# Patient Record
Sex: MALE | Race: WHITE | NOT HISPANIC OR LATINO | Employment: OTHER | URBAN - NONMETROPOLITAN AREA
[De-identification: names, ages, dates, MRNs, and addresses within clinical notes are randomized per-mention and may not be internally consistent; named-entity substitution may affect disease eponyms.]

---

## 2017-02-14 ENCOUNTER — HOSPITAL ENCOUNTER (OUTPATIENT)
Facility: HOSPITAL | Age: 72
Setting detail: SURGERY ADMIT
End: 2017-02-14
Attending: ORTHOPAEDIC SURGERY | Admitting: ORTHOPAEDIC SURGERY

## 2017-02-28 ENCOUNTER — TRANSCRIBE ORDERS (OUTPATIENT)
Dept: ADMINISTRATIVE | Facility: HOSPITAL | Age: 72
End: 2017-02-28

## 2017-02-28 DIAGNOSIS — I25.10 ATHEROSCLEROSIS OF NATIVE CORONARY ARTERY OF NATIVE HEART WITHOUT ANGINA PECTORIS: Primary | ICD-10-CM

## 2017-02-28 DIAGNOSIS — Z01.818 PREOPERATIVE CLEARANCE: ICD-10-CM

## 2017-03-06 ENCOUNTER — HOSPITAL ENCOUNTER (OUTPATIENT)
Dept: GENERAL RADIOLOGY | Facility: HOSPITAL | Age: 72
Discharge: HOME | End: 2017-03-06
Admitting: ORTHOPAEDIC SURGERY

## 2017-03-06 ENCOUNTER — APPOINTMENT (OUTPATIENT)
Dept: PREADMISSION TESTING | Facility: HOSPITAL | Age: 72
End: 2017-03-06

## 2017-03-06 VITALS
WEIGHT: 233.2 LBS | OXYGEN SATURATION: 97 % | RESPIRATION RATE: 18 BRPM | BODY MASS INDEX: 31.59 KG/M2 | SYSTOLIC BLOOD PRESSURE: 170 MMHG | HEIGHT: 72 IN | DIASTOLIC BLOOD PRESSURE: 66 MMHG | HEART RATE: 66 BPM

## 2017-03-06 LAB
ALBUMIN SERPL-MCNC: 3.8 G/DL (ref 3.5–5)
ALBUMIN/GLOB SERPL: 1.3 G/DL (ref 1.1–2.5)
ALP SERPL-CCNC: 100 U/L (ref 24–120)
ALT SERPL W P-5'-P-CCNC: 84 U/L (ref 0–54)
ANION GAP SERPL CALCULATED.3IONS-SCNC: 10 MMOL/L (ref 4–13)
APTT PPP: 29.9 SECONDS (ref 24.1–34.8)
AST SERPL-CCNC: 199 U/L (ref 7–45)
BACTERIA UR QL AUTO: ABNORMAL /HPF
BASOPHILS # BLD AUTO: 0.03 10*3/MM3 (ref 0–0.2)
BASOPHILS NFR BLD AUTO: 0.5 % (ref 0–2)
BILIRUB SERPL-MCNC: 0.6 MG/DL (ref 0.1–1)
BILIRUB UR QL STRIP: NEGATIVE
BUN BLD-MCNC: 24 MG/DL (ref 5–21)
BUN/CREAT SERPL: 17.4 (ref 7–25)
CALCIUM SPEC-SCNC: 9.2 MG/DL (ref 8.4–10.4)
CHLORIDE SERPL-SCNC: 102 MMOL/L (ref 98–110)
CLARITY UR: CLEAR
CO2 SERPL-SCNC: 30 MMOL/L (ref 24–31)
COLOR UR: YELLOW
CREAT BLD-MCNC: 1.38 MG/DL (ref 0.5–1.4)
DEPRECATED RDW RBC AUTO: 42.4 FL (ref 40–54)
EOSINOPHIL # BLD AUTO: 0.26 10*3/MM3 (ref 0–0.7)
EOSINOPHIL NFR BLD AUTO: 4.1 % (ref 0–4)
ERYTHROCYTE [DISTWIDTH] IN BLOOD BY AUTOMATED COUNT: 13.9 % (ref 12–15)
GFR SERPL CREATININE-BSD FRML MDRD: 51 ML/MIN/1.73
GLOBULIN UR ELPH-MCNC: 2.9 GM/DL
GLUCOSE BLD-MCNC: 157 MG/DL (ref 70–100)
GLUCOSE UR STRIP-MCNC: NEGATIVE MG/DL
HCT VFR BLD AUTO: 40.1 % (ref 40–52)
HGB BLD-MCNC: 13.2 G/DL (ref 14–18)
HGB UR QL STRIP.AUTO: NEGATIVE
HYALINE CASTS UR QL AUTO: ABNORMAL /LPF
IMM GRANULOCYTES # BLD: 0.03 10*3/MM3 (ref 0–0.03)
IMM GRANULOCYTES NFR BLD: 0.5 % (ref 0–5)
INR PPP: 0.92 (ref 0.91–1.09)
KETONES UR QL STRIP: NEGATIVE
LEUKOCYTE ESTERASE UR QL STRIP.AUTO: ABNORMAL
LYMPHOCYTES # BLD AUTO: 1.1 10*3/MM3 (ref 0.72–4.86)
LYMPHOCYTES NFR BLD AUTO: 17.2 % (ref 15–45)
MCH RBC QN AUTO: 27.4 PG (ref 28–32)
MCHC RBC AUTO-ENTMCNC: 32.9 G/DL (ref 33–36)
MCV RBC AUTO: 83.4 FL (ref 82–95)
MONOCYTES # BLD AUTO: 0.63 10*3/MM3 (ref 0.19–1.3)
MONOCYTES NFR BLD AUTO: 9.9 % (ref 4–12)
NEUTROPHILS # BLD AUTO: 4.33 10*3/MM3 (ref 1.87–8.4)
NEUTROPHILS NFR BLD AUTO: 67.8 % (ref 39–78)
NITRITE UR QL STRIP: NEGATIVE
PH UR STRIP.AUTO: 7.5 [PH] (ref 5–8)
PLATELET # BLD AUTO: 134 10*3/MM3 (ref 130–400)
PMV BLD AUTO: 10.6 FL (ref 6–12)
POTASSIUM BLD-SCNC: 4.7 MMOL/L (ref 3.5–5.3)
PROT SERPL-MCNC: 6.7 G/DL (ref 6.3–8.7)
PROT UR QL STRIP: ABNORMAL
PROTHROMBIN TIME: 12.6 SECONDS (ref 11.9–14.6)
RBC # BLD AUTO: 4.81 10*6/MM3 (ref 4.8–5.9)
RBC # UR: ABNORMAL /HPF
REF LAB TEST METHOD: ABNORMAL
SODIUM BLD-SCNC: 142 MMOL/L (ref 135–145)
SP GR UR STRIP: 1.02 (ref 1–1.03)
SQUAMOUS #/AREA URNS HPF: ABNORMAL /HPF
UROBILINOGEN UR QL STRIP: ABNORMAL
WBC NRBC COR # BLD: 6.38 10*3/MM3 (ref 4.8–10.8)
WBC UR QL AUTO: ABNORMAL /HPF

## 2017-03-06 PROCEDURE — 81001 URINALYSIS AUTO W/SCOPE: CPT | Performed by: ORTHOPAEDIC SURGERY

## 2017-03-06 PROCEDURE — 87086 URINE CULTURE/COLONY COUNT: CPT | Performed by: ORTHOPAEDIC SURGERY

## 2017-03-06 PROCEDURE — 93005 ELECTROCARDIOGRAM TRACING: CPT

## 2017-03-06 PROCEDURE — 85610 PROTHROMBIN TIME: CPT | Performed by: ORTHOPAEDIC SURGERY

## 2017-03-06 PROCEDURE — 36415 COLL VENOUS BLD VENIPUNCTURE: CPT

## 2017-03-06 PROCEDURE — 85730 THROMBOPLASTIN TIME PARTIAL: CPT | Performed by: ORTHOPAEDIC SURGERY

## 2017-03-06 PROCEDURE — 71020 HC CHEST PA AND LATERAL: CPT

## 2017-03-06 PROCEDURE — 93010 ELECTROCARDIOGRAM REPORT: CPT | Performed by: INTERNAL MEDICINE

## 2017-03-06 PROCEDURE — 85025 COMPLETE CBC W/AUTO DIFF WBC: CPT | Performed by: ORTHOPAEDIC SURGERY

## 2017-03-06 PROCEDURE — 80053 COMPREHEN METABOLIC PANEL: CPT | Performed by: ORTHOPAEDIC SURGERY

## 2017-03-08 LAB — BACTERIA SPEC AEROBE CULT: NORMAL

## 2017-03-10 ENCOUNTER — HOSPITAL ENCOUNTER (OUTPATIENT)
Dept: CARDIOLOGY | Facility: HOSPITAL | Age: 72
Discharge: HOME | End: 2017-03-10
Admitting: PHYSICIAN ASSISTANT

## 2017-03-10 ENCOUNTER — HOSPITAL ENCOUNTER (OUTPATIENT)
Dept: CARDIOLOGY | Facility: HOSPITAL | Age: 72
Discharge: HOME | End: 2017-03-10

## 2017-03-10 VITALS — DIASTOLIC BLOOD PRESSURE: 84 MMHG | HEART RATE: 74 BPM | SYSTOLIC BLOOD PRESSURE: 159 MMHG

## 2017-03-10 DIAGNOSIS — I25.10 ATHEROSCLEROSIS OF NATIVE CORONARY ARTERY OF NATIVE HEART WITHOUT ANGINA PECTORIS: ICD-10-CM

## 2017-03-10 DIAGNOSIS — Z01.818 PREOPERATIVE CLEARANCE: ICD-10-CM

## 2017-03-10 LAB
BH CV ECHO MEAS - AO MAX PG (FULL): 0.41 MMHG
BH CV ECHO MEAS - AO MAX PG: 3.9 MMHG
BH CV ECHO MEAS - AO MEAN PG (FULL): 1 MMHG
BH CV ECHO MEAS - AO MEAN PG: 2 MMHG
BH CV ECHO MEAS - AO ROOT AREA (BSA CORRECTED): 1.4
BH CV ECHO MEAS - AO ROOT AREA: 8 CM^2
BH CV ECHO MEAS - AO ROOT DIAM: 3.2 CM
BH CV ECHO MEAS - AO V2 MAX: 99.1 CM/SEC
BH CV ECHO MEAS - AO V2 MEAN: 60.1 CM/SEC
BH CV ECHO MEAS - AO V2 VTI: 25.2 CM
BH CV ECHO MEAS - AVA(I,A): 3.5 CM^2
BH CV ECHO MEAS - AVA(I,D): 3.5 CM^2
BH CV ECHO MEAS - AVA(V,A): 3.6 CM^2
BH CV ECHO MEAS - AVA(V,D): 3.6 CM^2
BH CV ECHO MEAS - BSA(HAYCOCK): 2.3 M^2
BH CV ECHO MEAS - BSA: 2.3 M^2
BH CV ECHO MEAS - BZI_BMI: 31.6 KILOGRAMS/M^2
BH CV ECHO MEAS - BZI_METRIC_HEIGHT: 182.9 CM
BH CV ECHO MEAS - BZI_METRIC_WEIGHT: 105.7 KG
BH CV ECHO MEAS - CONTRAST EF 4CH: 53.6 ML/M^2
BH CV ECHO MEAS - EDV(CUBED): 189.1 ML
BH CV ECHO MEAS - EDV(MOD-SP4): 135 ML
BH CV ECHO MEAS - EDV(TEICH): 162.6 ML
BH CV ECHO MEAS - EF(CUBED): 67.9 %
BH CV ECHO MEAS - EF(MOD-SP4): 53.6 %
BH CV ECHO MEAS - EF(TEICH): 58.7 %
BH CV ECHO MEAS - ESV(CUBED): 60.7 ML
BH CV ECHO MEAS - ESV(MOD-SP4): 62.7 ML
BH CV ECHO MEAS - ESV(TEICH): 67.1 ML
BH CV ECHO MEAS - FS: 31.5 %
BH CV ECHO MEAS - IVS/LVPW: 0.95
BH CV ECHO MEAS - IVSD: 1.1 CM
BH CV ECHO MEAS - LA DIMENSION: 4.2 CM
BH CV ECHO MEAS - LA/AO: 1.3
BH CV ECHO MEAS - LAT PEAK E' VEL: 7.8 CM/SEC
BH CV ECHO MEAS - LV DIASTOLIC VOL/BSA (35-75): 59.4 ML/M^2
BH CV ECHO MEAS - LV MASS(C)D: 253.5 GRAMS
BH CV ECHO MEAS - LV MASS(C)DI: 111.5 GRAMS/M^2
BH CV ECHO MEAS - LV MAX PG: 3.5 MMHG
BH CV ECHO MEAS - LV MEAN PG: 1 MMHG
BH CV ECHO MEAS - LV SYSTOLIC VOL/BSA (12-30): 27.6 ML/M^2
BH CV ECHO MEAS - LV V1 MAX: 93.8 CM/SEC
BH CV ECHO MEAS - LV V1 MEAN: 46.7 CM/SEC
BH CV ECHO MEAS - LV V1 VTI: 23.5 CM
BH CV ECHO MEAS - LVIDD: 5.7 CM
BH CV ECHO MEAS - LVIDS: 3.9 CM
BH CV ECHO MEAS - LVLD AP4: 7.9 CM
BH CV ECHO MEAS - LVLS AP4: 7.1 CM
BH CV ECHO MEAS - LVOT AREA (M): 3.8 CM^2
BH CV ECHO MEAS - LVOT AREA: 3.8 CM^2
BH CV ECHO MEAS - LVOT DIAM: 2.2 CM
BH CV ECHO MEAS - LVPWD: 1.1 CM
BH CV ECHO MEAS - MED PEAK E' VEL: 4.35 CM/SEC
BH CV ECHO MEAS - MV A MAX VEL: 54.8 CM/SEC
BH CV ECHO MEAS - MV DEC TIME: 0.16 SEC
BH CV ECHO MEAS - MV E MAX VEL: 90.2 CM/SEC
BH CV ECHO MEAS - MV E/A: 1.6
BH CV ECHO MEAS - RAP SYSTOLE: 10 MMHG
BH CV ECHO MEAS - RVSP: 66 MMHG
BH CV ECHO MEAS - SI(AO): 89.2 ML/M^2
BH CV ECHO MEAS - SI(CUBED): 56.5 ML/M^2
BH CV ECHO MEAS - SI(LVOT): 39.3 ML/M^2
BH CV ECHO MEAS - SI(MOD-SP4): 31.8 ML/M^2
BH CV ECHO MEAS - SI(TEICH): 42 ML/M^2
BH CV ECHO MEAS - SV(AO): 202.7 ML
BH CV ECHO MEAS - SV(CUBED): 128.4 ML
BH CV ECHO MEAS - SV(LVOT): 89.3 ML
BH CV ECHO MEAS - SV(MOD-SP4): 72.3 ML
BH CV ECHO MEAS - SV(TEICH): 95.5 ML
BH CV ECHO MEAS - TR MAX VEL: 374 CM/SEC
BH CV STRESS BP STAGE 1: NORMAL
BH CV STRESS BP STAGE 2: NORMAL
BH CV STRESS BP STAGE 3: NORMAL
BH CV STRESS DOSE DOBUTAMINE STAGE 1: 10
BH CV STRESS DOSE DOBUTAMINE STAGE 2: 20
BH CV STRESS DOSE DOBUTAMINE STAGE 3: 30
BH CV STRESS DURATION MIN STAGE 1: 3
BH CV STRESS DURATION MIN STAGE 2: 3
BH CV STRESS DURATION MIN STAGE 3: 2
BH CV STRESS DURATION SEC STAGE 1: 0
BH CV STRESS DURATION SEC STAGE 2: 0
BH CV STRESS DURATION SEC STAGE 3: 54
BH CV STRESS HR STAGE 1: 73
BH CV STRESS HR STAGE 2: 89
BH CV STRESS HR STAGE 3: 136
BH CV STRESS PROTOCOL 1: NORMAL
BH CV STRESS RECOVERY BP: NORMAL MMHG
BH CV STRESS RECOVERY HR: 90 BPM
BH CV STRESS STAGE 1: 1
BH CV STRESS STAGE 2: 2
BH CV STRESS STAGE 3: 3
E/E' RATIO: 20.7
LEFT ATRIUM VOLUME INDEX: 40.3 ML/M2
LEFT ATRIUM VOLUME: 91.4 CM3
MAXIMAL PREDICTED HEART RATE: 150 BPM
PERCENT MAX PREDICTED HR: 90.67 %
STRESS BASELINE BP: NORMAL MMHG
STRESS BASELINE HR: 68 BPM
STRESS PERCENT HR: 107 %
STRESS POST EXERCISE DUR MIN: 8 MIN
STRESS POST EXERCISE DUR SEC: 54 SEC
STRESS POST PEAK BP: NORMAL MMHG
STRESS POST PEAK HR: 136 BPM
STRESS TARGET HR: 128 BPM

## 2017-03-10 PROCEDURE — 93018 CV STRESS TEST I&R ONLY: CPT | Performed by: INTERNAL MEDICINE

## 2017-03-10 PROCEDURE — 93352 ADMIN ECG CONTRAST AGENT: CPT | Performed by: INTERNAL MEDICINE

## 2017-03-10 PROCEDURE — 93350 STRESS TTE ONLY: CPT | Performed by: INTERNAL MEDICINE

## 2017-03-10 PROCEDURE — 93306 TTE W/DOPPLER COMPLETE: CPT | Performed by: INTERNAL MEDICINE

## 2017-03-10 RX ORDER — DOBUTAMINE HYDROCHLORIDE 100 MG/100ML
10-50 INJECTION INTRAVENOUS
Status: DISCONTINUED | OUTPATIENT
Start: 2017-03-10 | End: 2017-03-11 | Stop reason: HOSPADM

## 2017-03-10 RX ADMIN — SODIUM CHLORIDE 3 ML: 9 INJECTION INTRAMUSCULAR; INTRAVENOUS; SUBCUTANEOUS at 14:48

## 2017-03-10 RX ADMIN — SODIUM CHLORIDE 5 ML: 9 INJECTION INTRAMUSCULAR; INTRAVENOUS; SUBCUTANEOUS at 14:02

## 2017-03-10 RX ADMIN — ATROPINE SULFATE 0.6 MG: 0.1 INJECTION, SOLUTION INTRAVENOUS at 14:50

## 2017-03-10 RX ADMIN — DOBUTAMINE HYDROCHLORIDE 30 MCG/KG/MIN: 100 INJECTION INTRAVENOUS at 14:39

## 2017-03-10 RX ADMIN — SODIUM CHLORIDE 3 ML: 9 INJECTION INTRAMUSCULAR; INTRAVENOUS; SUBCUTANEOUS at 13:42

## 2017-03-13 ENCOUNTER — HOSPITAL ENCOUNTER (INPATIENT)
Facility: HOSPITAL | Age: 72
LOS: 4 days | Discharge: HOME | End: 2017-03-17
Attending: ORTHOPAEDIC SURGERY | Admitting: ORTHOPAEDIC SURGERY

## 2017-03-13 ENCOUNTER — ANESTHESIA EVENT (OUTPATIENT)
Dept: PERIOP | Facility: HOSPITAL | Age: 72
End: 2017-03-13

## 2017-03-13 ENCOUNTER — ANESTHESIA (OUTPATIENT)
Dept: PERIOP | Facility: HOSPITAL | Age: 72
End: 2017-03-13

## 2017-03-13 ENCOUNTER — APPOINTMENT (OUTPATIENT)
Dept: GENERAL RADIOLOGY | Facility: HOSPITAL | Age: 72
End: 2017-03-13

## 2017-03-13 DIAGNOSIS — Z74.09 IMPAIRED FUNCTIONAL MOBILITY, BALANCE, GAIT, AND ENDURANCE: ICD-10-CM

## 2017-03-13 DIAGNOSIS — Z78.9 DECREASED ACTIVITIES OF DAILY LIVING (ADL): ICD-10-CM

## 2017-03-13 PROBLEM — I25.10 CORONARY ARTERY DISEASE INVOLVING NATIVE CORONARY ARTERY WITHOUT ANGINA PECTORIS: Chronic | Status: ACTIVE | Noted: 2017-03-13

## 2017-03-13 PROBLEM — E78.00 PURE HYPERCHOLESTEROLEMIA: Chronic | Status: ACTIVE | Noted: 2017-03-13

## 2017-03-13 PROBLEM — I10 ESSENTIAL HYPERTENSION: Chronic | Status: ACTIVE | Noted: 2017-03-13

## 2017-03-13 PROBLEM — M48.061 SPINAL STENOSIS, LUMBAR: Status: ACTIVE | Noted: 2017-03-13

## 2017-03-13 PROBLEM — E11.9 TYPE 2 DIABETES MELLITUS WITHOUT COMPLICATION, WITHOUT LONG-TERM CURRENT USE OF INSULIN (HCC): Chronic | Status: ACTIVE | Noted: 2017-03-13

## 2017-03-13 LAB
ABO GROUP BLD: NORMAL
BLD GP AB SCN SERPL QL: NEGATIVE
GLUCOSE BLDC GLUCOMTR-MCNC: 131 MG/DL (ref 70–130)
GLUCOSE BLDC GLUCOMTR-MCNC: 143 MG/DL (ref 70–130)
RH BLD: POSITIVE

## 2017-03-13 PROCEDURE — 94799 UNLISTED PULMONARY SVC/PX: CPT

## 2017-03-13 PROCEDURE — 25010000002 HEPARIN (PORCINE) PER 1000 UNITS: Performed by: ORTHOPAEDIC SURGERY

## 2017-03-13 PROCEDURE — 25010000002 SUCCINYLCHOLINE PER 20 MG: Performed by: NURSE ANESTHETIST, CERTIFIED REGISTERED

## 2017-03-13 PROCEDURE — 0SB20ZZ EXCISION OF LUMBAR VERTEBRAL DISC, OPEN APPROACH: ICD-10-PCS | Performed by: ORTHOPAEDIC SURGERY

## 2017-03-13 PROCEDURE — 25010000002 MIDAZOLAM PER 1 MG: Performed by: ANESTHESIOLOGY

## 2017-03-13 PROCEDURE — C1713 ANCHOR/SCREW BN/BN,TIS/BN: HCPCS | Performed by: ORTHOPAEDIC SURGERY

## 2017-03-13 PROCEDURE — 25010000002 HYDROMORPHONE PER 4 MG: Performed by: ANESTHESIOLOGY

## 2017-03-13 PROCEDURE — 82962 GLUCOSE BLOOD TEST: CPT

## 2017-03-13 PROCEDURE — 25010000003 CEFAZOLIN PER 500 MG: Performed by: ORTHOPAEDIC SURGERY

## 2017-03-13 PROCEDURE — 94760 N-INVAS EAR/PLS OXIMETRY 1: CPT

## 2017-03-13 PROCEDURE — 25010000002 METOCLOPRAMIDE PER 10 MG: Performed by: ANESTHESIOLOGY

## 2017-03-13 PROCEDURE — 0SG00A0 FUSION OF LUMBAR VERTEBRAL JOINT WITH INTERBODY FUSION DEVICE, ANTERIOR APPROACH, ANTERIOR COLUMN, OPEN APPROACH: ICD-10-PCS | Performed by: ORTHOPAEDIC SURGERY

## 2017-03-13 PROCEDURE — 76000 FLUOROSCOPY <1 HR PHYS/QHP: CPT

## 2017-03-13 PROCEDURE — 86901 BLOOD TYPING SEROLOGIC RH(D): CPT | Performed by: ORTHOPAEDIC SURGERY

## 2017-03-13 PROCEDURE — 86900 BLOOD TYPING SEROLOGIC ABO: CPT | Performed by: ORTHOPAEDIC SURGERY

## 2017-03-13 PROCEDURE — 72100 X-RAY EXAM L-S SPINE 2/3 VWS: CPT

## 2017-03-13 PROCEDURE — 4A11X4G MONITORING OF PERIPHERAL NERVOUS ELECTRICAL ACTIVITY, INTRAOPERATIVE, EXTERNAL APPROACH: ICD-10-PCS | Performed by: ORTHOPAEDIC SURGERY

## 2017-03-13 PROCEDURE — 25010000002 PROPOFOL 10 MG/ML EMULSION: Performed by: NURSE ANESTHETIST, CERTIFIED REGISTERED

## 2017-03-13 PROCEDURE — 86850 RBC ANTIBODY SCREEN: CPT | Performed by: ORTHOPAEDIC SURGERY

## 2017-03-13 DEVICE — IMPLANTABLE DEVICE: Type: IMPLANTABLE DEVICE | Status: FUNCTIONAL

## 2017-03-13 DEVICE — BONE CANC CRUSHED FDZ 15CC: Type: IMPLANTABLE DEVICE | Status: FUNCTIONAL

## 2017-03-13 DEVICE — SCRW IPF TIMBERLINE VARI S/TAP 5.5X40MM: Type: IMPLANTABLE DEVICE | Status: FUNCTIONAL

## 2017-03-13 RX ORDER — OXYCODONE AND ACETAMINOPHEN 10; 325 MG/1; MG/1
2 TABLET ORAL EVERY 4 HOURS PRN
Status: DISCONTINUED | OUTPATIENT
Start: 2017-03-13 | End: 2017-03-15 | Stop reason: SDUPTHER

## 2017-03-13 RX ORDER — NALOXONE HCL 0.4 MG/ML
0.04 VIAL (ML) INJECTION AS NEEDED
Status: DISCONTINUED | OUTPATIENT
Start: 2017-03-13 | End: 2017-03-13 | Stop reason: HOSPADM

## 2017-03-13 RX ORDER — LOSARTAN POTASSIUM 50 MG/1
100 TABLET ORAL DAILY
Status: DISCONTINUED | OUTPATIENT
Start: 2017-03-13 | End: 2017-03-17 | Stop reason: HOSPADM

## 2017-03-13 RX ORDER — FAMOTIDINE 20 MG/1
20 TABLET, FILM COATED ORAL 2 TIMES DAILY
Status: DISCONTINUED | OUTPATIENT
Start: 2017-03-13 | End: 2017-03-15 | Stop reason: SDUPTHER

## 2017-03-13 RX ORDER — SODIUM CHLORIDE, SODIUM LACTATE, POTASSIUM CHLORIDE, CALCIUM CHLORIDE 600; 310; 30; 20 MG/100ML; MG/100ML; MG/100ML; MG/100ML
100 INJECTION, SOLUTION INTRAVENOUS CONTINUOUS
Status: DISCONTINUED | OUTPATIENT
Start: 2017-03-13 | End: 2017-03-13 | Stop reason: SDUPTHER

## 2017-03-13 RX ORDER — FAMOTIDINE 10 MG/ML
20 INJECTION, SOLUTION INTRAVENOUS 2 TIMES DAILY
Status: DISCONTINUED | OUTPATIENT
Start: 2017-03-13 | End: 2017-03-15 | Stop reason: SDUPTHER

## 2017-03-13 RX ORDER — HYDRALAZINE HYDROCHLORIDE 20 MG/ML
5 INJECTION INTRAMUSCULAR; INTRAVENOUS
Status: DISCONTINUED | OUTPATIENT
Start: 2017-03-13 | End: 2017-03-13 | Stop reason: HOSPADM

## 2017-03-13 RX ORDER — IPRATROPIUM BROMIDE AND ALBUTEROL SULFATE 2.5; .5 MG/3ML; MG/3ML
3 SOLUTION RESPIRATORY (INHALATION) ONCE AS NEEDED
Status: DISCONTINUED | OUTPATIENT
Start: 2017-03-13 | End: 2017-03-13 | Stop reason: HOSPADM

## 2017-03-13 RX ORDER — SUCCINYLCHOLINE CHLORIDE 20 MG/ML
INJECTION INTRAMUSCULAR; INTRAVENOUS AS NEEDED
Status: DISCONTINUED | OUTPATIENT
Start: 2017-03-13 | End: 2017-03-13 | Stop reason: SURG

## 2017-03-13 RX ORDER — MIDAZOLAM HYDROCHLORIDE 1 MG/ML
2 INJECTION INTRAMUSCULAR; INTRAVENOUS
Status: DISCONTINUED | OUTPATIENT
Start: 2017-03-13 | End: 2017-03-17 | Stop reason: HOSPADM

## 2017-03-13 RX ORDER — MAGNESIUM HYDROXIDE 1200 MG/15ML
LIQUID ORAL AS NEEDED
Status: DISCONTINUED | OUTPATIENT
Start: 2017-03-13 | End: 2017-03-13 | Stop reason: HOSPADM

## 2017-03-13 RX ORDER — OXYCODONE HCL 20 MG/1
20 TABLET, FILM COATED, EXTENDED RELEASE ORAL ONCE
Status: COMPLETED | OUTPATIENT
Start: 2017-03-13 | End: 2017-03-13

## 2017-03-13 RX ORDER — SODIUM CHLORIDE, SODIUM LACTATE, POTASSIUM CHLORIDE, CALCIUM CHLORIDE 600; 310; 30; 20 MG/100ML; MG/100ML; MG/100ML; MG/100ML
30 INJECTION, SOLUTION INTRAVENOUS CONTINUOUS
Status: DISCONTINUED | OUTPATIENT
Start: 2017-03-13 | End: 2017-03-13 | Stop reason: SDUPTHER

## 2017-03-13 RX ORDER — METOCLOPRAMIDE HYDROCHLORIDE 5 MG/ML
5 INJECTION INTRAMUSCULAR; INTRAVENOUS
Status: DISCONTINUED | OUTPATIENT
Start: 2017-03-13 | End: 2017-03-13 | Stop reason: HOSPADM

## 2017-03-13 RX ORDER — FLUMAZENIL 0.1 MG/ML
0.2 INJECTION INTRAVENOUS AS NEEDED
Status: DISCONTINUED | OUTPATIENT
Start: 2017-03-13 | End: 2017-03-13 | Stop reason: HOSPADM

## 2017-03-13 RX ORDER — ONDANSETRON 4 MG/1
4 TABLET, ORALLY DISINTEGRATING ORAL EVERY 6 HOURS PRN
Status: DISCONTINUED | OUTPATIENT
Start: 2017-03-13 | End: 2017-03-15 | Stop reason: SDUPTHER

## 2017-03-13 RX ORDER — HEPARIN SODIUM 10000 [USP'U]/ML
INJECTION, SOLUTION INTRAVENOUS; SUBCUTANEOUS AS NEEDED
Status: DISCONTINUED | OUTPATIENT
Start: 2017-03-13 | End: 2017-03-13 | Stop reason: HOSPADM

## 2017-03-13 RX ORDER — SODIUM CHLORIDE 9 MG/ML
75 INJECTION, SOLUTION INTRAVENOUS CONTINUOUS
Status: DISPENSED | OUTPATIENT
Start: 2017-03-13 | End: 2017-03-15

## 2017-03-13 RX ORDER — PIOGLITAZONEHYDROCHLORIDE 30 MG/1
30 TABLET ORAL DAILY
COMMUNITY
End: 2022-09-22

## 2017-03-13 RX ORDER — MIDAZOLAM HYDROCHLORIDE 1 MG/ML
1 INJECTION INTRAMUSCULAR; INTRAVENOUS
Status: DISCONTINUED | OUTPATIENT
Start: 2017-03-13 | End: 2017-03-17 | Stop reason: HOSPADM

## 2017-03-13 RX ORDER — ONDANSETRON 4 MG/1
4 TABLET, FILM COATED ORAL EVERY 6 HOURS PRN
Status: DISCONTINUED | OUTPATIENT
Start: 2017-03-13 | End: 2017-03-15 | Stop reason: SDUPTHER

## 2017-03-13 RX ORDER — CALCIUM CHLORIDE 100 MG/ML
INJECTION INTRAVENOUS; INTRAVENTRICULAR AS NEEDED
Status: DISCONTINUED | OUTPATIENT
Start: 2017-03-13 | End: 2017-03-13 | Stop reason: HOSPADM

## 2017-03-13 RX ORDER — METOPROLOL TARTRATE 50 MG/1
50 TABLET, FILM COATED ORAL EVERY 12 HOURS SCHEDULED
Status: DISCONTINUED | OUTPATIENT
Start: 2017-03-13 | End: 2017-03-17 | Stop reason: HOSPADM

## 2017-03-13 RX ORDER — ONDANSETRON 2 MG/ML
4 INJECTION INTRAMUSCULAR; INTRAVENOUS EVERY 6 HOURS PRN
Status: DISCONTINUED | OUTPATIENT
Start: 2017-03-13 | End: 2017-03-13 | Stop reason: SDUPTHER

## 2017-03-13 RX ORDER — METOCLOPRAMIDE HYDROCHLORIDE 5 MG/ML
10 INJECTION INTRAMUSCULAR; INTRAVENOUS ONCE AS NEEDED
Status: COMPLETED | OUTPATIENT
Start: 2017-03-13 | End: 2017-03-13

## 2017-03-13 RX ORDER — ONDANSETRON 2 MG/ML
4 INJECTION INTRAMUSCULAR; INTRAVENOUS AS NEEDED
Status: DISCONTINUED | OUTPATIENT
Start: 2017-03-13 | End: 2017-03-13 | Stop reason: HOSPADM

## 2017-03-13 RX ORDER — DIPHENHYDRAMINE HCL 25 MG
25 CAPSULE ORAL NIGHTLY PRN
Status: DISCONTINUED | OUTPATIENT
Start: 2017-03-13 | End: 2017-03-15 | Stop reason: SDUPTHER

## 2017-03-13 RX ORDER — ONDANSETRON 2 MG/ML
4 INJECTION INTRAMUSCULAR; INTRAVENOUS EVERY 6 HOURS PRN
Status: DISCONTINUED | OUTPATIENT
Start: 2017-03-13 | End: 2017-03-15 | Stop reason: SDUPTHER

## 2017-03-13 RX ORDER — MORPHINE SULFATE 2 MG/ML
2 INJECTION, SOLUTION INTRAMUSCULAR; INTRAVENOUS AS NEEDED
Status: DISCONTINUED | OUTPATIENT
Start: 2017-03-13 | End: 2017-03-13 | Stop reason: HOSPADM

## 2017-03-13 RX ORDER — LABETALOL HYDROCHLORIDE 5 MG/ML
5 INJECTION, SOLUTION INTRAVENOUS
Status: DISCONTINUED | OUTPATIENT
Start: 2017-03-13 | End: 2017-03-13 | Stop reason: HOSPADM

## 2017-03-13 RX ORDER — ACETAMINOPHEN 10 MG/ML
1000 INJECTION, SOLUTION INTRAVENOUS ONCE
Status: COMPLETED | OUTPATIENT
Start: 2017-03-13 | End: 2017-03-13

## 2017-03-13 RX ORDER — LIDOCAINE HYDROCHLORIDE 20 MG/ML
INJECTION, SOLUTION INFILTRATION; PERINEURAL AS NEEDED
Status: DISCONTINUED | OUTPATIENT
Start: 2017-03-13 | End: 2017-03-13 | Stop reason: SURG

## 2017-03-13 RX ORDER — AMLODIPINE BESYLATE 5 MG/1
5 TABLET ORAL DAILY
Status: DISCONTINUED | OUTPATIENT
Start: 2017-03-13 | End: 2017-03-17 | Stop reason: HOSPADM

## 2017-03-13 RX ORDER — SODIUM CHLORIDE 0.9 % (FLUSH) 0.9 %
1-10 SYRINGE (ML) INJECTION AS NEEDED
Status: DISCONTINUED | OUTPATIENT
Start: 2017-03-13 | End: 2017-03-15 | Stop reason: SDUPTHER

## 2017-03-13 RX ORDER — SODIUM CHLORIDE 9 MG/ML
75 INJECTION, SOLUTION INTRAVENOUS CONTINUOUS
Status: DISCONTINUED | OUTPATIENT
Start: 2017-03-13 | End: 2017-03-13 | Stop reason: SDUPTHER

## 2017-03-13 RX ORDER — PROPOFOL 10 MG/ML
VIAL (ML) INTRAVENOUS AS NEEDED
Status: DISCONTINUED | OUTPATIENT
Start: 2017-03-13 | End: 2017-03-13 | Stop reason: SURG

## 2017-03-13 RX ORDER — MEPERIDINE HYDROCHLORIDE 25 MG/ML
12.5 INJECTION INTRAMUSCULAR; INTRAVENOUS; SUBCUTANEOUS
Status: DISCONTINUED | OUTPATIENT
Start: 2017-03-13 | End: 2017-03-13 | Stop reason: HOSPADM

## 2017-03-13 RX ORDER — SUFENTANIL CITRATE 50 UG/ML
INJECTION EPIDURAL; INTRAVENOUS AS NEEDED
Status: DISCONTINUED | OUTPATIENT
Start: 2017-03-13 | End: 2017-03-13 | Stop reason: SURG

## 2017-03-13 RX ADMIN — OXYCODONE HYDROCHLORIDE AND ACETAMINOPHEN 2 TABLET: 10; 325 TABLET ORAL at 16:12

## 2017-03-13 RX ADMIN — LIDOCAINE HYDROCHLORIDE 100 MG: 20 INJECTION, SOLUTION INFILTRATION; PERINEURAL at 12:05

## 2017-03-13 RX ADMIN — EPHEDRINE SULFATE 5 MG: 50 INJECTION INTRAMUSCULAR; INTRAVENOUS; SUBCUTANEOUS at 13:25

## 2017-03-13 RX ADMIN — METOCLOPRAMIDE 10 MG: 5 INJECTION, SOLUTION INTRAMUSCULAR; INTRAVENOUS at 10:29

## 2017-03-13 RX ADMIN — SUCCINYLCHOLINE CHLORIDE 100 MG: 20 INJECTION, SOLUTION INTRAMUSCULAR; INTRAVENOUS at 12:05

## 2017-03-13 RX ADMIN — PROPOFOL 50 MG: 10 INJECTION, EMULSION INTRAVENOUS at 12:05

## 2017-03-13 RX ADMIN — SODIUM CHLORIDE, POTASSIUM CHLORIDE, SODIUM LACTATE AND CALCIUM CHLORIDE 30 ML/HR: 600; 310; 30; 20 INJECTION, SOLUTION INTRAVENOUS at 10:29

## 2017-03-13 RX ADMIN — SODIUM CHLORIDE 75 ML/HR: 9 INJECTION, SOLUTION INTRAVENOUS at 15:11

## 2017-03-13 RX ADMIN — SODIUM CHLORIDE, POTASSIUM CHLORIDE, SODIUM LACTATE AND CALCIUM CHLORIDE 100 ML/HR: 600; 310; 30; 20 INJECTION, SOLUTION INTRAVENOUS at 10:30

## 2017-03-13 RX ADMIN — CEFAZOLIN SODIUM 2 G: 2 SOLUTION INTRAVENOUS at 12:01

## 2017-03-13 RX ADMIN — SODIUM CHLORIDE, POTASSIUM CHLORIDE, SODIUM LACTATE AND CALCIUM CHLORIDE: 600; 310; 30; 20 INJECTION, SOLUTION INTRAVENOUS at 12:01

## 2017-03-13 RX ADMIN — OXYCODONE HYDROCHLORIDE AND ACETAMINOPHEN 2 TABLET: 10; 325 TABLET ORAL at 23:51

## 2017-03-13 RX ADMIN — OXYCODONE HYDROCHLORIDE 20 MG: 20 TABLET, FILM COATED, EXTENDED RELEASE ORAL at 09:42

## 2017-03-13 RX ADMIN — AMLODIPINE BESYLATE 5 MG: 5 TABLET ORAL at 16:12

## 2017-03-13 RX ADMIN — EPHEDRINE SULFATE 5 MG: 50 INJECTION INTRAMUSCULAR; INTRAVENOUS; SUBCUTANEOUS at 13:32

## 2017-03-13 RX ADMIN — EPHEDRINE SULFATE 5 MG: 50 INJECTION INTRAMUSCULAR; INTRAVENOUS; SUBCUTANEOUS at 13:31

## 2017-03-13 RX ADMIN — ACETAMINOPHEN 1000 MG: 10 INJECTION, SOLUTION INTRAVENOUS at 10:30

## 2017-03-13 RX ADMIN — LOSARTAN POTASSIUM 100 MG: 50 TABLET, FILM COATED ORAL at 16:12

## 2017-03-13 RX ADMIN — FAMOTIDINE 20 MG: 20 TABLET, FILM COATED ORAL at 17:20

## 2017-03-13 RX ADMIN — MIDAZOLAM HYDROCHLORIDE 2 MG: 1 INJECTION, SOLUTION INTRAMUSCULAR; INTRAVENOUS at 10:29

## 2017-03-13 RX ADMIN — EPHEDRINE SULFATE 5 MG: 50 INJECTION INTRAMUSCULAR; INTRAVENOUS; SUBCUTANEOUS at 13:33

## 2017-03-13 RX ADMIN — METOPROLOL TARTRATE 50 MG: 50 TABLET ORAL at 21:11

## 2017-03-13 RX ADMIN — SUFENTANIL CITRATE 50 MCG: 50 INJECTION, SOLUTION EPIDURAL; INTRAVENOUS at 12:05

## 2017-03-13 RX ADMIN — CEFAZOLIN SODIUM 1 G: 1 INJECTION, SOLUTION INTRAVENOUS at 19:56

## 2017-03-13 RX ADMIN — LIDOCAINE HYDROCHLORIDE 0.5 ML: 10 INJECTION, SOLUTION EPIDURAL; INFILTRATION; INTRACAUDAL; PERINEURAL at 10:29

## 2017-03-13 RX ADMIN — HYDROMORPHONE HYDROCHLORIDE 0.5 MG: 1 INJECTION, SOLUTION INTRAMUSCULAR; INTRAVENOUS; SUBCUTANEOUS at 14:30

## 2017-03-13 NOTE — ANESTHESIA PROCEDURE NOTES
Airway  Urgency: elective    Airway not difficult    General Information and Staff    Patient location during procedure: OR  CRNA: LORENA NDIAYE    Indications and Patient Condition  Indications for airway management: airway protection    Preoxygenated: yes  MILS maintained throughout  Mask difficulty assessment: 1 - vent by mask    Final Airway Details  Final airway type: endotracheal airway      Successful airway: ETT  Cuffed: yes   Successful intubation technique: direct laryngoscopy  Facilitating devices/methods: intubating stylet  Endotracheal tube insertion site: oral  Blade: Hemalatha  Blade size: #4  ETT size: 8.0 mm  Cormack-Lehane Classification: grade IIa - partial view of glottis  Placement verified by: chest auscultation and capnometry   Cuff volume (mL): 8  Measured from: lips  ETT to lips (cm): 22  Number of attempts at approach: 1

## 2017-03-13 NOTE — ANESTHESIA PROCEDURE NOTES
Arterial Line    Patient location during procedure: pre-op  Start time: 3/13/2017 10:32 AM  Stop Time:3/13/2017 10:36 AM       Line placed for hemodynamic monitoring, ABGs/Labs/ISTAT and MD/Surgeon request.  Performed By   Anesthesiologist: PERLA LORENZO  Preanesthetic Checklist  Completed: patient identified, site marked, surgical consent, pre-op evaluation, timeout performed, IV checked, risks and benefits discussed and monitors and equipment checked  Arterial Line Prep   Sterile Tech: gloves and sterile barriers  Prep: ChloraPrep  Patient monitoring: blood pressure monitoring, continuous pulse oximetry and EKG  Arterial Line Procedure   Laterality:right  Location:  radial artery  Catheter size: 20 G   Guidance: ultrasound guided  PROCEDURE NOTE/ULTRASOUND INTERPRETATION.  Using ultrasound guidance the potential vascular sites for insertion of the catheter were visualized to determine the patency of the vessel to be used for vascular access.  After selecting the appropriate site for insertion, the needle was visualized under ultrasound being inserted into the radial artery, followed by ultrasound confirmation of wire and catheter placement. There were no abnormalities seen on ultrasound; an image was taken; and the patient tolerated the procedure with no complications.   Number of attempts: 1  Successful placement: yes          Post Assessment   Dressing Type: occlusive dressing applied, secured with tape and wrist guard applied.   Complications no  Circ/Move/Sens Assessment: normal.   Patient Tolerance: patient tolerated the procedure well with no apparent complications  Additional Notes  Patient unable to tolerate palpation technique. Ultrasound guided right radial A line placed without complication for severe pulmonary HTN and inferior hypokinesis

## 2017-03-13 NOTE — ANESTHESIA POSTPROCEDURE EVALUATION
Patient: Mani Arreola    Procedure Summary     Date Anesthesia Start Anesthesia Stop Room / Location    03/13/17 1201 1356 [facility] OR 05 / [facility] OR       Procedure Diagnosis Surgeon Provider    LEFT LUMBAR LATERAL INTERBODY FUSION WITH INSTRUMENTATION L4-5 , LANX,  NUVASIVE MONITORING  (Left Spine Lumbar) (M54.5) MD Mani Acevedo, PABLITO          Anesthesia Type: general  Last vitals  /76 (03/13/17 1423)    Temp 97.2 °F (36.2 °C) (03/13/17 1353)    Pulse 75 (03/13/17 1423)   Resp 14 (03/13/17 1423)    SpO2 93 % (03/13/17 1423)      Post Anesthesia Care and Evaluation    Patient location during evaluation: PACU  Patient participation: complete - patient participated  Level of consciousness: awake and alert  Pain management: adequate  Airway patency: patent  Anesthetic complications: No anesthetic complications    Cardiovascular status: acceptable and hemodynamically stable  Respiratory status: acceptable  Hydration status: acceptable

## 2017-03-13 NOTE — ANESTHESIA PREPROCEDURE EVALUATION
Anesthesia Evaluation     Patient summary reviewed and Nursing notes reviewed   no history of anesthetic complications:  NPO Status: > 8 hours   Airway   Mallampati: III  TM distance: >3 FB  Neck ROM: full  small opening and possible difficult intubation  Dental - normal exam     Pulmonary - normal exam   (+) a smoker (pipe smoker, none today),   Cardiovascular - normal exam    ECG reviewed  Patient on routine beta blocker and Beta blocker given within 24 hours of surgery    (+) hypertension, past MI (1998) , cardiac stents (x2, 1998) PVD (Carotid right- 2004),   (-) angina    ROS comment: Stress echo 2/28:  PREVIOUS APICAL LV MI  NO EVIDENCE OF ONGOING ISCHEMIA    Echo:  ·The left ventricular cavity is moderately dilated.  ·Left ventricular diastolic dysfunction (grade II) consistent with pseudonormalization.  ·Left atrial cavity size is mildly dilated.      MILD ISCHEMIC LV DYSFUNCTION  MODERATE TO SEVERE PULMONARY HTN  NO SIGNIFICANT VALVULAR DYSFUNCTION    Tricuspid Valve  The tricuspid valve is normal. No tricuspid valve stenosis is present. Trace tricuspid valve regurgitation is present. Estimated right ventricular systolic pressure from tricuspid regurgitation is markedly elevated (>55 mmHg). Moderate to severe pulmonary hypertension is present.  Left Ventricle  Calculated EF = 53.6%. Estimated EF was in disagreement with the calculated EF. Estimated EF appears to be in the range of 46 - 50%. Normal left ventricular wall thickness noted. Global left ventricular wall motion appears abnormal. The left ventricular cavity is moderately dilated. Left ventricular diastolic dysfunction is noted (grade II w/high LAP) consistent with pseudonormalization.    Neuro/Psych  (+) weakness,    (-) seizures, TIA, CVANumbness: Left leg, neurogenic claudication.  GI/Hepatic/Renal/Endo    (+) obesity,  diabetes mellitus,   (-) liver disease, renal disease    Musculoskeletal     (+) back pain, chronic pain,   Abdominal     Substance History      OB/GYN          Other      history of cancer (colon, 2004)                            Anesthesia Plan    ASA 3     general   (Given patient has known inferior hypokinesis and moderate to severe pulmonary HTN will place A line for surgery. )  intravenous induction   Anesthetic plan and risks discussed with patient.

## 2017-03-14 LAB
ANION GAP SERPL CALCULATED.3IONS-SCNC: 8 MMOL/L (ref 4–13)
BASOPHILS # BLD AUTO: 0.03 10*3/MM3 (ref 0–0.2)
BASOPHILS NFR BLD AUTO: 0.4 % (ref 0–2)
BUN BLD-MCNC: 24 MG/DL (ref 5–21)
BUN/CREAT SERPL: 16.3 (ref 7–25)
CALCIUM SPEC-SCNC: 8.8 MG/DL (ref 8.4–10.4)
CHLORIDE SERPL-SCNC: 106 MMOL/L (ref 98–110)
CO2 SERPL-SCNC: 26 MMOL/L (ref 24–31)
CREAT BLD-MCNC: 1.47 MG/DL (ref 0.5–1.4)
DEPRECATED RDW RBC AUTO: 43.6 FL (ref 40–54)
EOSINOPHIL # BLD AUTO: 0.28 10*3/MM3 (ref 0–0.7)
EOSINOPHIL NFR BLD AUTO: 4.1 % (ref 0–4)
ERYTHROCYTE [DISTWIDTH] IN BLOOD BY AUTOMATED COUNT: 14.4 % (ref 12–15)
GFR SERPL CREATININE-BSD FRML MDRD: 47 ML/MIN/1.73
GLUCOSE BLD-MCNC: 131 MG/DL (ref 70–100)
GLUCOSE BLDC GLUCOMTR-MCNC: 118 MG/DL (ref 70–130)
GLUCOSE BLDC GLUCOMTR-MCNC: 153 MG/DL (ref 70–130)
GLUCOSE BLDC GLUCOMTR-MCNC: 193 MG/DL (ref 70–130)
HBA1C MFR BLD: 7.3 %
HCT VFR BLD AUTO: 35.6 % (ref 40–52)
HGB BLD-MCNC: 11.6 G/DL (ref 14–18)
IMM GRANULOCYTES # BLD: 0.02 10*3/MM3 (ref 0–0.03)
IMM GRANULOCYTES NFR BLD: 0.3 % (ref 0–5)
LYMPHOCYTES # BLD AUTO: 0.7 10*3/MM3 (ref 0.72–4.86)
LYMPHOCYTES NFR BLD AUTO: 10.2 % (ref 15–45)
MCH RBC QN AUTO: 27 PG (ref 28–32)
MCHC RBC AUTO-ENTMCNC: 32.6 G/DL (ref 33–36)
MCV RBC AUTO: 83 FL (ref 82–95)
MONOCYTES # BLD AUTO: 0.68 10*3/MM3 (ref 0.19–1.3)
MONOCYTES NFR BLD AUTO: 9.9 % (ref 4–12)
NEUTROPHILS # BLD AUTO: 5.14 10*3/MM3 (ref 1.87–8.4)
NEUTROPHILS NFR BLD AUTO: 75.1 % (ref 39–78)
PLATELET # BLD AUTO: 185 10*3/MM3 (ref 130–400)
PMV BLD AUTO: 10.9 FL (ref 6–12)
POTASSIUM BLD-SCNC: 4.6 MMOL/L (ref 3.5–5.3)
RBC # BLD AUTO: 4.29 10*6/MM3 (ref 4.8–5.9)
SODIUM BLD-SCNC: 140 MMOL/L (ref 135–145)
WBC NRBC COR # BLD: 6.85 10*3/MM3 (ref 4.8–10.8)

## 2017-03-14 PROCEDURE — 97162 PT EVAL MOD COMPLEX 30 MIN: CPT

## 2017-03-14 PROCEDURE — 83036 HEMOGLOBIN GLYCOSYLATED A1C: CPT | Performed by: FAMILY MEDICINE

## 2017-03-14 PROCEDURE — G8987 SELF CARE CURRENT STATUS: HCPCS

## 2017-03-14 PROCEDURE — 85025 COMPLETE CBC W/AUTO DIFF WBC: CPT | Performed by: ORTHOPAEDIC SURGERY

## 2017-03-14 PROCEDURE — L0637 LSO SC R ANT/POS PNL PRE CST: HCPCS

## 2017-03-14 PROCEDURE — G8979 MOBILITY GOAL STATUS: HCPCS

## 2017-03-14 PROCEDURE — 25010000003 CEFAZOLIN PER 500 MG: Performed by: ORTHOPAEDIC SURGERY

## 2017-03-14 PROCEDURE — 82962 GLUCOSE BLOOD TEST: CPT

## 2017-03-14 PROCEDURE — 97116 GAIT TRAINING THERAPY: CPT

## 2017-03-14 PROCEDURE — 97166 OT EVAL MOD COMPLEX 45 MIN: CPT

## 2017-03-14 PROCEDURE — 80048 BASIC METABOLIC PNL TOTAL CA: CPT | Performed by: ORTHOPAEDIC SURGERY

## 2017-03-14 PROCEDURE — G8988 SELF CARE GOAL STATUS: HCPCS

## 2017-03-14 PROCEDURE — G8978 MOBILITY CURRENT STATUS: HCPCS

## 2017-03-14 PROCEDURE — 63710000001 INSULIN LISPRO (HUMAN) PER 5 UNITS: Performed by: FAMILY MEDICINE

## 2017-03-14 RX ORDER — DEXTROSE MONOHYDRATE 25 G/50ML
25 INJECTION, SOLUTION INTRAVENOUS
Status: DISCONTINUED | OUTPATIENT
Start: 2017-03-14 | End: 2017-03-17 | Stop reason: HOSPADM

## 2017-03-14 RX ORDER — NICOTINE POLACRILEX 4 MG
15 LOZENGE BUCCAL
Status: DISCONTINUED | OUTPATIENT
Start: 2017-03-14 | End: 2017-03-17 | Stop reason: HOSPADM

## 2017-03-14 RX ADMIN — FAMOTIDINE 20 MG: 20 TABLET, FILM COATED ORAL at 17:25

## 2017-03-14 RX ADMIN — CEFAZOLIN SODIUM 1 G: 1 INJECTION, SOLUTION INTRAVENOUS at 03:53

## 2017-03-14 RX ADMIN — SODIUM CHLORIDE 75 ML/HR: 9 INJECTION, SOLUTION INTRAVENOUS at 06:05

## 2017-03-14 RX ADMIN — OXYCODONE HYDROCHLORIDE AND ACETAMINOPHEN 2 TABLET: 10; 325 TABLET ORAL at 06:05

## 2017-03-14 RX ADMIN — LOSARTAN POTASSIUM 100 MG: 50 TABLET, FILM COATED ORAL at 09:11

## 2017-03-14 RX ADMIN — SODIUM CHLORIDE 75 ML/HR: 9 INJECTION, SOLUTION INTRAVENOUS at 20:00

## 2017-03-14 RX ADMIN — OXYCODONE HYDROCHLORIDE AND ACETAMINOPHEN 2 TABLET: 10; 325 TABLET ORAL at 12:22

## 2017-03-14 RX ADMIN — METFORMIN HYDROCHLORIDE: 500 TABLET, EXTENDED RELEASE ORAL at 09:11

## 2017-03-14 RX ADMIN — AMLODIPINE BESYLATE 5 MG: 5 TABLET ORAL at 09:11

## 2017-03-14 RX ADMIN — INSULIN LISPRO 2 UNITS: 100 INJECTION, SOLUTION INTRAVENOUS; SUBCUTANEOUS at 17:26

## 2017-03-14 RX ADMIN — METOPROLOL TARTRATE 50 MG: 50 TABLET ORAL at 09:12

## 2017-03-14 RX ADMIN — METOPROLOL TARTRATE 50 MG: 50 TABLET ORAL at 21:17

## 2017-03-14 RX ADMIN — CEFAZOLIN SODIUM 1 G: 1 INJECTION, SOLUTION INTRAVENOUS at 12:35

## 2017-03-14 RX ADMIN — INSULIN LISPRO 2 UNITS: 100 INJECTION, SOLUTION INTRAVENOUS; SUBCUTANEOUS at 12:23

## 2017-03-14 RX ADMIN — FAMOTIDINE 20 MG: 20 TABLET, FILM COATED ORAL at 09:11

## 2017-03-14 RX ADMIN — OXYCODONE HYDROCHLORIDE AND ACETAMINOPHEN 2 TABLET: 10; 325 TABLET ORAL at 18:51

## 2017-03-14 NOTE — PLAN OF CARE
Problem: Patient Care Overview (Adult)  Goal: Plan of Care Review  Outcome: Ongoing (interventions implemented as appropriate)    03/14/17 1042   Coping/Psychosocial Response Interventions   Plan Of Care Reviewed With patient   Patient Care Overview   Progress progress toward functional goals as expected   Outcome Evaluation   Outcome Summary/Follow up Plan OT sedrick completed this date. Pt. required CGA with bed rail to complete side lying to sit and supervision for sit to sidelying. Pt. required CGA with r/w to complete sit to stand t/f from bed. Pt. required CGA with r/w to complete functional mob. Pt. c/o increased pain in L. groin area with ambulation. Pt. cont to benefit from skilled OT due to decreased balance, increased pain, and decreased independence in ADLs. Anticipated dc is home with assist. 3/14/16 1040         Problem: Inpatient Occupational Therapy  Goal: Transfer Training Goal 1 LTG- OT  Outcome: Ongoing (interventions implemented as appropriate)    03/14/17 1042   Transfer Training OT LTG   Transfer Training OT LTG, Date Established 03/14/17   Transfer Training OT LTG, Time to Achieve by discharge   Transfer Training OT LTG, Activity Type bed to chair /chair to bed;walk-in shower;sit to stand/stand to sit;toilet;tub   Transfer Training OT LTG, [place] Level conditional independence   Transfer Training OT LTG, Assist Device walker, rolling;commode, bedside       Goal: Patient Education Goal LTG- OT  Outcome: Ongoing (interventions implemented as appropriate)    03/14/17 1042   Patient Education OT LTG   Patient Education OT LTG, Date Established 03/14/17   Patient Education OT LTG, Time to Achieve by discharge   Patient Education OT LTG, Education Type precautions per surgeon;brace use/care;positioning;posture/body mechanics;home safety;adaptive equipment mgmt;energy conservation;work simplification;phil/doff brace   Patient Education OT LTG, Education Understanding independent;demonstrates  adequately;verbalizes understanding       Goal: LB Dressing Goal LTG- OT  Outcome: Ongoing (interventions implemented as appropriate)    03/14/17 1042   LB Dressing OT LTG   LB Dressing Goal OT LTG, Date Established 03/14/17   LB Dressing Goal OT LTG, Time to Achieve by discharge   LB Dressing Goal OT LTG, [place] Level supervision required   LB Dressing Goal OT LTG, Additional Goal AE PRN

## 2017-03-14 NOTE — CONSULTS
[facility]  History and Physical    Patient:  Mani Arreola  MRN: [id number]    CHIEF COMPLAINT:  Back Pain    History Obtained From: the patient   PCP: Tyree Fleming MD    HISTORY OF PRESENT ILLNESS:   The patient is a 72 y.o. male who presents with severe back pain presents for elective lumbar gaytan.  Asked to follow for med mgt.    REVIEW OF SYSTEMS:    Constitutional: Negative for activity change, appetite change, chills, fatigue and fever.   HENT: Negative.  Negative for congestion, sinus pressure and sore throat.    Eyes: Negative for pain and discharge.   Respiratory: Negative.  Negative for cough, chest tightness, shortness of breath and wheezing.    Cardiovascular: Negative for chest pain and leg swelling.   Gastrointestinal: Negative for abdominal distention, abdominal pain, diarrhea, nausea and vomiting.   Genitourinary: Negative for difficulty urinating, flank pain, hematuria and urgency.   Musculoskeletal: Negative for arthralgias and back pain.   Skin: Negative for color change, pallor and rash.   Neurological: Negative for dizziness, syncope, numbness and headaches.   Psychiatric/Behavioral: Negative for agitation, behavioral problems and confusion.       Past Medical History:  Past Medical History   Diagnosis Date   • Colon cancer      colon   • Colon polyp    • Diabetes mellitus    • Hyperlipidemia    • Hypertension    • Myocardial infarction        Past Surgical History:  Past Surgical History   Procedure Laterality Date   • Cholecystectomy     • Carotid endarterectomy Right    • Colon resection       for colon cancer   • Colonoscopy  02/2004   • Colonoscopy  09/2005     normal   • Colonoscopy  10/2007     recall 3 yr   • Colonoscopy  11/01/2010     5mm polypectomy distal transverse colon, patent end to end ileo colonic anastomosis   • Hydrocele excision / repair     • Cataract extraction     • Colonoscopy N/A 11/16/2016     Procedure: COLONOSCOPY WITH ANESTHESIA;  Surgeon: Cirilo  JOSÉ ANTONIO Bhandari MD;  Location: [facility] ENDOSCOPY;  Service:    • Cardiac catheterization     • Peripheral arterial stent graft     • Lumbar fusion Left 3/13/2017     Procedure: LEFT LUMBAR LATERAL INTERBODY FUSION WITH INSTRUMENTATION L4-5 , LANX,  NUVASIVE MONITORING ;  Surgeon: DALY Morgan MD;  Location: [facility] OR;  Service:        Medications Prior to Admission:    Prescriptions Prior to Admission   Medication Sig Dispense Refill Last Dose   • amLODIPine (NORVASC) 5 MG tablet Take 5 mg by mouth daily.   3/12/2017 at 2300   • aspirin 325 MG tablet Take 325 mg by mouth daily.      • Cholecalciferol (VITAMIN D PO) Take  by mouth every night.      • diphenhydrAMINE (BENADRYL) 25 mg capsule Take 25 mg by mouth at night as needed for itching.      • folic acid (FOLVITE) 400 MCG tablet Take 400 mcg by mouth daily.      • losartan (COZAAR) 100 MG tablet Take 100 mg by mouth daily.   3/12/2017 at 0800   • metoprolol tartrate (LOPRESSOR) 50 MG tablet Take 50 mg by mouth 2 (two) times a day.   3/13/2017 at 0700   • Multiple Vitamins-Minerals (MULTIVITAMIN ADULT PO) Take  by mouth.      • pioglitazone (ACTOS) 30 MG tablet Take 30 mg by mouth Daily.   3/3/2017   • pravastatin (PRAVACHOL) 40 MG tablet Take 80 mg by mouth daily.      • SITagliptin-MetFORMIN HCl ER (JANUMET XR) 100-1000 MG tablet sustained-release 24 hour Take  by mouth daily.   3/12/2017 at 0700   • traMADol (ULTRAM) 50 MG tablet Take 50 mg by mouth 3 (three) times a day.      • vitamin C (ASCORBIC ACID) 500 MG tablet Take 500 mg by mouth daily.          Allergies:  Review of patient's allergies indicates no known allergies.    Social History:   Social History     Social History   • Marital status:      Spouse name: N/A   • Number of children: N/A   • Years of education: N/A     Occupational History   • Not on file.     Social History Main Topics   • Smoking status: Current Every Day Smoker     Types: Pipe   • Smokeless tobacco: Not on file   • Alcohol  "use Yes      Comment: occ   • Drug use: No   • Sexual activity: Defer     Other Topics Concern   • Not on file     Social History Narrative       Family History:   Family History   Problem Relation Age of Onset   • Heart disease Father    • Colon cancer Neg Hx            Physical Exam:    Vitals:   Visit Vitals   • /69 (BP Location: Right arm, Patient Position: Lying)   • Pulse 89   • Temp 98.4 °F (36.9 °C) (Oral)   • Resp 20   • Ht 72\" (182.9 cm)   • Wt 231 lb (105 kg)   • SpO2 93%   • BMI 31.33 kg/m2          General Appearance:    Alert, cooperative, in no acute distress   Head:    Normocephalic, without obvious abnormality, atraumatic   Eyes:            Lids and lashes normal, conjunctivae and sclerae normal, no   icterus, no pallor, corneas clear, PERRLA   Ears:    Ears appear intact with no abnormalities noted   Throat:   No oral lesions, no thrush, oral mucosa moist   Neck:   No adenopathy, supple, trachea midline, no thyromegaly, no   carotid bruit, no JVD   Back:     No kyphosis present, no scoliosis present, no skin lesions,      erythema or scars, no tenderness to percussion or                   palpation,   range of motion normal   Lungs:     Clear to auscultation,respirations regular, even and                  unlabored    Heart:    Regular rhythm and normal rate, normal S1 and S2, no            murmur, no gallop, no rub, no click   Chest Wall:    No abnormalities observed   Abdomen:     Normal bowel sounds, no masses, no organomegaly, soft        non-tender, non-distended, no guarding, no rebound                tenderness   Rectal:     Deferred   Extremities:   Moves all extremities well, no edema, no cyanosis, no             redness   Pulses:   Pulses palpable and equal bilaterally   Skin:   No bleeding, bruising or rash   Lymph nodes:   No palpable adenopathy   Neurologic:   Cranial nerves 2 - 12 grossly intact, sensation intact, DTR       present and equal bilaterally       Lab Results (last " 24 hours)     Procedure Component Value Units Date/Time    POC Glucose Fingerstick [[id number]]  (Abnormal) Collected:  03/13/17 1021    Specimen:  Blood Updated:  03/13/17 1042     Glucose 131 (H) mg/dL       : [id number] Wesley HydeMeter ID: [id number]       POC Glucose Fingerstick [[id number]]  (Abnormal) Collected:  03/13/17 1357    Specimen:  Blood Updated:  03/13/17 1409     Glucose 143 (H) mg/dL       : [id number] Kermit Sanders ID: [id number]       CBC Auto Differential [[id number]]  (Abnormal) Collected:  03/14/17 0339    Specimen:  Blood Updated:  03/14/17 0428     WBC 6.85 10*3/mm3      RBC 4.29 (L) 10*6/mm3      Hemoglobin 11.6 (L) g/dL      Hematocrit 35.6 (L) %      MCV 83.0 fL      MCH 27.0 (L) pg      MCHC 32.6 (L) g/dL      RDW 14.4 %      RDW-SD 43.6 fl      MPV 10.9 fL      Platelets 185 10*3/mm3      Neutrophil % 75.1 %      Lymphocyte % 10.2 (L) %      Monocyte % 9.9 %      Eosinophil % 4.1 (H) %      Basophil % 0.4 %      Immature Grans % 0.3 %      Neutrophils, Absolute 5.14 10*3/mm3      Lymphocytes, Absolute 0.70 (L) 10*3/mm3      Monocytes, Absolute 0.68 10*3/mm3      Eosinophils, Absolute 0.28 10*3/mm3      Basophils, Absolute 0.03 10*3/mm3      Immature Grans, Absolute 0.02 10*3/mm3     CBC & Differential [[id number]] Collected:  03/14/17 0339    Specimen:  Blood Updated:  03/14/17 0428    Narrative:       The following orders were created for panel order CBC & Differential.  Procedure                               Abnormality         Status                     ---------                               -----------         ------                     CBC Auto Differential[[id number]]         Abnormal            Final result                 Please view results for these tests on the individual orders.    Basic Metabolic Panel [[id number]]  (Abnormal) Collected:  03/14/17 0339    Specimen:  Blood Updated:  03/14/17 0446     Glucose 131 (H) mg/dL      BUN 24 (H) mg/dL      Creatinine 1.47 (H)  mg/dL      Sodium 140 mmol/L      Potassium 4.6 mmol/L      Chloride 106 mmol/L      CO2 26.0 mmol/L      Calcium 8.8 mg/dL      eGFR Non African Amer 47 (L) mL/min/1.73      BUN/Creatinine Ratio 16.3      Anion Gap 8.0 mmol/L     Narrative:       GFR Normal >60  Chronic Kidney Disease <60  Kidney Failure <15               No results found.    Assessment and Plan   1.     Active Problems:    Spinal stenosis, lumbar    Type 2 diabetes mellitus without complication, without long-term current use of insulin    Essential hypertension    Pure hypercholesterolemia    Coronary artery disease involving native coronary artery without angina pectoris    DIABETES:  1. Monitor glucose QAC and QHS or q 6hrs (if NPO)  2. SSI with humalog or novolog Insulin.  Medium dosing or bs-100/20  3. Diabetic diet   4. Diabetic education  Adjust therapy for Goal A1c <6.5 or <7.0 (  Tyree Fleming MD

## 2017-03-14 NOTE — PLAN OF CARE
Problem: Patient Care Overview (Adult)  Goal: Plan of Care Review  Outcome: Ongoing (interventions implemented as appropriate)    03/14/17 0321   Coping/Psychosocial Response Interventions   Plan Of Care Reviewed With patient   Patient Care Overview   Progress progress toward functional goals as expected   Outcome Evaluation   Outcome Summary/Follow up Plan Medicated patient with prn pain meds for relief of incisional pain. Effective. States some numbness improving which was in left foot. Dressing to left flank , clean/dry and intact. Safety maintained. Needs brace fitted. Part 2 surgery Wednesday..        Goal: Adult Individualization and Mutuality  Outcome: Ongoing (interventions implemented as appropriate)  Goal: Discharge Needs Assessment  Outcome: Ongoing (interventions implemented as appropriate)    Problem: Perioperative Period (Adult)  Goal: Signs and Symptoms of Listed Potential Problems Will be Absent or Manageable (Perioperative Period)  Outcome: Ongoing (interventions implemented as appropriate)

## 2017-03-14 NOTE — PLAN OF CARE
Problem: Patient Care Overview (Adult)  Goal: Plan of Care Review  Outcome: Ongoing (interventions implemented as appropriate)    03/14/17 1051   Coping/Psychosocial Response Interventions   Plan Of Care Reviewed With patient;family   Outcome Evaluation   Outcome Summary/Follow up Plan PT eval completed. Pt. CGA supine to sidelying to sit. Pt. CGA sit to stand and CGA x 2 to amb. 80' with RW in the hallway. Pt. limiited in functional mobility due to pain, decreased strength, ROM, and numbness LLE. Pt. with decreased standing balance, endurance and tolerance to physical activity. Pt. will benefit from skilled PT while in acute care to decrease impairments, and due to them, he is a fall risk at this time. Anticipate d/c home with A from family.         Problem: Inpatient Physical Therapy  Goal: Bed Mobility Goal LTG- PT  Outcome: Ongoing (interventions implemented as appropriate)    03/14/17 1051   Bed Mobility PT LTG   Bed Mobility PT LTG, Date Established 03/14/17   Bed Mobility PT LTG, Time to Achieve by discharge   Bed Mobility PT LTG, Activity Type all bed mobility   Bed Mobility PT LTG, [place] Level independent       Goal: Transfer Training Goal 1 LTG- PT  Outcome: Ongoing (interventions implemented as appropriate)    03/14/17 1051   Transfer Training PT LTG   Transfer Training PT LTG, Date Established 03/14/17   Transfer Training PT LTG, Time to Achieve by discharge   Transfer Training PT LTG, Activity Type sit to stand/stand to sit   Transfer Training PT LTG, [place] Level conditional independence   Transfer Training PT LTG, Assist Device walker, rolling       Goal: Gait Training Goal LTG- PT  Outcome: Ongoing (interventions implemented as appropriate)    03/14/17 1051   Gait Training PT LTG   Gait Training Goal PT LTG, Date Established 03/14/17   Gait Training Goal PT LTG, Time to Achieve by discharge   Gait Training Goal PT LTG, [place] Level supervision required   Gait Training Goal PT LTG,  Assist Device walker, rolling   Gait Training Goal PT LTG, Distance to Achieve 200'  (with LSO)       Goal: Stair Training Goal LTG- PT  Outcome: Ongoing (interventions implemented as appropriate)    03/14/17 1051   Stair Training PT LTG   Stair Training Goal PT LTG, Date Established 03/14/17   Stair Training Goal PT LTG, Time to Achieve by discharge   Stair Training Goal PT LTG, Number of Steps 3   Stair Training Goal PT LTG, [place] Level contact guard assist   Stair Training Goal PT LTG, Assist Device cane, straight

## 2017-03-14 NOTE — PROGRESS NOTES
Discharge Planning Assessment  [facility]     Patient Name: Mani Arreola  MRN: [id number]  Today's Date: 3/14/2017    Admit Date: 3/13/2017          Discharge Needs Assessment       03/14/17 1026    Living Environment    Lives With spouse    Living Arrangements house    Primary Care Provided By spouse/significant other    Quality Of Family Relationships supportive;helpful;involved    Able to Return to Prior Living Arrangements yes    Discharge Needs Assessment    Concerns To Be Addressed denies needs/concerns at this time    Readmission Within The Last 30 Days no previous admission in last 30 days    Equipment Currently Used at Home walker, rolling;cane, straight;commode;crutches    Equipment Needed After Discharge none    Transportation Available family or friend will provide    Discharge Disposition home or self-care    Discharge Planning Comments SW spoke to patient in room re discharge planning. Patient lives with spouse. RN had documented patient has no DME at home. However, patient states that he has all needed DME at home as indicated above. No additional DME is needed. Patient denies any discharge planning needs and states he will discharge to home.             Discharge Plan     None        Discharge Placement     No information found                Demographic Summary     None            Functional Status     None            Psychosocial     None            Abuse/Neglect     None            Legal     None            Substance Abuse     None            Patient Forms     None          SAM BrowneW

## 2017-03-14 NOTE — PROGRESS NOTES
Acute Care - Physical Therapy Initial Evaluation  [facility]     Patient Name: Mani Arreola  : 1945  MRN: [id number]  Today's Date: 3/14/2017   Onset of Illness/Injury or Date of Surgery Date: 17  Date of Referral to PT: 17  Referring Physician: Dr. Morgan (p/o care, gait training, braace A, rehab needs)      Admit Date: 3/13/2017     Visit Dx:    ICD-10-CM ICD-9-CM   1. Decreased activities of daily living (ADL) Z78.9 V49.89   2. Impaired functional mobility, balance, gait, and endurance Z74.09 V49.89     Patient Active Problem List   Diagnosis   • Spinal stenosis, lumbar   • Type 2 diabetes mellitus without complication, without long-term current use of insulin   • Essential hypertension   • Pure hypercholesterolemia   • Coronary artery disease involving native coronary artery without angina pectoris     Past Medical History   Diagnosis Date   • Colon cancer      colon   • Colon polyp    • Diabetes mellitus    • Hyperlipidemia    • Hypertension    • Myocardial infarction      Past Surgical History   Procedure Laterality Date   • Cholecystectomy     • Carotid endarterectomy Right    • Colon resection       for colon cancer   • Colonoscopy  2004   • Colonoscopy  2005     normal   • Colonoscopy  10/2007     recall 3 yr   • Colonoscopy  2010     5mm polypectomy distal transverse colon, patent end to end ileo colonic anastomosis   • Hydrocele excision / repair     • Cataract extraction     • Colonoscopy N/A 2016     Procedure: COLONOSCOPY WITH ANESTHESIA;  Surgeon: Cirilo Bhandari MD;  Location: [facility] ENDOSCOPY;  Service:    • Cardiac catheterization     • Peripheral arterial stent graft     • Lumbar fusion Left 3/13/2017     Procedure: LEFT LUMBAR LATERAL INTERBODY FUSION WITH INSTRUMENTATION L4-5 , LANX,  NUVASIVE MONITORING ;  Surgeon: DALY Morgan MD;  Location: [facility] OR;  Service:           PT ASSESSMENT (last 72 hours)      PT Evaluation       17 1026  03/14/17 1016    Rehab Evaluation    Document Type  evaluation;other (see comments)   SEE mar  -CP    Subjective Information  agree to therapy;complains of;pain  -CP    General Information    Patient Profile Review  yes  -CP    Onset of Illness/Injury or Date of Surgery Date  03/13/17  -CP    Referring Physician  Dr. Morgan   p/o care, gait training, braace A, rehab needs  -CP    General Observations  Pt. lying supine, alert, IV, on RA, family present, SCDs  -CP    Pertinent History Of Current Problem  Pt. having pain in lower [place], radiating down legs, L. side worse. S/P 3/13/17 L lumbar lateral interbody fusion with instrumentation L4-5.   -CP    Precautions/Limitations  brace on when up;fall precautions   LSO  -CP    Prior Level of Function  independent:;all household mobility;community mobility;gait;transfer;ADL's;feeding;grooming;dressing;bathing;home management;driving  -CP    Equipment Currently Used at Home walker, rolling;cane, straight;commode;crutches  - bath bench;commode;walker, rolling;wheelchair;cane, straight;walker, standard  -CP    Plans/Goals Discussed With  patient and family;agreed upon  -CP    Risks Reviewed  patient and family:;LOB;nausea/vomiting;dizziness;increased discomfort;change in vital signs  -CP    Benefits Reviewed  patient and family:;improve function;increase independence;increase strength;increase balance;decrease pain;decrease risk of DVT;improve skin integrity;increase knowledge  -CP    Barriers to Rehab  previous functional deficit;physical barrier;impaired sensation  -CP    Living Environment    Lives With spouse  -KP spouse  -CP    Living Arrangements house  - mobile home  -CP    Home Accessibility  stairs to enter home  -CP    Number of Stairs to Enter Home  3  -CP    Stair Railings at Home  none  -CP    Transportation Available family or friend will provide  -     Clinical Impression    Date of Referral to PT  03/13/17  -CP    PT Diagnosis  impaired gait and mobility   "-CP    Prognosis  good  -CP    Patient/Family Goals Statement  decrease pain, walk  -CP    Criteria for Skilled Therapeutic Interventions Met  yes;treatment indicated  -CP    Pathology/Pathophysiology Noted (Describe Specifically for Each System)  musculoskeletal;neuromuscular  -CP    Impairments Found (describe specific impairments)  ergonomics and body mechanics;gait, locomotion, and balance;joint integrity and mobility;motor function;muscle performance;ROM;posture  -CP    Rehab Potential  good, to achieve stated therapy goals  -CP    Predicted Duration of Therapy Intervention (days/wks)  until d/c  -CP    Pain Assessment    Pain Assessment  0-10  -CP    Pain Score  4  -CP    Pain Type  Chronic pain;Surgical pain  -CP    Pain Location  Back  -CP    Pain Orientation  Lower  -CP    Pain Radiating Towards  radiating down LLE to toes  -CP    Pain Descriptors  --   \"all of the above\"  -CP    Pain Frequency  Constant/continuous  -CP    Pain Intervention(s)  Medication (See MAR);Repositioned;Ambulation/increased activity  -CP    Response to Interventions  tolerated  -CP    Multiple Pain Sites  Yes  -CP    Pain 2    Pain Score 2  4  -CP    Vision Assessment/Intervention    Visual Impairment  WFL with corrective lenses   reading glasses  -CP    Cognitive Assessment/Intervention    Current Cognitive/Communication Assessment  functional  -CP    Orientation Status  oriented x 4  -CP    Follows Commands/Answers Questions  able to follow multi-step instructions;100% of the time  -CP    Personal Safety  WNL/WFL  -CP    Personal Safety Interventions  fall prevention program maintained;gait belt;muscle strengthening facilitated;nonskid shoes/slippers when out of bed;supervised activity  -CP    ROM (Range of Motion)    General ROM  lower extremity range of motion deficits identified  -CP    General ROM Detail  AAROM L hip flexion limited due to pain, AROM L knee, ankle and R knee, hip and ankle WFLs  -CP    MMT (Manual Muscle " Testing)    General MMT Assessment  lower extremity strength deficits identified  -CP    General MMT Assessment Detail  LLE grossly 3/5, RLE grossly 4/5  -CP    Bed Mobility, Assessment/Treatment    Bed Mobility, Assistive Device  bed rails  -CP    Bed Mobility, Roll Left, [place]  contact guard assist;verbal cues required  -CP    Bed Mob, Sidelying to Sit, [place]  verbal cues required;nonverbal cues required (demo/gesture);contact guard assist  -CP    Bed Mob, Sit to Sidelying, [place]  verbal cues required;nonverbal cues required (demo/gesture);supervision required  -CP    Bed Mobility, Impairments  pain;ROM decreased;sensation decreased;strength decreased  -CP    Transfer Assessment/Treatment    Transfers, Sit-Stand [place]  verbal cues required;nonverbal cues required (demo/gesture);contact guard assist  -CP    Transfers, Stand-Sit [place]  verbal cues required;nonverbal cues required (demo/gesture);contact guard assist  -CP    Transfers, Sit-Stand-Sit, Assist Device  rolling walker  -CP    Transfer, Impairments  ROM decreased;strength decreased;impaired balance;pain  -CP    Gait Assessment/Treatment    Gait, [place] Level  contact guard assist;2 person assist required;verbal cues required   2A not necessary for future treatments  -CP    Gait, Assistive Device  rolling walker  -CP    Gait, Distance (Feet)  80  -CP    Gait, Gait Pattern Analysis  swing-to gait  -CP    Gait, Gait Deviations  left:;antalgic;sendy decreased;bilateral:;decreased heel strike;forward flexed posture;step length decreased;stride length decreased;toe-to-floor clearance decreased  -CP    Gait, Safety Issues  balance decreased during turns;step length decreased;weight-shifting ability decreased  -CP    Gait, Impairments  ROM decreased;decreased flexibility;sensation decreased;strength decreased;impaired balance;coordination impaired;motor control impaired;pain  -CP    Orthotics Prosthetics    Additional  Documentation  Orthosis Location (Group)  -CP    Orthosis Location    Orthosis Location/Type  neck/back  -CP    Orthosis, Neck/Back  LSO (lumbar sacral orthosis)  -CP    Orthosis Management/Training    Orthosis Indications  immobilize, protect/position healing structures;rest, reduce pain  -CP    Orthosis Skills Training  doffing orthosis;donning orthosis;activity limitations   no bending, lifting or twisting  -CP    Orthosis Wear Schedule  wear when out of bed only  -CP    Sensory Assessment/Intervention    Sensory Impairment  numbness   LLE  -CP    Positioning and Restraints    Pre-Treatment Position  in bed  -CP    Post Treatment Position  bed  -CP    In Bed  fowlers;call light within reach;encouraged to call for assist;with family/caregiver;side rails up x2;SCD pump applied  -CP      03/14/17 0834 03/13/17 2135    Rehab Evaluation    Document Type evaluation   See MAR, entered room 1015  -ND     Subjective Information agree to therapy;complains of;weakness;pain;numbness   numbness LLE  -ND     General Information    Patient Profile Review yes  -ND     Onset of Illness/Injury or Date of Surgery Date 03/13/17  -ND     Referring Physician Dr. Morgan  -ND     General Observations Pt. lying supine, alert, IV site, famil present, SCDs  -ND     Pertinent History Of Current Problem Pt. having pain in lower [place], radiating down legs, L. side worse. S/P 3/13/17 L lumbar lateral  interbody fusion with instrumentation L4-5.   -ND     Precautions/Limitations brace on when up;fall precautions;spinal precautions  -ND     Prior Level of Function independent:;all household mobility;community mobility;ADL's;home management;cooking;cleaning;driving  -ND     Equipment Currently Used at Home bath bench;commode;walker, rolling;wheelchair;cane, straight  -ND     Plans/Goals Discussed With patient and family;agreed upon  -ND     Risks Reviewed patient and family:;LOB;dizziness;nausea/vomiting;increased discomfort;change in vital  "signs;increased drainage  -ND     Benefits Reviewed patient and family:;increase independence;improve function;decrease pain;increase balance;increase strength;improve skin integrity;increase knowledge;decrease risk of DVT  -ND     Barriers to Rehab previous functional deficit;physical barrier;impaired sensation  -ND     Living Environment    Lives With spouse  -ND spouse  -AR    Living Arrangements mobile home  -ND house  -AR    Home Accessibility stairs to enter home;tub/shower is not walk in;bed and bath on same level  -ND stairs within home  -AR    Number of Stairs to Enter Home 3  -ND     Number of Stairs Within Home --  -ND 3  -AR    Stair Railings at Home  none  -AR    Type of Financial/Environmental Concern  none  -AR    Transportation Available  car  -AR    Pain Assessment    Pain Assessment 0-10  -ND     Pain Score 4  -ND     Pain Type Surgical pain;Chronic pain  -ND     Pain Location Back  -ND     Pain Orientation Lower  -ND     Pain Radiating Towards Radiating down LLE  -ND     Pain Descriptors --   \"All of the above\"  -ND     Pain Frequency Constant/continuous  -ND     Pain Intervention(s) Medication (See MAR);Repositioned  -ND     Response to Interventions tolerated  -ND     Multiple Pain Sites Yes  -ND     Vision Assessment/Intervention    Visual Impairment WFL with corrective lenses   READING  -ND     Cognitive Assessment/Intervention    Current Cognitive/Communication Assessment functional  -ND     Orientation Status oriented x 4  -ND     Follows Commands/Answers Questions 100% of the time;able to follow multi-step instructions  -ND     Personal Safety WNL/WFL  -ND     Personal Safety Interventions fall prevention program maintained;gait belt;nonskid shoes/slippers when out of bed;supervised activity  -ND     ROM (Range of Motion)    General ROM no range of motion deficits identified  -ND     General ROM Detail BUE AROM WFL  -ND     MMT (Manual Muscle Testing)    General MMT Assessment no strength " deficits identified  -ND     Bed Mobility, Assessment/Treatment    Bed Mobility, Assistive Device bed rails  -ND     Bed Mob, Sidelying to Sit, [place] verbal cues required;nonverbal cues required (demo/gesture);contact guard assist  -ND     Bed Mob, Sit to Sidelying, [place] verbal cues required;nonverbal cues required (demo/gesture);supervision required  -ND     Bed Mobility, Impairments pain  -ND     Transfer Assessment/Treatment    Transfers, Sit-Stand [place] verbal cues required;nonverbal cues required (demo/gesture);contact guard assist  -ND     Transfers, Stand-Sit [place] verbal cues required;nonverbal cues required (demo/gesture);contact guard assist  -ND     Transfers, Sit-Stand-Sit, Assist Device rolling walker  -ND     Transfer, Impairments pain;impaired balance  -ND     Motor Skills/Interventions    Additional Documentation Balance Skills Training (Group)  -ND     Balance Skills Training    Sitting-Level of Assistance Independent  -ND     Sitting-Balance Support Feet supported  -ND     Standing-Level of Assistance Contact guard  -ND     Static Standing Balance Support assistive device  -ND     Gait Balance-Level of Assistance Contact guard  -ND     Gait Balance Support assistive device  -ND     Orthotics Prosthetics    Additional Documentation Orthosis Location (Group);Orthosis Management/Training (Group)  -ND     Orthosis Location    Orthosis Location/Type neck/back  -ND     Orthosis, Neck/Back LSO (lumbar sacral orthosis)  -ND     Orthosis Management/Training    Orthosis Indications immobilize, protect/position healing structures;rest, reduce pain  -ND     Orthosis Skills Training doffing orthosis;donning orthosis;purpose/goals of orthosis;restrictions/precautions  -ND     Orthosis Wear Schedule wear when out of bed only  -ND     Sensory Assessment/Intervention    Sensory Impairment --   Numbness in LLE  -ND     Positioning and Restraints    Pre-Treatment Position in bed  -ND      Post Treatment Position bed  -ND     In Bed fowlers;call light within reach;encouraged to call for assist;with family/caregiver;side rails up x2  -ND       03/13/17 9447       General Information    Equipment Currently Used at Home none  -AR       User Key  (r) = Recorded By, (t) = Taken By, (c) = Cosigned By    Initials Name Provider Type    AMBIKA Chester, RN Registered Nurse    ANGEL Clark, BSW     NAGA Calzada, PT Physical Therapist    ARMIDA Ortiz, OTR/L Occupational Therapist          Physical Therapy Education     Title: PT OT SLP Therapies (Active)     Topic: Physical Therapy (Active)     Point: Mobility training (Done)    Learning Progress Summary    Learner Readiness Method Response Comment Documented by Status   Patient Acceptance E VU,NR Pt. educated on purpose of PT for mobility training, use of brace and RW. RW left in room for use with staff. Pt. educated on proper body mechanics, including no bending, lifting or twisting. CP 03/14/17 1049 Done   Family Acceptance E VU,NR Pt. educated on purpose of PT for mobility training, use of brace and RW. RW left in room for use with staff. Pt. educated on proper body mechanics, including no bending, lifting or twisting. CP 03/14/17 1049 Done               Point: Body mechanics (Done)    Learning Progress Summary    Learner Readiness Method Response Comment Documented by Status   Patient Acceptance E VU,NR Pt. educated on purpose of PT for mobility training, use of brace and RW. RW left in room for use with staff. Pt. educated on proper body mechanics, including no bending, lifting or twisting. CP 03/14/17 1049 Done   Family Acceptance E VU,NR Pt. educated on purpose of PT for mobility training, use of brace and RW. RW left in room for use with staff. Pt. educated on proper body mechanics, including no bending, lifting or twisting. CP 03/14/17 1049 Done               Point: Precautions (Done)    Learning Progress Summary     Learner Readiness Method Response Comment Documented by Status   Patient Acceptance E VU,NR Pt. educated on purpose of PT for mobility training, use of brace and RW. RW left in room for use with staff. Pt. educated on proper body mechanics, including no bending, lifting or twisting. CP 03/14/17 1049 Done   Family Acceptance E VU,NR Pt. educated on purpose of PT for mobility training, use of brace and RW. RW left in room for use with staff. Pt. educated on proper body mechanics, including no bending, lifting or twisting. CP 03/14/17 1049 Done                      User Key     Initials Effective Dates Name Provider Type Discipline    CP 09/18/16 -  Ivette Calzada, PT Physical Therapist PT                PT Recommendation and Plan  Anticipated Discharge Disposition: home with assist  Planned Therapy Interventions: balance training, bed mobility training, gait training, home exercise program, orthotic fitting/training, patient/family education, postural re-education, ROM (Range of Motion), strengthening, transfer training, stair training  PT Frequency: daily, 2 times/day, per priority policy  Plan of Care Review  Plan Of Care Reviewed With: patient, family  Outcome Summary/Follow up Plan: PT eval completed. Pt. CGA supine to sidelying to sit. Pt. CGA sit to stand and CGA x 2 to amb. 80' with RW in the hallway. Pt. limiited in functional mobility due to pain, decreased strength, ROM, and numbness LLE. Pt. with decreased standing balance, endurance and tolerance to physical activity. Pt. will benefit from skilled PT while in acute care to decrease impairments, and due to them, he is a fall risk at this time. Anticipate d/c home with A from family.          IP PT Goals       03/14/17 1051          Bed Mobility PT LTG    Bed Mobility PT LTG, Date Established 03/14/17  -CP      Bed Mobility PT LTG, Time to Achieve by discharge  -CP      Bed Mobility PT LTG, Activity Type all bed mobility  -CP      Bed Mobility PT  LTG, [place] Level independent  -CP      Transfer Training PT LTG    Transfer Training PT LTG, Date Established 03/14/17  -CP      Transfer Training PT LTG, Time to Achieve by discharge  -CP      Transfer Training PT LTG, Activity Type sit to stand/stand to sit  -CP      Transfer Training PT LTG, [place] Level conditional independence  -CP      Transfer Training PT LTG, Assist Device walker, rolling  -CP      Gait Training PT LTG    Gait Training Goal PT LTG, Date Established 03/14/17  -CP      Gait Training Goal PT LTG, Time to Achieve by discharge  -CP      Gait Training Goal PT LTG, [place] Level supervision required  -CP      Gait Training Goal PT LTG, Assist Device walker, rolling  -CP      Gait Training Goal PT LTG, Distance to Achieve 200'   with LSO  -CP      Stair Training PT LTG    Stair Training Goal PT LTG, Date Established 03/14/17  -CP      Stair Training Goal PT LTG, Time to Achieve by discharge  -CP      Stair Training Goal PT LTG, Number of Steps 3  -CP      Stair Training Goal PT LTG, [place] Level contact guard assist  -CP      Stair Training Goal PT LTG, Assist Device cane, straight  -CP        User Key  (r) = Recorded By, (t) = Taken By, (c) = Cosigned By    Initials Name Provider Type    CP Ivette Calzada, PT Physical Therapist                Outcome Measures       03/14/17 1016 03/14/17 1000       How much help from another person do you currently need...    Turning from your back to your side while in flat bed without using bedrails? 3  -CP      Moving from lying on back to sitting on the side of a flat bed without bedrails? 3  -CP      Moving to and from a bed to a chair (including a wheelchair)? 3  -CP      Standing up from a chair using your arms (e.g., wheelchair, bedside chair)? 3  -CP      Climbing 3-5 steps with a railing? 3  -CP      To walk in hospital room? 3  -CP      AM-PAC 6 Clicks Score 18  -CP      How much help from another is currently needed...     Putting on and taking off regular lower body clothing?  3  -ND     Bathing (including washing, rinsing, and drying)  3  -ND     Toileting (which includes using toilet bed pan or urinal)  3  -ND     Putting on and taking off regular upper body clothing  3  -ND     Taking care of personal grooming (such as brushing teeth)  4  -ND     Eating meals  4  -ND     Score  20  -ND     Functional Assessment    Outcome Measure Options AM-PAC 6 Clicks Basic Mobility (PT)  -CP AM-PAC 6 Clicks Daily Activity (OT)  -ND       User Key  (r) = Recorded By, (t) = Taken By, (c) = Cosigned By    Initials Name Provider Type    CP Ivette Calzada, PT Physical Therapist    ND Lesli Ortiz, OTR/L Occupational Therapist           Time Calculation:         PT Charges       03/14/17 1056          Time Calculation    Start Time 1000   16 mins chart review prior to pt. care  -CP      Stop Time 1045  -CP      Time Calculation (min) 45 min  -CP      PT Received On 03/14/17  -CP      PT Goal Re-Cert Due Date 03/24/17  -CP        User Key  (r) = Recorded By, (t) = Taken By, (c) = Cosigned By    Initials Name Provider Type    CP Ivette Calzada, PT Physical Therapist          Therapy Charges for Today     Code Description Service Date Service Provider Modifiers Qty    19236685639 HC PT MOBILITY CURRENT 3/14/2017 Ivette Calzada, PT GP, CK 1    84112524307 HC PT MOBILITY PROJECTED 3/14/2017 Ivette Calzada, PT GP, CI 1    [id number] HC PT EVAL MOD COMPLEXITY 3 3/14/2017 Ivette Calzada, PT GP, KX 1          PT G-Codes  PT Professional Judgement Used?: Yes  Outcome Measure Options: AM-PAC 6 Clicks Basic Mobility (PT)  Score: 18  Functional Limitation: Mobility: Walking and moving around  Mobility: Walking and Moving Around Current Status (): At least 40 percent but less than 60 percent impaired, limited or restricted  Mobility: Walking and Moving Around Goal Status (): At least 1 percent but less than 20 percent  impaired, limited or restricted      Ivette Calzada, PT  3/14/2017

## 2017-03-14 NOTE — PROGRESS NOTES
Acute Care - Occupational Therapy Initial Evaluation  [facility]     Patient Name: Mani Arreola  : 1945  MRN: [id number]  Today's Date: 3/14/2017  Onset of Illness/Injury or Date of Surgery Date: 17  Date of Referral to OT: 17  Referring Physician: Dr. Morgan (p/o care, gait training, braace A, rehab needs)    Admit Date: 3/13/2017       ICD-10-CM ICD-9-CM   1. Decreased activities of daily living (ADL) Z78.9 V49.89     Patient Active Problem List   Diagnosis   • Spinal stenosis, lumbar   • Type 2 diabetes mellitus without complication, without long-term current use of insulin   • Essential hypertension   • Pure hypercholesterolemia   • Coronary artery disease involving native coronary artery without angina pectoris     Past Medical History   Diagnosis Date   • Colon cancer      colon   • Colon polyp    • Diabetes mellitus    • Hyperlipidemia    • Hypertension    • Myocardial infarction      Past Surgical History   Procedure Laterality Date   • Cholecystectomy     • Carotid endarterectomy Right    • Colon resection       for colon cancer   • Colonoscopy  2004   • Colonoscopy  2005     normal   • Colonoscopy  10/2007     recall 3 yr   • Colonoscopy  2010     5mm polypectomy distal transverse colon, patent end to end ileo colonic anastomosis   • Hydrocele excision / repair     • Cataract extraction     • Colonoscopy N/A 2016     Procedure: COLONOSCOPY WITH ANESTHESIA;  Surgeon: Cirilo Bhandari MD;  Location: [facility] ENDOSCOPY;  Service:    • Cardiac catheterization     • Peripheral arterial stent graft     • Lumbar fusion Left 3/13/2017     Procedure: LEFT LUMBAR LATERAL INTERBODY FUSION WITH INSTRUMENTATION L4-5 , LANX,  NUVASIVE MONITORING ;  Surgeon: DALY Morgan MD;  Location: [facility] OR;  Service:           OT ASSESSMENT FLOWSHEET (last 72 hours)      OT Evaluation       17 1026 17 1016 17 0834 17 2135 17 2133    Rehab Evaluation     Document Type  evaluation;other (see comments)   SEE mar  -CP evaluation   See MAR, entered room 1015  -ND      Subjective Information  agree to therapy;complains of;pain  -CP agree to therapy;complains of;weakness;pain;numbness   numbness LLE  -ND      General Information    Patient Profile Review  yes  -CP yes  -ND      Onset of Illness/Injury or Date of Surgery Date  03/13/17  -CP 03/13/17  -ND      Referring Physician  Dr. Morgan   p/o care, gait training, braace A, rehab needs  -CP Dr. Morgan  -ND      General Observations  (P)  Pt. lying supine, alert, IV, on RA, family present, SCDs  -CP Pt. lying supine, alert, IV site, famil present, SCDs  -ND      Pertinent History Of Current Problem  Pt. having pain in lower [place], radiating down legs, L. side worse. S/P 3/13/17 L lumbar lateral interbody fusion with instrumentation L4-5.   -CP Pt. having pain in lower [place], radiating down legs, L. side worse. S/P 3/13/17 L lumbar lateral  interbody fusion with instrumentation L4-5.   -ND      Precautions/Limitations  brace on when up;fall precautions   LSO  -CP brace on when up;fall precautions;spinal precautions  -ND      Prior Level of Function  (P)  independent:;all household mobility;community mobility;gait;transfer;ADL's;feeding;grooming;dressing;bathing;home management;driving  -CP independent:;all household mobility;community mobility;ADL's;home management;cooking;cleaning;driving  -ND      Equipment Currently Used at Home walker, rolling;cane, straight;commode;crutches  -KP (P)  bath bench;commode;walker, rolling;wheelchair;cane, straight;walker, standard  -CP bath bench;commode;walker, rolling;wheelchair;cane, straight  -ND  none  -AR    Plans/Goals Discussed With  (P)  patient and family;agreed upon  -CP patient and family;agreed upon  -ND      Risks Reviewed  (P)  patient and family:;LOB;nausea/vomiting;dizziness;increased discomfort;change in vital signs  -CP patient and  family:;LOB;dizziness;nausea/vomiting;increased discomfort;change in vital signs;increased drainage  -ND      Benefits Reviewed  (P)  patient and family:;improve function;increase independence;increase strength;increase balance;decrease pain;decrease risk of DVT;improve skin integrity;increase knowledge  -CP patient and family:;increase independence;improve function;decrease pain;increase balance;increase strength;improve skin integrity;increase knowledge;decrease risk of DVT  -ND      Barriers to Rehab  (P)  previous functional deficit;physical barrier;impaired sensation  -CP previous functional deficit;physical barrier;impaired sensation  -ND      Living Environment    Lives With spouse  -KP spouse  -CP spouse  -ND spouse  -AR     Living Arrangements house  -KP mobile home  -CP mobile home  -ND house  -AR     Home Accessibility  stairs to enter home  -CP stairs to enter home;tub/shower is not walk in;bed and bath on same level  -ND stairs within home  -AR     Number of Stairs to Enter Home  3  -CP 3  -ND      Number of Stairs Within Home   --  -ND 3  -AR     Stair Railings at Home  none  -CP  none  -AR     Type of Financial/Environmental Concern    none  -AR     Transportation Available family or friend will provide  -   car  -AR     Clinical Impression    Date of Referral to OT   03/13/17  -ND      OT Diagnosis   DECREASED ADL  -ND      Prognosis   good  -ND      Impairments Found (describe specific impairments)   gait, locomotion, and balance;ergonomics and body mechanics;sensory Integrity  -ND      Patient/Family Goals Statement   to go home  -ND      Criteria for Skilled Therapeutic Interventions Met   yes;treatment indicated  -ND      Rehab Potential   good, to achieve stated therapy goals  -ND      Therapy Frequency   3-5 times/wk  -ND      Predicted Duration of Therapy Intervention (days/wks)   10 days  -ND      Anticipated Equipment Needs At Discharge   --   pt. has needed equipment  -ND      Anticipated  "Discharge Disposition   home with assist  -ND      Functional Level Prior    Ambulation     0-->independent  -AR    Transferring     0-->independent  -AR    Toileting     0-->independent  -AR    Bathing     0-->independent  -AR    Dressing     0-->independent  -AR    Eating     0-->independent  -AR    Communication     0-->understands/communicates without difficulty  -AR    Swallowing     0-->swallows foods/liquids without difficulty  -AR    Pain Assessment    Pain Assessment  (P)  0-10  -CP 0-10  -ND      Pain Score  (P)  4  -CP 4  -ND      Pain Type  (P)  Chronic pain;Surgical pain  -CP Surgical pain;Chronic pain  -ND      Pain Location  (P)  Back  -CP Back  -ND      Pain Orientation  (P)  Lower  -CP Lower  -ND      Pain Radiating Towards  (P)  radiating down LLE to toes  -CP Radiating down LLE  -ND      Pain Descriptors  (P)  --   \"all of the above\"  -CP --   \"All of the above\"  -ND      Pain Frequency  (P)  Constant/continuous  -CP Constant/continuous  -ND      Pain Intervention(s)  (P)  Medication (See MAR);Repositioned;Ambulation/increased activity  -CP Medication (See MAR);Repositioned  -ND      Response to Interventions  (P)  tolerated  -CP tolerated  -ND      Multiple Pain Sites  (P)  Yes  -CP Yes  -ND      Pain 2    Pain Score 2  (P)  4  -CP       Vision Assessment/Intervention    Visual Impairment  (P)  WFL with corrective lenses   reading glasses  -CP WFL with corrective lenses   READING  -ND      Cognitive Assessment/Intervention    Current Cognitive/Communication Assessment  (P)  functional  -CP functional  -ND      Orientation Status  (P)  oriented x 4  -CP oriented x 4  -ND      Follows Commands/Answers Questions  (P)  able to follow multi-step instructions;100% of the time  -% of the time;able to follow multi-step instructions  -ND      Personal Safety  (P)  WNL/WFL  -CP WNL/WFL  -ND      Personal Safety Interventions  (P)  fall prevention program maintained;gait belt;muscle strengthening " facilitated;nonskid shoes/slippers when out of bed;supervised activity  -CP fall prevention program maintained;gait belt;nonskid shoes/slippers when out of bed;supervised activity  -ND      ROM (Range of Motion)    General ROM  (P)  lower extremity range of motion deficits identified  -CP no range of motion deficits identified  -ND      General ROM Detail  (P)  AAROM L hip flexion limited due to pain, AROM L knee, ankle and R knee, hip and ankle WFLs  -CP BUE AROM WFL  -ND      MMT (Manual Muscle Testing)    General MMT Assessment  (P)  lower extremity strength deficits identified  -CP no strength deficits identified  -ND      Bed Mobility, Assessment/Treatment    Bed Mobility, Assistive Device   bed rails  -ND      Bed Mob, Sidelying to Sit, [place]   verbal cues required;nonverbal cues required (demo/gesture);contact guard assist  -ND      Bed Mob, Sit to Sidelying, [place]   verbal cues required;nonverbal cues required (demo/gesture);supervision required  -ND      Bed Mobility, Impairments   pain  -ND      Transfer Assessment/Treatment    Transfers, Sit-Stand [place]   verbal cues required;nonverbal cues required (demo/gesture);contact guard assist  -ND      Transfers, Stand-Sit [place]   verbal cues required;nonverbal cues required (demo/gesture);contact guard assist  -ND      Transfers, Sit-Stand-Sit, Assist Device   rolling walker  -ND      Transfer, Impairments   pain;impaired balance  -ND      Functional Mobility    Functional Mobility- Ind. Level   nonverbal cues required (demo/gesture);verbal cues required;contact guard assist  -ND      Functional Mobility- Device   rolling walker  -ND      Functional Mobility-Distance (Feet)   --   From room in [place] and back to bed  -ND      Functional Mobility- Comment   Pt. stated he had increased pain in L. groin area with ambulation.   -ND      Upper Body Dressing Assessment/Training    UB Dressing Assess/Train, Clothing Type   doffing:;donning:    LSO  -ND      UB Dressing Assess/Train, Position   edge of bed  -ND      UB Dressing Assess/Train, [place]   moderate assist (50% patient effort)  -ND      UB Dressing Assess/Train, Impairments   pain  -ND      Lower Body Dressing Assessment/Training    LB Dressing Assess/Train, Clothing Type   doffing:;donning:;socks  -ND      LB Dressing Assess/Train, Position   edge of bed  -ND      LB Dressing Assess/Train, [place]   contact guard assist  -ND      LB Dressing Assess/Train, Impairments   pain  -ND      LB Dressing Assess/Train, Comment   Pt. able to simulate don/doff jillian socks by bringing legs up onto knees  -ND      Motor Skills/Interventions    Additional Documentation   Balance Skills Training (Group)  -ND      Balance Skills Training    Sitting-Level of Assistance   Independent  -ND      Sitting-Balance Support   Feet supported  -ND      Standing-Level of Assistance   Contact guard  -ND      Static Standing Balance Support   assistive device  -ND      Gait Balance-Level of Assistance   Contact guard  -ND      Gait Balance Support   assistive device  -ND      Orthotics Prosthetics    Additional Documentation   Orthosis Location (Group);Orthosis Management/Training (Group)  -ND      Orthosis Location    Orthosis Location/Type   neck/back  -ND      Orthosis, Neck/Back   LSO (lumbar sacral orthosis)  -ND      Orthosis Management/Training    Orthosis Indications   immobilize, protect/position healing structures;rest, reduce pain  -ND      Orthosis Skills Training   doffing orthosis;donning orthosis;purpose/goals of orthosis;restrictions/precautions  -ND      Orthosis Wear Schedule   wear when out of bed only  -ND      Sensory Assessment/Intervention    Sensory Impairment   --   Numbness in LLE  -ND      General Therapy Interventions    Planned Therapy Interventions   activity intolerance;ADL retraining;balance training;bed mobility training;energy conservation;transfer training;orthotic  fitting/training  -ND      Positioning and Restraints    Pre-Treatment Position   in bed  -ND      Post Treatment Position   bed  -ND      In Bed   fowlers;call light within reach;encouraged to call for assist;with family/caregiver;side rails up x2  -ND        User Key  (r) = Recorded By, (t) = Taken By, (c) = Cosigned By    Initials Name Effective Dates    AR Elisa Chester RN 08/02/16 -     KP Freda Clark, BSW 09/15/16 -     CP Ivette Calzada, PT 09/18/16 -     ND Lesli Ortiz, OTR/L 10/21/16 -            Occupational Therapy Education     Title: PT OT SLP Therapies (Done)     Topic: Occupational Therapy (Done)     Point: ADL training (Done)    Description: Instruct learner(s) on proper safety adaptation and remediation techniques during self care or transfers.   Instruct in proper use of assistive devices.    Learning Progress Summary    Learner Readiness Method Response Comment Documented by Status   Patient Acceptance E VU Pt. educated on role of OT, safe t/f, safe functional mob, back precautions, LSo brace use/care, and progression with poc ND 03/14/17 1041 Done               Point: Home exercise program (Done)    Description: Instruct learner(s) on appropriate technique for monitoring, assisting and/or progressing therapeutic exercises/activities.    Learning Progress Summary    Learner Readiness Method Response Comment Documented by Status   Patient Acceptance E VU Pt. educated on role of OT, safe t/f, safe functional mob, back precautions, LSo brace use/care, and progression with poc ND 03/14/17 1041 Done               Point: Precautions (Done)    Description: Instruct learner(s) on prescribed precautions during self-care and functional transfers.    Learning Progress Summary    Learner Readiness Method Response Comment Documented by Status   Patient Acceptance E VU Pt. educated on role of OT, safe t/f, safe functional mob, back precautions, LSo brace use/care, and progression with poc ND  03/14/17 1041 Done               Point: Body mechanics (Done)    Description: Instruct learner(s) on proper positioning and spine alignment during self-care, functional mobility activities and/or exercises.    Learning Progress Summary    Learner Readiness Method Response Comment Documented by Status   Patient Acceptance E VU Pt. educated on role of OT, safe t/f, safe functional mob, back precautions, LSo brace use/care, and progression with poc ND 03/14/17 1041 Done                      User Key     Initials Effective Dates Name Provider Type Discipline    ND 10/21/16 -  Lesli Ortiz, OTR/L Occupational Therapist OT                  OT Recommendation and Plan  Anticipated Equipment Needs At Discharge:  (pt. has needed equipment)  Anticipated Discharge Disposition: home with assist  Planned Therapy Interventions: activity intolerance, ADL retraining, balance training, bed mobility training, energy conservation, transfer training, orthotic fitting/training  Therapy Frequency: 3-5 times/wk  Plan of Care Review  Plan Of Care Reviewed With: patient  Progress: progress toward functional goals as expected  Outcome Summary/Follow up Plan: OT sedrick completed this date. Pt. required CGA with bed rail to complete side lying to sit and supervision for sit to sidelying. Pt. required CGA with r/w to complete sit to stand t/f from bed. Pt. required CGA with r/w to complete functional mob. Pt. c/o increased pain in L. groin area with ambulation. Pt. cont to benefit from skilled OT due to decreased balance, increased pain, and decreased independence in ADLs. Anticipated dc is home with assist. 3/14/16 1040          OT Goals       03/14/17 1042          Transfer Training OT LTG    Transfer Training OT LTG, Date Established 03/14/17  -ND      Transfer Training OT LTG, Time to Achieve by discharge  -ND      Transfer Training OT LTG, Activity Type bed to chair /chair to bed;walk-in shower;sit to stand/stand to sit;toilet;tub  -ND       Transfer Training OT LTG, [place] Level conditional independence  -ND      Transfer Training OT LTG, Assist Device walker, rolling;commode, bedside  -ND      Patient Education OT LTG    Patient Education OT LTG, Date Established 03/14/17  -ND      Patient Education OT LTG, Time to Achieve by discharge  -ND      Patient Education OT LTG, Education Type precautions per surgeon;brace use/care;positioning;posture/body mechanics;home safety;adaptive equipment mgmt;energy conservation;work simplification;phil/doff brace  -ND      Patient Education OT LTG, Education Understanding independent;demonstrates adequately;verbalizes understanding  -ND      LB Dressing OT LTG    LB Dressing Goal OT LTG, Date Established 03/14/17  -ND      LB Dressing Goal OT LTG, Time to Achieve by discharge  -ND      LB Dressing Goal OT LTG, [place] Level supervision required  -ND      LB Dressing Goal OT LTG, Additional Goal AE PRN  -ND        User Key  (r) = Recorded By, (t) = Taken By, (c) = Cosigned By    Initials Name Provider Type    ARMIDA Ortiz OTR/L Occupational Therapist                Outcome Measures       03/14/17 1000          How much help from another is currently needed...    Putting on and taking off regular lower body clothing? 3  -ND      Bathing (including washing, rinsing, and drying) 3  -ND      Toileting (which includes using toilet bed pan or urinal) 3  -ND      Putting on and taking off regular upper body clothing 3  -ND      Taking care of personal grooming (such as brushing teeth) 4  -ND      Eating meals 4  -ND      Score 20  -ND      Functional Assessment    Outcome Measure Options AM-PAC 6 Clicks Daily Activity (OT)  -ND        User Key  (r) = Recorded By, (t) = Taken By, (c) = Cosigned By    Initials Name Provider Type    ARMIDA Ortiz OTR/L Occupational Therapist          Time Calculation:   OT Start Time: 1015 (15 min chart review not included)  OT Stop Time: 1040  OT Time Calculation  (min): 25 min    Therapy Charges for Today     Code Description Service Date Service Provider Modifiers Qty    11950187001 HC OT SELFCARE CURRENT 3/14/2017 Lesli Ortiz OTR/L GO, CJ 1    92561824168 HC OT SELFCARE PROJECTED 3/14/2017 Lesli Ortiz OTR/L GO, CI 1    02186681627 HC OT EVAL MOD COMPLEXITY 3 3/14/2017 Lesli Ortiz OTR/L GO, KX 1          OT G-codes  OT Functional Scales Options: AM-PAC 6 Clicks Daily Activity (OT)  Functional Limitation: Self care  Self Care Current Status (): At least 20 percent but less than 40 percent impaired, limited or restricted  Self Care Goal Status (): At least 1 percent but less than 20 percent impaired, limited or restricted    Lesli Ortiz OTR/L  3/14/2017

## 2017-03-14 NOTE — PLAN OF CARE
Problem: Patient Care Overview (Adult)  Goal: Plan of Care Review  Outcome: Ongoing (interventions implemented as appropriate)    03/14/17 7544   Coping/Psychosocial Response Interventions   Plan Of Care Reviewed With patient;spouse   Patient Care Overview   Progress improving   Outcome Evaluation   Outcome Summary/Follow up Plan pt tolerating PO pain meds, mepilex to left flank intact, brace in room, slight weakness in left leg         Problem: Perioperative Period (Adult)  Goal: Signs and Symptoms of Listed Potential Problems Will be Absent or Manageable (Perioperative Period)  Outcome: Ongoing (interventions implemented as appropriate)

## 2017-03-14 NOTE — PROGRESS NOTES
Orthopedic Spine Progress Note    Mani Arreola  72 y.o.  male  Subjective     Post-Operative Day: 1  Systemic or Specific Complaints:   No new c/o. Lbp/soreness, havent walked yet    Objective     Vital signs in last 24 hours:  Temp:  [97.2 °F (36.2 °C)-98.6 °F (37 °C)] 98.6 °F (37 °C)  Heart Rate:  [63-82] 82  Resp:  [14-20] 20  BP: (114-164)/(58-90) 149/64  Arterial Line BP: (129-160)/(56-67) 157/65    Physical Exam:  Alert and oriented ×3, no digits, vital signs stable, grossly neurovascularly intact, dressing clean dry and intact    Diagnostic Data:  CBC:  Results from last 7 days  Lab Units 03/14/17  0339   WBC 10*3/mm3 6.85   RBC 10*6/mm3 4.29*   HEMOGLOBIN g/dL 11.6*   HEMATOCRIT % 35.6*   PLATELETS 10*3/mm3 185      BMP:@LABCNTIP(BMP)@    Assessment/Plan     1. Status Post left LLIF L4-5,  3/13/17          Plan:  1. Npo after midnight  2. Plan second stage intervention tomorrow  3. Pt/ot/brace today                  CHETNA Alexander    Date: 3/14/2017  Time: 7:38 AM

## 2017-03-15 ENCOUNTER — ANESTHESIA EVENT (OUTPATIENT)
Dept: PERIOP | Facility: HOSPITAL | Age: 72
End: 2017-03-15

## 2017-03-15 ENCOUNTER — ANESTHESIA (OUTPATIENT)
Dept: PERIOP | Facility: HOSPITAL | Age: 72
End: 2017-03-15

## 2017-03-15 ENCOUNTER — APPOINTMENT (OUTPATIENT)
Dept: GENERAL RADIOLOGY | Facility: HOSPITAL | Age: 72
End: 2017-03-15

## 2017-03-15 LAB
GLUCOSE BLDC GLUCOMTR-MCNC: 135 MG/DL (ref 70–130)
GLUCOSE BLDC GLUCOMTR-MCNC: 143 MG/DL (ref 70–130)
GLUCOSE BLDC GLUCOMTR-MCNC: 150 MG/DL (ref 70–130)
GLUCOSE BLDC GLUCOMTR-MCNC: 166 MG/DL (ref 70–130)
GLUCOSE BLDC GLUCOMTR-MCNC: 177 MG/DL (ref 70–130)

## 2017-03-15 PROCEDURE — 25010000002 MIDAZOLAM PER 1 MG: Performed by: ANESTHESIOLOGY

## 2017-03-15 PROCEDURE — 25010000002 ONDANSETRON PER 1 MG: Performed by: ORTHOPAEDIC SURGERY

## 2017-03-15 PROCEDURE — 97168 OT RE-EVAL EST PLAN CARE: CPT | Performed by: OCCUPATIONAL THERAPIST

## 2017-03-15 PROCEDURE — 97116 GAIT TRAINING THERAPY: CPT

## 2017-03-15 PROCEDURE — 82962 GLUCOSE BLOOD TEST: CPT

## 2017-03-15 PROCEDURE — 94799 UNLISTED PULMONARY SVC/PX: CPT

## 2017-03-15 PROCEDURE — 25010000002 SUCCINYLCHOLINE PER 20 MG: Performed by: NURSE ANESTHETIST, CERTIFIED REGISTERED

## 2017-03-15 PROCEDURE — 94640 AIRWAY INHALATION TREATMENT: CPT

## 2017-03-15 PROCEDURE — 97164 PT RE-EVAL EST PLAN CARE: CPT

## 2017-03-15 PROCEDURE — 72100 X-RAY EXAM L-S SPINE 2/3 VWS: CPT

## 2017-03-15 PROCEDURE — G8987 SELF CARE CURRENT STATUS: HCPCS | Performed by: OCCUPATIONAL THERAPIST

## 2017-03-15 PROCEDURE — 25010000003 CEFAZOLIN PER 500 MG: Performed by: ORTHOPAEDIC SURGERY

## 2017-03-15 PROCEDURE — 25010000002 FENTANYL CITRATE (PF) 100 MCG/2ML SOLUTION: Performed by: NURSE ANESTHETIST, CERTIFIED REGISTERED

## 2017-03-15 PROCEDURE — C1713 ANCHOR/SCREW BN/BN,TIS/BN: HCPCS | Performed by: ORTHOPAEDIC SURGERY

## 2017-03-15 PROCEDURE — 94760 N-INVAS EAR/PLS OXIMETRY 1: CPT

## 2017-03-15 PROCEDURE — 25010000002 NEOSTIGMINE PER 0.5 MG: Performed by: NURSE ANESTHETIST, CERTIFIED REGISTERED

## 2017-03-15 PROCEDURE — 25010000002 PROPOFOL 10 MG/ML EMULSION: Performed by: NURSE ANESTHETIST, CERTIFIED REGISTERED

## 2017-03-15 PROCEDURE — 25010000002 ONDANSETRON PER 1 MG: Performed by: NURSE ANESTHETIST, CERTIFIED REGISTERED

## 2017-03-15 PROCEDURE — G8978 MOBILITY CURRENT STATUS: HCPCS

## 2017-03-15 PROCEDURE — G8988 SELF CARE GOAL STATUS: HCPCS | Performed by: OCCUPATIONAL THERAPIST

## 2017-03-15 PROCEDURE — 0SG0071 FUSION OF LUMBAR VERTEBRAL JOINT WITH AUTOLOGOUS TISSUE SUBSTITUTE, POSTERIOR APPROACH, POSTERIOR COLUMN, OPEN APPROACH: ICD-10-PCS | Performed by: ORTHOPAEDIC SURGERY

## 2017-03-15 PROCEDURE — 76000 FLUOROSCOPY <1 HR PHYS/QHP: CPT

## 2017-03-15 PROCEDURE — G8979 MOBILITY GOAL STATUS: HCPCS

## 2017-03-15 DEVICE — SCRW ST UNIV FIX SYS: Type: IMPLANTABLE DEVICE | Status: FUNCTIONAL

## 2017-03-15 DEVICE — IMPLANTABLE DEVICE: Type: IMPLANTABLE DEVICE | Status: FUNCTIONAL

## 2017-03-15 DEVICE — SCRW T2 2HD 6.5X50MM: Type: IMPLANTABLE DEVICE | Status: FUNCTIONAL

## 2017-03-15 RX ORDER — MORPHINE SULFATE 2 MG/ML
2 INJECTION, SOLUTION INTRAMUSCULAR; INTRAVENOUS AS NEEDED
Status: DISCONTINUED | OUTPATIENT
Start: 2017-03-15 | End: 2017-03-15 | Stop reason: HOSPADM

## 2017-03-15 RX ORDER — SODIUM CHLORIDE, SODIUM LACTATE, POTASSIUM CHLORIDE, CALCIUM CHLORIDE 600; 310; 30; 20 MG/100ML; MG/100ML; MG/100ML; MG/100ML
20 INJECTION, SOLUTION INTRAVENOUS CONTINUOUS
Status: DISCONTINUED | OUTPATIENT
Start: 2017-03-15 | End: 2017-03-17 | Stop reason: HOSPADM

## 2017-03-15 RX ORDER — BISACODYL 10 MG
10 SUPPOSITORY, RECTAL RECTAL DAILY PRN
Status: DISCONTINUED | OUTPATIENT
Start: 2017-03-15 | End: 2017-03-17 | Stop reason: HOSPADM

## 2017-03-15 RX ORDER — PHENYLEPHRINE HCL IN 0.9% NACL 0.8MG/10ML
SYRINGE (ML) INTRAVENOUS AS NEEDED
Status: DISCONTINUED | OUTPATIENT
Start: 2017-03-15 | End: 2017-03-15 | Stop reason: SURG

## 2017-03-15 RX ORDER — METOCLOPRAMIDE HYDROCHLORIDE 5 MG/ML
5 INJECTION INTRAMUSCULAR; INTRAVENOUS
Status: DISCONTINUED | OUTPATIENT
Start: 2017-03-15 | End: 2017-03-15 | Stop reason: HOSPADM

## 2017-03-15 RX ORDER — ROCURONIUM BROMIDE 10 MG/ML
INJECTION, SOLUTION INTRAVENOUS AS NEEDED
Status: DISCONTINUED | OUTPATIENT
Start: 2017-03-15 | End: 2017-03-15 | Stop reason: SURG

## 2017-03-15 RX ORDER — GLYCOPYRROLATE 0.2 MG/ML
INJECTION INTRAMUSCULAR; INTRAVENOUS AS NEEDED
Status: DISCONTINUED | OUTPATIENT
Start: 2017-03-15 | End: 2017-03-15 | Stop reason: SURG

## 2017-03-15 RX ORDER — ONDANSETRON 2 MG/ML
4 INJECTION INTRAMUSCULAR; INTRAVENOUS EVERY 6 HOURS PRN
Status: DISCONTINUED | OUTPATIENT
Start: 2017-03-15 | End: 2017-03-15 | Stop reason: SDUPTHER

## 2017-03-15 RX ORDER — ONDANSETRON 2 MG/ML
4 INJECTION INTRAMUSCULAR; INTRAVENOUS AS NEEDED
Status: DISCONTINUED | OUTPATIENT
Start: 2017-03-15 | End: 2017-03-15 | Stop reason: HOSPADM

## 2017-03-15 RX ORDER — SODIUM CHLORIDE 9 MG/ML
75 INJECTION, SOLUTION INTRAVENOUS CONTINUOUS
Status: DISCONTINUED | OUTPATIENT
Start: 2017-03-15 | End: 2017-03-15

## 2017-03-15 RX ORDER — SUCCINYLCHOLINE CHLORIDE 20 MG/ML
INJECTION INTRAMUSCULAR; INTRAVENOUS AS NEEDED
Status: DISCONTINUED | OUTPATIENT
Start: 2017-03-15 | End: 2017-03-15 | Stop reason: SURG

## 2017-03-15 RX ORDER — SODIUM CHLORIDE 9 MG/ML
75 INJECTION, SOLUTION INTRAVENOUS CONTINUOUS
Status: DISPENSED | OUTPATIENT
Start: 2017-03-15 | End: 2017-03-17

## 2017-03-15 RX ORDER — LABETALOL HYDROCHLORIDE 5 MG/ML
5 INJECTION, SOLUTION INTRAVENOUS
Status: DISCONTINUED | OUTPATIENT
Start: 2017-03-15 | End: 2017-03-15 | Stop reason: HOSPADM

## 2017-03-15 RX ORDER — HYDRALAZINE HYDROCHLORIDE 20 MG/ML
5 INJECTION INTRAMUSCULAR; INTRAVENOUS
Status: DISCONTINUED | OUTPATIENT
Start: 2017-03-15 | End: 2017-03-15 | Stop reason: HOSPADM

## 2017-03-15 RX ORDER — FAMOTIDINE 10 MG/ML
20 INJECTION, SOLUTION INTRAVENOUS 2 TIMES DAILY
Status: DISCONTINUED | OUTPATIENT
Start: 2017-03-15 | End: 2017-03-17 | Stop reason: HOSPADM

## 2017-03-15 RX ORDER — SODIUM CHLORIDE 0.9 % (FLUSH) 0.9 %
1-10 SYRINGE (ML) INJECTION AS NEEDED
Status: DISCONTINUED | OUTPATIENT
Start: 2017-03-15 | End: 2017-03-15 | Stop reason: HOSPADM

## 2017-03-15 RX ORDER — ONDANSETRON 2 MG/ML
4 INJECTION INTRAMUSCULAR; INTRAVENOUS EVERY 6 HOURS PRN
Status: DISCONTINUED | OUTPATIENT
Start: 2017-03-15 | End: 2017-03-17 | Stop reason: HOSPADM

## 2017-03-15 RX ORDER — NALOXONE HCL 0.4 MG/ML
0.04 VIAL (ML) INJECTION AS NEEDED
Status: DISCONTINUED | OUTPATIENT
Start: 2017-03-15 | End: 2017-03-15 | Stop reason: HOSPADM

## 2017-03-15 RX ORDER — ONDANSETRON 4 MG/1
4 TABLET, FILM COATED ORAL EVERY 6 HOURS PRN
Status: DISCONTINUED | OUTPATIENT
Start: 2017-03-15 | End: 2017-03-17 | Stop reason: HOSPADM

## 2017-03-15 RX ORDER — ONDANSETRON 4 MG/1
4 TABLET, ORALLY DISINTEGRATING ORAL EVERY 6 HOURS PRN
Status: DISCONTINUED | OUTPATIENT
Start: 2017-03-15 | End: 2017-03-17 | Stop reason: HOSPADM

## 2017-03-15 RX ORDER — IPRATROPIUM BROMIDE AND ALBUTEROL SULFATE 2.5; .5 MG/3ML; MG/3ML
3 SOLUTION RESPIRATORY (INHALATION) ONCE AS NEEDED
Status: DISCONTINUED | OUTPATIENT
Start: 2017-03-15 | End: 2017-03-15 | Stop reason: HOSPADM

## 2017-03-15 RX ORDER — MIDAZOLAM HYDROCHLORIDE 1 MG/ML
1 INJECTION INTRAMUSCULAR; INTRAVENOUS
Status: DISCONTINUED | OUTPATIENT
Start: 2017-03-15 | End: 2017-03-15 | Stop reason: HOSPADM

## 2017-03-15 RX ORDER — SODIUM CHLORIDE 0.9 % (FLUSH) 0.9 %
1-10 SYRINGE (ML) INJECTION AS NEEDED
Status: DISCONTINUED | OUTPATIENT
Start: 2017-03-15 | End: 2017-03-17 | Stop reason: HOSPADM

## 2017-03-15 RX ORDER — OXYCODONE AND ACETAMINOPHEN 10; 325 MG/1; MG/1
2 TABLET ORAL EVERY 4 HOURS PRN
Status: DISCONTINUED | OUTPATIENT
Start: 2017-03-15 | End: 2017-03-17 | Stop reason: HOSPADM

## 2017-03-15 RX ORDER — LIDOCAINE HYDROCHLORIDE 20 MG/ML
INJECTION, SOLUTION INFILTRATION; PERINEURAL AS NEEDED
Status: DISCONTINUED | OUTPATIENT
Start: 2017-03-15 | End: 2017-03-15 | Stop reason: SURG

## 2017-03-15 RX ORDER — MIDAZOLAM HYDROCHLORIDE 1 MG/ML
2 INJECTION INTRAMUSCULAR; INTRAVENOUS
Status: DISCONTINUED | OUTPATIENT
Start: 2017-03-15 | End: 2017-03-15 | Stop reason: HOSPADM

## 2017-03-15 RX ORDER — LIDOCAINE HYDROCHLORIDE 40 MG/ML
SOLUTION TOPICAL AS NEEDED
Status: DISCONTINUED | OUTPATIENT
Start: 2017-03-15 | End: 2017-03-15 | Stop reason: SURG

## 2017-03-15 RX ORDER — MAGNESIUM HYDROXIDE 1200 MG/15ML
LIQUID ORAL AS NEEDED
Status: DISCONTINUED | OUTPATIENT
Start: 2017-03-15 | End: 2017-03-15 | Stop reason: HOSPADM

## 2017-03-15 RX ORDER — POLYETHYLENE GLYCOL 3350 17 G/17G
17 POWDER, FOR SOLUTION ORAL DAILY PRN
Status: DISCONTINUED | OUTPATIENT
Start: 2017-03-15 | End: 2017-03-17 | Stop reason: HOSPADM

## 2017-03-15 RX ORDER — FENTANYL CITRATE 50 UG/ML
INJECTION, SOLUTION INTRAMUSCULAR; INTRAVENOUS AS NEEDED
Status: DISCONTINUED | OUTPATIENT
Start: 2017-03-15 | End: 2017-03-15 | Stop reason: SURG

## 2017-03-15 RX ORDER — IPRATROPIUM BROMIDE AND ALBUTEROL SULFATE 2.5; .5 MG/3ML; MG/3ML
3 SOLUTION RESPIRATORY (INHALATION)
Status: DISCONTINUED | OUTPATIENT
Start: 2017-03-15 | End: 2017-03-17 | Stop reason: HOSPADM

## 2017-03-15 RX ORDER — ONDANSETRON 2 MG/ML
INJECTION INTRAMUSCULAR; INTRAVENOUS AS NEEDED
Status: DISCONTINUED | OUTPATIENT
Start: 2017-03-15 | End: 2017-03-15 | Stop reason: SURG

## 2017-03-15 RX ORDER — DOCUSATE SODIUM 100 MG/1
100 CAPSULE, LIQUID FILLED ORAL DAILY
Status: DISCONTINUED | OUTPATIENT
Start: 2017-03-15 | End: 2017-03-17 | Stop reason: HOSPADM

## 2017-03-15 RX ORDER — SODIUM CHLORIDE, SODIUM LACTATE, POTASSIUM CHLORIDE, CALCIUM CHLORIDE 600; 310; 30; 20 MG/100ML; MG/100ML; MG/100ML; MG/100ML
100 INJECTION, SOLUTION INTRAVENOUS CONTINUOUS
Status: DISCONTINUED | OUTPATIENT
Start: 2017-03-15 | End: 2017-03-17 | Stop reason: HOSPADM

## 2017-03-15 RX ORDER — FAMOTIDINE 20 MG/1
20 TABLET, FILM COATED ORAL 2 TIMES DAILY
Status: DISCONTINUED | OUTPATIENT
Start: 2017-03-15 | End: 2017-03-17 | Stop reason: HOSPADM

## 2017-03-15 RX ORDER — PROPOFOL 10 MG/ML
VIAL (ML) INTRAVENOUS AS NEEDED
Status: DISCONTINUED | OUTPATIENT
Start: 2017-03-15 | End: 2017-03-15 | Stop reason: SURG

## 2017-03-15 RX ORDER — MEPERIDINE HYDROCHLORIDE 25 MG/ML
12.5 INJECTION INTRAMUSCULAR; INTRAVENOUS; SUBCUTANEOUS
Status: DISCONTINUED | OUTPATIENT
Start: 2017-03-15 | End: 2017-03-15 | Stop reason: HOSPADM

## 2017-03-15 RX ORDER — DIPHENHYDRAMINE HCL 25 MG
25 CAPSULE ORAL NIGHTLY PRN
Status: DISCONTINUED | OUTPATIENT
Start: 2017-03-15 | End: 2017-03-17 | Stop reason: HOSPADM

## 2017-03-15 RX ORDER — FLUMAZENIL 0.1 MG/ML
0.2 INJECTION INTRAVENOUS AS NEEDED
Status: DISCONTINUED | OUTPATIENT
Start: 2017-03-15 | End: 2017-03-15 | Stop reason: HOSPADM

## 2017-03-15 RX ADMIN — METOPROLOL TARTRATE 50 MG: 50 TABLET ORAL at 20:48

## 2017-03-15 RX ADMIN — LOSARTAN POTASSIUM 100 MG: 50 TABLET, FILM COATED ORAL at 10:28

## 2017-03-15 RX ADMIN — SODIUM CHLORIDE, POTASSIUM CHLORIDE, SODIUM LACTATE AND CALCIUM CHLORIDE 100 ML/HR: 600; 310; 30; 20 INJECTION, SOLUTION INTRAVENOUS at 06:43

## 2017-03-15 RX ADMIN — PROPOFOL 150 MG: 10 INJECTION, EMULSION INTRAVENOUS at 07:01

## 2017-03-15 RX ADMIN — CEFAZOLIN SODIUM 1 G: 1 INJECTION, SOLUTION INTRAVENOUS at 15:10

## 2017-03-15 RX ADMIN — INSULIN LISPRO 2 UNITS: 100 INJECTION, SOLUTION INTRAVENOUS; SUBCUTANEOUS at 17:37

## 2017-03-15 RX ADMIN — LIDOCAINE HYDROCHLORIDE 0.5 ML: 10 INJECTION, SOLUTION EPIDURAL; INFILTRATION; INTRACAUDAL; PERINEURAL at 06:43

## 2017-03-15 RX ADMIN — SODIUM CHLORIDE 75 ML/HR: 9 INJECTION, SOLUTION INTRAVENOUS at 15:10

## 2017-03-15 RX ADMIN — Medication 80 MCG: at 07:24

## 2017-03-15 RX ADMIN — MIDAZOLAM HYDROCHLORIDE 2 MG: 1 INJECTION, SOLUTION INTRAMUSCULAR; INTRAVENOUS at 06:46

## 2017-03-15 RX ADMIN — CEFAZOLIN 1 G: 1 INJECTION, POWDER, FOR SOLUTION INTRAMUSCULAR; INTRAVENOUS; PARENTERAL at 07:08

## 2017-03-15 RX ADMIN — FAMOTIDINE 20 MG: 20 TABLET, FILM COATED ORAL at 10:28

## 2017-03-15 RX ADMIN — SODIUM CHLORIDE, POTASSIUM CHLORIDE, SODIUM LACTATE AND CALCIUM CHLORIDE 20 ML/HR: 600; 310; 30; 20 INJECTION, SOLUTION INTRAVENOUS at 06:43

## 2017-03-15 RX ADMIN — FENTANYL CITRATE 150 MCG: 50 INJECTION INTRAMUSCULAR; INTRAVENOUS at 07:01

## 2017-03-15 RX ADMIN — POLYETHYLENE GLYCOL 3350 17 G: 17 POWDER, FOR SOLUTION ORAL at 15:10

## 2017-03-15 RX ADMIN — AMLODIPINE BESYLATE 5 MG: 5 TABLET ORAL at 10:28

## 2017-03-15 RX ADMIN — LIDOCAINE HYDROCHLORIDE 100 MG: 20 INJECTION, SOLUTION INFILTRATION; PERINEURAL at 07:01

## 2017-03-15 RX ADMIN — SODIUM CHLORIDE 75 ML/HR: 9 INJECTION, SOLUTION INTRAVENOUS at 10:32

## 2017-03-15 RX ADMIN — GLYCOPYRROLATE 0.4 MG: 0.2 INJECTION, SOLUTION INTRAMUSCULAR; INTRAVENOUS at 07:40

## 2017-03-15 RX ADMIN — OXYCODONE HYDROCHLORIDE AND ACETAMINOPHEN 2 TABLET: 10; 325 TABLET ORAL at 13:14

## 2017-03-15 RX ADMIN — FENTANYL CITRATE 100 MCG: 50 INJECTION INTRAMUSCULAR; INTRAVENOUS at 07:48

## 2017-03-15 RX ADMIN — Medication 4 MG: at 07:40

## 2017-03-15 RX ADMIN — LIDOCAINE HYDROCHLORIDE 1 EACH: 40 SOLUTION TOPICAL at 07:03

## 2017-03-15 RX ADMIN — DOCUSATE SODIUM 100 MG: 100 CAPSULE ORAL at 16:40

## 2017-03-15 RX ADMIN — OXYCODONE HYDROCHLORIDE AND ACETAMINOPHEN 2 TABLET: 10; 325 TABLET ORAL at 04:23

## 2017-03-15 RX ADMIN — METOPROLOL TARTRATE 50 MG: 50 TABLET ORAL at 10:28

## 2017-03-15 RX ADMIN — OXYCODONE HYDROCHLORIDE AND ACETAMINOPHEN 2 TABLET: 10; 325 TABLET ORAL at 18:41

## 2017-03-15 RX ADMIN — IPRATROPIUM BROMIDE AND ALBUTEROL SULFATE 3 ML: .5; 3 SOLUTION RESPIRATORY (INHALATION) at 15:04

## 2017-03-15 RX ADMIN — SUCCINYLCHOLINE CHLORIDE 120 MG: 20 INJECTION, SOLUTION INTRAMUSCULAR; INTRAVENOUS at 07:02

## 2017-03-15 RX ADMIN — CEFAZOLIN SODIUM 1 G: 1 INJECTION, SOLUTION INTRAVENOUS at 23:00

## 2017-03-15 RX ADMIN — ROCURONIUM BROMIDE 30 MG: 10 INJECTION INTRAVENOUS at 07:01

## 2017-03-15 RX ADMIN — ONDANSETRON 4 MG: 2 INJECTION INTRAMUSCULAR; INTRAVENOUS at 05:11

## 2017-03-15 RX ADMIN — ONDANSETRON HYDROCHLORIDE 4 MG: 2 SOLUTION INTRAMUSCULAR; INTRAVENOUS at 07:27

## 2017-03-15 RX ADMIN — IPRATROPIUM BROMIDE AND ALBUTEROL SULFATE 3 ML: .5; 3 SOLUTION RESPIRATORY (INHALATION) at 11:13

## 2017-03-15 RX ADMIN — Medication 10 ML: at 05:11

## 2017-03-15 RX ADMIN — FAMOTIDINE 20 MG: 20 TABLET, FILM COATED ORAL at 17:36

## 2017-03-15 RX ADMIN — Medication 80 MCG: at 07:16

## 2017-03-15 RX ADMIN — IPRATROPIUM BROMIDE AND ALBUTEROL SULFATE 3 ML: .5; 3 SOLUTION RESPIRATORY (INHALATION) at 19:45

## 2017-03-15 NOTE — OP NOTE
LUMBAR FUSION DECOMPRESSON WITH PEDICLE SCREWS  Procedure Note    Mani Arreola  3/15/2017    Pre-op Diagnosis:     1. Increasing chronic back pain  2. Bilateral buttock, thigh, and leg radiculopathy, left much worse than right  3. Severe neurogenic claudication  4. Degenerative disc disease L4-5  5. Focal scoliosis L4-5, concave right  6. Facet arthropathy L4 to S1, worse L4-5  7. Central and bilateral foraminal stenosis L4-5  8.  Status post left LLIF with instrumentation L4 5, 3/13/2017    Post-op Diagnosis:    same    Procedure/CPT® Codes:    1.  Posterior spinal fusion L4-5  2.  Posterior spinal instrumentation L4-5 (Lanx pedicle screws and rods)  3.  Use of locally obtained autograft bone for fusion  4.  Use of fluoroscopy for confirmation of surgical level and placement of instrumentation    Anesthesia: General    Surgeon: SUSAN Morgan MD    Assistant: Hugo Anand PA-C, Jani Sahu PA-C    Estimated Blood Loss: minimal    Complications: None    Condition: Stable to PACU.    Indications:    The patient is a 72-year-old who sees Dr. Levi Fleming for medical issues. He has had complaints of increasing chronic back pain, as well as symptoms down both buttocks, thighs, and into his legs, with the left side worse than the right. His symptoms are very consistent with severe neurogenic claudication. Imaging studies revealed advanced degenerative changes at L4-5 with a focal scoliosis concave to the right, along with facet arthropathy causing central and bilateral foraminal stenosis. This was felt to be contributing to the vast majority of his symptoms.    After failing conservative measures, it was mutually decided that surgery would be his best option. Risks, benefits, and complications of surgery were discussed with the patient. The patient appeared well informed and wished to proceed. We specifically discussed the risk of infection, blood loss, nerve root injury, CSF leak, and the possibility of incomplete  resolution of symptoms. We also discussed the possible risk of a nonunion and the potential need for additional surgery in the event of a pseudoarthrosis or hardware failure.      We elected proceed with a staged operation.  Previously on 3/13/17, the patient underwent a left lateral fusion of L4-5 with instrumentation.  Today we return to surgery for the second posterior stage of the procedure.     Operative Procedure:    After obtaining informed consent and verifying the correct operative site, the patient was brought to the operating room. A general anesthetic was provided by the anesthesia service with the assistance of an endotracheal tube. Once this was appropriately positioned and secured, the patient was carefully rotated prone onto a Khanh frame. All bony processes were well-padded. The lumbar region was prepped and draped in usual sterile fashion. A surgical timeout was taken to confirm this was the correct patient, we were working at the correct levels, and that preoperative antibiotics were given in a timely fashion.    Using fluoroscopy for guidance, a right sided Wiltsey incision was created overlying the L4-5 segment using a 10 blade scalpel.  Dissection was carried through subcutaneous tissues using Bovie cautery.  The lumbar fascia was divided in line with the incision and blunt dissection was carried between the multifidus and the longissimus down to the facet joint of L4-5.  Dissection was carried laterally exposing the transverse processes of L4 and L5.  We then exposed the L4-5 facet joint.  The capsule was destroyed using Bovie cautery exposing as much bone as possible.  The high-speed bur was then used to decorticate the facet joint, destroying as much of the facet cartilage as possible.  I also decorticated the lateral pars region and the tranverse processes.  The locally obtained autograft bone was left in situ in the posterolateral gutter.  This constituted a posterior fusion of  L4-5.    Next under fluoroscopic guidance, a Jamshidi needles were used to cannulate the pedicles of L4 and L5.  Once the needles were properly positioned in the pedicles, guidewires were placed to maintain position.  Over each of the guidewires, a Lanx pedicle screw was placed.  We used 6.5 mm x 50 mm screws into each of the pedicles.  After confirming the screws were properly positioned, an appropriate-sized saadia was chosen to span the pedicle screws.  The saadia was tightened into position using set screws.  The setscrews were tightened using the appropriate antitorque wrench and torque limiting screwdriver provided by [employer].    The wound was then thoroughly irrigated and an inspection was then undertaken to ensure that we had adequate hemostasis.  Bleeding at this point was controlled using FloSeal and bipolar cautery.  Final fluoroscopy imaging confirmed adequate position of the newly placed posterior instrumentation.    Wound closure was then accomplished by reapproximating the fascia with a #1 Vicryl area immediate subcutaneous tissues were closed using a 2-0 Vicryl and skin closure was augmented using Mastisol and Steri-Strips.  The incision was then washed and sterilely dressed with a Bioclusive dressing.    The patient was then carefully rotated supine onto a hospital gurney, extubated, and sent to the recovery room in good stable condition.  The patient tolerated the procedure well.  There were no complications.  We estimated blood loss to be minimal.    Hugo Anand PA-C provided critical assistance during the procedure.  His assistance was medically necessary in order to allow the procedure to occur in the most safe and efficient manner.    SUSAN Morgan MD     Date: 3/15/2017  Time: 6:51 AM

## 2017-03-15 NOTE — PROGRESS NOTES
Acute Care - Physical Therapy Re-Evaluation  [facility]     Patient Name: Mani Arreola  : 1945  MRN: [id number]  Today's Date: 3/15/2017   Onset of Illness/Injury or Date of Surgery Date: 17  Date of Referral to PT: 03/15/17  Referring Physician: Dr. Morgan      Admit Date: 3/13/2017     Visit Dx:    ICD-10-CM ICD-9-CM   1. Decreased activities of daily living (ADL) Z78.9 V49.89   2. Impaired functional mobility, balance, gait, and endurance Z74.09 V49.89     Patient Active Problem List   Diagnosis   • Spinal stenosis, lumbar   • Type 2 diabetes mellitus without complication, without long-term current use of insulin   • Essential hypertension   • Pure hypercholesterolemia   • Coronary artery disease involving native coronary artery without angina pectoris     Past Medical History   Diagnosis Date   • Colon cancer      colon   • Colon polyp    • Diabetes mellitus    • Hyperlipidemia    • Hypertension    • Myocardial infarction      Past Surgical History   Procedure Laterality Date   • Cholecystectomy     • Carotid endarterectomy Right    • Colon resection       for colon cancer   • Colonoscopy  2004   • Colonoscopy  2005     normal   • Colonoscopy  10/2007     recall 3 yr   • Colonoscopy  2010     5mm polypectomy distal transverse colon, patent end to end ileo colonic anastomosis   • Hydrocele excision / repair     • Cataract extraction     • Colonoscopy N/A 2016     Procedure: COLONOSCOPY WITH ANESTHESIA;  Surgeon: Cirilo Bhandari MD;  Location: [facility] ENDOSCOPY;  Service:    • Cardiac catheterization     • Peripheral arterial stent graft     • Lumbar fusion Left 3/13/2017     Procedure: LEFT LUMBAR LATERAL INTERBODY FUSION WITH INSTRUMENTATION L4-5 , LANX,  NUVASIVE MONITORING ;  Surgeon: DALY Morgan MD;  Location: [facility] OR;  Service:    • Lumbar fusion N/A 3/15/2017     Procedure: POSTERIOR SPINAL FUSION WITH INSTRUMENTATION L4-5;  Surgeon: DALY Morgan MD;   Location: [facility] OR;  Service:           PT ASSESSMENT (last 72 hours)      PT Evaluation       03/15/17 1107 03/15/17 0952    Rehab Evaluation    Document Type re-evaluation   see MAR  -PB (r) EK (t) PB (c) re-evaluation   See MAR  -TR    Subjective Information agree to therapy;complains of;fatigue;pain  -PB (r) EK (t) PB (c) agree to therapy;complains of;fatigue;pain  -TR    Patient Effort, Rehab Treatment  adequate  -TR    Patient Effort, Rehab Treatment Comment  Participation limited by drowsiness following surgery.   -TR    Symptoms Noted During/After Treatment shortness of breath  -PB (r) EK (t) PB (c) shortness of breath  -TR    Symptoms Noted Comment O2 decreased to 87% with mobility  -PB (r) EK (t) PB (c) O2 decreased to 87% with mobility  -TR    General Information    Patient Profile Review yes  -PB (r) EK (t) PB (c) yes  -TR    Onset of Illness/Injury or Date of Surgery Date 03/13/17  -PB (r) EK (t) PB (c) 03/13/17  -TR    Referring Physician Dr. Morgan  -PB (r) EK (t) PB (c) Dr. Morgan  -TR    General Observations fowlers, sleeping, 3L O2 NC, SCDs on, IV, spouse present  -PB (r) EK (t) PB (c) Fowlers, sleeping, O2 NC 3L, SCDs, IV L UE, spouse present.   -TR    Pertinent History Of Current Problem s/p 3/15 posterior spinal fusion with instrumentation L4-5  -PB (r) EK (t) PB (c) S/P 3/15 posterior spinal fusion with instrumentation L4-5  -TR    Precautions/Limitations fall precautions;spinal precautions;brace on when up  -PB (r) EK (t) PB (c) fall precautions;brace on when up;spinal precautions   LSO when OOB  -TR    Plans/Goals Discussed With spouse/S.O.;patient;agreed upon  -PB (r) EK (t) PB (c) spouse/S.O.;patient;agreed upon  -TR    Risks Reviewed patient:;spouse/S.O.:;LOB;nausea/vomiting;dizziness;increased discomfort;change in vital signs;increased drainage;lines disloged  -PB (r) EK (t) PB (c) patient:;spouse/S.O.:;LOB;dizziness;increased discomfort;change in vital signs  -TR    Benefits Reviewed  patient:;spouse/S.O.:;improve function;increase independence;increase strength;increase balance;decrease pain;decrease risk of DVT;improve skin integrity;increase knowledge  -PB (r) EK (t) PB (c) patient:;spouse/S.O.:;improve function;increase independence;increase strength;increase balance;decrease pain;increase knowledge  -TR    Barriers to Rehab physical barrier  -PB (r) EK (t) PB (c) physical barrier  -TR    Clinical Impression    Date of Referral to PT 03/15/17  -PB (r) EK (t) PB (c)     PT Diagnosis impaired gait and mobility  -PB (r) EK (t) PB (c)     Patient/Family Goals Statement return home  -PB (r) EK (t) PB (c)     Criteria for Skilled Therapeutic Interventions Met yes;treatment indicated  -PB (r) EK (t) PB (c)     Pathology/Pathophysiology Noted (Describe Specifically for Each System) musculoskeletal;neuromuscular  -PB (r) EK (t) PB (c)     Impairments Found (describe specific impairments) gait, locomotion, and balance;ergonomics and body mechanics;posture  -PB (r) EK (t) PB (c)     Rehab Potential good, to achieve stated therapy goals  -PB (r) EK (t) PB (c)     Predicted Duration of Therapy Intervention (days/wks) until discharge  -PB (r) EK (t) PB (c)     Vital Signs    Pre SpO2 (%) 93  -PB (r) EK (t) PB (c) 93  -TR    O2 Delivery Pre Treatment supplemental O2   3L NC  -PB (r) EK (t) PB (c) supplemental O2   3L NC  -TR    Intra SpO2 (%) 87  -PB (r) EK (t) PB (c) 87  -TR    O2 Delivery Intra Treatment room air  -PB (r) EK (t) PB (c) room air  -TR    Post SpO2 (%) 92  -PB (r) EK (t) PB (c) 92  -TR    O2 Delivery Post Treatment supplemental O2   3L NC  -PB (r) EK (t) PB (c) supplemental O2   3L NC  -TR    Pre Patient Position Supine  -PB (r) EK (t) PB (c) Supine  -TR    Intra Patient Position Standing   following mobility  -PB (r) EK (t) PB (c) Standing   Following mobility  -TR    Post Patient Position Supine  -PB (r) EK (t) PB (c) Supine  -TR    Pain Assessment    Pain Assessment 0-10  -PB (r) EK (t)  PB (c) 0-10  -TR    Pain Score 4  -PB (r) EK (t) PB (c) 4  -TR    Post Pain Score 5  -PB (r) EK (t) PB (c) 5  -TR    Pain Type Surgical pain;Acute pain  -PB (r) EK (t) PB (c) Surgical pain;Acute pain  -TR    Pain Location Hip  -PB (r) EK (t) PB (c) Hip  -TR    Pain Orientation Right  -PB (r) EK (t) PB (c) Right  -TR    Pain Frequency Constant/continuous  -PB (r) EK (t) PB (c) Constant/continuous  -TR    Pain Intervention(s) Medication (See MAR);Ambulation/increased activity;Repositioned  -PB (r) EK (t) PB (c) Medication (See MAR);Repositioned;Ambulation/increased activity  -TR    Response to Interventions tolerated  -PB (r) EK (t) PB (c) Tolerated  -TR    Vision Assessment/Intervention    Visual Impairment WFL with corrective lenses   reading glasses  -PB (r) EK (t) PB (c) WFL with corrective lenses   Reading glasses  -TR    Cognitive Assessment/Intervention    Current Cognitive/Communication Assessment functional  -PB (r) EK (t) PB (c) functional  -TR    Orientation Status oriented x 4  -PB (r) EK (t) PB (c) oriented x 4  -TR    Follows Commands/Answers Questions able to follow single-step instructions;100% of the time  -PB (r) EK (t) PB (c) able to follow single-step instructions;100% of the time  -TR    Personal Safety decreased awareness, need for safety  -PB (r) EK (t) PB (c) decreased awareness, need for safety  -TR    Personal Safety Interventions fall prevention program maintained;gait belt;nonskid shoes/slippers when out of bed;supervised activity  -PB (r) EK (t) PB (c) gait belt;fall prevention program maintained;nonskid shoes/slippers when out of bed;supervised activity  -TR    ROM (Range of Motion)    General ROM no range of motion deficits identified  -PB (r) EK (t) PB (c)     General ROM Detail  B UE AROM WFL  -TR    MMT (Manual Muscle Testing)    General MMT Assessment Detail BLE WFL throughout  -PB (r) EK (t) PB (c) B UE grossly WFL  -TR    Bed Mobility, Assessment/Treatment    Bed Mobility,  Assistive Device bed rails  -PB (r) EK (t) PB (c) bed rails  -TR    Bed Mobility, Roll Left, [place] set up required;verbal cues required  -PB (r) EK (t) PB (c) supervision required;verbal cues required  -TR    Bed Mobility, Roll Right, [place] supervision required;verbal cues required  -PB (r) EK (t) PB (c) supervision required;verbal cues required  -TR    Bed Mob, Sidelying to Sit, [place] contact guard assist;verbal cues required  -PB (r) EK (t) PB (c) contact guard assist;verbal cues required  -TR    Bed Mob, Sit to Sidelying, [place] contact guard assist;verbal cues required  -PB (r) EK (t) PB (c) contact guard assist;verbal cues required  -TR    Bed Mobility, Impairments pain  -PB (r) EK (t) PB (c) pain;decreased flexibility  -TR    Transfer Assessment/Treatment    Transfers, Sit-Stand [place] contact guard assist;2 person assist required;verbal cues required  -PB (r) EK (t) PB (c) contact guard assist;2 person assist required;verbal cues required  -TR    Transfers, Stand-Sit [place] contact guard assist;verbal cues required  -PB (r) EK (t) PB (c) contact guard assist;verbal cues required  -TR    Transfers, Sit-Stand-Sit, Assist Device rolling walker  -PB (r) EK (t) PB (c) rolling walker  -TR    Transfer, Impairments pain;impaired balance  -PB (r) EK (t) PB (c) pain;impaired balance  -TR    Gait Assessment/Treatment    Gait, [place] Level contact guard assist  -PB (r) EK (t) PB (c)     Gait, Assistive Device rolling walker  -PB (r) EK (t) PB (c)     Gait, Distance (Feet) 50  -PB (r) EK (t) PB (c)     Gait, Gait Deviations step length decreased  -PB (r) EK (t) PB (c)     Gait, Safety Issues step length decreased  -PB (r) EK (t) PB (c)     Gait, Impairments pain  -PB (r) EK (t) PB (c)     Gait, Comment patient required VCs to slow down and keep walker close to maintain safety  -PB (r) EK (t) PB (c)     Motor Skills/Interventions    Additional Documentation Balance Skills  Training (Group)  -PB (r) EK (t) PB (c)     Balance Skills Training    Sitting-Level of Assistance Contact guard  -PB (r) EK (t) PB (c) Contact guard  -TR    Sitting-Balance Support Feet supported  -PB (r) EK (t) PB (c) Feet supported  -TR    Standing-Level of Assistance Contact guard  -PB (r) EK (t) PB (c) Contact guard  -TR    Static Standing Balance Support assistive device  -PB (r) EK (t) PB (c) assistive device  -TR    Gait Balance-Level of Assistance Contact guard  -PB (r) EK (t) PB (c)     Gait Balance Support assistive device  -PB (r) EK (t) PB (c)     Orthotics Prosthetics    Additional Documentation Orthosis Location (Group);Orthosis Management/Training (Group)  -PB (r) EK (t) PB (c) Orthosis Location (Group);Orthosis Management/Training (Group)  -TR    Orthosis Location    Orthosis Location/Type neck/back  -PB (r) EK (t) PB (c)     Orthosis, Neck/Back LSO (lumbar sacral orthosis)  -PB (r) EK (t) PB (c) LSO (lumbar sacral orthosis)  -TR    Orthosis Management/Training    Orthosis Indications immobilize, protect/position healing structures;rest, reduce pain  -PB (r) EK (t) PB (c)     Orthosis Skills Training activity limitations;doffing orthosis;donning orthosis;purpose/goals of orthosis;restrictions/precautions;sleeping without orthosis  -PB (r) EK (t) PB (c) activity limitations;doffing orthosis;donning orthosis;purpose/goals of orthosis;restrictions/precautions;sleeping without orthosis  -TR    Orthosis Skills Training Comment wife educated. Pt drowsy and unable to report understanding of education.  -PB (r) EK (t) PB (c) Wife educated. Pt drowsy and unable to report understanding of education.   -TR    Orthosis Wear Schedule wear when out of bed only  -PB (r) EK (t) PB (c) wear when out of bed only  -TR    Sensory Assessment/Intervention    Sensory Impairment --   no complaints per pt  -PB (r) EK (t) PB (c) --   No complaints during Re-Eval  -TR    Positioning and Restraints    Pre-Treatment Position in  bed  -PB (r) EK (t) PB (c) in bed  -TR    Post Treatment Position bed  -PB (r) EK (t) PB (c) bed  -TR    In Bed fowlers;call light within reach;encouraged to call for assist;with family/caregiver;side rails up x3;SCD pump applied  -PB (r) EK (t) PB (c) fowlers;call light within reach;encouraged to call for assist;with family/caregiver;side rails up x3;SCD pump applied  -TR      03/14/17 1328 03/14/17 1026    Rehab Evaluation    Document Type therapy note (daily note)  -NW     Subjective Information agree to therapy;complains of;pain  -NW     General Information    Precautions/Limitations fall precautions;brace on when up;spinal precautions   going for 2nd sx tomorrow  -NW     Equipment Currently Used at Home  walker, rolling;cane, straight;commode;crutches  -    Living Environment    Lives With  spouse  -    Living Arrangements  [place]  -    Transportation Available  family or friend will provide  -    Pain Assessment    Pain Assessment 0-10  -NW     Pain Score 3  -NW     Pain Type Surgical pain  -NW     Pain Location Incision  -NW     Pain Frequency Constant/continuous  -NW     Pain Intervention(s) Medication (See MAR)  -NW     Multiple Pain Sites Yes  -NW     Pain 2    Pain Score 2 5  -NW     Pain Type 2 Acute pain  -NW     Pain Location 2 Groin  -NW     Pain Orientation 2 Left  -NW     Pain Descriptors 2 Burning  -NW     Bed Mobility, Assessment/Treatment    Bed Mobility, Assistive Device bed rails  -NW     Bed Mobility, Roll Left, [place] supervision required  -NW     Bed Mob, Sidelying to Sit, [place] contact guard assist  -NW     Bed Mob, Sit to Sidelying, [place] contact guard assist  -NW     Bed Mobility, Impairments pain  -NW     Transfer Assessment/Treatment    Transfers, Bed-Chair-Bed, Assist Device elevated surface  -NW     Transfers, Sit-Stand [place] verbal cues required;contact guard assist  -NW     Transfers, Stand-Sit [place] stand by assist  -NW     Transfers,  Sit-Stand-Sit, Assist Device rolling walker  -NW     Transfer, Impairments pain  -NW     Gait Assessment/Treatment    Gait, [place] Level contact guard assist  -NW     Gait, Assistive Device rolling walker  -NW     Gait, Distance (Feet) 150  -NW     Gait, Gait Deviations sendy decreased;step length decreased;stride length decreased;forward flexed posture  -NW     Gait, Impairments pain  -NW     Orthotics Prosthetics    Additional Documentation Orthosis Location (Group)  -NW     Orthosis Location    Orthosis Location/Type neck/back  -NW     Orthosis, Neck/Back LSO (lumbar sacral orthosis)  -NW     Positioning and Restraints    Pre-Treatment Position in bed  -NW     Post Treatment Position bed  -NW     In Bed fowlers;call light within reach;encouraged to call for assist;with family/caregiver  -NW       03/14/17 1016 03/14/17 0834    Rehab Evaluation    Document Type evaluation;other (see comments)   SEE mar  -CP evaluation   See MAR, entered room 1015  -ND    Subjective Information agree to therapy;complains of;pain  -CP agree to therapy;complains of;weakness;pain;numbness   numbness LLE  -ND    General Information    Patient Profile Review yes  -CP yes  -ND    Onset of Illness/Injury or Date of Surgery Date 03/13/17  -CP 03/13/17  -ND    Referring Physician Dr. Morgan   p/o care, gait training, braace A, rehab needs  -CP Dr. Morgan  -ND    General Observations Pt. lying supine, alert, IV, on RA, family present, SCDs  -CP Pt. lying supine, alert, IV site, famil present, SCDs  -ND    Pertinent History Of Current Problem Pt. having pain in [place], radiating down legs, L. side worse. S/P 3/13/17 L lumbar lateral interbody fusion with instrumentation L4-5.   -CP Pt. having pain in [place], radiating down legs, L. side worse. S/P 3/13/17 L lumbar lateral  interbody fusion with instrumentation L4-5.   -ND    Precautions/Limitations brace on when up;fall precautions   LSO  -CP brace on when up;fall  precautions;spinal precautions  -ND    Prior Level of Function independent:;all household mobility;community mobility;gait;transfer;ADL's;feeding;grooming;dressing;bathing;home management;driving  -CP independent:;all household mobility;community mobility;ADL's;home management;cooking;cleaning;driving  -ND    Equipment Currently Used at Home bath bench;commode;walker, rolling;wheelchair;cane, straight;walker, standard  -CP bath bench;commode;walker, rolling;wheelchair;cane, straight  -ND    Plans/Goals Discussed With patient and family;agreed upon  -CP patient and family;agreed upon  -ND    Risks Reviewed patient and family:;LOB;nausea/vomiting;dizziness;increased discomfort;change in vital signs  -CP patient and family:;LOB;dizziness;nausea/vomiting;increased discomfort;change in vital signs;increased drainage  -ND    Benefits Reviewed patient and family:;improve function;increase independence;increase strength;increase balance;decrease pain;decrease risk of DVT;improve skin integrity;increase knowledge  -CP patient and family:;increase independence;improve function;decrease pain;increase balance;increase strength;improve skin integrity;increase knowledge;decrease risk of DVT  -ND    Barriers to Rehab previous functional deficit;physical barrier;impaired sensation  -CP previous functional deficit;physical barrier;impaired sensation  -ND    Living Environment    Lives With spouse  -CP spouse  -ND    Living Arrangements mobile home  -CP mobile home  -ND    Home Accessibility stairs to enter home  -CP stairs to enter home;tub/shower is not walk in;bed and bath on same level  -ND    Number of Stairs to Enter Home 3  -CP 3  -ND    Number of Stairs Within Home  --  -ND    Stair Railings at Home none  -CP     Clinical Impression    Date of Referral to PT 03/13/17  -CP     PT Diagnosis impaired gait and mobility  -CP     Prognosis good  -CP     Patient/Family Goals Statement decrease pain, walk  -CP     Criteria for  "Skilled Therapeutic Interventions Met yes;treatment indicated  -CP     Pathology/Pathophysiology Noted (Describe Specifically for Each System) musculoskeletal;neuromuscular  -CP     Impairments Found (describe specific impairments) ergonomics and body mechanics;gait, locomotion, and balance;joint integrity and mobility;motor function;muscle performance;ROM;posture  -CP     Rehab Potential good, to achieve stated therapy goals  -CP     Predicted Duration of Therapy Intervention (days/wks) until d/c  -CP     Pain Assessment    Pain Assessment 0-10  -CP 0-10  -ND    Pain Score 4  -CP 4  -ND    Pain Type Chronic pain;Surgical pain  -CP Surgical pain;Chronic pain  -ND    Pain Location Back  -CP Back  -ND    Pain Orientation Lower  -CP Lower  -ND    Pain Radiating Towards radiating down LLE to toes  -CP Radiating down LLE  -ND    Pain Descriptors --   \"all of the above\"  -CP --   \"All of the above\"  -ND    Pain Frequency Constant/continuous  -CP Constant/continuous  -ND    Pain Intervention(s) Medication (See MAR);Repositioned;Ambulation/increased activity  -CP Medication (See MAR);Repositioned  -ND    Response to Interventions tolerated  -CP tolerated  -ND    Multiple Pain Sites Yes  -CP Yes  -ND    Pain 2    Pain Score 2 4  -CP     Vision Assessment/Intervention    Visual Impairment WFL with corrective lenses   reading glasses  -CP WFL with corrective lenses   READING  -ND    Cognitive Assessment/Intervention    Current Cognitive/Communication Assessment functional  -CP functional  -ND    Orientation Status oriented x 4  -CP oriented x 4  -ND    Follows Commands/Answers Questions able to follow multi-step instructions;100% of the time  -% of the time;able to follow multi-step instructions  -ND    Personal Safety WNL/WFL  -CP WNL/WFL  -ND    Personal Safety Interventions fall prevention program maintained;gait belt;muscle strengthening facilitated;nonskid shoes/slippers when out of bed;supervised activity  -CP fall " prevention program maintained;gait belt;nonskid shoes/slippers when out of bed;supervised activity  -ND    ROM (Range of Motion)    General ROM lower extremity range of motion deficits identified  -CP no range of motion deficits identified  -ND    General ROM Detail AAROM L hip flexion limited due to pain, AROM L knee, ankle and R knee, hip and ankle WFLs  -CP BUE AROM WFL  -ND    MMT (Manual Muscle Testing)    General MMT Assessment lower extremity strength deficits identified  -CP no strength deficits identified  -ND    General MMT Assessment Detail LLE grossly 3/5, RLE grossly 4/5  -CP     Bed Mobility, Assessment/Treatment    Bed Mobility, Assistive Device bed rails  -CP bed rails  -ND    Bed Mobility, Roll Left, [place] contact guard assist;verbal cues required  -CP     Bed Mob, Sidelying to Sit, [place] verbal cues required;nonverbal cues required (demo/gesture);contact guard assist  -CP verbal cues required;nonverbal cues required (demo/gesture);contact guard assist  -ND    Bed Mob, Sit to Sidelying, [place] verbal cues required;nonverbal cues required (demo/gesture);supervision required  -CP verbal cues required;nonverbal cues required (demo/gesture);supervision required  -ND    Bed Mobility, Impairments pain;ROM decreased;sensation decreased;strength decreased  -CP pain  -ND    Transfer Assessment/Treatment    Transfers, Sit-Stand [place] verbal cues required;nonverbal cues required (demo/gesture);contact guard assist  -CP verbal cues required;nonverbal cues required (demo/gesture);contact guard assist  -ND    Transfers, Stand-Sit [place] verbal cues required;nonverbal cues required (demo/gesture);contact guard assist  -CP verbal cues required;nonverbal cues required (demo/gesture);contact guard assist  -ND    Transfers, Sit-Stand-Sit, Assist Device rolling walker  -CP rolling walker  -ND    Transfer, Impairments ROM decreased;strength decreased;impaired balance;pain  -CP  pain;impaired balance  -ND    Gait Assessment/Treatment    Gait, [place] Level contact guard assist;2 person assist required;verbal cues required   2A not necessary for future treatments  -CP     Gait, Assistive Device rolling walker  -CP     Gait, Distance (Feet) 80  -CP     Gait, Gait Pattern Analysis swing-to gait  -CP     Gait, Gait Deviations left:;antalgic;[place] decreased;bilateral:;decreased heel strike;forward flexed posture;step length decreased;stride length decreased;toe-to-floor clearance decreased  -CP     Gait, Safety Issues balance decreased during turns;step length decreased;weight-shifting ability decreased  -CP     Gait, Impairments ROM decreased;decreased flexibility;sensation decreased;strength decreased;impaired balance;coordination impaired;motor control impaired;pain  -CP     Motor Skills/Interventions    Additional Documentation  Balance Skills Training (Group)  -ND    Balance Skills Training    Sitting-Level of Assistance  Independent  -ND    Sitting-Balance Support  Feet supported  -ND    Standing-Level of Assistance  Contact guard  -ND    Static Standing Balance Support  assistive device  -ND    Gait Balance-Level of Assistance  Contact guard  -ND    Gait Balance Support  assistive device  -ND    Orthotics Prosthetics    Additional Documentation Orthosis Location (Group)  -CP Orthosis Location (Group);Orthosis Management/Training (Group)  -ND    Orthosis Location    Orthosis Location/Type neck/back  -CP neck/back  -ND    Orthosis, Neck/Back LSO (lumbar sacral orthosis)  -CP LSO (lumbar sacral orthosis)  -ND    Orthosis Management/Training    Orthosis Indications immobilize, protect/position healing structures;rest, reduce pain  -CP immobilize, protect/position healing structures;rest, reduce pain  -ND    Orthosis Skills Training doffing orthosis;donning orthosis;activity limitations   no bending, lifting or twisting  -CP doffing orthosis;donning orthosis;purpose/goals of  orthosis;restrictions/precautions  -ND    Orthosis Wear Schedule wear when out of bed only  -CP wear when out of bed only  -ND    Sensory Assessment/Intervention    Sensory Impairment numbness   LLE  -CP --   Numbness in LLE  -ND    Positioning and Restraints    Pre-Treatment Position in bed  -CP in bed  -ND    Post Treatment Position bed  -CP bed  -ND    In Bed fowlers;call light within reach;encouraged to call for assist;with family/caregiver;side rails up x2;SCD pump applied  -CP fowlers;call light within reach;encouraged to call for assist;with family/caregiver;side rails up x2  -ND      03/13/17 2135 03/13/17 2133    General Information    Equipment Currently Used at Home  none  -AR    Living Environment    Lives With spouse  -[place]     Living Arrangements house  -[place]     Home Accessibility stairs within home  -AR     Number of Stairs Within Home 3  -AR     Stair Railings at Home none  -AR     Type of Financial/Environmental Concern none  -AR     Transportation Available car  -AR       User Key  (r) = Recorded By, (t) = Taken By, (c) = Cosigned By    Initials Name Provider Type    NW [place] Monsivais, PTA Physical Therapy Assistant    AR Elisa Chester, RN Registered Nurse    PB Vince Boles, PT DPT Physical Therapist    KP Freda Clark, BSW     TR Marimar Foley, OTR/L Occupational Therapist    CP Ivette Calzada, PT Physical Therapist    ND Lesli Ortiz, OTR/L Occupational Therapist    EK Ngoc Wesley, PT Student PT Student          Physical Therapy Education     Title: PT OT SLP Therapies (Active)     Topic: Physical Therapy (Active)     Point: Mobility training (Done)    Learning Progress Summary    Learner Readiness Method Response Comment Documented by Status   Patient Acceptance E VU Patient and wife were educated on improving mobility and proper body mechanics while maintaining spinal precautions. EK 03/15/17 1131 Done    Acceptance E VU,NR Pt. educated on purpose of PT for  mobility training, use of brace and RW. RW left in room for use with staff. Pt. educated on proper body mechanics, including no bending, lifting or twisting. CP 03/14/17 1049 Done   Family Acceptance E VU Patient and wife were educated on improving mobility and proper body mechanics while maintaining spinal precautions. EK 03/15/17 1131 Done    Acceptance E VU,NR Pt. educated on purpose of PT for mobility training, use of brace and RW. RW left in room for use with staff. Pt. educated on proper body mechanics, including no bending, lifting or twisting. CP 03/14/17 1049 Done               Point: Body mechanics (Done)    Learning Progress Summary    Learner Readiness Method Response Comment Documented by Status   Patient Acceptance E VU Patient and wife were educated on improving mobility and proper body mechanics while maintaining spinal precautions. EK 03/15/17 1131 Done    Acceptance E VU,NR Pt. educated on purpose of PT for mobility training, use of brace and RW. RW left in room for use with staff. Pt. educated on proper body mechanics, including no bending, lifting or twisting. CP 03/14/17 1049 Done   Family Acceptance E VU Patient and wife were educated on improving mobility and proper body mechanics while maintaining spinal precautions. EK 03/15/17 1131 Done    Acceptance E VU,NR Pt. educated on purpose of PT for mobility training, use of brace and RW. RW left in room for use with staff. Pt. educated on proper body mechanics, including no bending, lifting or twisting. CP 03/14/17 1049 Done               Point: Precautions (Done)    Learning Progress Summary    Learner Readiness Method Response Comment Documented by Status   Patient Acceptance E VU Patient and wife were educated on improving mobility and proper body mechanics while maintaining spinal precautions. EK 03/15/17 1131 Done    Acceptance E VU,NR Pt. educated on purpose of PT for mobility training, use of brace and RW. RW left in room for use with  staff. Pt. educated on proper body mechanics, including no bending, lifting or twisting. CP 03/14/17 1049 Done   Family Acceptance E VU Patient and wife were educated on improving mobility and proper body mechanics while maintaining spinal precautions. EK 03/15/17 1131 Done    Acceptance E VU,NR Pt. educated on purpose of PT for mobility training, use of brace and RW. RW left in room for use with staff. Pt. educated on proper body mechanics, including no bending, lifting or twisting. CP 03/14/17 1049 Done                      User Key     Initials Effective Dates Name Provider Type Discipline    CP 09/18/16 -  Ivette Calzada, PT Physical Therapist PT    EK 03/23/16 -  Ngoc Wesley, PT Student PT Student PT                PT Recommendation and Plan  Anticipated Discharge Disposition: home with assist  Planned Therapy Interventions: balance training, bed mobility training, gait training, home exercise program, orthotic fitting/training, patient/family education, postural re-education, stair training, strengthening, transfer training  PT Frequency: daily, 2 times/day, per priority policy  Plan of Care Review  Plan Of Care Reviewed With: patient, spouse  Progress: progress toward functional goals as expected  Outcome Summary/Follow up Plan: PT re-eval complete after second surgery on 3/15. Patient demonstrated ability to perform bed mobility and transfers at [facility] and ambulated with RW at [facility]. Patient's performance was limited by increased drowsiness. Patient demonstrates impaired balance, functional mobility, gait, and transfers. Patient will continue to benefit from skilled PT to include gait training, stair training, balance training, functional mobility, strengthening, and patient education. Patient plans to discharge to home with assist.          IP PT Goals       03/15/17 1132 03/14/17 1051       Bed Mobility PT LTG    Bed Mobility PT LTG, Date Established  03/14/17  -CP     Bed Mobility PT LTG, Time to  Achieve  by discharge  -CP     Bed Mobility PT LTG, Activity Type  all bed mobility  -CP     Bed Mobility PT LTG, [place] Level  independent  -CP     Bed Mobility PT LTG, Date Goal Reviewed 03/15/17  -PB (r) EK (t) PB (c)      Bed Mobility PT LTG, Outcome goal ongoing  -PB (r) EK (t) PB (c)      Bed Mobility PT LTG, Reason Goal Not Met --   second surgery  -PB (r) EK (t) PB (c)      Transfer Training PT LTG    Transfer Training PT LTG, Date Established  03/14/17  -CP     Transfer Training PT LTG, Time to Achieve  by discharge  -CP     Transfer Training PT LTG, Activity Type  sit to stand/stand to sit  -CP     Transfer Training PT LTG, [place] Level  conditional independence  -CP     Transfer Training PT LTG, Assist Device  walker, rolling  -CP     Transfer Training PT  LTG, Date Goal Reviewed 03/15/17  -PB (r) EK (t) PB (c)      Transfer Training PT LTG, Outcome goal ongoing  -PB (r) EK (t) PB (c)      Transfer Training PT LTG, Reason Goal Not Met other (see comments)   second surgery  -PB (r) EK (t) PB (c)      Gait Training PT LTG    Gait Training Goal PT LTG, Date Established  03/14/17  -CP     Gait Training Goal PT LTG, Time to Achieve  by discharge  -CP     Gait Training Goal PT LTG, [place] Level  supervision required  -CP     Gait Training Goal PT LTG, Assist Device  walker, rolling  -CP     Gait Training Goal PT LTG, Distance to Achieve  200'   with LSO  -CP     Gait Training Goal PT LTG, Additional Goal progress away from AD as tolerated  -PB (r) EK (t) PB (c)      Gait Training Goal PT LTG, Date Goal Reviewed 03/15/17  -PB (r) EK (t) PB (c)      Gait Training Goal PT LTG, Outcome goal ongoing  -PB (r) EK (t) PB (c)      Gait Training Goal PT LTG, Reason Goal Not Met other (see comments)   second surgery  -PB (r) EK (t) PB (c)      Stair Training PT LTG    Stair Training Goal PT LTG, Date Established  03/14/17  -CP     Stair Training Goal PT LTG, Time to Achieve  by discharge  -CP     Stair  Training Goal PT LTG, Number of Steps  3  -CP     Stair Training Goal PT LTG, [place] Level  contact guard assist  -CP     Stair Training Goal PT LTG, Assist Device  cane, straight  -CP     Stair Training Goal PT LTG, Date Goal Reviewed 03/15/17  -PB (r) EK (t) PB (c)      Stair Training Goal PT LTG, Outcome goal ongoing  -PB (r) EK (t) PB (c)      Stair Training Goal PT LTG, Reason Goal Not Met other (see comments)   second surgery  -PB (r) EK (t) PB (c)        User Key  (r) = Recorded By, (t) = Taken By, (c) = Cosigned By    Initials Name Provider Type    PB Vince Boles, PT DPT Physical Therapist    CP Ivette Calzada, PT Physical Therapist    EK Ngoc Wesley, PT Student PT Student                Outcome Measures       03/15/17 1100 03/15/17 0952 03/14/17 1016    How much help from another person do you currently need...    Turning from your back to your side while in flat bed without using bedrails? 3  -PB (r) EK (t) PB (c)  3  -CP    Moving from lying on back to sitting on the side of a flat bed without bedrails? 3  -PB (r) EK (t) PB (c)  3  -CP    Moving to and from a bed to a chair (including a wheelchair)? 3  -PB (r) EK (t) PB (c)  3  -CP    Standing up from a chair using your arms (e.g., wheelchair, bedside chair)? 3  -PB (r) EK (t) PB (c)  3  -CP    Climbing 3-5 steps with a railing? 3  -PB (r) EK (t) PB (c)  3  -CP    To walk in hospital room? 3  -PB (r) EK (t) PB (c)  3  -CP    AM-PAC 6 Clicks Score 18  -PB (r) EK (t)  18  -CP    How much help from another is currently needed...    Putting on and taking off regular lower body clothing?  3  -TR     Bathing (including washing, rinsing, and drying)  3  -TR     Toileting (which includes using toilet bed pan or urinal)  3  -TR     Putting on and taking off regular upper body clothing  3  -TR     Taking care of personal grooming (such as brushing teeth)  3  -TR     Eating meals  4  -TR     Score  19  -TR     Functional Assessment    Outcome  Measure Options AM-PAC 6 Clicks Basic Mobility (PT)  -PB (r) EK (t) PB (c) AM-PAC 6 Clicks Daily Activity (OT)  -TR AM-PAC 6 Clicks Basic Mobility (PT)  -CP      03/14/17 1000          How much help from another is currently needed...    Putting on and taking off regular lower body clothing? 3  -ND      Bathing (including washing, rinsing, and drying) 3  -ND      Toileting (which includes using toilet bed pan or urinal) 3  -ND      Putting on and taking off regular upper body clothing 3  -ND      Taking care of personal grooming (such as brushing teeth) 4  -ND      Eating meals 4  -ND      Score 20  -ND      Functional Assessment    Outcome Measure Options AM-PAC 6 Clicks Daily Activity (OT)  -ND        User Key  (r) = Recorded By, (t) = Taken By, (c) = Cosigned By    Initials Name Provider Type    PB Vince Boles, PT DPT Physical Therapist    TR Marimar Foley, OTR/L Occupational Therapist    CP Ivette Calzada, PT Physical Therapist    ND Lesli Ortiz, OTR/L Occupational Therapist    EK Ngoc Wesley, PT Student PT Student           Time Calculation:         PT Charges       03/15/17 1147          Time Calculation    Start Time 1037  -PB (r) EK (t) PB (c)      Stop Time 1100  -PB (r) EK (t) PB (c)      Time Calculation (min) 23 min  -PB (r) EK (t)      PT Received On 03/15/17  -PB (r) EK (t) PB (c)      PT Goal Re-Cert Due Date 03/25/17  -PB (r) EK (t) PB (c)        User Key  (r) = Recorded By, (t) = Taken By, (c) = Cosigned By    Initials Name Provider Type    PAULETTE Boles, PT DPT Physical Therapist    EK Ngoc Wesley, PT Student PT Student          Therapy Charges for Today     Code Description Service Date Service Provider Modifiers Qty    [id number]  PT RE-EVAL ESTABLISHED PLAN 2 3/15/2017 Ngoc Wesley, PT Student GP, KX 1          PT G-Codes  PT Professional Judgement Used?: Yes  Outcome Measure Options: AM-PAC 6 Clicks Basic Mobility (PT)  Score: 18  Functional Limitation:  Mobility: Walking and moving around  Mobility: Walking and Moving Around Current Status (): At least 40 percent but less than 60 percent impaired, limited or restricted  Mobility: Walking and Moving Around Goal Status (): At least 1 percent but less than 20 percent impaired, limited or restricted      Ngoc Wesley, PT Student  3/15/2017

## 2017-03-15 NOTE — ANESTHESIA POSTPROCEDURE EVALUATION
Patient: Mani Arreola    Procedure Summary     Date Anesthesia Start Anesthesia Stop Room / Location    03/15/17 0658 0758  [facility] OR 05 / [facility]       Procedure Diagnosis Surgeon Provider    POSTERIOR SPINAL FUSION WITH INSTRUMENTATION L4-5 (N/A Spine Lumbar) (M54.5) MD Eric Acevedo CRNA          Anesthesia Type: general  Last vitals  /64 (03/15/17 0810)    Temp 97.5 °F (36.4 °C) (03/15/17 0755)    Pulse 80 (03/15/17 0810)   Resp 16 (03/15/17 0810)    SpO2 92 % (03/15/17 0810)      Post Anesthesia Care and Evaluation    Patient location during evaluation: PACU  Patient participation: complete - patient participated  Level of consciousness: awake and alert  Pain score: 0  Pain management: adequate  Airway patency: patent  Anesthetic complications: No anesthetic complications    Cardiovascular status: acceptable, hemodynamically stable and stable  Respiratory status: acceptable  Hydration status: acceptable

## 2017-03-15 NOTE — PROGRESS NOTES
Acute Care - Occupational Therapy Re-Evaluation  [facility]     Patient Name: Mani Arreola  : 1945  MRN: [id number]  Today's Date: 3/15/2017  Onset of Illness/Injury or Date of Surgery Date: (P) 17  Date of Referral to OT: 03/15/17  Referring Physician: (P) Dr. Morgan    Admit Date: 3/13/2017       ICD-10-CM ICD-9-CM   1. Decreased activities of daily living (ADL) Z78.9 V49.89   2. Impaired functional mobility, balance, gait, and endurance Z74.09 V49.89     Patient Active Problem List   Diagnosis   • Spinal stenosis, lumbar   • Type 2 diabetes mellitus without complication, without long-term current use of insulin   • Essential hypertension   • Pure hypercholesterolemia   • Coronary artery disease involving native coronary artery without angina pectoris     Past Medical History   Diagnosis Date   • Colon cancer      colon   • Colon polyp    • Diabetes mellitus    • Hyperlipidemia    • Hypertension    • Myocardial infarction      Past Surgical History   Procedure Laterality Date   • Cholecystectomy     • Carotid endarterectomy Right    • Colon resection       for colon cancer   • Colonoscopy  2004   • Colonoscopy  2005     normal   • Colonoscopy  10/2007     recall 3 yr   • Colonoscopy  2010     5mm polypectomy distal transverse colon, patent end to end ileo colonic anastomosis   • Hydrocele excision / repair     • Cataract extraction     • Colonoscopy N/A 2016     Procedure: COLONOSCOPY WITH ANESTHESIA;  Surgeon: Cirilo Bhandari MD;  Location: [facility] ENDOSCOPY;  Service:    • Cardiac catheterization     • Peripheral arterial stent graft     • Lumbar fusion Left 3/13/2017     Procedure: LEFT LUMBAR LATERAL INTERBODY FUSION WITH INSTRUMENTATION L4-5 , LANX,  NUVASIVE MONITORING ;  Surgeon: DALY Morgan MD;  Location: [facility] OR;  Service:    • Lumbar fusion N/A 3/15/2017     Procedure: POSTERIOR SPINAL FUSION WITH INSTRUMENTATION L4-5;  Surgeon: DALY Morgan MD;   Location: [facility] OR;  Service:           OT ASSESSMENT FLOWSHEET (last 72 hours)      OT Evaluation       03/15/17 1107 03/15/17 0952 03/14/17 1328 03/14/17 1026 03/14/17 1016    Rehab Evaluation    Document Type (P)  re-evaluation   see MAR  -EK re-evaluation   See MAR  -TR therapy note (daily note)  -NW  evaluation;other (see comments)   SEE mar  -CP    Subjective Information (P)  agree to therapy;complains of;fatigue;pain  -EK agree to therapy;complains of;fatigue;pain  -TR agree to therapy;complains of;pain  -NW  agree to therapy;complains of;pain  -CP    Patient Effort, Rehab Treatment  adequate  -TR       Patient Effort, Rehab Treatment Comment  Participation limited by drowsiness following surgery.   -TR       Symptoms Noted During/After Treatment (P)  shortness of breath  -EK shortness of breath  -TR       Symptoms Noted Comment (P)  O2 decreased to 87% with mobility  -EK O2 decreased to 87% with mobility  -TR       General Information    Patient Profile Review (P)  yes  -EK yes  -TR   yes  -CP    Onset of Illness/Injury or Date of Surgery Date (P)  03/13/17  -EK 03/13/17  -TR   03/13/17  -CP    Referring Physician (P)  Dr. Morgan  -EK Dr. Morgan  -TR   Dr. Morgan   p/o care, gait training, braace A, rehab needs  -CP    General Observations (P)  fowlers, sleeping, 3L O2 NC, SCDs on, IV, spouse present  -EK Fowlers, sleeping, O2 NC 3L, SCDs, IV L UE, spouse present.   -TR   Pt. lying supine, alert, IV, on RA, family present, SCDs  -CP    Pertinent History Of Current Problem (P)  s/p 3/15 posterior spinal fusion with instrumentation L4-5  -EK S/P 3/15 posterior spinal fusion with instrumentation L4-5  -TR   Pt. having pain in lower [place], radiating down legs, L. side worse. S/P 3/13/17 L lumbar lateral interbody fusion with instrumentation L4-5.   -CP    Precautions/Limitations (P)  fall precautions;spinal precautions;brace on when up  -EK fall precautions;brace on when up;spinal precautions   LSO when OOB   -TR fall precautions;brace on when up;spinal precautions   going for 2nd sx tomorrow  -NW  brace on when up;fall precautions   LSO  -CP    Prior Level of Function     independent:;all household mobility;community mobility;gait;transfer;ADL's;feeding;grooming;dressing;bathing;home management;driving  -CP    Equipment Currently Used at Home    walker, rolling;cane, straight;commode;crutches  - bath bench;commode;walker, rolling;wheelchair;cane, straight;walker, standard  -CP    Plans/Goals Discussed With (P)  spouse/S.O.;patient;agreed upon  -EK spouse/S.O.;patient;agreed upon  -TR   patient and family;agreed upon  -CP    Risks Reviewed (P)  patient:;spouse/S.O.:;LOB;nausea/vomiting;dizziness;increased discomfort;change in vital signs;increased drainage;lines disloged  -EK patient:;spouse/S.O.:;LOB;dizziness;increased discomfort;change in vital signs  -TR   patient and family:;LOB;nausea/vomiting;dizziness;increased discomfort;change in vital signs  -CP    Benefits Reviewed (P)  patient:;spouse/S.O.:;improve function;increase independence;increase strength;increase balance;decrease pain;decrease risk of DVT;improve skin integrity;increase knowledge  -EK patient:;spouse/S.O.:;improve function;increase independence;increase strength;increase balance;decrease pain;increase knowledge  -TR   patient and family:;improve function;increase independence;increase strength;increase balance;decrease pain;decrease risk of DVT;improve skin integrity;increase knowledge  -CP    Barriers to Rehab (P)  physical barrier  -EK physical barrier  -TR   previous functional deficit;physical barrier;impaired sensation  -CP    Living Environment    Lives With    spouse  -KP spouse  -CP    Living Arrangements    house  -KP mobile home  -CP    Home Accessibility     stairs to enter home  -CP    Number of Stairs to Enter Home     3  -CP    Stair Railings at Home     none  -CP    Transportation Available    family or friend will provide  -KP      "Clinical Impression    Date of Referral to OT  03/15/17  -TR       OT Diagnosis  Decreased ADL  -TR       Impairments Found (describe specific impairments)  aerobic capacity/endurance;ergonomics and body mechanics;gait, locomotion, and balance;muscle performance;ventilation and respiration/gas exchange  -TR       Patient/Family Goals Statement  Return home. Learn all precautions related to pts surgery.   -TR       Criteria for Skilled Therapeutic Interventions Met  yes;treatment indicated  -TR       Rehab Potential  good, to achieve stated therapy goals  -TR       Therapy Frequency  3-5 times/wk  -TR       Predicted Duration of Therapy Intervention (days/wks)  10 days  -TR       Anticipated Equipment Needs At Discharge  --   None  -TR       Anticipated Discharge Disposition  home with assist  -TR       Vital Signs    Pre SpO2 (%) (P)  93  -EK 93  -TR       O2 Delivery Pre Treatment (P)  supplemental O2   3L NC  -EK supplemental O2   3L NC  -TR       Intra SpO2 (%) (P)  87  -EK 87  -TR       O2 Delivery Intra Treatment (P)  room air  -EK room air  -TR       Post SpO2 (%) (P)  92  -EK 92  -TR       O2 Delivery Post Treatment (P)  supplemental O2   3L NC  -EK supplemental O2   3L NC  -TR       Pre Patient Position (P)  Supine  -EK Supine  -TR       Intra Patient Position (P)  Standing   following mobility  -EK Standing   Following mobility  -TR       Post Patient Position (P)  Supine  -EK Supine  -TR       Pain Assessment    Pain Assessment (P)  0-10  -EK 0-10  -TR 0-10  -NW  0-10  -CP    Pain Score (P)  4  -EK 4  -TR 3  -NW  4  -CP    Post Pain Score (P)  5  -EK 5  -TR       Pain Type (P)  Surgical pain;Acute pain  -EK Surgical pain;Acute pain  -TR Surgical pain  -NW  Chronic pain;Surgical pain  -CP    Pain Location (P)  Hip  -EK Hip  -TR Incision  -NW  Back  -CP    Pain Orientation (P)  Right  -EK Right  -TR   Lower  -CP    Pain Radiating Towards     radiating down LLE to toes  -CP    Pain Descriptors     --   \"all " "of the above\"  -CP    Pain Frequency (P)  Constant/continuous  -EK Constant/continuous  -TR Constant/continuous  -NW  Constant/continuous  -CP    Pain Intervention(s) (P)  Medication (See MAR);Ambulation/increased activity;Repositioned  -EK Medication (See MAR);Repositioned;Ambulation/increased activity  -TR Medication (See MAR)  -NW  Medication (See MAR);Repositioned;Ambulation/increased activity  -CP    Response to Interventions (P)  tolerated  -EK Tolerated  -TR   tolerated  -CP    Multiple Pain Sites   Yes  -NW  Yes  -CP    Pain 2    Pain Score 2   5  -NW  4  -CP    Pain Type 2   Acute pain  -NW      Pain Location 2   Groin  -NW      Pain Orientation 2   Left  -NW      Pain Descriptors 2   Burning  -NW      Vision Assessment/Intervention    Visual Impairment (P)  WFL with corrective lenses   reading glasses  -EK WFL with corrective lenses   Reading glasses  -TR   WFL with corrective lenses   reading glasses  -CP    Cognitive Assessment/Intervention    Current Cognitive/Communication Assessment (P)  functional  -EK functional  -TR   functional  -CP    Orientation Status (P)  oriented x 4  -EK oriented x 4  -TR   oriented x 4  -CP    Follows Commands/Answers Questions (P)  able to follow single-step instructions;100% of the time  -EK able to follow single-step instructions;100% of the time  -TR   able to follow multi-step instructions;100% of the time  -CP    Personal Safety (P)  decreased awareness, need for safety  -EK decreased awareness, need for safety  -TR   WNL/WFL  -CP    Personal Safety Interventions (P)  fall prevention program maintained;gait belt;nonskid shoes/slippers when out of bed;supervised activity  -EK gait belt;fall prevention program maintained;nonskid shoes/slippers when out of bed;supervised activity  -TR   fall prevention program maintained;gait belt;muscle strengthening facilitated;nonskid shoes/slippers when out of bed;supervised activity  -CP    ROM (Range of Motion)    General ROM (P)  " no range of motion deficits identified  -EK    lower extremity range of motion deficits identified  -CP    General ROM Detail  B UE AROM WFL  -TR   AAROM L hip flexion limited due to pain, AROM L knee, ankle and R knee, hip and ankle WFLs  -CP    MMT (Manual Muscle Testing)    General MMT Assessment     lower extremity strength deficits identified  -CP    General MMT Assessment Detail (P)  BLE WFL throughout  -EK B UE grossly WFL  -TR   LLE grossly 3/5, RLE grossly 4/5  -CP    Bed Mobility, Assessment/Treatment    Bed Mobility, Assistive Device (P)  bed rails  -EK bed rails  -TR bed rails  -NW  bed rails  -CP    Bed Mobility, Roll Left, [place] (P)  set up required;verbal cues required  -EK supervision required;verbal cues required  -TR supervision required  -NW  contact guard assist;verbal cues required  -CP    Bed Mobility, Roll Right, [place] (P)  supervision required;verbal cues required  -EK supervision required;verbal cues required  -TR       Bed Mob, Sidelying to Sit, [place] (P)  contact guard assist;verbal cues required  -EK contact guard assist;verbal cues required  -TR contact guard assist  -NW  verbal cues required;nonverbal cues required (demo/gesture);contact guard assist  -CP    Bed Mob, Sit to Sidelying, [place] (P)  contact guard assist;verbal cues required  -EK contact guard assist;verbal cues required  -TR contact guard assist  -NW  verbal cues required;nonverbal cues required (demo/gesture);supervision required  -CP    Bed Mobility, Impairments (P)  pain  -EK pain;decreased flexibility  -TR pain  -NW  pain;ROM decreased;sensation decreased;strength decreased  -CP    Transfer Assessment/Treatment    Transfers, Bed-Chair-Bed, Assist Device   elevated surface  -NW      Transfers, Sit-Stand [place] (P)  contact guard assist;2 person assist required;verbal cues required  -EK contact guard assist;2 person assist required;verbal cues required  -TR verbal cues required;contact  guard assist  -NW  verbal cues required;nonverbal cues required (demo/gesture);contact guard assist  -CP    Transfers, Stand-Sit [place] (P)  contact guard assist;verbal cues required  -EK contact guard assist;verbal cues required  -TR stand by assist  -NW  verbal cues required;nonverbal cues required (demo/gesture);contact guard assist  -CP    Transfers, Sit-Stand-Sit, Assist Device (P)  rolling walker  -EK rolling walker  -TR rolling walker  -NW  rolling walker  -CP    Transfer, Impairments (P)  pain;impaired balance  -EK pain;impaired balance  -TR pain  -NW  ROM decreased;strength decreased;impaired balance;pain  -CP    Functional Mobility    Functional Mobility- Ind. Level  contact guard assist;verbal cues required;1 person + 1 person to manage equipment  -TR       Functional Mobility- Device  rolling walker  -TR       Functional Mobility- Safety Issues  --   cues for safety   -TR       Functional Mobility- Comment  In room and hallway  -TR       Upper Body Dressing Assessment/Training    UB Dressing Assess/Train, Clothing Type  doffing:;donning:   LSO  -TR       UB Dressing Assess/Train, Position  edge of bed  -TR       UB Dressing Assess/Train, [place]  maximum assist (25% patient effort);verbal cues required  -TR       UB Dressing Assess/Train, Impairments  pain;decreased flexibility  -TR       UB Dressing Assess/Train, Comment  Pt drowsy from surgery. Increased assist required.   -TR       Lower Body Dressing Assessment/Training    LB Dressing Assess/Train, Comment  Expected level: Min A  -TR       Toileting Assessment/Training    Toileting Assess/Train, Comment  Expected level: Min A  -TR       Motor Skills/Interventions    Additional Documentation (P)  Balance Skills Training (Group)  -EK        Balance Skills Training    Sitting-Level of Assistance (P)  Contact guard  -EK Contact guard  -TR       Sitting-Balance Support (P)  Feet supported  -EK Feet supported  -TR       Standing-Level of  Assistance (P)  Contact guard  -EK Contact guard  -TR       Static Standing Balance Support (P)  assistive device  -EK assistive device  -TR       Gait Balance-Level of Assistance (P)  Contact guard  -EK        Gait Balance Support (P)  assistive device  -EK        Orthotics Prosthetics    Additional Documentation (P)  Orthosis Location (Group);Orthosis Management/Training (Group)  -EK Orthosis Location (Group);Orthosis Management/Training (Group)  -TR Orthosis Location (Group)  -NW  Orthosis Location (Group)  -CP    Orthosis Location    Orthosis Location/Type   neck/back  -NW  neck/back  -CP    Orthosis, Neck/Back  LSO (lumbar sacral orthosis)  -TR LSO (lumbar sacral orthosis)  -NW  LSO (lumbar sacral orthosis)  -CP    Orthosis Management/Training    Orthosis Indications     immobilize, protect/position healing structures;rest, reduce pain  -CP    Orthosis Skills Training  activity limitations;doffing orthosis;donning orthosis;purpose/goals of orthosis;restrictions/precautions;sleeping without orthosis  -TR   doffing orthosis;donning orthosis;activity limitations   no bending, lifting or twisting  -CP    Orthosis Skills Training Comment  Wife educated. Pt drowsy and unable to report understanding of education.   -TR       Orthosis Wear Schedule  wear when out of bed only  -TR   wear when out of bed only  -CP    Sensory Assessment/Intervention    Sensory Impairment  --   No complaints during Re-Eval  -TR   numbness   LLE  -CP    General Therapy Interventions    Planned Therapy Interventions  activity intolerance;adaptive equipment training;ADL retraining;balance training;bed mobility training;home exercise program;transfer training  -TR       Positioning and Restraints    Pre-Treatment Position  in bed  -TR in bed  -NW  in bed  -CP    Post Treatment Position  bed  -TR bed  -NW  bed  -CP    In Bed  fowlers;call light within reach;encouraged to call for assist;with family/caregiver;side rails up x3;SCD pump applied   -TR fowlers;call light within reach;encouraged to call for assist;with family/caregiver  -NW  fowlers;call light within reach;encouraged to call for assist;with family/caregiver;side rails up x2;SCD pump applied  -CP      03/14/17 0834 03/13/17 2135 03/13/17 2133          Rehab Evaluation    Document Type evaluation   See MAR, entered room 1015  -ND        Subjective Information agree to therapy;complains of;weakness;pain;numbness   numbness LLE  -ND        General Information    Patient Profile Review yes  -ND        Onset of Illness/Injury or Date of Surgery Date 03/13/17  -ND        Referring Physician Dr. Morgan  -ND        General Observations Pt. lying supine, alert, IV site, famil present, SCDs  -ND        Pertinent History Of Current Problem Pt. having pain in lower [place], radiating down legs, L. side worse. S/P 3/13/17 L lumbar lateral  interbody fusion with instrumentation L4-5.   -ND        Precautions/Limitations brace on when up;fall precautions;spinal precautions  -ND        Prior Level of Function independent:;all household mobility;community mobility;ADL's;home management;cooking;cleaning;driving  -ND        Equipment Currently Used at Home bath bench;commode;walker, rolling;wheelchair;cane, straight  -ND  none  -AR      Plans/Goals Discussed With patient and family;agreed upon  -ND        Risks Reviewed patient and family:;LOB;dizziness;nausea/vomiting;increased discomfort;change in vital signs;increased drainage  -ND        Benefits Reviewed patient and family:;increase independence;improve function;decrease pain;increase balance;increase strength;improve skin integrity;increase knowledge;decrease risk of DVT  -ND        Barriers to Rehab previous functional deficit;physical barrier;impaired sensation  -ND        Living Environment    Lives With spouse  -ND spouse  -AR       Living Arrangements mobile home  -ND house  -AR       Home Accessibility stairs to enter home;tub/shower is not walk in;bed  "and bath on same level  -ND stairs within home  -AR       Number of Stairs to Enter Home 3  -ND        Number of Stairs Within Home --  -ND 3  -AR       Stair Railings at Home  none  -AR       Type of Financial/Environmental Concern  none  -AR       Transportation Available  car  -AR       Clinical Impression    Date of Referral to OT 03/13/17  -ND        OT Diagnosis DECREASED ADL  -ND        Prognosis good  -ND        Impairments Found (describe specific impairments) gait, locomotion, and balance;ergonomics and body mechanics;sensory Integrity  -ND        Patient/Family Goals Statement to go home  -ND        Criteria for Skilled Therapeutic Interventions Met yes;treatment indicated  -ND        Rehab Potential good, to achieve stated therapy goals  -ND        Therapy Frequency 3-5 times/wk  -ND        Predicted Duration of Therapy Intervention (days/wks) 10 days  -ND        Anticipated Equipment Needs At Discharge --   pt. has needed equipment  -ND        Anticipated Discharge Disposition home with assist  -ND        Functional Level Prior    Ambulation   0-->independent  -AR      Transferring   0-->independent  -AR      Toileting   0-->independent  -AR      Bathing   0-->independent  -AR      Dressing   0-->independent  -AR      Eating   0-->independent  -AR      Communication   0-->understands/communicates without difficulty  -AR      Swallowing   0-->swallows foods/liquids without difficulty  -AR      Pain Assessment    Pain Assessment 0-10  -ND        Pain Score 4  -ND        Pain Type Surgical pain;Chronic pain  -ND        Pain Location Back  -ND        Pain Orientation Lower  -ND        Pain Radiating Towards Radiating down LLE  -ND        Pain Descriptors --   \"All of the above\"  -ND        Pain Frequency Constant/continuous  -ND        Pain Intervention(s) Medication (See MAR);Repositioned  -ND        Response to Interventions tolerated  -ND        Multiple Pain Sites Yes  -ND        Vision " Assessment/Intervention    Visual Impairment WFL with corrective lenses   READING  -ND        Cognitive Assessment/Intervention    Current Cognitive/Communication Assessment functional  -ND        Orientation Status oriented x 4  -ND        Follows Commands/Answers Questions 100% of the time;able to follow multi-step instructions  -ND        Personal Safety WNL/WFL  -ND        Personal Safety Interventions fall prevention program maintained;gait belt;nonskid shoes/slippers when out of bed;supervised activity  -ND        ROM (Range of Motion)    General ROM no range of motion deficits identified  -ND        General ROM Detail BUE AROM WFL  -ND        MMT (Manual Muscle Testing)    General MMT Assessment no strength deficits identified  -ND        Bed Mobility, Assessment/Treatment    Bed Mobility, Assistive Device bed rails  -ND        Bed Mob, Sidelying to Sit, [place] verbal cues required;nonverbal cues required (demo/gesture);contact guard assist  -ND        Bed Mob, Sit to Sidelying, [place] verbal cues required;nonverbal cues required (demo/gesture);supervision required  -ND        Bed Mobility, Impairments pain  -ND        Transfer Assessment/Treatment    Transfers, Sit-Stand [place] verbal cues required;nonverbal cues required (demo/gesture);contact guard assist  -ND        Transfers, Stand-Sit [place] verbal cues required;nonverbal cues required (demo/gesture);contact guard assist  -ND        Transfers, Sit-Stand-Sit, Assist Device rolling walker  -ND        Transfer, Impairments pain;impaired balance  -ND        Functional Mobility    Functional Mobility- Ind. Level nonverbal cues required (demo/gesture);verbal cues required;contact guard assist  -ND        Functional Mobility- Device rolling walker  -ND        Functional Mobility-Distance (Feet) --   From room in [place] and back to bed  -ND        Functional Mobility- Comment Pt. stated he had increased pain in L. groin area with  ambulation.   -ND        Upper Body Dressing Assessment/Training    UB Dressing Assess/Train, Clothing Type doffing:;donning:   LSO  -ND        UB Dressing Assess/Train, Position edge of bed  -ND        UB Dressing Assess/Train, [place] moderate assist (50% patient effort)  -ND        UB Dressing Assess/Train, Impairments pain  -ND        Lower Body Dressing Assessment/Training    LB Dressing Assess/Train, Clothing Type doffing:;donning:;socks  -ND        LB Dressing Assess/Train, Position edge of bed  -ND        LB Dressing Assess/Train, [place] contact guard assist  -ND        LB Dressing Assess/Train, Impairments pain  -ND        LB Dressing Assess/Train, Comment Pt. able to simulate don/doff jillian socks by bringing legs up onto knees  -ND        Motor Skills/Interventions    Additional Documentation Balance Skills Training (Group)  -ND        Balance Skills Training    Sitting-Level of Assistance Independent  -ND        Sitting-Balance Support Feet supported  -ND        Standing-Level of Assistance Contact guard  -ND        Static Standing Balance Support assistive device  -ND        Gait Balance-Level of Assistance Contact guard  -ND        Gait Balance Support assistive device  -ND        Orthotics Prosthetics    Additional Documentation Orthosis Location (Group);Orthosis Management/Training (Group)  -ND        Orthosis Location    Orthosis Location/Type neck/back  -ND        Orthosis, Neck/Back LSO (lumbar sacral orthosis)  -ND        Orthosis Management/Training    Orthosis Indications immobilize, protect/position healing structures;rest, reduce pain  -ND        Orthosis Skills Training doffing orthosis;donning orthosis;purpose/goals of orthosis;restrictions/precautions  -ND        Orthosis Wear Schedule wear when out of bed only  -ND        Sensory Assessment/Intervention    Sensory Impairment --   Numbness in LLE  -ND        General Therapy Interventions    Planned Therapy Interventions activity  intolerance;ADL retraining;balance training;bed mobility training;energy conservation;transfer training;orthotic fitting/training  -ND        Positioning and Restraints    Pre-Treatment Position in bed  -ND        Post Treatment Position bed  -ND        In Bed fowlers;call light within reach;encouraged to call for assist;with family/caregiver;side rails up x2  -ND          User Key  (r) = Recorded By, (t) = Taken By, (c) = Cosigned By    Initials Name Effective Dates    NW [place], [place] 08/02/16 -     AR Elisa Chester, RN 08/02/16 -     KP Freda Clark, BSW 09/15/16 -     TR Marimar Foley, OTR/L 06/22/15 -     CP Ivette Calzada, PT 09/18/16 -     ND Lesli Ortiz, OTR/L 10/21/16 -     EK Ngoc Wesley, PT Student 03/23/16 -            Occupational Therapy Education     Title: PT OT SLP Therapies (Active)     Topic: Occupational Therapy (Done)     Point: ADL training (Done)    Description: Instruct learner(s) on proper safety adaptation and remediation techniques during self care or transfers.   Instruct in proper use of assistive devices.    Learning Progress Summary    Learner Readiness Method Response Comment Documented by Status   Patient Acceptance E,D VU,NR Education provided on purpose of Re-Eval, impairments found, LSO wear/care, back precautions and d/c planning. Pt unable to verbalize understanding due to drowsiness. TR 03/15/17 1116 Done    Acceptance E VU Pt. educated on role of OT, safe t/f, safe functional mob, back precautions, LSo brace use/care, and progression with poc ND 03/14/17 1041 Done   Significant Other Acceptance E,D VU,NR Education provided on purpose of Re-Eval, impairments found, LSO wear/care, back precautions and d/c planning. Pt unable to verbalize understanding due to drowsiness. TR 03/15/17 1116 Done               Point: Home exercise program (Done)    Description: Instruct learner(s) on appropriate technique for monitoring, assisting and/or progressing  therapeutic exercises/activities.    Learning Progress Summary    Learner Readiness Method Response Comment Documented by Status   Patient Acceptance E VU Pt. educated on role of OT, safe t/f, safe functional mob, back precautions, LSo brace use/care, and progression with poc ND 03/14/17 1041 Done               Point: Precautions (Done)    Description: Instruct learner(s) on prescribed precautions during self-care and functional transfers.    Learning Progress Summary    Learner Readiness Method Response Comment Documented by Status   Patient Acceptance E,D VU,NR Education provided on purpose of Re-Eval, impairments found, LSO wear/care, back precautions and d/c planning. Pt unable to verbalize understanding due to drowsiness. TR 03/15/17 1116 Done    Acceptance E VU Pt. educated on role of OT, safe t/f, safe functional mob, back precautions, LSo brace use/care, and progression with poc ND 03/14/17 1041 Done   Significant Other Acceptance E,D VU,NR Education provided on purpose of Re-Eval, impairments found, LSO wear/care, back precautions and d/c planning. Pt unable to verbalize understanding due to drowsiness. TR 03/15/17 1116 Done               Point: Body mechanics (Done)    Description: Instruct learner(s) on proper positioning and spine alignment during self-care, functional mobility activities and/or exercises.    Learning Progress Summary    Learner Readiness Method Response Comment Documented by Status   Patient Acceptance E,D VU,NR Education provided on purpose of Re-Eval, impairments found, LSO wear/care, back precautions and d/c planning. Pt unable to verbalize understanding due to drowsiness. TR 03/15/17 1116 Done    Acceptance E VU Pt. educated on role of OT, safe t/f, safe functional mob, back precautions, LSo brace use/care, and progression with poc ND 03/14/17 1041 Done   Significant Other Acceptance E,D VU,NR Education provided on purpose of Re-Eval, impairments found, LSO wear/care, back  precautions and d/c planning. Pt unable to verbalize understanding due to drowsiness. TR 03/15/17 1116 Done                      User Key     Initials Effective Dates Name Provider Type Discipline    TR 06/22/15 -  Marimar Foley, OTR/L Occupational Therapist OT    ND 10/21/16 -  Lesli Ortiz, OTR/L Occupational Therapist OT                  OT Recommendation and Plan  Anticipated Equipment Needs At Discharge:  (None)  Anticipated Discharge Disposition: home with assist  Planned Therapy Interventions: activity intolerance, adaptive equipment training, ADL retraining, balance training, bed mobility training, home exercise program, transfer training  Therapy Frequency: 3-5 times/wk  Plan of Care Review  Plan Of Care Reviewed With: patient, spouse  Progress: progress towards functional goals is fair  Outcome Summary/Follow up Plan: OT Re-Eval completed following second surgery. Pt was drowsy which limited his performance. CGA for bed mobility, transfers and functional mobility with a RW. Max A to don/doff LSO. Expected level is Min to CGA for ADL. OT will address safety and increasing independence with ADL. Spouse reports she can assist pt at home as needed. Anticipated d/c is home with assist.           OT Goals       03/15/17 1117 03/14/17 1042       Transfer Training OT LTG    Transfer Training OT LTG, Date Established  03/14/17  -ND     Transfer Training OT LTG, Time to Achieve  by discharge  -ND     Transfer Training OT LTG, Activity Type  bed to chair /chair to bed;walk-in shower;sit to stand/stand to sit;toilet;tub  -ND     Transfer Training OT LTG, [place] Level  conditional independence  -ND     Transfer Training OT LTG, Assist Device  walker, rolling;commode, bedside  -ND     Transfer Training OT LTG, Additional Goal with good safety awareness  -TR      Transfer Training OT LTG, Date Goal Reviewed 03/15/17  -TR      Transfer Training OT LTG, Outcome goal revised  -TR      Transfer Training OT LTG,  Reason Goal Not Met progress slower than expected  -TR      Patient Education OT LTG    Patient Education OT LTG, Date Established  03/14/17  -ND     Patient Education OT LTG, Time to Achieve  by discharge  -ND     Patient Education OT LTG, Education Type  precautions per surgeon;brace use/care;positioning;posture/body mechanics;home safety;adaptive equipment mgmt;energy conservation;work simplification;phil/doff brace  -ND     Patient Education OT LTG, Education Understanding  independent;demonstrates adequately;verbalizes understanding  -ND     Patient Education OT LTG, Additional Goal Pt and spouse  -TR      Patient Education OT LTG, Date Goal Reviewed 03/15/17  -TR      Patient Education OT LTG Outcome goal revised  -TR      Patient Education OT LTG, Reason Goal Not Met progress slower than expected  -TR      LB Dressing OT LTG    LB Dressing Goal OT LTG, Date Established  03/14/17  -ND     LB Dressing Goal OT LTG, Time to Achieve  by discharge  -ND     LB Dressing Goal OT LTG, [place] Level  supervision required  -ND     LB Dressing Goal OT LTG, Additional Goal  AE PRN  -ND     LB Dressing Goal OT LTG, Date Goal Reviewed 03/15/17  -TR      LB Dressing Goal OT LTG, Outcome goal ongoing  -TR      LB Dressing Goal OT LTG, Reason Goal Not Met progress slower than expected  -TR        User Key  (r) = Recorded By, (t) = Taken By, (c) = Cosigned By    Initials Name Provider Type    TR Marimar Foley, OTR/L Occupational Therapist    ARMIDA Ortiz, OTR/L Occupational Therapist                Outcome Measures       03/15/17 0952 03/14/17 1016 03/14/17 1000    How much help from another person do you currently need...    Turning from your back to your side while in flat bed without using bedrails?  3  -CP     Moving from lying on back to sitting on the side of a flat bed without bedrails?  3  -CP     Moving to and from a bed to a chair (including a wheelchair)?  3  -CP     Standing up from a chair using your  arms (e.g., wheelchair, bedside chair)?  3  -CP     Climbing 3-5 steps with a railing?  3  -CP     To walk in hospital room?  3  -CP     AM-PAC 6 Clicks Score  18  -CP     How much help from another is currently needed...    Putting on and taking off regular lower body clothing? 3  -TR  3  -ND    Bathing (including washing, rinsing, and drying) 3  -TR  3  -ND    Toileting (which includes using toilet bed pan or urinal) 3  -TR  3  -ND    Putting on and taking off regular upper body clothing 3  -TR  3  -ND    Taking care of personal grooming (such as brushing teeth) 3  -TR  4  -ND    Eating meals 4  -TR  4  -ND    Score 19  -TR  20  -ND    Functional Assessment    Outcome Measure Options AM-PAC 6 Clicks Daily Activity (OT)  -TR AM-PAC 6 Clicks Basic Mobility (PT)  -CP AM-PAC 6 Clicks Daily Activity (OT)  -ND      User Key  (r) = Recorded By, (t) = Taken By, (c) = Cosigned By    Initials Name Provider Type    TR Marimar Foley OTR/L Occupational Therapist    CP Ivette Calzada, PT Physical Therapist    ND Lesli Ortiz OTR/L Occupational Therapist          Time Calculation:   OT Start Time: 1036  OT Stop Time: 1110  OT Time Calculation (min): 34 min    Therapy Charges for Today     Code Description Service Date Service Provider Modifiers Qty    31980327578  OT SELFCARE CURRENT 3/15/2017 Marimar Foley OTR/L GO, CK 1    61094628336 HC OT SELFCARE PROJECTED 3/15/2017 Marimar Foley OTR/L GO, CI 1    [id number]  OT RE-EVAL 2 3/15/2017 RYAN Alaniz/L GO, KX 1          OT G-codes  OT Professional Judgement Used?: Yes  OT Functional Scales Options: AM-PAC 6 Clicks Daily Activity (OT)  Score: 19  Functional Limitation: Self care  Self Care Current Status (): At least 40 percent but less than 60 percent impaired, limited or restricted  Self Care Goal Status (): At least 1 percent but less than 20 percent impaired, limited or restricted    RYAN Lora/CLARISSE  3/15/2017

## 2017-03-15 NOTE — ANESTHESIA PREPROCEDURE EVALUATION
Anesthesia Evaluation     Patient summary reviewed   NPO Status: > 8 hours   Airway   Mallampati: II  TM distance: >3 FB  no difficulty expected  Dental    (+) poor dentition    Pulmonary - negative pulmonary ROS and normal exam    breath sounds clear to auscultation  Cardiovascular - normal exam    Rhythm: regular  Rate: normal    (+) hypertension well controlled, past MI  >12 months, CAD,       Neuro/Psych- negative ROS  GI/Hepatic/Renal/Endo    (+)  diabetes mellitus well controlled,     Musculoskeletal     (+) back pain,   Abdominal  - normal exam   Substance History - negative use     OB/GYN          Other   (+) arthritis                                 Anesthesia Plan    ASA 3     general     intravenous induction   Anesthetic plan and risks discussed with patient.  Use of blood products discussed with patient  Consented to blood products.   Plan discussed with CRNA.

## 2017-03-15 NOTE — PLAN OF CARE
Problem: Patient Care Overview (Adult)  Goal: Plan of Care Review  Outcome: Ongoing (interventions implemented as appropriate)    03/15/17 9995   Coping/Psychosocial Response Interventions   Plan Of Care Reviewed With patient;family   Patient Care Overview   Progress no change   Outcome Evaluation   Outcome Summary/Follow up Plan Patient had part 2 of back surgery today. Mepilex remains dry and intact on left flank. Gauze and medipore tape in place on lower back, also dry and intact. Patient c/o moderate pain , medicated with PRN pain medication as ordered. No other changes. Will continue to monitor.          Problem: Perioperative Period (Adult)  Goal: Signs and Symptoms of Listed Potential Problems Will be Absent or Manageable (Perioperative Period)  Outcome: Ongoing (interventions implemented as appropriate)  Goal: Signs and Symptoms of Listed Potential Problems Will be Absent or Manageable (Perioperative Period)  Outcome: Ongoing (interventions implemented as appropriate)

## 2017-03-15 NOTE — ANESTHESIA PROCEDURE NOTES
Airway  Urgency: elective    Date/Time: 3/15/2017 7:03 AM  End Time:3/15/2017 7:03 AM  Airway not difficult    General Information and Staff    Patient location during procedure: OR  CRNA: BRISEYDA MOISE    Indications and Patient Condition  Indications for airway management: airway protection    Preoxygenated: yes  MILS maintained throughout  Mask difficulty assessment: 1 - vent by mask    Final Airway Details  Final airway type: endotracheal airway      Successful airway: ETT  Cuffed: yes   Successful intubation technique: direct laryngoscopy  Endotracheal tube insertion site: oral  Blade: [place]  Blade size: #2  ETT size: 8.0 mm  Cormack-Lehane Classification: grade I - full view of glottis  Placement verified by: chest auscultation and capnometry   Cuff volume (mL): 8  Measured from: lips  ETT to lips (cm): 23  Number of attempts at approach: 1

## 2017-03-15 NOTE — PLAN OF CARE
Problem: Patient Care Overview (Adult)  Goal: Plan of Care Review  Outcome: Ongoing (interventions implemented as appropriate)    03/15/17 0522   Coping/Psychosocial Response Interventions   Plan Of Care Reviewed With patient   Patient Care Overview   Progress progress toward functional goals as expected   Outcome Evaluation   Outcome Summary/Follow up Plan medicated patient with prn pain and nausea meds, effective towards relief. Mepilex to left flank, clean/dry and intact. Slight numbness in toes to left foot, which patient says is improving, Hibiclens bath done. Patient scheduled for 0720 part 2 surgery.        Goal: Discharge Needs Assessment  Outcome: Ongoing (interventions implemented as appropriate)    Problem: Perioperative Period (Adult)  Goal: Signs and Symptoms of Listed Potential Problems Will be Absent or Manageable (Perioperative Period)  Outcome: Ongoing (interventions implemented as appropriate)

## 2017-03-15 NOTE — PLAN OF CARE
Problem: Patient Care Overview (Adult)  Goal: Plan of Care Review  Outcome: Ongoing (interventions implemented as appropriate)    03/15/17 1117   Coping/Psychosocial Response Interventions   Plan Of Care Reviewed With patient;spouse   Patient Care Overview   Progress progress towards functional goals is fair   Outcome Evaluation   Outcome Summary/Follow up Plan OT Re-Eval completed following second surgery. Pt was drowsy which limited his performance. CGA for bed mobility, transfers and functional mobility with a RW. Max A to don/doff LSO. Expected level is Min to CGA for ADL. OT will address safety and increasing independence with ADL. Spouse reports she can assist pt at home as needed. Anticipated d/c is home with assist.          Problem: Inpatient Occupational Therapy  Goal: Transfer Training Goal 1 LTG- OT  Outcome: Ongoing (interventions implemented as appropriate)    03/14/17 1042 03/15/17 1117   Transfer Training OT LTG   Transfer Training OT LTG, Date Established 03/14/17 --    Transfer Training OT LTG, Time to Achieve by discharge --    Transfer Training OT LTG, Activity Type bed to chair /chair to bed;walk-in shower;sit to stand/stand to sit;toilet;tub --    Transfer Training OT LTG, [place] Level conditional independence --    Transfer Training OT LTG, Assist Device walker, rolling;commode, bedside --    Transfer Training OT LTG, Additional Goal --  with good safety awareness   Transfer Training OT LTG, Date Goal Reviewed --  03/15/17   Transfer Training OT LTG, Outcome --  goal revised   Transfer Training OT LTG, Reason Goal Not Met --  progress slower than expected       Goal: Patient Education Goal LTG- OT  Outcome: Ongoing (interventions implemented as appropriate)    03/14/17 1042 03/15/17 1117   Patient Education OT LTG   Patient Education OT LTG, Date Established 03/14/17 --    Patient Education OT LTG, Time to Achieve by discharge --    Patient Education OT LTG, Education Type precautions  per surgeon;brace use/care;positioning;posture/body mechanics;home safety;adaptive equipment mgmt;energy conservation;work simplification;phil/doff brace --    Patient Education OT LTG, Education Understanding independent;demonstrates adequately;verbalizes understanding --    Patient Education OT LTG, Additional Goal --  Pt and spouse   Patient Education OT LTG, Date Goal Reviewed --  03/15/17   Patient Education OT LTG Outcome --  goal revised   Patient Education OT LTG, Reason Goal Not Met --  progress slower than expected       Goal: LB Dressing Goal LTG- OT  Outcome: Ongoing (interventions implemented as appropriate)    03/14/17 1042 03/15/17 1117   LB Dressing OT LTG   LB Dressing Goal OT LTG, Date Established 03/14/17 --    LB Dressing Goal OT LTG, Time to Achieve by discharge --    LB Dressing Goal OT LTG, [place] Level supervision required --    LB Dressing Goal OT LTG, Additional Goal AE PRN --    LB Dressing Goal OT LTG, Date Goal Reviewed --  03/15/17   LB Dressing Goal OT LTG, Outcome --  goal ongoing   LB Dressing Goal OT LTG, Reason Goal Not Met --  progress slower than expected

## 2017-03-15 NOTE — PLAN OF CARE
Problem: Patient Care Overview (Adult)  Goal: Plan of Care Review  Outcome: Ongoing (interventions implemented as appropriate)    03/15/17 1132   Coping/Psychosocial Response Interventions   Plan Of Care Reviewed With patient;spouse   Patient Care Overview   Progress progress toward functional goals as expected   Outcome Evaluation   Outcome Summary/Follow up Plan PT re-eval complete after second surgery on 3/15. Patient demonstrated ability to perform bed mobility and transfers at CGA and ambulated with RW at CGA. Patient's performance was limited by increased drowsiness. Patient demonstrates impaired balance, functional mobility, gait, and transfers. Patient will continue to benefit from skilled PT to include gait training, stair training, balance training, functional mobility, strengthening, and patient education. Patient plans to discharge to home with assist.         Problem: Inpatient Physical Therapy  Goal: Bed Mobility Goal LTG- PT  Outcome: Ongoing (interventions implemented as appropriate)    03/14/17 1051 03/15/17 1132   Bed Mobility PT LTG   Bed Mobility PT LTG, Date Established 03/14/17 --    Bed Mobility PT LTG, Time to Achieve by discharge --    Bed Mobility PT LTG, Activity Type all bed mobility --    Bed Mobility PT LTG, [place] Level independent --    Bed Mobility PT LTG, Date Goal Reviewed --  03/15/17   Bed Mobility PT LTG, Outcome --  goal ongoing   Bed Mobility PT LTG, Reason Goal Not Met --  (second surgery)       Goal: Transfer Training Goal 1 LTG- PT  Outcome: Ongoing (interventions implemented as appropriate)    03/14/17 1051 03/15/17 1132   Transfer Training PT LTG   Transfer Training PT LTG, Date Established 03/14/17 --    Transfer Training PT LTG, Time to Achieve by discharge --    Transfer Training PT LTG, Activity Type sit to stand/stand to sit --    Transfer Training PT LTG, [place] Level conditional independence --    Transfer Training PT LTG, Assist Device walker, rolling  --    Transfer Training PT LTG, Date Goal Reviewed --  03/15/17   Transfer Training PT LTG, Outcome --  goal ongoing   Transfer Training PT LTG, Reason Goal Not Met --  other (see comments)  (second surgery)       Goal: Gait Training Goal LTG- PT  Outcome: Ongoing (interventions implemented as appropriate)    03/14/17 1051 03/15/17 1132   Gait Training PT LTG   Gait Training Goal PT LTG, Date Established 03/14/17 --    Gait Training Goal PT LTG, Time to Achieve by discharge --    Gait Training Goal PT LTG, [place] Level supervision required --    Gait Training Goal PT LTG, Assist Device walker, rolling --    Gait Training Goal PT LTG, Distance to Achieve 200'  (with LSO) --    Gait Training Goal PT LTG, Additional Goal --  progress away from AD as tolerated   Gait Training Goal PT LTG, Date Goal Reviewed --  03/15/17   Gait Training Goal PT LTG, Outcome --  goal ongoing   Gait Training Goal PT LTG, Reason Goal Not Met --  other (see comments)  (second surgery)       Goal: Stair Training Goal LTG- PT  Outcome: Ongoing (interventions implemented as appropriate)    03/14/17 1051 03/15/17 1132   Stair Training PT LTG   Stair Training Goal PT LTG, Date Established 03/14/17 --    Stair Training Goal PT LTG, Time to Achieve by discharge --    Stair Training Goal PT LTG, Number of Steps 3 --    Stair Training Goal PT LTG, [place] Level contact guard assist --    Stair Training Goal PT LTG, Assist Device cane, straight --    Stair Training Goal PT LTG, Date Goal Reviewed --  03/15/17   Stair Training Goal PT LTG, Outcome --  goal ongoing   Stair Training Goal PT LTG, Reason Goal Not Met --  other (see comments)  (second surgery)

## 2017-03-15 NOTE — ADDENDUM NOTE
Addendum  created 03/15/17 1226 by Eric Conrad, PABLITO    Anesthesia Event edited, Anesthesia Intra Flowsheets edited, Anesthesia Intra LDAs edited, Anesthesia Intra Meds edited, Flowsheet data copied forward, LDA properties accepted, Patient device removed, Procedure Event Log accessed

## 2017-03-15 NOTE — PROGRESS NOTES
[facility]  Daily Progress Note  Mani Arreola  MRN: [id number] LOS: 2    Admit Date: 3/13/2017   3/15/2017 7:32 AM    Subjective:          Chief Complaint:  Back pain    Interval History:    Reviewed overnight events and nursing notes.   Status:  stable  Pain:  some relief    Going to OR this am    ROS:  10 point ROS obtained:   Gen: No fevers  HEENT: No migraine or visual change  Lung: No cough, hemopotysis or wheezing  Cv: No chest pain or pressure  Abd: No nausea or vomit, no change in bowel fct, no gi bleeding  Ext: No inc pain or swelling  Neuro: No seizure or syncope  Skin: No new rashes  Endo: No polyuria, polyphagia or poilydypsia  Psyc: No inc anxiety or depression  MS: No increase in joint pain or swelling reported    DIET:  NPO Diet    Medications:     lactated ringers 100 mL/hr Last Rate: 100 mL/hr (03/15/17 0643)   lactated ringers 20 mL/hr Last Rate: 20 mL/hr (03/15/17 0643)   sodium chloride 75 mL/hr Last Rate: 75 mL/hr (03/14/17 2000)       amLODIPine 5 mg Oral Daily   famotidine 20 mg Intravenous BID   Or      famotidine 20 mg Oral BID   [MAR Hold] insulin lispro 2-9 Units Subcutaneous 4x Daily AC & at Bedtime   losartan 100 mg Oral Daily   metoprolol tartrate 50 mg Oral Q12H   [MAR Hold] sitaGLIPtin-metFORMIN XR (JANUMET XR) 100-1000 mg combo dose  Oral Daily With Breakfast       Data:     Code Status: Full Code    Family History   Problem Relation Age of Onset   • Heart disease Father    • Colon cancer Neg Hx      Social History     Social History   • Marital status:      Spouse name: N/A   • Number of children: N/A   • Years of education: N/A     Occupational History   • Not on file.     Social History Main Topics   • Smoking status: Current Every Day Smoker     Types: Pipe   • Smokeless tobacco: Not on file   • Alcohol use Yes      Comment: occ   • Drug use: No   • Sexual activity: Defer     Other Topics Concern   • Not on file     Social History Narrative  "          Objective:     Vitals:   Visit Vitals   • /69 (BP Location: Left arm, Patient Position: Lying)   • Pulse 96   • Temp 98.2 °F (36.8 °C) (Oral)   • Resp 16   • Ht 72\" (182.9 cm)   • Wt 231 lb (105 kg)   • SpO2 96%   • BMI 31.33 kg/m2      Intake/Output Summary (Last 24 hours) at 03/15/17 0732  Last data filed at 03/15/17 0430   Gross per 24 hour   Intake 2973.75 ml   Output    700 ml   Net 2273.75 ml    Temp (24hrs), Av °F (36.7 °C), Min:97.7 °F (36.5 °C), Max:98.2 °F (36.8 °C)      Physical Examination:   General appearance - alert, well appearing, and in no distress and oriented to person, place, and time  Mental status - alert, oriented to person, place, and time, normal mood, behavior, speech, dress, motor activity, and thought processes  Eyes - pupils equal and reactive, extraocular eye movements intact  Ears - bilateral TM's and external ear canals normal, hearing grossly normal bilaterally  Mouth - mucous membranes moist, pharynx normal without lesions  Neck - supple, no significant adenopathy  Lymphatics - no palpable lymphadenopathy, no hepatosplenomegaly  Chest - clear to auscultation, no wheezes, rales or rhonchi, symmetric air entry, no tachypnea, retractions or cyanosis  Heart - normal rate, regular rhythm, normal S1, S2, no murmurs, rubs, clicks or gallops  Abdomen - soft, nontender, nondistended, no masses or organomegaly bowel sounds normal  Neurological - alert, oriented, normal speech, no focal findings or movement disorder noted  Musculoskeletal - no joint tenderness, deformity or swelling  Extremities - peripheral pulses normal, no pedal edema, no clubbing or cyanosis  Skin - normal coloration and turgor, no rashes, no suspicious skin lesions noted      Assessment and Plan:     Primary Problem:  Lumbar stenosis  DM II  HTN  CAD    Hospital Problem list:  Active Problems:    Spinal stenosis, lumbar    Type 2 diabetes mellitus without complication, without long-term current use of " insulin    Essential hypertension    Pure hypercholesterolemia    Coronary artery disease involving native coronary artery without angina pectoris      PMH:  Past Medical History   Diagnosis Date   • Colon cancer      colon   • Colon polyp    • Diabetes mellitus    • Hyperlipidemia    • Hypertension    • Myocardial infarction        Treatment Plan:    Diabetes:  1.  Good glucose control  2.  Continue home meds with SSI    Lumbar Disc Dz:  1.  Going to OR this am for 2nd part fusion.    HTN:  1. Stable on current tx    Discharge planning:   Home    Reviewed treatment plans with the patient and/or family.   20 minutes spent in face to face interaction and coordination of care.     Electronically signed by Tyree Fleming MD on 3/15/2017 at 7:32 AM

## 2017-03-16 LAB
ANION GAP SERPL CALCULATED.3IONS-SCNC: 8 MMOL/L (ref 4–13)
BASOPHILS # BLD AUTO: 0.01 10*3/MM3 (ref 0–0.2)
BASOPHILS NFR BLD AUTO: 0.2 % (ref 0–2)
BUN BLD-MCNC: 15 MG/DL (ref 5–21)
BUN/CREAT SERPL: 12.7 (ref 7–25)
CALCIUM SPEC-SCNC: 8.7 MG/DL (ref 8.4–10.4)
CHLORIDE SERPL-SCNC: 106 MMOL/L (ref 98–110)
CO2 SERPL-SCNC: 24 MMOL/L (ref 24–31)
CREAT BLD-MCNC: 1.18 MG/DL (ref 0.5–1.4)
DEPRECATED RDW RBC AUTO: 43.1 FL (ref 40–54)
EOSINOPHIL # BLD AUTO: 0.19 10*3/MM3 (ref 0–0.7)
EOSINOPHIL NFR BLD AUTO: 3.7 % (ref 0–4)
ERYTHROCYTE [DISTWIDTH] IN BLOOD BY AUTOMATED COUNT: 14.3 % (ref 12–15)
GFR SERPL CREATININE-BSD FRML MDRD: 61 ML/MIN/1.73
GLUCOSE BLD-MCNC: 147 MG/DL (ref 70–100)
GLUCOSE BLDC GLUCOMTR-MCNC: 159 MG/DL (ref 70–130)
GLUCOSE BLDC GLUCOMTR-MCNC: 165 MG/DL (ref 70–130)
GLUCOSE BLDC GLUCOMTR-MCNC: 185 MG/DL (ref 70–130)
HCT VFR BLD AUTO: 32.7 % (ref 40–52)
HGB BLD-MCNC: 10.7 G/DL (ref 14–18)
IMM GRANULOCYTES # BLD: 0.01 10*3/MM3 (ref 0–0.03)
IMM GRANULOCYTES NFR BLD: 0.2 % (ref 0–5)
LYMPHOCYTES # BLD AUTO: 0.6 10*3/MM3 (ref 0.72–4.86)
LYMPHOCYTES NFR BLD AUTO: 11.7 % (ref 15–45)
MCH RBC QN AUTO: 27 PG (ref 28–32)
MCHC RBC AUTO-ENTMCNC: 32.7 G/DL (ref 33–36)
MCV RBC AUTO: 82.6 FL (ref 82–95)
MONOCYTES # BLD AUTO: 0.68 10*3/MM3 (ref 0.19–1.3)
MONOCYTES NFR BLD AUTO: 13.2 % (ref 4–12)
NEUTROPHILS # BLD AUTO: 3.66 10*3/MM3 (ref 1.87–8.4)
NEUTROPHILS NFR BLD AUTO: 71 % (ref 39–78)
PLATELET # BLD AUTO: 138 10*3/MM3 (ref 130–400)
PMV BLD AUTO: 10.2 FL (ref 6–12)
POTASSIUM BLD-SCNC: 4 MMOL/L (ref 3.5–5.3)
RBC # BLD AUTO: 3.96 10*6/MM3 (ref 4.8–5.9)
SODIUM BLD-SCNC: 138 MMOL/L (ref 135–145)
WBC NRBC COR # BLD: 5.15 10*3/MM3 (ref 4.8–10.8)

## 2017-03-16 PROCEDURE — 94799 UNLISTED PULMONARY SVC/PX: CPT

## 2017-03-16 PROCEDURE — 85025 COMPLETE CBC W/AUTO DIFF WBC: CPT | Performed by: ORTHOPAEDIC SURGERY

## 2017-03-16 PROCEDURE — 80048 BASIC METABOLIC PNL TOTAL CA: CPT | Performed by: ORTHOPAEDIC SURGERY

## 2017-03-16 PROCEDURE — 82962 GLUCOSE BLOOD TEST: CPT

## 2017-03-16 PROCEDURE — 94760 N-INVAS EAR/PLS OXIMETRY 1: CPT

## 2017-03-16 PROCEDURE — 25010000003 CEFAZOLIN PER 500 MG: Performed by: ORTHOPAEDIC SURGERY

## 2017-03-16 PROCEDURE — 97116 GAIT TRAINING THERAPY: CPT

## 2017-03-16 PROCEDURE — 97535 SELF CARE MNGMENT TRAINING: CPT | Performed by: OCCUPATIONAL THERAPIST

## 2017-03-16 RX ORDER — OXYCODONE AND ACETAMINOPHEN 10; 325 MG/1; MG/1
1 TABLET ORAL EVERY 4 HOURS PRN
Qty: 120 TABLET | Refills: 0
Start: 2017-03-16 | End: 2018-12-12

## 2017-03-16 RX ADMIN — LOSARTAN POTASSIUM 100 MG: 50 TABLET, FILM COATED ORAL at 08:11

## 2017-03-16 RX ADMIN — IPRATROPIUM BROMIDE AND ALBUTEROL SULFATE 3 ML: .5; 3 SOLUTION RESPIRATORY (INHALATION) at 19:35

## 2017-03-16 RX ADMIN — CEFAZOLIN SODIUM 1 G: 1 INJECTION, SOLUTION INTRAVENOUS at 08:12

## 2017-03-16 RX ADMIN — FAMOTIDINE 20 MG: 20 TABLET, FILM COATED ORAL at 08:11

## 2017-03-16 RX ADMIN — IPRATROPIUM BROMIDE AND ALBUTEROL SULFATE 3 ML: .5; 3 SOLUTION RESPIRATORY (INHALATION) at 06:58

## 2017-03-16 RX ADMIN — DOCUSATE SODIUM 100 MG: 100 CAPSULE ORAL at 08:11

## 2017-03-16 RX ADMIN — OXYCODONE HYDROCHLORIDE AND ACETAMINOPHEN 2 TABLET: 10; 325 TABLET ORAL at 17:33

## 2017-03-16 RX ADMIN — FAMOTIDINE 20 MG: 20 TABLET, FILM COATED ORAL at 17:34

## 2017-03-16 RX ADMIN — AMLODIPINE BESYLATE 5 MG: 5 TABLET ORAL at 08:11

## 2017-03-16 RX ADMIN — METFORMIN HYDROCHLORIDE: 500 TABLET, EXTENDED RELEASE ORAL at 08:11

## 2017-03-16 RX ADMIN — INSULIN LISPRO 2 UNITS: 100 INJECTION, SOLUTION INTRAVENOUS; SUBCUTANEOUS at 12:40

## 2017-03-16 RX ADMIN — IPRATROPIUM BROMIDE AND ALBUTEROL SULFATE 3 ML: .5; 3 SOLUTION RESPIRATORY (INHALATION) at 10:33

## 2017-03-16 RX ADMIN — METOPROLOL TARTRATE 50 MG: 50 TABLET ORAL at 08:11

## 2017-03-16 RX ADMIN — OXYCODONE HYDROCHLORIDE AND ACETAMINOPHEN 2 TABLET: 10; 325 TABLET ORAL at 08:12

## 2017-03-16 RX ADMIN — METOPROLOL TARTRATE 50 MG: 50 TABLET ORAL at 20:23

## 2017-03-16 NOTE — DISCHARGE SUMMARY
Date of Discharge:  3/16/2017    Admission Diagnosis: M54.5    Discharge Diagnosis:   1. Increasing chronic back pain  2. Bilateral buttock, thigh, and leg radiculopathy, left much worse than right  3. Severe neurogenic claudication  4. Degenerative disc disease L4-5  5. Focal scoliosis L4-5, concave right  6. Facet arthropathy L4 to S1, worse L4-5  7. Central and bilateral foraminal stenosis L4-5  8. Status post left LLIF with instrumentation L4 5, 3/13/2017  9.status post Posterior spinal fusion L4-5 (Lanx pedicle screws and rods) 3/15/17    Consults During Admission:  Dr. grey    Hospital Course  Patient is a 72 y.o. male known to our practice.  He is admitted for the above elective staged fusion.  He tolerated this well he was discharged home in good stable condition today with instructions for brace at all times when out of bed.    Condition on Discharge:  STABLE    Vital Signs  Temp:  [97.2 °F (36.2 °C)-99.5 °F (37.5 °C)] 97.9 °F (36.6 °C)  Heart Rate:  [80-98] 87  Resp:  [14-20] 16  BP: (120-169)/(60-85) 152/73    Physical Exam:   Alert and oriented ×3, no acute distress, grossly neurovascular intact, vital signs stable, dressing clean dry and intact    Discharge Disposition  Home or Self Care    Discharge Medications   Mani Arreola   Home Medication Instructions YURI:[id number]    Printed on:03/16/17 0745   Medication Information                      amLODIPine (NORVASC) 5 MG tablet  Take 5 mg by mouth daily.             aspirin 325 MG tablet  Take 325 mg by mouth daily.             Cholecalciferol (VITAMIN D PO)  Take  by mouth every night.             diphenhydrAMINE (BENADRYL) 25 mg capsule  Take 25 mg by mouth at night as needed for itching.             folic acid (FOLVITE) 400 MCG tablet  Take 400 mcg by mouth daily.             losartan (COZAAR) 100 MG tablet  Take 100 mg by mouth daily.             metoprolol tartrate (LOPRESSOR) 50 MG tablet  Take 50 mg by mouth 2 (two) times a day.              Multiple Vitamins-Minerals (MULTIVITAMIN ADULT PO)  Take  by mouth.             oxyCODONE-acetaminophen (PERCOCET)  MG per tablet  Take 1 tablet by mouth Every 4 (Four) Hours As Needed for Moderate Pain (4-6) or Severe Pain (7-10). Take 1-2 by mouth             pioglitazone (ACTOS) 30 MG tablet  Take 30 mg by mouth Daily.             pravastatin (PRAVACHOL) 40 MG tablet  Take 80 mg by mouth daily.             SITagliptin-MetFORMIN HCl ER (JANUMET XR) 100-1000 MG tablet sustained-release 24 hour  Take  by mouth daily.             vitamin C (ASCORBIC ACID) 500 MG tablet  Take 500 mg by mouth daily.                 Discharge Diet: Resume Home diet, advance as tolerated    Activity at Discharge: Resume home activity advace as tolerated, no lifting, no twisting, no bending, brace as directed, no driving until directed.     Follow-up Appointments  Followup with PCP within one week  Followup [facility] at 2weeks post-op         CHETNA Alexander  03/16/17  7:45 AM    Time: 30min

## 2017-03-16 NOTE — PLAN OF CARE
Problem: Patient Care Overview (Adult)  Goal: Plan of Care Review  Outcome: Ongoing (interventions implemented as appropriate)    03/16/17 0911   Coping/Psychosocial Response Interventions   Plan Of Care Reviewed With patient;spouse   Patient Care Overview   Progress progress toward functional goals as expected   Outcome Evaluation   Outcome Summary/Follow up Plan Pt requires min assist for bed mobility, sit-stand cga-min with bed elevated. Pt requires assist donning/doffing LSO. Pt amb 75 feet with rwx cga. Pt 02 sat 88% on RA,,back to 90% with deep breathing.

## 2017-03-16 NOTE — PLAN OF CARE
Problem: Patient Care Overview (Adult)  Goal: Plan of Care Review  Outcome: Ongoing (interventions implemented as appropriate)    03/16/17 1520   Coping/Psychosocial Response Interventions   Plan Of Care Reviewed With patient;spouse   Patient Care Overview   Progress no change   Outcome Evaluation   Outcome Summary/Follow up Plan Patient was to be discharged home today, but therapy expressed concern that he wasn't ready and that patient's O2 sat dropped down into mid 80's. Per Hugo, okay to stay another night on floor. Patient on 2L O2 to help increase O2 sat, eduated patient on coughing and deep breathing. Patient c/o moderate pain, PRN pain medication given as ordered. Probable D/C to home tomorrow. Will continue to monitor.          Problem: Perioperative Period (Adult)  Goal: Signs and Symptoms of Listed Potential Problems Will be Absent or Manageable (Perioperative Period)  Outcome: Ongoing (interventions implemented as appropriate)  Goal: Signs and Symptoms of Listed Potential Problems Will be Absent or Manageable (Perioperative Period)  Outcome: Ongoing (interventions implemented as appropriate)

## 2017-03-16 NOTE — PROGRESS NOTES
[facility]  Daily Progress Note  Mani Arreola  MRN: [id number] LOS: 3    Admit Date: 3/13/2017   3/16/2017 7:32 AM    Subjective:          Chief Complaint:  Back pain    Interval History:    Reviewed overnight events and nursing notes.   Status:  stable  Pain:  some relief    Doing well post-op    ROS:  10 point ROS obtained:   Gen: No fevers  HEENT: No migraine or visual change  Lung: No cough, hemopotysis or wheezing  Cv: No chest pain or pressure  Abd: No nausea or vomit, no change in bowel fct, no gi bleeding  Ext: No inc pain or swelling  Neuro: No seizure or syncope  Skin: No new rashes  Endo: No polyuria, polyphagia or poilydypsia  Psyc: No inc anxiety or depression  MS: No increase in joint pain or swelling reported    DIET:  Diet Regular    Medications:       lactated ringers 100 mL/hr Last Rate: 100 mL/hr (03/15/17 0643)   lactated ringers 20 mL/hr Last Rate: 20 mL/hr (03/15/17 0643)   sodium chloride 75 mL/hr Last Rate: 75 mL/hr (03/15/17 1510)       amLODIPine 5 mg Oral Daily   ceFAZolin 1 g Intravenous Q8H   docusate sodium 100 mg Oral Daily   famotidine 20 mg Intravenous BID   Or      famotidine 20 mg Oral BID   insulin lispro 2-9 Units Subcutaneous 4x Daily AC & at Bedtime   ipratropium-albuterol 3 mL Nebulization 4x Daily - RT   losartan 100 mg Oral Daily   metoprolol tartrate 50 mg Oral Q12H   sitaGLIPtin-metFORMIN XR (JANUMET XR) 100-1000 mg combo dose  Oral Daily With Breakfast       Data:     Code Status: Full Code    Family History   Problem Relation Age of Onset   • Heart disease Father    • Colon cancer Neg Hx      Social History     Social History   • Marital status:      Spouse name: N/A   • Number of children: N/A   • Years of education: N/A     Occupational History   • Not on file.     Social History Main Topics   • Smoking status: Current Every Day Smoker     Types: Pipe   • Smokeless tobacco: Not on file   • Alcohol use Yes      Comment: occ   • Drug use: No   •  "Sexual activity: Defer     Other Topics Concern   • Not on file     Social History Narrative           Objective:     Vitals:   Visit Vitals   • /76 (BP Location: Right arm, Patient Position: Lying)   • Pulse 84   • Temp 98 °F (36.7 °C) (Oral)   • Resp 20   • Ht 72\" (182.9 cm)   • Wt 231 lb (105 kg)   • SpO2 94%   • BMI 31.33 kg/m2        Intake/Output Summary (Last 24 hours) at 17 0732  Last data filed at 17 0300   Gross per 24 hour   Intake 2562.75 ml   Output   1250 ml   Net 1312.75 ml    Temp (24hrs), Av.9 °F (36.6 °C), Min:97.2 °F (36.2 °C), Max:99.5 °F (37.5 °C)      Physical Examination:   General appearance - alert, well appearing, and in no distress and oriented to person, place, and time  Mental status - alert, oriented to person, place, and time, normal mood, behavior, speech, dress, motor activity, and thought processes  Eyes - pupils equal and reactive, extraocular eye movements intact  Ears - bilateral TM's and external ear canals normal, hearing grossly normal bilaterally  Mouth - mucous membranes moist, pharynx normal without lesions  Neck - supple, no significant adenopathy  Lymphatics - no palpable lymphadenopathy, no hepatosplenomegaly  Chest - clear to auscultation, no wheezes, rales or rhonchi, symmetric air entry, no tachypnea, retractions or cyanosis  Heart - normal rate, regular rhythm, normal S1, S2, no murmurs, rubs, clicks or gallops  Abdomen - soft, nontender, nondistended, no masses or organomegaly bowel sounds normal  Neurological - alert, oriented, normal speech, no focal findings or movement disorder noted  Musculoskeletal - no joint tenderness, deformity or swelling  Extremities - peripheral pulses normal, no pedal edema, no clubbing or cyanosis  Skin - normal coloration and turgor, no rashes, no suspicious skin lesions noted      Assessment and Plan:     Primary Problem:  Lumbar stenosis  DM II  HTN  CAD    Hospital Problem list:  Active Problems:    Spinal " stenosis, lumbar    Type 2 diabetes mellitus without complication, without long-term current use of insulin    Essential hypertension    Pure hypercholesterolemia    Coronary artery disease involving native coronary artery without angina pectoris      PMH:  Past Medical History   Diagnosis Date   • Colon cancer      colon   • Colon polyp    • Diabetes mellitus    • Hyperlipidemia    • Hypertension    • Myocardial infarction        Treatment Plan:    Diabetes:  1.  Good glucose control  2.  Continue home meds with SSI    Lumbar Disc Dz:  1.  Recovering well    HTN:  1. Stable on current tx    Discharge planning:   Home    Reviewed treatment plans with the patient and/or family.   20 minutes spent in face to face interaction and coordination of care.     Electronically signed by Tyree Fleming MD on 3/16/2017 at 7:32 AM

## 2017-03-16 NOTE — PLAN OF CARE
Problem: Patient Care Overview (Adult)  Goal: Plan of Care Review  Outcome: Ongoing (interventions implemented as appropriate)    03/16/17 0348   Coping/Psychosocial Response Interventions   Plan Of Care Reviewed With patient   Patient Care Overview   Progress improving   Outcome Evaluation   Outcome Summary/Follow up Plan Patient denies pain, refuses any meds throughout shift. Drsg CDI. No neuro changes.         Problem: Perioperative Period (Adult)  Goal: Signs and Symptoms of Listed Potential Problems Will be Absent or Manageable (Perioperative Period)  Outcome: Ongoing (interventions implemented as appropriate)  Goal: Signs and Symptoms of Listed Potential Problems Will be Absent or Manageable (Perioperative Period)  Outcome: Ongoing (interventions implemented as appropriate)

## 2017-03-16 NOTE — PROGRESS NOTES
Continued Stay Note   M[facility]     Patient Name: Mani Arreola  MRN: [id number]  Today's Date: 3/16/2017    Admit Date: 3/13/2017          Discharge Plan       03/16/17 1209    Case Management/Social Work Plan    Plan Home    Final Note    Final Note Patient is discharged home today with no discharge planning needs / orders.              Discharge Codes     None        Expected Discharge Date and Time     Expected Discharge Date Expected Discharge Time    Mar 16, 2017             ROSEANN Browne

## 2017-03-17 VITALS
HEART RATE: 83 BPM | TEMPERATURE: 98 F | OXYGEN SATURATION: 96 % | DIASTOLIC BLOOD PRESSURE: 75 MMHG | HEIGHT: 72 IN | RESPIRATION RATE: 16 BRPM | WEIGHT: 231 LBS | BODY MASS INDEX: 31.29 KG/M2 | SYSTOLIC BLOOD PRESSURE: 157 MMHG

## 2017-03-17 LAB
GLUCOSE BLDC GLUCOMTR-MCNC: 159 MG/DL (ref 70–130)
GLUCOSE BLDC GLUCOMTR-MCNC: 245 MG/DL (ref 70–130)

## 2017-03-17 PROCEDURE — 97116 GAIT TRAINING THERAPY: CPT

## 2017-03-17 PROCEDURE — 94799 UNLISTED PULMONARY SVC/PX: CPT

## 2017-03-17 PROCEDURE — 82962 GLUCOSE BLOOD TEST: CPT

## 2017-03-17 PROCEDURE — 94760 N-INVAS EAR/PLS OXIMETRY 1: CPT

## 2017-03-17 RX ADMIN — METFORMIN HYDROCHLORIDE: 500 TABLET, EXTENDED RELEASE ORAL at 09:34

## 2017-03-17 RX ADMIN — INSULIN LISPRO 4 UNITS: 100 INJECTION, SOLUTION INTRAVENOUS; SUBCUTANEOUS at 11:46

## 2017-03-17 RX ADMIN — LOSARTAN POTASSIUM 100 MG: 50 TABLET, FILM COATED ORAL at 09:35

## 2017-03-17 RX ADMIN — OXYCODONE HYDROCHLORIDE AND ACETAMINOPHEN 2 TABLET: 10; 325 TABLET ORAL at 04:03

## 2017-03-17 RX ADMIN — OXYCODONE HYDROCHLORIDE AND ACETAMINOPHEN 2 TABLET: 10; 325 TABLET ORAL at 09:35

## 2017-03-17 RX ADMIN — IPRATROPIUM BROMIDE AND ALBUTEROL SULFATE 3 ML: .5; 3 SOLUTION RESPIRATORY (INHALATION) at 12:03

## 2017-03-17 RX ADMIN — DOCUSATE SODIUM 100 MG: 100 CAPSULE ORAL at 09:35

## 2017-03-17 RX ADMIN — METOPROLOL TARTRATE 50 MG: 50 TABLET ORAL at 09:35

## 2017-03-17 RX ADMIN — FAMOTIDINE 20 MG: 20 TABLET, FILM COATED ORAL at 09:35

## 2017-03-17 RX ADMIN — IPRATROPIUM BROMIDE AND ALBUTEROL SULFATE 3 ML: .5; 3 SOLUTION RESPIRATORY (INHALATION) at 07:30

## 2017-03-17 RX ADMIN — AMLODIPINE BESYLATE 5 MG: 5 TABLET ORAL at 09:35

## 2017-03-17 RX ADMIN — OXYCODONE HYDROCHLORIDE AND ACETAMINOPHEN 2 TABLET: 10; 325 TABLET ORAL at 13:35

## 2017-03-17 NOTE — PLAN OF CARE
Problem: Patient Care Overview (Adult)  Goal: Plan of Care Review  Outcome: Ongoing (interventions implemented as appropriate)    03/17/17 1431   Coping/Psychosocial Response Interventions   Plan Of Care Reviewed With patient;spouse   Patient Care Overview   Progress improving   Outcome Evaluation   Outcome Summary/Follow up Plan Prn pain med as ordered. NV checks wnl. Left flank and lumbar incision CDI with the drainage. Drsg changed to tegaderm. Brace when OOB. RA. ALert and orientated. Permaury. Rich.        Goal: Adult Individualization and Mutuality  Outcome: Ongoing (interventions implemented as appropriate)  Goal: Discharge Needs Assessment  Outcome: Ongoing (interventions implemented as appropriate)    Problem: Perioperative Period (Adult)  Goal: Signs and Symptoms of Listed Potential Problems Will be Absent or Manageable (Perioperative Period)  Outcome: Ongoing (interventions implemented as appropriate)  Goal: Signs and Symptoms of Listed Potential Problems Will be Absent or Manageable (Perioperative Period)  Outcome: Ongoing (interventions implemented as appropriate)

## 2017-03-17 NOTE — PLAN OF CARE
Problem: Inpatient Physical Therapy  Goal: Bed Mobility Goal LTG- PT  Outcome: Unable to achieve outcome(s) by discharge Date Met:  03/17/17 03/17/17 1502   Bed Mobility PT LTG   Bed Mobility PT LTG, Date Goal Reviewed 03/17/17   Bed Mobility PT LTG, Outcome goal met       Goal: Transfer Training Goal 1 LTG- PT  Outcome: Outcome(s) achieved Date Met:  03/17/17 03/17/17 1502   Transfer Training PT LTG   Transfer Training PT LTG, Date Goal Reviewed 03/17/17   Transfer Training PT LTG, Outcome goal met       Goal: Gait Training Goal LTG- PT  Outcome: Outcome(s) achieved Date Met:  03/17/17 03/17/17 1502   Gait Training PT LTG   Gait Training Goal PT LTG, Date Goal Reviewed 03/17/17   Gait Training Goal PT LTG, Outcome goal met       Goal: Stair Training Goal LTG- PT  Outcome: Outcome(s) achieved Date Met:  03/17/17 03/17/17 1502   Stair Training PT LTG   Stair Training Goal PT LTG, Date Goal Reviewed 03/17/17   Stair Training Goal PT LTG, Outcome goal met

## 2017-03-17 NOTE — PLAN OF CARE
Problem: Inpatient Occupational Therapy  Goal: Transfer Training Goal 1 LTG- OT  Outcome: Unable to achieve outcome(s) by discharge Date Met:  03/17/17 03/14/17 1042 03/15/17 1117 03/17/17 1432   Transfer Training OT LTG   Transfer Training OT LTG, Date Established 03/14/17 --  --    Transfer Training OT LTG, Time to Achieve by discharge --  --    Transfer Training OT LTG, Activity Type bed to chair /chair to bed;walk-in shower;sit to stand/stand to sit;toilet;tub --  --    Transfer Training OT LTG, [place] Level conditional independence --  --    Transfer Training OT LTG, Assist Device walker, rolling;commode, bedside --  --    Transfer Training OT LTG, Additional Goal --  with good safety awareness --    Transfer Training OT LTG, Date Goal Reviewed --  --  03/17/17   Transfer Training OT LTG, Outcome --  --  goal not met   Transfer Training OT LTG, Reason Goal Not Met --  --  discharged from facility       Goal: Patient Education Goal LTG- OT  Outcome: Unable to achieve outcome(s) by discharge Date Met:  03/17/17 03/14/17 1042 03/15/17 1117 03/17/17 1432   Patient Education OT LTG   Patient Education OT LTG, Date Established 03/14/17 --  --    Patient Education OT LTG, Time to Achieve by discharge --  --    Patient Education OT LTG, Education Type precautions per surgeon;brace use/care;positioning;posture/body mechanics;home safety;adaptive equipment mgmt;energy conservation;work simplification;phil/doff brace --  --    Patient Education OT LTG, Education Understanding independent;demonstrates adequately;verbalizes understanding --  --    Patient Education OT LTG, Additional Goal --  Pt and spouse --    Patient Education OT LTG, Date Goal Reviewed --  --  03/17/17   Patient Education OT LTG Outcome --  --  goal not met   Patient Education OT LTG, Reason Goal Not Met --  --  discharged from facility       Goal: LB Dressing Goal LTG- OT  Outcome: Unable to achieve outcome(s) by discharge Date Met:   03/17/17 03/14/17 1042 03/17/17 1432   LB Dressing OT LTG   LB Dressing Goal OT LTG, Date Established 03/14/17 --    LB Dressing Goal OT LTG, Time to Achieve by discharge --    LB Dressing Goal OT LTG, [place] Level supervision required --    LB Dressing Goal OT LTG, Additional Goal AE PRN --    LB Dressing Goal OT LTG, Date Goal Reviewed --  03/17/17   LB Dressing Goal OT LTG, Outcome --  goal not met   LB Dressing Goal OT LTG, Reason Goal Not Met --  discharged from facility

## 2017-03-17 NOTE — PLAN OF CARE
Problem: Patient Care Overview (Adult)  Goal: Plan of Care Review  Outcome: Ongoing (interventions implemented as appropriate)    03/17/17 0552   Coping/Psychosocial Response Interventions   Plan Of Care Reviewed With patient   Patient Care Overview   Progress improving   Outcome Evaluation   Outcome Summary/Follow up Plan Pt. slept well through the night, medicated x1 for pain (pain from 3-6/10), VSS, baseline numbness LLE, voiding.          Problem: Perioperative Period (Adult)  Goal: Signs and Symptoms of Listed Potential Problems Will be Absent or Manageable (Perioperative Period)  Outcome: Ongoing (interventions implemented as appropriate)  Goal: Signs and Symptoms of Listed Potential Problems Will be Absent or Manageable (Perioperative Period)  Outcome: Ongoing (interventions implemented as appropriate)

## 2017-03-17 NOTE — THERAPY DISCHARGE NOTE
Acute Care - Physical Therapy Discharge Summary  [facility]       Patient Name: Mani Arreola  : 1945  MRN: [id number]    Today's Date: 3/17/2017  Onset of Illness/Injury or Date of Surgery Date: 17    Date of Referral to PT: 03/15/17  Referring Physician: Dr. Morgan      Admit Date: 3/13/2017      PT Recommendation and Plan    Visit Dx:    ICD-10-CM ICD-9-CM   1. Decreased activities of daily living (ADL) Z78.9 V49.89   2. Impaired functional mobility, balance, gait, and endurance Z74.09 V49.89             Outcome Measures       17 0800 17 1117 17 0900    How much help from another person do you currently need...    Turning from your back to your side while in flat bed without using bedrails? 3  -AH  3  -AH    Moving from lying on back to sitting on the side of a flat bed without bedrails? 3  -AH  3  -AH    Moving to and from a bed to a chair (including a wheelchair)? 3  -AH  3  -AH    Standing up from a chair using your arms (e.g., wheelchair, bedside chair)? 3  -AH  3  -AH    Climbing 3-5 steps with a railing? 3  -AH  3  -AH    To walk in hospital room? 3  -AH  3  -AH    AM-PAC 6 Clicks Score 18  -AH  18  -AH    How much help from another is currently needed...    Putting on and taking off regular lower body clothing?  3  -TR     Bathing (including washing, rinsing, and drying)  3  -TR     Toileting (which includes using toilet bed pan or urinal)  3  -TR     Putting on and taking off regular upper body clothing  3  -TR     Taking care of personal grooming (such as brushing teeth)  3  -TR     Eating meals  4  -TR     Score  19  -TR     Functional Assessment    Outcome Measure Options AM-PAC 6 Clicks Basic Mobility (PT)  - AM-PAC 6 Clicks Daily Activity (OT)  -TR AM-PAC 6 Clicks Basic Mobility (PT)  -      03/15/17 1100 03/15/17 0952       How much help from another person do you currently need...    Turning from your back to your side while in flat bed without using bedrails? 3   -PB (r) EK (t) PB (c)      Moving from lying on back to sitting on the side of a flat bed without bedrails? 3  -PB (r) EK (t) PB (c)      Moving to and from a bed to a chair (including a wheelchair)? 3  -PB (r) EK (t) PB (c)      Standing up from a chair using your arms (e.g., wheelchair, bedside chair)? 3  -PB (r) EK (t) PB (c)      Climbing 3-5 steps with a railing? 3  -PB (r) EK (t) PB (c)      To walk in hospital room? 3  -PB (r) EK (t) PB (c)      AM-PAC 6 Clicks Score 18  -PB (r) EK (t)      How much help from another is currently needed...    Putting on and taking off regular lower body clothing?  3  -TR     Bathing (including washing, rinsing, and drying)  3  -TR     Toileting (which includes using toilet bed pan or urinal)  3  -TR     Putting on and taking off regular upper body clothing  3  -TR     Taking care of personal grooming (such as brushing teeth)  3  -TR     Eating meals  4  -TR     Score  19  -TR     Functional Assessment    Outcome Measure Options AM-PAC 6 Clicks Basic Mobility (PT)  -PB (r) EK (t) PB (c) AM-PAC 6 Clicks Daily Activity (OT)  -       User Key  (r) = Recorded By, (t) = Taken By, (c) = Cosigned By    Initials Name Provider Type     Karma Paniagua PTA Physical Therapy Assistant    PAULETTE Boles, PT DPT Physical Therapist    TR Marimar Foley, OTR/L Occupational Therapist    EK Ngoc Wesley, PT Student PT Student                PT Charges       03/17/17 0835          Time Calculation    Start Time 0757  -      Stop Time 0820  -      Time Calculation (min) 23 min  -      PT Received On 03/17/17  -      PT Goal Re-Cert Due Date 03/25/17  -      Time Calculation- PT    Total Timed Code Minutes- PT 23 minute(s)  -        User Key  (r) = Recorded By, (t) = Taken By, (c) = Cosigned By    Initials Name Provider Type     Karma Paniagua PTA Physical Therapy Assistant                  IP PT Goals       03/17/17 1502 03/15/17 1132 03/14/17 1051    Bed Mobility  PT LTG    Bed Mobility PT LTG, Date Established   03/14/17  -CP    Bed Mobility PT LTG, Time to Achieve   by discharge  -CP    Bed Mobility PT LTG, Activity Type   all bed mobility  -CP    Bed Mobility PT LTG, [place] Level   independent  -CP    Bed Mobility PT LTG, Date Goal Reviewed 03/17/17  - 03/15/17  -PB (r) EK (t) PB (c)     Bed Mobility PT LTG, Outcome goal met  - goal ongoing  -PB (r) EK (t) PB (c)     Bed Mobility PT LTG, Reason Goal Not Met  --   second surgery  -PB (r) EK (t) PB (c)     Transfer Training PT LTG    Transfer Training PT LTG, Date Established   03/14/17  -CP    Transfer Training PT LTG, Time to Achieve   by discharge  -CP    Transfer Training PT LTG, Activity Type   sit to stand/stand to sit  -CP    Transfer Training PT LTG, [place] Level   conditional independence  -CP    Transfer Training PT LTG, Assist Device   walker, rolling  -CP    Transfer Training PT  LTG, Date Goal Reviewed 03/17/17  - 03/15/17  -PB (r) EK (t) PB (c)     Transfer Training PT LTG, Outcome goal met  - goal ongoing  -PB (r) EK (t) PB (c)     Transfer Training PT LTG, Reason Goal Not Met  other (see comments)   second surgery  -PB (r) EK (t) PB (c)     Gait Training PT LTG    Gait Training Goal PT LTG, Date Established   03/14/17  -CP    Gait Training Goal PT LTG, Time to Achieve   by discharge  -CP    Gait Training Goal PT LTG, [place] Level   supervision required  -CP    Gait Training Goal PT LTG, Assist Device   walker, rolling  -CP    Gait Training Goal PT LTG, Distance to Achieve   200'   with LSO  -CP    Gait Training Goal PT LTG, Additional Goal  progress away from AD as tolerated  -PB (r) EK (t) PB (c)     Gait Training Goal PT LTG, Date Goal Reviewed 03/17/17  - 03/15/17  -PB (r) EK (t) PB (c)     Gait Training Goal PT LTG, Outcome goal met  - goal ongoing  -PB (r) EK (t) PB (c)     Gait Training Goal PT LTG, Reason Goal Not Met  other (see comments)   second surgery  -PB (r) EK (t)  PB (c)     Stair Training PT LTG    Stair Training Goal PT LTG, Date Established   03/14/17  -CP    Stair Training Goal PT LTG, Time to Achieve   by discharge  -CP    Stair Training Goal PT LTG, Number of Steps   3  -CP    Stair Training Goal PT LTG, [place] Level   contact guard assist  -CP    Stair Training Goal PT LTG, Assist Device   cane, straight  -CP    Stair Training Goal PT LTG, Date Goal Reviewed 03/17/17  - 03/15/17  -PB (r) EK (t) PB (c)     Stair Training Goal PT LTG, Outcome goal met  - goal ongoing  -PB (r) EK (t) PB (c)     Stair Training Goal PT LTG, Reason Goal Not Met  other (see comments)   second surgery  -PB (r) EK (t) PB (c)       User Key  (r) = Recorded By, (t) = Taken By, (c) = Cosigned By    Initials Name Provider Type     Karma Paniagua PTA Physical Therapy Assistant    PB Vince Boles, PT DPT Physical Therapist    CP Ivette Calzada, PT Physical Therapist    EK Ngoc Wesley, PT Student PT Student          Therapy Charges for Today     Code Description Service Date Service Provider Modifiers Qty    80722026449 HC GAIT TRAINING EA 15 MIN 3/16/2017 Karma Paniagua PTA GP, KX 2    82616940795 HC GAIT TRAINING EA 15 MIN 3/16/2017 Karma Paniagua PTA GP, KX 2    31105908047 HC GAIT TRAINING EA 15 MIN 3/17/2017 Karma Paniagua PTA GP, KX 2          PT Discharge Summary  Reason for Discharge: Discharge from facility  Outcomes Achieved: Refer to plan of care for updates on goals achieved  Discharge Destination: Home with assist      Karma Paniagua PTA   3/17/2017

## 2017-03-17 NOTE — PROGRESS NOTES
[facility]  Daily Progress Note  Mani Arreola  MRN: [id number] LOS: 4    Admit Date: 3/13/2017   3/17/2017 11:18 AM    Subjective:          Chief Complaint:  Back pain    Interval History:    Reviewed overnight events and nursing notes.   Status:  stable  Pain:  some relief    Doing well post-op    ROS:  10 point ROS obtained:   Gen: No fevers  HEENT: No migraine or visual change  Lung: No cough, hemopotysis or wheezing  Cv: No chest pain or pressure  Abd: No nausea or vomit, no change in bowel fct, no gi bleeding  Ext: No inc pain or swelling  Neuro: No seizure or syncope  Skin: No new rashes  Endo: No polyuria, polyphagia or poilydypsia  Psyc: No inc anxiety or depression  MS: No increase in joint pain or swelling reported    DIET:  Diet Regular    Medications:       lactated ringers 100 mL/hr Last Rate: 100 mL/hr (03/15/17 0643)   lactated ringers 20 mL/hr Last Rate: 20 mL/hr (03/15/17 0643)       amLODIPine 5 mg Oral Daily   docusate sodium 100 mg Oral Daily   famotidine 20 mg Intravenous BID   Or      famotidine 20 mg Oral BID   insulin lispro 2-9 Units Subcutaneous 4x Daily AC & at Bedtime   ipratropium-albuterol 3 mL Nebulization 4x Daily - RT   losartan 100 mg Oral Daily   metoprolol tartrate 50 mg Oral Q12H   sitaGLIPtin-metFORMIN XR (JANUMET XR) 100-1000 mg combo dose  Oral Daily With Breakfast       Data:     Code Status: Full Code    Family History   Problem Relation Age of Onset   • Heart disease Father    • Colon cancer Neg Hx      Social History     Social History   • Marital status:      Spouse name: N/A   • Number of children: N/A   • Years of education: N/A     Occupational History   • Not on file.     Social History Main Topics   • Smoking status: Current Every Day Smoker     Types: Pipe   • Smokeless tobacco: Not on file   • Alcohol use Yes      Comment: occ   • Drug use: No   • Sexual activity: Defer     Other Topics Concern   • Not on file     Social History Narrative  "          Objective:     Vitals:   Visit Vitals   • /75 (BP Location: Left arm)   • Pulse 83   • Temp 98 °F (36.7 °C) (Oral)   • Resp 16   • Ht 72\" (182.9 cm)   • Wt 231 lb (105 kg)   • SpO2 96%   • BMI 31.33 kg/m2        Intake/Output Summary (Last 24 hours) at 17 1118  Last data filed at 17 0900   Gross per 24 hour   Intake    475 ml   Output   1500 ml   Net  -1025 ml    Temp (24hrs), Av.5 °F (36.9 °C), Min:97.6 °F (36.4 °C), Max:99.4 °F (37.4 °C)      Physical Examination:   General appearance - alert, well appearing, and in no distress and oriented to person, place, and time  Mental status - alert, oriented to person, place, and time, normal mood, behavior, speech, dress, motor activity, and thought processes  Eyes - pupils equal and reactive, extraocular eye movements intact  Ears - bilateral TM's and external ear canals normal, hearing grossly normal bilaterally  Mouth - mucous membranes moist, pharynx normal without lesions  Neck - supple, no significant adenopathy  Lymphatics - no palpable lymphadenopathy, no hepatosplenomegaly  Chest - clear to auscultation, no wheezes, rales or rhonchi, symmetric air entry, no tachypnea, retractions or cyanosis  Heart - normal rate, regular rhythm, normal S1, S2, no murmurs, rubs, clicks or gallops  Abdomen - soft, nontender, nondistended, no masses or organomegaly bowel sounds normal  Neurological - alert, oriented, normal speech, no focal findings or movement disorder noted  Musculoskeletal - no joint tenderness, deformity or swelling  Extremities - peripheral pulses normal, no pedal edema, no clubbing or cyanosis  Skin - normal coloration and turgor, no rashes, no suspicious skin lesions noted      Assessment and Plan:     Primary Problem:  Lumbar stenosis  DM II  HTN  CAD    Hospital Problem list:  Active Problems:    Spinal stenosis, lumbar    Type 2 diabetes mellitus without complication, without long-term current use of insulin    Essential " hypertension    Pure hypercholesterolemia    Coronary artery disease involving native coronary artery without angina pectoris      PMH:  Past Medical History   Diagnosis Date   • Colon cancer      colon   • Colon polyp    • Diabetes mellitus    • Hyperlipidemia    • Hypertension    • Myocardial infarction        Treatment Plan:    Diabetes:  1.  Good glucose control  2.  Continue home meds with SSI    Lumbar Disc Dz:  1.  Recovering well    HTN:  1. Stable on current tx    Discharge planning:   Home    Reviewed treatment plans with the patient and/or family.   20 minutes spent in face to face interaction and coordination of care.     Electronically signed by Tyree Fleming MD on 3/17/2017 at 11:18 AM

## 2017-03-17 NOTE — PLAN OF CARE
Problem: Patient Care Overview (Adult)  Goal: Plan of Care Review  Outcome: Ongoing (interventions implemented as appropriate)    03/17/17 0833   Coping/Psychosocial Response Interventions   Plan Of Care Reviewed With patient   Patient Care Overview   Progress progress toward functional goals as expected   Outcome Evaluation   Outcome Summary/Follow up Plan Pt trans to EOB cga, amb 200 feet cga, up/down 9 steps with 2 HR cga. Pt ready for d/c home

## 2017-03-17 NOTE — THERAPY DISCHARGE NOTE
Acute Care - Occupational Therapy Discharge Summary  [facility]     Patient Name: Mani Arreola  : 1945  MRN: [id number]    Today's Date: 3/17/2017  Onset of Illness/Injury or Date of Surgery Date: 17    Date of Referral to OT: 03/15/17  Referring Physician: Dr. Morgan      Admit Date: 3/13/2017        OT Recommendation and Plan    Visit Dx:    ICD-10-CM ICD-9-CM   1. Decreased activities of daily living (ADL) Z78.9 V49.89   2. Impaired functional mobility, balance, gait, and endurance Z74.09 V49.89                     OT Goals       17 1432 03/15/17 1117 17 1042    Transfer Training OT LTG    Transfer Training OT LTG, Date Established   17  -ND    Transfer Training OT LTG, Time to Achieve   by discharge  -ND    Transfer Training OT LTG, Activity Type   bed to chair /chair to bed;walk-in shower;sit to stand/stand to sit;toilet;tub  -ND    Transfer Training OT LTG, [place] Level   conditional independence  -ND    Transfer Training OT LTG, Assist Device   walker, rolling;commode, bedside  -ND    Transfer Training OT LTG, Additional Goal  with good safety awareness  -TR     Transfer Training OT LTG, Date Goal Reviewed 17  -AC 03/15/17  -TR     Transfer Training OT LTG, Outcome goal not met  -AC goal revised  -TR     Transfer Training OT LTG, Reason Goal Not Met discharged from facility  -AC progress slower than expected  -TR     Patient Education OT LTG    Patient Education OT LTG, Date Established   17  -ND    Patient Education OT LTG, Time to Achieve   by discharge  -ND    Patient Education OT LTG, Education Type   precautions per surgeon;brace use/care;positioning;posture/body mechanics;home safety;adaptive equipment mgmt;energy conservation;work simplification;phil/doff brace  -ND    Patient Education OT LTG, Education Understanding   independent;demonstrates adequately;verbalizes understanding  -ND    Patient Education OT LTG, Additional Goal  Pt and spouse  -TR      Patient Education OT LTG, Date Goal Reviewed 03/17/17  -AC 03/15/17  -TR     Patient Education OT LTG Outcome goal not met  -AC goal revised  -TR     Patient Education OT LTG, Reason Goal Not Met discharged from facility  -AC progress slower than expected  -TR     LB Dressing OT LTG    LB Dressing Goal OT LTG, Date Established   03/14/17  -ND    LB Dressing Goal OT LTG, Time to Achieve   by discharge  -ND    LB Dressing Goal OT LTG, [place] Level   supervision required  -ND    LB Dressing Goal OT LTG, Additional Goal   AE PRN  -ND    LB Dressing Goal OT LTG, Date Goal Reviewed 03/17/17  -AC 03/15/17  -TR     LB Dressing Goal OT LTG, Outcome goal not met  -AC goal ongoing  -TR     LB Dressing Goal OT LTG, Reason Goal Not Met discharged from facility  -AC progress slower than expected  -TR       User Key  (r) = Recorded By, (t) = Taken By, (c) = Cosigned By    Initials Name Provider Type    AC Shar Mosher, OTR/L Occupational Therapist    TR Marimar Foely, OTR/L Occupational Therapist    ND Lesli Ortiz, OTR/L Occupational Therapist                Outcome Measures       03/17/17 0800 03/16/17 1117 03/16/17 0900    How much help from another person do you currently need...    Turning from your back to your side while in flat bed without using bedrails? 3  -AH  3  -AH    Moving from lying on back to sitting on the side of a flat bed without bedrails? 3  -AH  3  -AH    Moving to and from a bed to a chair (including a wheelchair)? 3  -AH  3  -AH    Standing up from a chair using your arms (e.g., wheelchair, bedside chair)? 3  -AH  3  -AH    Climbing 3-5 steps with a railing? 3  -AH  3  -AH    To walk in hospital room? 3  -AH  3  -AH    AM-PAC 6 Clicks Score 18  -AH  18  -AH    How much help from another is currently needed...    Putting on and taking off regular lower body clothing?  3  -TR     Bathing (including washing, rinsing, and drying)  3  -TR     Toileting (which includes using toilet bed pan or  urinal)  3  -TR     Putting on and taking off regular upper body clothing  3  -TR     Taking care of personal grooming (such as brushing teeth)  3  -TR     Eating meals  4  -TR     Score  19  -TR     Functional Assessment    Outcome Measure Options AM-PAC 6 Clicks Basic Mobility (PT)  - AM-PAC 6 Clicks Daily Activity (OT)  -TR AM-PAC 6 Clicks Basic Mobility (PT)  -      03/15/17 1100 03/15/17 0952       How much help from another person do you currently need...    Turning from your back to your side while in flat bed without using bedrails? 3  -PB (r) EK (t) PB (c)      Moving from lying on back to sitting on the side of a flat bed without bedrails? 3  -PB (r) EK (t) PB (c)      Moving to and from a bed to a chair (including a wheelchair)? 3  -PB (r) EK (t) PB (c)      Standing up from a chair using your arms (e.g., wheelchair, bedside chair)? 3  -PB (r) EK (t) PB (c)      Climbing 3-5 steps with a railing? 3  -PB (r) EK (t) PB (c)      To walk in hospital room? 3  -PB (r) EK (t) PB (c)      AM-PAC 6 Clicks Score 18  -PB (r) EK (t)      How much help from another is currently needed...    Putting on and taking off regular lower body clothing?  3  -TR     Bathing (including washing, rinsing, and drying)  3  -TR     Toileting (which includes using toilet bed pan or urinal)  3  -TR     Putting on and taking off regular upper body clothing  3  -TR     Taking care of personal grooming (such as brushing teeth)  3  -TR     Eating meals  4  -TR     Score  19  -TR     Functional Assessment    Outcome Measure Options AM-PAC 6 Clicks Basic Mobility (PT)  -PB (r) EK (t) PB (c) AM-PAC 6 Clicks Daily Activity (OT)  -TR       User Key  (r) = Recorded By, (t) = Taken By, (c) = Cosigned By    Initials Name Provider Type    JONNY Paniagua, PTA Physical Therapy Assistant    PB Vince Boles, PT DPT Physical Therapist    TR Marimar Foley, OTR/L Occupational Therapist    EK Ngoc Wesley, PT Student PT Student               OT Discharge Summary  Anticipated Discharge Disposition: home with assist  Reason for Discharge: Discharge from facility  Outcomes Achieved: Refer to plan of care for updates on goals achieved  Discharge Destination: Home with assist      Shar Mosher OTR/CLARISSE  3/17/2017

## 2017-03-17 NOTE — DISCHARGE SUMMARY
Date of Discharge:  3/17/2017    Admission Diagnosis: M54.5    Discharge Diagnosis:   1. Increasing chronic back pain  2. Bilateral buttock, thigh, and leg radiculopathy, left much worse than right  3. Severe neurogenic claudication  4. Degenerative disc disease L4-5  5. Focal scoliosis L4-5, concave right  6. Facet arthropathy L4 to S1, worse L4-5  7. Central and bilateral foraminal stenosis L4-5  8. Status post left LLIF with instrumentation L4 5, 3/13/2017  9.status post Posterior spinal fusion L4-5 (Lanx pedicle screws and rods) 3/15/17    Consults During Admission:  Dr. grey    Hospital Course  Patient is a 72 y.o. male known to our practice.  He is admitted for the above elective staged fusion.  He tolerated this well he was discharged home in good stable condition today with instructions for brace at all times when out of bed.    Condition on Discharge:  STABLE    Vital Signs  Temp:  [97.6 °F (36.4 °C)-99.4 °F (37.4 °C)] 98 °F (36.7 °C)  Heart Rate:  [61-98] 83  Resp:  [16-20] 16  BP: (126-157)/(69-80) 157/75    Physical Exam:   Alert and oriented ×3, no acute distress, grossly neurovascular intact, vital signs stable, dressing clean dry and intact    Discharge Disposition  Home or Self Care    Discharge Medications   Mani Arreola   Home Medication Instructions YURI:[id number]    Printed on:03/17/17 0748   Medication Information                      amLODIPine (NORVASC) 5 MG tablet  Take 5 mg by mouth daily.             aspirin 325 MG tablet  Take 325 mg by mouth daily.             Cholecalciferol (VITAMIN D PO)  Take  by mouth every night.             diphenhydrAMINE (BENADRYL) 25 mg capsule  Take 25 mg by mouth at night as needed for itching.             folic acid (FOLVITE) 400 MCG tablet  Take 400 mcg by mouth daily.             losartan (COZAAR) 100 MG tablet  Take 100 mg by mouth daily.             metoprolol tartrate (LOPRESSOR) 50 MG tablet  Take 50 mg by mouth 2 (two) times a day.              Multiple Vitamins-Minerals (MULTIVITAMIN ADULT PO)  Take  by mouth.             oxyCODONE-acetaminophen (PERCOCET)  MG per tablet  Take 1 tablet by mouth Every 4 (Four) Hours As Needed for Moderate Pain (4-6) or Severe Pain (7-10). Take 1-2 by mouth             pioglitazone (ACTOS) 30 MG tablet  Take 30 mg by mouth Daily.             pravastatin (PRAVACHOL) 40 MG tablet  Take 80 mg by mouth daily.             SITagliptin-MetFORMIN HCl ER (JANUMET XR) 100-1000 MG tablet sustained-release 24 hour  Take  by mouth daily.             vitamin C (ASCORBIC ACID) 500 MG tablet  Take 500 mg by mouth daily.                 Discharge Diet: Resume Home diet, advance as tolerated    Activity at Discharge: Resume home activity advace as tolerated, no lifting, no twisting, no bending, brace as directed, no driving until directed.     Follow-up Appointments  Followup with PCP within one week  Followup [facility] at 2weeks post-op         CHETNA Alexander  03/17/17  7:48 AM    Time: 30min  Addendum:  - patient d/c held secondary to some pain and weakness. This has now improved and he will be discharged home today as above.

## 2017-06-04 NOTE — OP NOTE
Procedure Note    Mani Arreola  3/13/2017    Pre-op Diagnosis:     1.  Increasing chronic back pain  2.  Bilateral buttock, thigh, and leg radiculopathy, left much worse than right  3.  Severe neurogenic claudication  4.  Degenerative disc disease L4-5  5.  Focal scoliosis L4-5, concave right  6.  Facet arthropathy L4 to S1, worse L4-5  7.  Central and bilateral foraminal stenosis L4-5    Post-op Diagnosis:    same    Procedure/CPT® Codes:    1.  Left LLIF L4-5  2.  Anterior spinal instrumentation L4-5 (Lanx lateral plate and screws)  3.  Use of PEEK interbody biomechanical device for fusion L4-5 (Lanx PEEK spacer)  4.  Use of bone marrow aspirate obtained through separate incision for fusion  5.  Use of allograft bone chips for fusion  6.  Use of fluoroscopy for confirmation of surgical level, placement of PEEK spacer and instrumentation  7.  Intraoperative neural monitoring    Anesthesia: General    Surgeon: SUSAN Morgan MD    Assistant: Hugo Anand PA-C    Estimated Blood Loss: minimal    Complications: None    Condition: Stable to PACU.    Indications:    The patient is a 72-year-old who sees Dr. Levi Abrams for medical issues.  He has had complaints of increasing chronic back pain, as well as symptoms down both buttocks, thighs, and into his legs, with the left side worse than the right.  His symptoms are very consistent with severe neurogenic claudication.  Imaging studies revealed advanced degenerative changes at L4-5 with a focal scoliosis concave to the right, along with facet arthropathy causing central and bilateral foraminal stenosis.  This was felt to be contributing to the vast majority of his symptoms.     After failing conservative measures, it was mutually decided that surgery would be the best option. Risks, benefits, and complications of surgery were discussed with the patient. The patient appeared well informed and wished to proceed. We specifically discussed the risk of infection,  blood loss, nerve root injury, CSF leak, and the possibility of incomplete resolution of symptoms. We also discussed the possible risk of a nonunion and the potential need for additional surgery in the event of a pseudoarthrosis or hardware failure.     We elected proceed with a staged operation.  It is planned that we will be returning to surgery at a later date for the second posterior stage the procedure.    Operative Procedure:    After obtaining informed consent and verifying the correct operative site, the patient was brought to the operating room and placed supine on operating table.  A general anesthetic was provided by the anesthesia service with the assistance of an endotracheal tube.  Once this was appropriately positioned and secured, the patient was carefully rotated into the lateral decubitus position with the left side up.  All bony prominences were well-padded.  3 inch wide cloth tape was used to maintain the patient's position.  It was also used to tip open the pelvis slightly allowing better access to the lumbar spine through a lateral approach.  Fluoroscopy was then used to identify the L4-5 level, and the skin was marked for our planned incision.  The left flank was then prepped and draped in the usual sterile fashion.  A surgical timeout was taken to confirm this was the correct patient, we were working at the correct level, and that preoperative antibiotics were given in a timely fashion.    A small stab incision was created over the left anterior iliac crest using a 15 blade scalpel.  Through this stab incision, a Jamshidi needle was advanced into the iliac crest.  Through the needle we aspirated approximately 50-60 mL of bone marrow from the iliac crest.  This bone marrow aspirate was then passed off in a sterile fashion for processing and concentration to be later mixed with allograft bone chips to assist with obtaining a fusion.    An oblique incision was created of the left flank using a  10 blade scalpel directly centered over the L4-5 segment. Dissection was carried bluntly through subcutaneous tissues. Blunt dissection was also carried between the external oblique and internal oblique musculature. The transversalis fascia was pierced allowing access to the retro-peroneal space. At this point, a series of neuro monitoring probes were used to safely access the L4-5 level. We used stimulated and free run EMG for this purpose. Once nerve roots were confirmed to be out of harm's way, self retaining retractors were placed for continuous exposure.     An annulotomy was then created at the L4-5 area using a 15 blade scalpel on a long handle. A Valerio elevator was used to remove disc material off of the endplates. Disc material was removed using Kerrisons, pituitaries, and forward angled curettes. Once all disc material had been retrieved, I used the Valerio elevator to divide the contralateral annulus. This assisted with mobilization of the vertebral body. We then used a series of endplate scrapers to prepare the endplates for interbody fusion. Trial spacers were malleted into position and for the disc space it was felt that a 12 mm x 60 mm implant would be the best fit to restore disc height. The disc space was then thoroughly irrigated with saline solution.     A PEEK spacer from the Lanx instrumentation set measuring 12 mm x 60 mm x 22 mm was then packed as tightly as possible with a combination of the previously obtained bone marrow aspirate and allograft bone chips. This PEEK spacer was then malleted into the L4-5 disc space under fluoroscopic guidance. It was placed as a PEEK interbody biomechanical device to assist with interbody fusion. Once this spacer was confirmed to be properly positioned, I chose a 4-hole plate to help augment the fusion of L4-5. This plate was held into position using 4 screws. I placed 55 mm screws into L4 and I placed shorter 40 mm screws into L5. The shorter screws were used  to hopefully accommodate the placement of a pedicle screw on the contralateral L5 pedicle as part of the next stage of the procedure. A cover plate was then screwed into position to prevent screw backout once all 4 screws were properly positioned.     The wound was then irrigated thoroughly. A thorough inspection was then undertaken to ensure that we had adequate hemostasis. Bleeding at this point was controlled using FloSeal and bipolar cautery. Closure was then accomplished with a #1 Vicryl reapproximating the transversalis fascia as well as the internal and external oblique musculature. Immediate subcutaneous tissues were closed with a 3-0 Vicryl and skin closure was accomplished using Mastisol and Steri-Strips. The wound was then sterilely dressed. The patient was then carefully rotated supine onto a hospital gurney, extubated, and sent to the recovery room in good stable condition.     The patient tolerated the procedure well. There were no complications. We estimated blood loss to be minimal. The patient remained hemodynamically stable.     Hugo Anand PA-C provided critical assistance during the procedure. His assistance was medically necessary in order to allow the procedure to occur in the most safe and efficient manner.     SUSAN Morgan MD     Date: 3/13/2017  Time: 6:53 AM     - - -

## 2018-06-15 NOTE — PLAN OF CARE
Failed per nursing protocol - please advise on refill request     Cholesterol Medications  Protocol Criteria:  · Appointment scheduled in the past 12 months or in the next 3 months  · ALT & LDL on file in the past 12 months  · ALT result < 80  · LDL resul Problem: Patient Care Overview (Adult)  Goal: Plan of Care Review  Outcome: Ongoing (interventions implemented as appropriate)    03/16/17 1147   Coping/Psychosocial Response Interventions   Plan Of Care Reviewed With patient   Patient Care Overview   Progress progress towards functional goals is fair   Outcome Evaluation   Outcome Summary/Follow up Plan OT tx completed. CGA for bed mobility, transfers and functional mobility with a RW. Min A to don/doff LSO. Education provided on back precautions and ADL completion with LSO. Pt and spouse report that pts wife can assist with ADL as needed and have no AE needs at this time. Will continue with OT POC. Anticipated d/c is home with assist. Recommend continued PT/OT with home health or outpatient services.

## 2018-09-28 ENCOUNTER — TRANSCRIBE ORDERS (OUTPATIENT)
Dept: ADMINISTRATIVE | Facility: HOSPITAL | Age: 73
End: 2018-09-28

## 2018-09-28 DIAGNOSIS — I65.23 BILATERAL CAROTID ARTERY STENOSIS: Primary | ICD-10-CM

## 2018-10-05 ENCOUNTER — HOSPITAL ENCOUNTER (OUTPATIENT)
Dept: ULTRASOUND IMAGING | Facility: HOSPITAL | Age: 73
Discharge: HOME | End: 2018-10-05
Attending: FAMILY MEDICINE | Admitting: FAMILY MEDICINE

## 2018-10-05 DIAGNOSIS — I65.23 BILATERAL CAROTID ARTERY STENOSIS: ICD-10-CM

## 2018-10-05 PROCEDURE — 93880 EXTRACRANIAL BILAT STUDY: CPT | Performed by: SURGERY

## 2018-10-05 PROCEDURE — 93880 EXTRACRANIAL BILAT STUDY: CPT

## 2018-12-12 ENCOUNTER — OFFICE VISIT (OUTPATIENT)
Dept: GASTROENTEROLOGY | Facility: CLINIC | Age: 73
End: 2018-12-12

## 2018-12-12 VITALS
DIASTOLIC BLOOD PRESSURE: 78 MMHG | HEIGHT: 72 IN | BODY MASS INDEX: 33.18 KG/M2 | OXYGEN SATURATION: 99 % | SYSTOLIC BLOOD PRESSURE: 142 MMHG | HEART RATE: 55 BPM | TEMPERATURE: 95.7 F | WEIGHT: 245 LBS

## 2018-12-12 DIAGNOSIS — Z85.038 PERSONAL HISTORY OF COLON CANCER: Primary | ICD-10-CM

## 2018-12-12 DIAGNOSIS — I10 ESSENTIAL HYPERTENSION: Chronic | ICD-10-CM

## 2018-12-12 DIAGNOSIS — Z72.0 TOBACCO USE: ICD-10-CM

## 2018-12-12 DIAGNOSIS — E11.9 TYPE 2 DIABETES MELLITUS WITHOUT COMPLICATION, WITHOUT LONG-TERM CURRENT USE OF INSULIN (HCC): Chronic | ICD-10-CM

## 2018-12-12 PROCEDURE — S0260 H&P FOR SURGERY: HCPCS | Performed by: NURSE PRACTITIONER

## 2018-12-12 RX ORDER — TRAMADOL HYDROCHLORIDE 50 MG/1
50 TABLET ORAL 2 TIMES DAILY
COMMUNITY

## 2018-12-12 RX ORDER — POLYETHYLENE GLYCOL 3350, SODIUM CHLORIDE, SODIUM BICARBONATE, POTASSIUM CHLORIDE 420; 11.2; 5.72; 1.48 G/4L; G/4L; G/4L; G/4L
4000 POWDER, FOR SOLUTION ORAL SEE ADMIN INSTRUCTIONS
Qty: 4000 ML | Refills: 0 | Status: ON HOLD | OUTPATIENT
Start: 2018-12-12 | End: 2019-01-11

## 2018-12-12 NOTE — PROGRESS NOTES
[facility]    Primary Physician Tyree Fleming MD    12/12/2018    Mani Arreola   1945      Chief Complaint   Patient presents with   • Colonoscopy       Subjective     HPI    Mani Arreola is a 73 y.o. male who presents as a referral for preventative maintenance. He has no complaints of nausea or vomiting. No change in bowels. No wt loss. No BRBPR. No melena. No abdominal pain.     Last colonoscopy was 11/2016  polyp ( path tubular adenomatous), recall rec 2 yr..  The patient does  have history of colon polyps. The patient does have history of colon cancer 2004 requiring surgery, chemo/xrt.   There is no family history of colon polyps. There is no family history of colon cancer.     Past Medical History:   Diagnosis Date   • Colon cancer (CMS/HCC)     colon   • Colon polyp    • Diabetes mellitus (CMS/HCC)    • Hyperlipidemia    • Hypertension    • Myocardial infarction (CMS/HCC)        Past Surgical History:   Procedure Laterality Date   • CARDIAC CATHETERIZATION     • CAROTID ENDARTERECTOMY Right    • CATARACT EXTRACTION     • CHOLECYSTECTOMY     • COLON RESECTION      for colon cancer   • COLONOSCOPY  02/2004   • COLONOSCOPY  09/2005    normal   • COLONOSCOPY  10/2007    recall 3 yr   • COLONOSCOPY  11/01/2010    5mm polypectomy distal transverse colon, patent end to end ileo colonic anastomosis   • HYDROCELE EXCISION / REPAIR     • PERIPHERAL ARTERIAL STENT GRAFT         Outpatient Medications Marked as Taking for the 12/12/18 encounter (Office Visit) with Nitza Carvalho APRN   Medication Sig Dispense Refill   • amLODIPine (NORVASC) 5 MG tablet Take 5 mg by mouth daily.     • aspirin 325 MG tablet Take 325 mg by mouth daily.     • Cholecalciferol (VITAMIN D PO) Take  by mouth every night.     • diphenhydrAMINE (BENADRYL) 25 mg capsule Take 25 mg by mouth at night as needed for itching.     • folic acid (FOLVITE) 400 MCG tablet Take 400 mcg by mouth daily.     • losartan  (COZAAR) 100 MG tablet Take 100 mg by mouth daily.     • metoprolol tartrate (LOPRESSOR) 50 MG tablet Take 50 mg by mouth 2 (two) times a day.     • Multiple Vitamins-Minerals (MULTIVITAMIN ADULT PO) Take  by mouth.     • pioglitazone (ACTOS) 30 MG tablet Take 30 mg by mouth Daily.     • pravastatin (PRAVACHOL) 40 MG tablet Take 80 mg by mouth daily.     • SITagliptin-MetFORMIN HCl ER (JANUMET XR) 100-1000 MG tablet sustained-release 24 hour Take  by mouth daily.     • traMADol (ULTRAM) 50 MG tablet Take 50 mg by mouth Every 6 (Six) Hours As Needed for Moderate Pain .     • vitamin C (ASCORBIC ACID) 500 MG tablet Take 500 mg by mouth daily.         No Known Allergies    Social History     Socioeconomic History   • Marital status:      Spouse name: Not on file   • Number of children: Not on file   • Years of education: Not on file   • Highest education level: Not on file   Social Needs   • Financial resource strain: Not on file   • Food insecurity - worry: Not on file   • Food insecurity - inability: Not on file   • Transportation needs - medical: Not on file   • Transportation needs - non-medical: Not on file   Occupational History   • Not on file   Tobacco Use   • Smoking status: Current Every Day Smoker     Types: Pipe   • Smokeless tobacco: Never Used   Substance and Sexual Activity   • Alcohol use: Yes     Comment: occ   • Drug use: No   • Sexual activity: Defer   Other Topics Concern   • Not on file   Social History Narrative   • Not on file       Family History   Problem Relation Age of Onset   • Heart disease Father    • Colon cancer Neg Hx    • Colon polyps Neg Hx        Review of Systems   Constitutional: Negative for chills, fever and unexpected weight change.   Respiratory: Negative for cough, shortness of breath and wheezing.    Cardiovascular: Negative for chest pain and palpitations.   Gastrointestinal: Negative for abdominal distention, abdominal pain, anal bleeding, blood in stool,  constipation, diarrhea, nausea, rectal pain and vomiting.       Objective     Vitals:    12/12/18 1447   BP: 142/78   Pulse: 55   Temp: 95.7 °F (35.4 °C)   SpO2: 99%         12/12/18  1447   Weight: 111 kg (245 lb)     Body mass index is 33.23 kg/m².    Physical Exam   Constitutional: He is oriented to person, place, and time. He appears well-developed and well-nourished. No distress.   Cardiovascular: Normal rate, regular rhythm and normal heart sounds.   Pulmonary/Chest: Effort normal and breath sounds normal.   Abdominal: Soft. Bowel sounds are normal. He exhibits no distension and no mass. There is no tenderness. There is no rebound and no guarding.   Musculoskeletal: He exhibits no edema.   Neurological: He is alert and oriented to person, place, and time.   Skin: Skin is warm and dry.   Psychiatric: He has a normal mood and affect. His behavior is normal.   Vitals reviewed.      Imaging Results (most recent)     None          Assessment/Plan     Mani was seen today for colonoscopy.    Diagnoses and all orders for this visit:    Personal history of colon cancer  -     Case Request; Standing  -     Case Request    Essential hypertension    Type 2 diabetes mellitus without complication, without long-term current use of insulin (CMS/[facility])    Tobacco use    Other orders  -     Follow Anesthesia Guidelines / Standing Orders; Future  -     Implement Anesthesia Orders Day of Procedure; Standing  -     Obtain Informed Consent; Standing  -     polyethylene glycol-electrolytes (NULYTELY WITH FLAVOR PACKS) 420 g solution; Take 4,000 mL by mouth See Admin Instructions.    Plan for colonoscopy. The patient was advised to take any blood pressure or heart  medications the morning of  procedure if that is when he/she normally takes. The patient was instructed how to adjust diabetes medications the am of procedure.         Switch to asa 81 mg from 325 mg ,5 days prior to procedure.     I advised Mani of the risks of continuing  to use tobacco, and I provided him with tobacco cessation educational materials in the After Visit Summary.     During this visit, I spent > 3  minutes counseling the patient regarding tobacco cessation.           Body mass index is 33.23 kg/m².    Patient's Body mass index is 33.23 kg/m². BMI is above normal parameters. Recommendations include: no follow-up required. Recommend weight loss.       COLONOSCOPY WITH ANESTHESIA (N/A)  All risks, benefits, alternatives, and indications of colonoscopy procedure have been discussed with the patient. Risks to include perforation of the colon requiring possible surgery or colostomy, risk of bleeding from biopsies or removal of colon tissue, possibility of missing a colon polyp or cancer, or adverse drug reaction.  Benefits to include the diagnosis and management of disease of the colon and rectum. Alternatives to include barium enema, radiographic evaluation, lab testing or no intervention. Pt verbalizes understanding and agrees.         GARY Coppola      EMR Dragon/transcription disclaimer:  Much of this encounter note is electronic transcription/translation of spoken language to printed text.  The electronic translation of spoken language may be erroneous, or at times, nonsensical words or phrases may be inadvertently transcribed.  Although I have reviewed the note for such errors, some may still exist.

## 2019-01-11 ENCOUNTER — ANESTHESIA (OUTPATIENT)
Dept: GASTROENTEROLOGY | Facility: HOSPITAL | Age: 74
End: 2019-01-11

## 2019-01-11 ENCOUNTER — ANESTHESIA EVENT (OUTPATIENT)
Dept: GASTROENTEROLOGY | Facility: HOSPITAL | Age: 74
End: 2019-01-11

## 2019-01-11 ENCOUNTER — HOSPITAL ENCOUNTER (OUTPATIENT)
Facility: HOSPITAL | Age: 74
Setting detail: HOSPITAL OUTPATIENT SURGERY
Discharge: HOME | End: 2019-01-11
Attending: INTERNAL MEDICINE | Admitting: INTERNAL MEDICINE

## 2019-01-11 ENCOUNTER — TELEPHONE (OUTPATIENT)
Dept: GASTROENTEROLOGY | Facility: CLINIC | Age: 74
End: 2019-01-11

## 2019-01-11 VITALS
DIASTOLIC BLOOD PRESSURE: 69 MMHG | TEMPERATURE: 98.2 F | OXYGEN SATURATION: 98 % | HEART RATE: 73 BPM | RESPIRATION RATE: 14 BRPM | SYSTOLIC BLOOD PRESSURE: 128 MMHG | BODY MASS INDEX: 32.48 KG/M2 | WEIGHT: 239.8 LBS | HEIGHT: 72 IN

## 2019-01-11 LAB — GLUCOSE BLDC GLUCOMTR-MCNC: 142 MG/DL (ref 70–130)

## 2019-01-11 PROCEDURE — 25010000002 PROPOFOL 10 MG/ML EMULSION: Performed by: NURSE ANESTHETIST, CERTIFIED REGISTERED

## 2019-01-11 PROCEDURE — 82962 GLUCOSE BLOOD TEST: CPT

## 2019-01-11 PROCEDURE — 45385 COLONOSCOPY W/LESION REMOVAL: CPT | Performed by: INTERNAL MEDICINE

## 2019-01-11 RX ORDER — ONDANSETRON 2 MG/ML
4 INJECTION INTRAMUSCULAR; INTRAVENOUS ONCE AS NEEDED
Status: DISCONTINUED | OUTPATIENT
Start: 2019-01-11 | End: 2019-01-11 | Stop reason: HOSPADM

## 2019-01-11 RX ORDER — SODIUM CHLORIDE 9 MG/ML
500 INJECTION, SOLUTION INTRAVENOUS CONTINUOUS PRN
Status: DISCONTINUED | OUTPATIENT
Start: 2019-01-11 | End: 2019-01-11 | Stop reason: HOSPADM

## 2019-01-11 RX ORDER — PROPOFOL 10 MG/ML
VIAL (ML) INTRAVENOUS AS NEEDED
Status: DISCONTINUED | OUTPATIENT
Start: 2019-01-11 | End: 2019-01-11 | Stop reason: SURG

## 2019-01-11 RX ORDER — SODIUM CHLORIDE 0.9 % (FLUSH) 0.9 %
3 SYRINGE (ML) INJECTION AS NEEDED
Status: DISCONTINUED | OUTPATIENT
Start: 2019-01-11 | End: 2019-01-11 | Stop reason: HOSPADM

## 2019-01-11 RX ADMIN — PROPOFOL 50 MG: 10 INJECTION, EMULSION INTRAVENOUS at 10:24

## 2019-01-11 RX ADMIN — PROPOFOL 50 MG: 10 INJECTION, EMULSION INTRAVENOUS at 10:32

## 2019-01-11 RX ADMIN — PROPOFOL 50 MG: 10 INJECTION, EMULSION INTRAVENOUS at 10:17

## 2019-01-11 RX ADMIN — SODIUM CHLORIDE 500 ML: 9 INJECTION, SOLUTION INTRAVENOUS at 09:41

## 2019-01-11 RX ADMIN — PROPOFOL 50 MG: 10 INJECTION, EMULSION INTRAVENOUS at 10:28

## 2019-01-11 RX ADMIN — LIDOCAINE HYDROCHLORIDE 0.5 ML: 10 INJECTION, SOLUTION EPIDURAL; INFILTRATION; INTRACAUDAL; PERINEURAL at 09:41

## 2019-01-11 RX ADMIN — PROPOFOL 50 MG: 10 INJECTION, EMULSION INTRAVENOUS at 10:20

## 2019-01-11 NOTE — ANESTHESIA PREPROCEDURE EVALUATION
Anesthesia Evaluation     Patient summary reviewed   no history of anesthetic complications:  NPO Solid Status: > 8 hours  NPO Liquid Status: > 2 hours           Airway   Mallampati: II  TM distance: >3 FB  Neck ROM: full  Dental          Pulmonary    (+) a smoker (pipe) Current,   (-) asthma, sleep apnea  Cardiovascular     Patient on routine beta blocker and Beta blocker given within 24 hours of surgery    (+) hypertension, past MI (1998) , CAD, cardiac stents (1998) hyperlipidemia,       Neuro/Psych  (-) seizures, TIA, CVA  GI/Hepatic/Renal/Endo    (+)   diabetes mellitus,   (-) liver disease, no renal disease    Musculoskeletal     Abdominal    Substance History      OB/GYN          Other                        Anesthesia Plan    ASA 3     general   total IV anesthesia  intravenous induction   Anesthetic plan, all risks, benefits, and alternatives have been provided, discussed and informed consent has been obtained with: patient.

## 2019-01-11 NOTE — ANESTHESIA POSTPROCEDURE EVALUATION
Patient: Mani Arreola    Procedure Summary     Date:  01/11/19 Room / Location:  [facility] ENDOSCOPY 5 / [facility]    Anesthesia Start:  1011 Anesthesia Stop:  1037    Procedure:  COLONOSCOPY WITH ANESTHESIA (N/A ) Diagnosis:       Personal history of colon cancer      (Personal history of colon cancer [Z85.038])    Surgeon:  Cirilo Bhandari MD Provider:  Franko Jim CRNA    Anesthesia Type:  general ASA Status:  3          Anesthesia Type: general  Last vitals  BP   159/83 (01/11/19 0925)   Temp   98.2 °F (36.8 °C) (01/11/19 0925)   Pulse   83 (01/11/19 0925)   Resp   20 (01/11/19 0925)     SpO2   98 % (01/11/19 0925)     Post Anesthesia Care and Evaluation    Patient location during evaluation: PHASE II  Patient participation: complete - patient participated  Level of consciousness: awake and alert  Pain management: adequate  Airway patency: patent  Anesthetic complications: No anesthetic complications  PONV Status: none  Cardiovascular status: acceptable and stable  Respiratory status: acceptable and unassisted  Hydration status: acceptable

## 2020-02-03 ENCOUNTER — TRANSCRIBE ORDERS (OUTPATIENT)
Dept: ADMINISTRATIVE | Facility: HOSPITAL | Age: 75
End: 2020-02-03

## 2020-02-03 DIAGNOSIS — R80.9 URINE PROTEIN INCREASED: Primary | ICD-10-CM

## 2020-02-14 ENCOUNTER — HOSPITAL ENCOUNTER (OUTPATIENT)
Dept: ULTRASOUND IMAGING | Facility: HOSPITAL | Age: 75
Discharge: HOME | End: 2020-02-14
Admitting: FAMILY MEDICINE

## 2020-02-14 DIAGNOSIS — R80.9 URINE PROTEIN INCREASED: ICD-10-CM

## 2020-02-14 PROCEDURE — 76775 US EXAM ABDO BACK WALL LIM: CPT

## 2022-01-11 ENCOUNTER — OFFICE VISIT (OUTPATIENT)
Dept: GASTROENTEROLOGY | Facility: CLINIC | Age: 77
End: 2022-01-11

## 2022-01-11 VITALS
HEIGHT: 72 IN | DIASTOLIC BLOOD PRESSURE: 82 MMHG | SYSTOLIC BLOOD PRESSURE: 160 MMHG | OXYGEN SATURATION: 98 % | WEIGHT: 248 LBS | BODY MASS INDEX: 33.59 KG/M2 | TEMPERATURE: 96.4 F | HEART RATE: 75 BPM

## 2022-01-11 DIAGNOSIS — Z85.038 PERSONAL HISTORY OF COLON CANCER: Primary | ICD-10-CM

## 2022-01-11 PROCEDURE — S0260 H&P FOR SURGERY: HCPCS | Performed by: NURSE PRACTITIONER

## 2022-01-11 RX ORDER — SENNOSIDES 8.6 MG
1300 CAPSULE ORAL 2 TIMES DAILY
COMMUNITY

## 2022-01-11 RX ORDER — POLYETHYLENE GLYCOL 3350, SODIUM SULFATE ANHYDROUS, SODIUM BICARBONATE, SODIUM CHLORIDE, POTASSIUM CHLORIDE 236; 22.74; 6.74; 5.86; 2.97 G/4L; G/4L; G/4L; G/4L; G/4L
4 POWDER, FOR SOLUTION ORAL ONCE
Qty: 4000 ML | Refills: 0 | Status: SHIPPED | OUTPATIENT
Start: 2022-01-11 | End: 2022-01-11

## 2022-01-11 NOTE — PROGRESS NOTES
[facility]    Primary Physician Tyree Fleming MD    1/11/2022    Mani Arreola   1945      Chief Complaint   Patient presents with   • Colonoscopy       Subjective     HPI    Mani Arreola is a 76 y.o. male who presents as a referral for preventative maintenance. He has no complaints of nausea or vomiting. No change in bowels. No wt loss. No BRBPR. No melena. No abdominal pain.        Last colonoscopy was 1/2018 noting 2 colon polyps ( not retrieved). Recommended recall 3 years.The patient does have history of colon cancer 2004.   There is not a family history of colon polyps/colon cancer.     Past Medical History:   Diagnosis Date   • Colon cancer (HCC)     colon   • Colon polyp    • Diabetes mellitus (HCC)    • Hyperlipidemia    • Hypertension    • Myocardial infarction (HCC)        Past Surgical History:   Procedure Laterality Date   • CARDIAC CATHETERIZATION     • CAROTID ENDARTERECTOMY Right    • CATARACT EXTRACTION     • CHOLECYSTECTOMY     • COLON RESECTION      for colon cancer   • COLONOSCOPY  02/2004   • COLONOSCOPY  09/2005    normal   • COLONOSCOPY  10/2007    recall 3 yr   • COLONOSCOPY  11/01/2010    5mm polypectomy distal transverse colon, patent end to end ileo colonic anastomosis   • COLONOSCOPY N/A 11/16/2016    Procedure: COLONOSCOPY WITH ANESTHESIA;  Surgeon: Cirilo Bhandari MD;  Location: [facility] ENDOSCOPY;  Service:    • COLONOSCOPY N/A 1/11/2019    Procedure: COLONOSCOPY WITH ANESTHESIA;  Surgeon: Cirilo Bhandari MD;  Location: [facility] ENDOSCOPY;  Service: Gastroenterology   • HYDROCELE EXCISION / REPAIR     • LUMBAR FUSION Left 3/13/2017    Procedure: LEFT LUMBAR LATERAL INTERBODY FUSION WITH INSTRUMENTATION L4-5 , LANX,  NUVASIVE MONITORING ;  Surgeon: DALY Morgan MD;  Location: [facility] OR;  Service:    • LUMBAR FUSION N/A 3/15/2017    Procedure: POSTERIOR SPINAL FUSION WITH INSTRUMENTATION L4-5;  Surgeon: DALY Morgan MD;  Location:   [facility] OR;  Service:    • PERIPHERAL ARTERIAL STENT GRAFT         Outpatient Medications Marked as Taking for the 1/11/22 encounter (Office Visit) with Nitza Carvalho APRN   Medication Sig Dispense Refill   • acetaminophen (TYLENOL) 650 MG 8 hr tablet Take 1,300 mg by mouth Every 8 (Eight) Hours As Needed for Mild Pain .     • amLODIPine (NORVASC) 5 MG tablet Take 5 mg by mouth daily.     • aspirin 325 MG tablet Take 325 mg by mouth daily.     • diphenhydrAMINE (BENADRYL) 25 mg capsule Take 25 mg by mouth at night as needed for itching.     • folic acid (FOLVITE) 400 MCG tablet Take 400 mcg by mouth daily.     • metoprolol tartrate (LOPRESSOR) 50 MG tablet Take 50 mg by mouth 2 (two) times a day.     • Multiple Vitamins-Minerals (MULTIVITAMIN ADULT PO) Take  by mouth.     • Omega-3 Fatty Acids (FISH OIL PO) Take  by mouth Daily.     • pioglitazone (ACTOS) 30 MG tablet Take 30 mg by mouth Daily.     • pravastatin (PRAVACHOL) 40 MG tablet Take 80 mg by mouth daily.     • SITagliptin-MetFORMIN HCl ER (JANUMET XR) 100-1000 MG tablet sustained-release 24 hour Take  by mouth daily.     • traMADol (ULTRAM) 50 MG tablet Take 50 mg by mouth Every 6 (Six) Hours As Needed for Moderate Pain .     • vitamin C (ASCORBIC ACID) 500 MG tablet Take 500 mg by mouth daily.         No Known Allergies    Social History     Socioeconomic History   • Marital status:    Tobacco Use   • Smoking status: Current Every Day Smoker     Types: Pipe   • Smokeless tobacco: Never Used   Substance and Sexual Activity   • Alcohol use: Yes     Comment: occ   • Drug use: No   • Sexual activity: Defer       Family History   Problem Relation Age of Onset   • Heart disease Father    • Esophageal cancer Father    • Colon cancer Neg Hx    • Colon polyps Neg Hx        Review of Systems   Constitutional: Negative for chills, fever and unexpected weight change.   Respiratory: Negative for cough, shortness of breath and wheezing.    Cardiovascular: Negative  for chest pain and palpitations.   Gastrointestinal: Negative for abdominal distention, abdominal pain, anal bleeding, blood in stool, constipation, diarrhea, nausea and vomiting.       Objective     Vitals:    01/11/22 0808   BP: 160/82   Pulse: 75   Temp: 96.4 °F (35.8 °C)   SpO2: 98%         01/11/22 0808   Weight: 112 kg (248 lb)     Body mass index is 33.63 kg/m².    Physical Exam  Vitals reviewed.   Constitutional:       General: He is not in acute distress.  Cardiovascular:      Rate and Rhythm: Normal rate and regular rhythm.      Heart sounds: Normal heart sounds.   Pulmonary:      Effort: Pulmonary effort is normal.      Breath sounds: Normal breath sounds.   Abdominal:      General: Bowel sounds are normal. There is no distension.      Palpations: Abdomen is soft.      Tenderness: There is no abdominal tenderness.   Skin:     General: Skin is warm and dry.   Neurological:      Mental Status: He is alert.         Imaging Results (Most Recent)     None          Assessment/Plan     Diagnoses and all orders for this visit:    1. Personal history of colon cancer (Primary)  -     Case Request; Standing  -     Case Request    Other orders  -     Follow Anesthesia Guidelines / Protocol; Future  -     Obtain Informed Consent; Future  -     polyethylene glycol (Golytely) 236 g solution; Take 4,000 mL by mouth 1 (One) Time for 1 dose. Take as directed per instruction sheet.  Dispense: 4000 mL; Refill: 0           Plan for colonoscopy. The patient was advised to take any blood pressure or heart  medications the morning of  procedure if that is when he/she normally takes. The patient was instructed how to adjust diabetes medications the am of procedure.   Switch to asa 81 mg 1 week prior to the procedure.            COLONOSCOPY WITH ANESTHESIA (N/A)  All risks, benefits, alternatives, and indications of colonoscopy procedure have been discussed with the patient. Risks to include perforation of the colon requiring  possible surgery or colostomy, risk of bleeding from biopsies or removal of colon tissue, possibility of missing a colon polyp or cancer, or adverse drug reaction.  Benefits to include the diagnosis and management of disease of the colon and rectum. Alternatives to include barium enema, radiographic evaluation, lab testing or no intervention. Pt verbalizes understanding and agrees.         Nitza Carvalho, APRN

## 2022-02-15 ENCOUNTER — HOSPITAL ENCOUNTER (OUTPATIENT)
Facility: HOSPITAL | Age: 77
Setting detail: HOSPITAL OUTPATIENT SURGERY
Discharge: HOME | End: 2022-02-15
Attending: INTERNAL MEDICINE | Admitting: INTERNAL MEDICINE

## 2022-02-15 ENCOUNTER — ANESTHESIA (OUTPATIENT)
Dept: GASTROENTEROLOGY | Facility: HOSPITAL | Age: 77
End: 2022-02-15

## 2022-02-15 ENCOUNTER — TELEPHONE (OUTPATIENT)
Dept: GASTROENTEROLOGY | Facility: CLINIC | Age: 77
End: 2022-02-15

## 2022-02-15 ENCOUNTER — ANESTHESIA EVENT (OUTPATIENT)
Dept: GASTROENTEROLOGY | Facility: HOSPITAL | Age: 77
End: 2022-02-15

## 2022-02-15 VITALS
HEIGHT: 72 IN | SYSTOLIC BLOOD PRESSURE: 123 MMHG | HEART RATE: 67 BPM | DIASTOLIC BLOOD PRESSURE: 66 MMHG | TEMPERATURE: 97.8 F | WEIGHT: 238 LBS | BODY MASS INDEX: 32.23 KG/M2 | OXYGEN SATURATION: 94 % | RESPIRATION RATE: 21 BRPM

## 2022-02-15 LAB — GLUCOSE BLDC GLUCOMTR-MCNC: 122 MG/DL (ref 70–130)

## 2022-02-15 PROCEDURE — 82962 GLUCOSE BLOOD TEST: CPT

## 2022-02-15 PROCEDURE — 45385 COLONOSCOPY W/LESION REMOVAL: CPT | Performed by: INTERNAL MEDICINE

## 2022-02-15 PROCEDURE — 25010000002 PROPOFOL 10 MG/ML EMULSION: Performed by: NURSE ANESTHETIST, CERTIFIED REGISTERED

## 2022-02-15 RX ORDER — SODIUM CHLORIDE 0.9 % (FLUSH) 0.9 %
10 SYRINGE (ML) INJECTION AS NEEDED
Status: DISCONTINUED | OUTPATIENT
Start: 2022-02-15 | End: 2022-02-15 | Stop reason: HOSPADM

## 2022-02-15 RX ORDER — LIDOCAINE HYDROCHLORIDE 10 MG/ML
0.5 INJECTION, SOLUTION EPIDURAL; INFILTRATION; INTRACAUDAL; PERINEURAL ONCE AS NEEDED
Status: CANCELLED | OUTPATIENT
Start: 2022-02-15

## 2022-02-15 RX ORDER — SODIUM CHLORIDE 0.9 % (FLUSH) 0.9 %
10 SYRINGE (ML) INJECTION EVERY 12 HOURS SCHEDULED
Status: CANCELLED | OUTPATIENT
Start: 2022-02-15

## 2022-02-15 RX ORDER — SODIUM CHLORIDE 0.9 % (FLUSH) 0.9 %
10 SYRINGE (ML) INJECTION AS NEEDED
Status: CANCELLED | OUTPATIENT
Start: 2022-02-15

## 2022-02-15 RX ORDER — SODIUM CHLORIDE 9 MG/ML
100 INJECTION, SOLUTION INTRAVENOUS CONTINUOUS
Status: CANCELLED | OUTPATIENT
Start: 2022-02-15

## 2022-02-15 RX ORDER — SODIUM CHLORIDE 9 MG/ML
500 INJECTION, SOLUTION INTRAVENOUS CONTINUOUS PRN
Status: DISCONTINUED | OUTPATIENT
Start: 2022-02-15 | End: 2022-02-15 | Stop reason: HOSPADM

## 2022-02-15 RX ORDER — PROPOFOL 10 MG/ML
VIAL (ML) INTRAVENOUS AS NEEDED
Status: DISCONTINUED | OUTPATIENT
Start: 2022-02-15 | End: 2022-02-15 | Stop reason: SURG

## 2022-02-15 RX ORDER — ONDANSETRON 2 MG/ML
4 INJECTION INTRAMUSCULAR; INTRAVENOUS ONCE AS NEEDED
Status: DISCONTINUED | OUTPATIENT
Start: 2022-02-15 | End: 2022-02-15 | Stop reason: HOSPADM

## 2022-02-15 RX ORDER — LIDOCAINE HYDROCHLORIDE 20 MG/ML
INJECTION, SOLUTION EPIDURAL; INFILTRATION; INTRACAUDAL; PERINEURAL AS NEEDED
Status: DISCONTINUED | OUTPATIENT
Start: 2022-02-15 | End: 2022-02-15 | Stop reason: SURG

## 2022-02-15 RX ADMIN — LIDOCAINE HYDROCHLORIDE 50 MG: 20 INJECTION, SOLUTION EPIDURAL; INFILTRATION; INTRACAUDAL; PERINEURAL at 10:36

## 2022-02-15 RX ADMIN — PROPOFOL 230 MG: 10 INJECTION, EMULSION INTRAVENOUS at 10:36

## 2022-02-15 RX ADMIN — SODIUM CHLORIDE 500 ML: 9 INJECTION, SOLUTION INTRAVENOUS at 09:28

## 2022-02-15 NOTE — ANESTHESIA POSTPROCEDURE EVALUATION
Patient: Mani Arreola    Procedure Summary     Date: 02/15/22 Room / Location: [facility] ENDOSCOPY 4 / [facility]    Anesthesia Start: 1034 Anesthesia Stop: 1102    Procedure: COLONOSCOPY WITH ANESTHESIA (N/A ) Diagnosis:       Personal history of colon cancer      (Personal history of colon cancer [Z85.038])    Surgeons: Cirilo Bhandari MD Provider: Phyllis Anderson CRNA    Anesthesia Type: MAC ASA Status: 3          Anesthesia Type: MAC    Vitals  Vitals Value Taken Time   /66 02/15/22 1122   Temp     Pulse 67 02/15/22 1123   Resp 21 02/15/22 1120   SpO2 95 % 02/15/22 1123   Vitals shown include unvalidated device data.        Post Anesthesia Care and Evaluation    Patient location during evaluation: PHASE II  Patient participation: complete - patient participated  Level of consciousness: awake and alert  Pain management: adequate  Airway patency: patent  Anesthetic complications: No anesthetic complications  PONV Status: none  Cardiovascular status: acceptable  Respiratory status: acceptable  Hydration status: acceptable  No anesthesia care post op

## 2022-02-15 NOTE — ANESTHESIA PREPROCEDURE EVALUATION
Anesthesia Evaluation     Patient summary reviewed   no history of anesthetic complications:  NPO Solid Status: > 8 hours  NPO Liquid Status: > 4 hours           Airway   Mallampati: II  TM distance: >3 FB  Neck ROM: full  Dental          Pulmonary    (+) a smoker (pipe) Current,   (-) asthma, sleep apnea  Cardiovascular   Exercise tolerance: good (4-7 METS)    Patient on routine beta blocker and Beta blocker given within 24 hours of surgery    (+) hypertension, past MI (1998) , CAD, cardiac stents (1998) hyperlipidemia,       Neuro/Psych  (-) seizures, TIA, CVA  GI/Hepatic/Renal/Endo    (+)   diabetes mellitus,   (-) liver disease, no renal disease    Musculoskeletal     Abdominal    Substance History      OB/GYN          Other      history of cancer                      Anesthesia Plan    ASA 3     MAC     intravenous induction     Anesthetic plan, all risks, benefits, and alternatives have been provided, discussed and informed consent has been obtained with: patient and spouse/significant other.

## 2022-02-15 NOTE — H&P
[facility] Gastroenterology  Pre Procedure History & Physical    Chief Complaint:   Colon polyps    Subjective     HPI:   The patient has a history of colon polyps who presents for exam.  He had 2 diminutive polyps on his colonoscopy in 2018 that were destroyed.  He does have a process colon cancer 2004.    Past Medical History:   Past Medical History:   Diagnosis Date   • Arthritis    • Colon cancer (HCC)     colon   • Colon polyp    • Coronary artery disease     stents x 2   • Diabetes mellitus (HCC)    • Hyperlipidemia    • Hypertension    • Myocardial infarction (HCC)        Past Surgical History:  Past Surgical History:   Procedure Laterality Date   • CARDIAC CATHETERIZATION      stents x 2   • CAROTID ENDARTERECTOMY Right    • CATARACT EXTRACTION     • CHOLECYSTECTOMY     • COLON RESECTION      for colon cancer   • COLONOSCOPY  02/2004   • COLONOSCOPY  09/2005    normal   • COLONOSCOPY  10/2007    recall 3 yr   • COLONOSCOPY  11/01/2010    5mm polypectomy distal transverse colon, patent end to end ileo colonic anastomosis   • COLONOSCOPY N/A 11/16/2016    Procedure: COLONOSCOPY WITH ANESTHESIA;  Surgeon: Cirilo Bhandari MD;  Location:  [facility];  Service:    • COLONOSCOPY N/A 1/11/2019    Procedure: COLONOSCOPY WITH ANESTHESIA;  Surgeon: Cirilo Bhandari MD;  Location:  [facility] ENDOS[facility];  Service: Gastroenterology   • HYDROCELE EXCISION / REPAIR     • LUMBAR FUSION Left 3/13/2017    Procedure: LEFT LUMBAR LATERAL INTERBODY FUSION WITH INSTRUMENTATION L4-5 , LANX,  NUVASIVE MONITORING ;  Surgeon: DALY Morgan MD;  Location:  [facility] OR;  Service:    • LUMBAR FUSION N/A 3/15/2017    Procedure: POSTERIOR SPINAL FUSION WITH INSTRUMENTATION L4-5;  Surgeon: DALY Morgan MD;  Location:  [facility] OR;  Service:    • PERIPHERAL ARTERIAL STENT GRAFT         Family History:  Family History   Problem Relation Age of Onset   • Heart disease Father    • Esophageal cancer Father    • Colon cancer Neg Hx     • Colon polyps Neg Hx        Social History:   reports that he has been smoking pipe. He has never used smokeless tobacco. He reports current alcohol use. He reports that he does not use drugs.    Medications:   Prior to Admission medications    Medication Sig Start Date End Date Taking? Authorizing Provider   acetaminophen (TYLENOL) 650 MG 8 hr tablet Take 1,300 mg by mouth Every 8 (Eight) Hours As Needed for Mild Pain .   Yes Michael Snowden MD   amLODIPine (NORVASC) 5 MG tablet Take 5 mg by mouth daily.   Yes Michael Snowden MD   aspirin 325 MG tablet Take 325 mg by mouth daily.   Yes Michael Snowden MD   diphenhydrAMINE (BENADRYL) 25 mg capsule Take 25 mg by mouth at night as needed for itching.   Yes Michael Snowden MD   folic acid (FOLVITE) 400 MCG tablet Take 400 mcg by mouth daily.   Yes Michael Snowden MD   losartan (COZAAR) 100 MG tablet Take 100 mg by mouth daily.   Yes Michael Snowden MD   metoprolol tartrate (LOPRESSOR) 50 MG tablet Take 50 mg by mouth 2 (two) times a day.   Yes Michael Snowden MD   pioglitazone (ACTOS) 30 MG tablet Take 30 mg by mouth Daily.   Yes Michael Snowden MD   pravastatin (PRAVACHOL) 40 MG tablet Take 80 mg by mouth daily.   Yes Michael Snowden MD   SITagliptin-MetFORMIN HCl ER (JANUMET XR) 100-1000 MG tablet sustained-release 24 hour Take  by mouth daily.   Yes Michael Snowden MD   traMADol (ULTRAM) 50 MG tablet Take 50 mg by mouth Every 6 (Six) Hours As Needed for Moderate Pain .   Yes Michael Snowden MD   Cholecalciferol (VITAMIN D PO) Take  by mouth every night.    Michael Snowden MD   Multiple Vitamins-Minerals (MULTIVITAMIN ADULT PO) Take  by mouth.    Michael Snowden MD   Omega-3 Fatty Acids (FISH OIL PO) Take  by mouth Daily.    Michael Snowden MD   vitamin C (ASCORBIC ACID) 500 MG tablet Take 500 mg by mouth daily.    Michael Snowden MD       Allergies:  Patient has no known  "allergies.    ROS:    General: Weight stable  Resp: No SOA  Cardiovascular: No CP    Objective     Blood pressure 153/83, pulse 69, temperature 97.8 °F (36.6 °C), temperature source Temporal, resp. rate 20, height 182.9 cm (72\"), weight 108 kg (238 lb), SpO2 96 %.    Physical Exam   Constitutional: Pt is oriented to person, place, and in no distress.   Cardiovascular: Normal rate, regular rhythm.    Pulmonary/Chest: Effort normal. No respiratory distress.   Abdominal:  Non-distended.  Psychiatric: Mood, memory, affect and judgment appear normal.     Assessment/Plan     Diagnosis:  Colon polyps, history of colon cancer 2004    Anticipated Surgical Procedure:  Colonoscopy    The risks, benefits, and alternatives of this procedure have been discussed with the patient or the responsible party- the patient understands and agrees to proceed.      EMR Dragon/transcription disclaimer:  Much of this encounter note is electronic transcription/translation of spoken language to printed text.  The electronic translation of spoken language may be erroneous, or at times, nonsensical words or phrases may be inadvertently transcribed.  Although I have reviewed the note for such errors, some may still exist.  "

## 2022-09-07 ENCOUNTER — TRANSCRIBE ORDERS (OUTPATIENT)
Dept: ADMINISTRATIVE | Facility: HOSPITAL | Age: 77
End: 2022-09-07

## 2022-09-07 DIAGNOSIS — I50.31 ACUTE DIASTOLIC CONGESTIVE HEART FAILURE: Primary | ICD-10-CM

## 2022-09-15 ENCOUNTER — HOSPITAL ENCOUNTER (OUTPATIENT)
Dept: CARDIOLOGY | Facility: HOSPITAL | Age: 77
Discharge: HOME | End: 2022-09-15
Admitting: FAMILY MEDICINE

## 2022-09-15 DIAGNOSIS — I50.31 ACUTE DIASTOLIC CONGESTIVE HEART FAILURE: ICD-10-CM

## 2022-09-15 LAB
BH CV ECHO MEAS - AO MAX PG: 4.8 MMHG
BH CV ECHO MEAS - AO MEAN PG: 2 MMHG
BH CV ECHO MEAS - AO ROOT DIAM: 2.9 CM
BH CV ECHO MEAS - AO V2 MAX: 109 CM/SEC
BH CV ECHO MEAS - AO V2 VTI: 20.6 CM
BH CV ECHO MEAS - AVA(I,D): 1.99 CM2
BH CV ECHO MEAS - EDV(CUBED): 192.1 ML
BH CV ECHO MEAS - EDV(MOD-SP2): 100 ML
BH CV ECHO MEAS - EDV(MOD-SP4): 82.2 ML
BH CV ECHO MEAS - EF(MOD-SP2): 27.3 %
BH CV ECHO MEAS - EF(MOD-SP4): 25.1 %
BH CV ECHO MEAS - ESV(CUBED): 78.4 ML
BH CV ECHO MEAS - ESV(MOD-SP2): 72.7 ML
BH CV ECHO MEAS - ESV(MOD-SP4): 61.6 ML
BH CV ECHO MEAS - FS: 25.8 %
BH CV ECHO MEAS - IVS/LVPW: 1.05 CM
BH CV ECHO MEAS - IVSD: 0.88 CM
BH CV ECHO MEAS - LA DIMENSION: 4.3 CM
BH CV ECHO MEAS - LAT PEAK E' VEL: 7.6 CM/SEC
BH CV ECHO MEAS - LV DIASTOLIC VOL/BSA (35-75): 35.8 CM2
BH CV ECHO MEAS - LV MASS(C)D: 189.5 GRAMS
BH CV ECHO MEAS - LV MAX PG: 2.45 MMHG
BH CV ECHO MEAS - LV MEAN PG: 1 MMHG
BH CV ECHO MEAS - LV SYSTOLIC VOL/BSA (12-30): 26.9 CM2
BH CV ECHO MEAS - LV V1 MAX: 78.3 CM/SEC
BH CV ECHO MEAS - LV V1 VTI: 16.1 CM
BH CV ECHO MEAS - LVIDD: 5.8 CM
BH CV ECHO MEAS - LVIDS: 4.3 CM
BH CV ECHO MEAS - LVOT AREA: 2.5 CM2
BH CV ECHO MEAS - LVOT DIAM: 1.8 CM
BH CV ECHO MEAS - LVPWD: 0.84 CM
BH CV ECHO MEAS - MED PEAK E' VEL: 5 CM/SEC
BH CV ECHO MEAS - MR MAX PG: 41.5 MMHG
BH CV ECHO MEAS - MR MAX VEL: 315.5 CM/SEC
BH CV ECHO MEAS - MR MEAN PG: 41 MMHG
BH CV ECHO MEAS - MR MEAN VEL: 306 CM/SEC
BH CV ECHO MEAS - MR VTI: 123 CM
BH CV ECHO MEAS - MV DEC TIME: 0.17 MSEC
BH CV ECHO MEAS - MV E MAX VEL: 95.1 CM/SEC
BH CV ECHO MEAS - SI(MOD-SP2): 11.9 ML/M2
BH CV ECHO MEAS - SI(MOD-SP4): 9 ML/M2
BH CV ECHO MEAS - SV(LVOT): 41 ML
BH CV ECHO MEAS - SV(MOD-SP2): 27.3 ML
BH CV ECHO MEAS - SV(MOD-SP4): 20.6 ML
BH CV ECHO MEAS - TR MAX PG: 6.3 MMHG
BH CV ECHO MEAS - TR MAX VEL: 125 CM/SEC
BH CV ECHO MEASUREMENTS AVERAGE E/E' RATIO: 15.1
LEFT ATRIUM VOLUME INDEX: 37 ML/M2
LEFT ATRIUM VOLUME: 84.8 ML
MAXIMAL PREDICTED HEART RATE: 145 BPM
STRESS TARGET HR: 123 BPM

## 2022-09-15 PROCEDURE — 93306 TTE W/DOPPLER COMPLETE: CPT | Performed by: INTERNAL MEDICINE

## 2022-09-15 PROCEDURE — 93306 TTE W/DOPPLER COMPLETE: CPT

## 2022-09-15 PROCEDURE — 25010000002 PERFLUTREN 6.52 MG/ML SUSPENSION: Performed by: INTERNAL MEDICINE

## 2022-09-15 RX ADMIN — PERFLUTREN 1.17 MG: 6.52 INJECTION, SUSPENSION INTRAVENOUS at 08:08

## 2022-09-21 NOTE — PROGRESS NOTES
Reason For Visit:  New heart failure    Subjective        Mani Arreola is a 77 y.o. male with a PMH of CAD s/p PCI, HTN, HLD, T2DM, and carotid stenosis s/p CEA who is referred for new heart failure diagnosis.    Patient overall has been doing well, and about 6 months ago started noticing issues with lower extremity edema.  In June, he did take Lasix for a week with some improvement, but subsequently had worsening of the swelling again.  Due to his ongoing swelling, he was more recently started on Lasix 40 mg daily (previously had been on 20 mg BID) and was referred for an echo that demonstrated now severely reduced LV systolic function.  The patient reports that he otherwise has been doing well regarding cardiac symptoms other than the swelling.  He denies chest pain, significant dyspnea on exertion, orthopnea, PND, palpitations, or lightheadedness.  He does get short of breath with a lot of exertion, but overall reports that his activity level is primarily limited by his back pain before he becomes short of breath.  Given his new heart failure, he was referred to the heart failure clinic for further management.    Regarding past cardiac history, the patient had a cardiac cath in 1998 that demonstrated 99% subtotal occlusion of the proximal LAD extending into the first diagonal branch that was treated with 2 stents.  He denies having had any anginal symptoms since that time and has not undergone a repeat cath.    Of note, on my review of his outpatient clinic visits recently, he has had elevated HR including up to 125 at most recent visit.  His labs from 9/2/2022 were notable for Hgb 12.4 with MCV 89, proBNP 2973, BMP notable for creatinine 1.51 (GFR 48), K5.1    ROS otherwise unremarkable except as noted above    Pertinent past medical, surgical, family, and social history were reviewed and updated in the EMR.      Current Outpatient Medications:   •  acetaminophen (TYLENOL) 650 MG 8 hr tablet, Take 1,300 mg by  "mouth Every 8 (Eight) Hours As Needed for Mild Pain ., Disp: , Rfl:   •  amLODIPine (NORVASC) 5 MG tablet, Take 5 mg by mouth daily., Disp: , Rfl:   •  aspirin 325 MG tablet, Take 325 mg by mouth daily., Disp: , Rfl:   •  Cholecalciferol (VITAMIN D PO), Take  by mouth every night., Disp: , Rfl:   •  diphenhydrAMINE (BENADRYL) 25 mg capsule, Take 25 mg by mouth at night as needed for itching., Disp: , Rfl:   •  folic acid (FOLVITE) 400 MCG tablet, Take 400 mcg by mouth daily., Disp: , Rfl:   •  losartan (COZAAR) 100 MG tablet, Take 100 mg by mouth daily., Disp: , Rfl:   •  metoprolol tartrate (LOPRESSOR) 50 MG tablet, Take 50 mg by mouth 2 (two) times a day., Disp: , Rfl:   •  Multiple Vitamins-Minerals (MULTIVITAMIN ADULT PO), Take  by mouth., Disp: , Rfl:   •  Omega-3 Fatty Acids (FISH OIL PO), Take  by mouth Daily., Disp: , Rfl:   •  pioglitazone (ACTOS) 30 MG tablet, Take 30 mg by mouth Daily., Disp: , Rfl:   •  pravastatin (PRAVACHOL) 40 MG tablet, Take 80 mg by mouth daily., Disp: , Rfl:   •  SITagliptin-MetFORMIN HCl ER (JANUMET XR) 100-1000 MG tablet sustained-release 24 hour, Take  by mouth daily., Disp: , Rfl:   •  traMADol (ULTRAM) 50 MG tablet, Take 50 mg by mouth Every 6 (Six) Hours As Needed for Moderate Pain ., Disp: , Rfl:   •  vitamin C (ASCORBIC ACID) 500 MG tablet, Take 500 mg by mouth daily., Disp: , Rfl:      Objective   Vital Signs:  There were no vitals taken for this visit.  Estimated body mass index is 32.28 kg/m² as calculated from the following:    Height as of 2/15/22: 182.9 cm (72\").    Weight as of 2/15/22: 108 kg (238 lb).      Constitutional:       Appearance: Healthy appearance. Not in distress.   Neck:      Comments: JVD is elevated to 3 cm above the clavicle while sitting upright  Pulmonary:      Effort: Pulmonary effort is normal.      Breath sounds: Normal breath sounds.   Cardiovascular:      Borderline tachycardic Irregularly irregular rhythm.      Murmurs: There is no murmur. "      No gallop. No click. No rub.   Edema:     Pretibial: bilateral 3+ edema of the pretibial area.  Abdominal:      General: There is no distension.      Palpations: Abdomen is soft.      Tenderness: There is no abdominal tenderness.   Skin:     General: Skin is warm and dry.   Neurological:      Mental Status: Alert and oriented to person, place and time.        Result Review :  The following data was reviewed by: Dominic Aguirre MD on 09/22/2022:  CMP   CMP    CMP 9/22/22   Glucose 176 (A)   BUN 49 (A)   Creatinine 1.82 (A)   Sodium 143   Potassium 4.9   Chloride 104   Calcium 9.3   Albumin 4.50   Total Bilirubin 0.3   Alkaline Phosphatase 70   AST (SGOT) 20   ALT (SGPT) 16   (A) Abnormal value            CBC   CBC    CBC 9/22/22   WBC 5.52   RBC 4.50   Hemoglobin 12.8 (A)   Hematocrit 41.4   MCV 92.0   MCH 28.4   MCHC 30.9 (A)   RDW 15.8 (A)   Platelets 150   (A) Abnormal value            Lipid Panel   Lipid Panel    Lipid Panel 9/22/22   Total Cholesterol 131   Triglycerides 64   HDL Cholesterol 58   VLDL Cholesterol 13   LDL Cholesterol  60   LDL/HDL Ratio 1.04           A1c   Most Recent A1C    HGBA1C Most Recent 9/22/22   Hemoglobin A1C 6.60 (A)   (A) Abnormal value              Data reviewed: Referral clinic notes with pertinent summary in HPI     Adult Transthoracic Echo Complete W/ Cont if Necessary Per Protocol (09/15/2022 10:03)  On my review of echocardiogram, LV dilated with LVIDD 5.8.  LV systolic function is severely reduced around 30% EF with hypokinesis of all anteroseptal segments and all of the apex. Rhythm difficult to determine, possible A. fib or flutter with variable conduction, which precludes grading of diastolic dysfunction, but diastolic function is abnormal.  There is moderate biatrial dilation  RV is suboptimally imaged but appears normal in size with low normal systolic function  There is no significant valvular disease and insufficient TR jet to assess RVSP.    Coronary angiogram  3/9/1998  Single-vessel CAD with subtotal occlusion of the proximal LAD with extension into the D1 ostium treated with balloon angioplasty at the bifurcation and subsequently 2 stent placements reportedly with a good result.  Reportedly right dominant with angiographically normal LMCA, RCA, and LCx distributions.  LVgram w/ 53% EF and anterior hypokinesis.      ECG in clinic today demonstrates atrial flutter with variable conduction at a ventricular rate of 88 along with a septal infarct     Assessment and Plan   There are no diagnoses linked to this encounter.    #HFrEF  Suspect ischemic cardiomyopathy given CAD history with prior apical akinesis from old LAD infarct with regional wall motion abnormalities on most recent echo suspicious for LAD stenosis.  We will focus on optimizing volume status and GDMT.  - Etiology: Likely ischemic, possibly a component of TICM given atrial flutter and some elevated HR noted on PCP visits.  We will plan for LHC after some optimization with hopefully some improved renal function.  - LVEF: 30-35%  - NYHA Class: 2 although with exertion limited more by back pain then cardiac symptoms  - KCCQ: date 9/22/2022, score 45  - 6MWT: date 9/22/2022, walked 156 meters with heart rate increase up to the 130s and oxygen saturation down to 93%; stopped after 2 minutes and 35 seconds due to back pain  - BB: Increase metoprolol succinate from 50 mg daily to 100 mg daily  - ACEi/ARB/ARNi: Continue losartan 100 mg daily with plan to transition to Entresto at a future visit  - SGLT2i: Starting dapagliflozin 10 mg daily  - MRA: Not on currently, will consider starting at a future visit if K improves  - Isosorbide/Hydralazine not indicated currently  - Ivabradine: Not indicated currently  - Diuretics: Stop furosemide 40 mg daily and start Bumex 2 mg daily  - Electrolytes: K high normal on labs today, will reassess at follow-up  - ICD/CRT: We will consider ICD pending GDMT optimization and potential  coronary revascularization if indicated on cath.  I discussed a possible LifeVest with the patient today, and he ultimately elected to defer this for now.  - IV Iron: Iron studies today do qualify the patient for IV iron; we will further explore this in the future after patient has otherwise been optimized (primarily for improvement in functional status and quality of life, which currently are not severely impaired by his heart failure)  - Na Restriction: Advised on 2 g restriction daily  - Fluid Restriction: Advised on target 2 L intake daily  - Daily Weight: Advised on daily weight monitoring    #Atrial flutter  New diagnosis, which appears to be rate controlled although with elevated HR during exertion (normal) but also HR elevation noted at prior outpatient visits with borderline tachycardia on my exam.  - Start Eliquis 5 mg twice daily  - Referral to Dr. Cote for ablation (as well as potential need for ICD if ultimately becomes indicated)  - Increasing metoprolol as above    #CAD s/p PCI (2 stents to the proximal LAD in 1998)  #PAD s/p right CEA  No current anginal or strokelike symptoms.  Based on his echocardiogram, I do have suspicion that there may be some obstructive LAD disease.  He likely will need a coronary angiogram, which I will discuss with interventional cardiology about setting up with appropriate timing.  - DC pravastatin and start Lipitor 80 mg daily  - Continue on aspirin which is being decreased to 81 mg daily, may ultimately transition to combination Eliquis and Plavix should patient need a stent, or if no indications for PCI may simply stop aspirin    #HTN  - Beta-blocker, RAAS inhibitor, and diuretic as noted above  - Continue amlodipine 5 mg daily although plan will ultimately be to discontinue this if BP becomes adequately controlled with HF GDMT    #T2DM  - Metformin 1000 mg twice daily  - Dapagliflozin and 10 mg daily   - Patient ultimately will start Xigduo 5/1000  (dapagliflozin/metformin) BID  - Stop pioglitazone  - Continue PCP follow-up       I spent 90 minutes caring for Mani on this date of service. This time includes time spent by me in the following activities:preparing for the visit, reviewing tests, performing a medically appropriate examination and/or evaluation , counseling and educating the patient/family/caregiver, ordering medications, tests, or procedures, referring and communicating with other health care professionals , documenting information in the medical record and independently interpreting results and communicating that information with the patient/family/caregiver  Follow Up   Return in about 11 days (around 10/3/2022).  Patient was given instructions and counseling regarding his condition or for health maintenance advice. Please see specific information pulled into the AVS if appropriate.

## 2022-09-22 ENCOUNTER — HOSPITAL ENCOUNTER (OUTPATIENT)
Dept: CARDIOLOGY | Facility: HOSPITAL | Age: 77
Discharge: HOME | End: 2022-09-22
Admitting: HOSPITALIST

## 2022-09-22 VITALS
HEART RATE: 96 BPM | HEIGHT: 72 IN | OXYGEN SATURATION: 98 % | WEIGHT: 252.2 LBS | SYSTOLIC BLOOD PRESSURE: 154 MMHG | DIASTOLIC BLOOD PRESSURE: 90 MMHG | BODY MASS INDEX: 34.16 KG/M2

## 2022-09-22 DIAGNOSIS — I50.20 HFREF (HEART FAILURE WITH REDUCED EJECTION FRACTION): Primary | ICD-10-CM

## 2022-09-22 DIAGNOSIS — I10 ESSENTIAL HYPERTENSION: Chronic | ICD-10-CM

## 2022-09-22 DIAGNOSIS — I25.10 CORONARY ARTERY DISEASE INVOLVING NATIVE CORONARY ARTERY OF NATIVE HEART WITHOUT ANGINA PECTORIS: Chronic | ICD-10-CM

## 2022-09-22 DIAGNOSIS — E11.9 TYPE 2 DIABETES MELLITUS WITHOUT COMPLICATION, WITHOUT LONG-TERM CURRENT USE OF INSULIN: Chronic | ICD-10-CM

## 2022-09-22 DIAGNOSIS — I48.92 ATRIAL FLUTTER WITH CONTROLLED RESPONSE: ICD-10-CM

## 2022-09-22 DIAGNOSIS — E78.00 PURE HYPERCHOLESTEROLEMIA: Chronic | ICD-10-CM

## 2022-09-22 LAB
ALBUMIN SERPL-MCNC: 4.5 G/DL (ref 3.5–5.2)
ALBUMIN/GLOB SERPL: 1.9 G/DL
ALP SERPL-CCNC: 70 U/L (ref 39–117)
ALT SERPL W P-5'-P-CCNC: 16 U/L (ref 1–41)
ANION GAP SERPL CALCULATED.3IONS-SCNC: 13 MMOL/L (ref 5–15)
AST SERPL-CCNC: 20 U/L (ref 1–40)
BILIRUB SERPL-MCNC: 0.3 MG/DL (ref 0–1.2)
BUN SERPL-MCNC: 49 MG/DL (ref 8–23)
BUN/CREAT SERPL: 26.9 (ref 7–25)
CALCIUM SPEC-SCNC: 9.3 MG/DL (ref 8.6–10.5)
CHLORIDE SERPL-SCNC: 104 MMOL/L (ref 98–107)
CHOLEST SERPL-MCNC: 131 MG/DL (ref 0–200)
CO2 SERPL-SCNC: 26 MMOL/L (ref 22–29)
CREAT SERPL-MCNC: 1.82 MG/DL (ref 0.76–1.27)
DEPRECATED RDW RBC AUTO: 53.1 FL (ref 37–54)
EGFRCR SERPLBLD CKD-EPI 2021: 38.3 ML/MIN/1.73
ERYTHROCYTE [DISTWIDTH] IN BLOOD BY AUTOMATED COUNT: 15.8 % (ref 12.3–15.4)
FERRITIN SERPL-MCNC: 50.02 NG/ML (ref 30–400)
GLOBULIN UR ELPH-MCNC: 2.4 GM/DL
GLUCOSE SERPL-MCNC: 176 MG/DL (ref 65–99)
HBA1C MFR BLD: 6.6 % (ref 4.8–5.6)
HCT VFR BLD AUTO: 41.4 % (ref 37.5–51)
HDLC SERPL-MCNC: 58 MG/DL (ref 40–60)
HGB BLD-MCNC: 12.8 G/DL (ref 13–17.7)
IRON 24H UR-MRATE: 68 MCG/DL (ref 59–158)
IRON SATN MFR SERPL: 14 % (ref 20–50)
LDLC SERPL CALC-MCNC: 60 MG/DL (ref 0–100)
LDLC/HDLC SERPL: 1.04 {RATIO}
MCH RBC QN AUTO: 28.4 PG (ref 26.6–33)
MCHC RBC AUTO-ENTMCNC: 30.9 G/DL (ref 31.5–35.7)
MCV RBC AUTO: 92 FL (ref 79–97)
NT-PROBNP SERPL-MCNC: 1959 PG/ML (ref 0–1800)
PLATELET # BLD AUTO: 150 10*3/MM3 (ref 140–450)
PMV BLD AUTO: 10.4 FL (ref 6–12)
POTASSIUM SERPL-SCNC: 4.9 MMOL/L (ref 3.5–5.2)
PROT SERPL-MCNC: 6.9 G/DL (ref 6–8.5)
RBC # BLD AUTO: 4.5 10*6/MM3 (ref 4.14–5.8)
SODIUM SERPL-SCNC: 143 MMOL/L (ref 136–145)
TIBC SERPL-MCNC: 478 MCG/DL (ref 298–536)
TRANSFERRIN SERPL-MCNC: 321 MG/DL (ref 200–360)
TRIGL SERPL-MCNC: 64 MG/DL (ref 0–150)
VLDLC SERPL-MCNC: 13 MG/DL (ref 5–40)
WBC NRBC COR # BLD: 5.52 10*3/MM3 (ref 3.4–10.8)

## 2022-09-22 PROCEDURE — 80061 LIPID PANEL: CPT | Performed by: HOSPITALIST

## 2022-09-22 PROCEDURE — 85027 COMPLETE CBC AUTOMATED: CPT | Performed by: HOSPITALIST

## 2022-09-22 PROCEDURE — 83540 ASSAY OF IRON: CPT | Performed by: HOSPITALIST

## 2022-09-22 PROCEDURE — 83036 HEMOGLOBIN GLYCOSYLATED A1C: CPT | Performed by: HOSPITALIST

## 2022-09-22 PROCEDURE — 94618 PULMONARY STRESS TESTING: CPT

## 2022-09-22 PROCEDURE — 99205 OFFICE O/P NEW HI 60 MIN: CPT | Performed by: HOSPITALIST

## 2022-09-22 PROCEDURE — 80053 COMPREHEN METABOLIC PANEL: CPT | Performed by: HOSPITALIST

## 2022-09-22 PROCEDURE — 84466 ASSAY OF TRANSFERRIN: CPT | Performed by: HOSPITALIST

## 2022-09-22 PROCEDURE — 83880 ASSAY OF NATRIURETIC PEPTIDE: CPT | Performed by: HOSPITALIST

## 2022-09-22 PROCEDURE — 83695 ASSAY OF LIPOPROTEIN(A): CPT | Performed by: HOSPITALIST

## 2022-09-22 PROCEDURE — 82728 ASSAY OF FERRITIN: CPT | Performed by: HOSPITALIST

## 2022-09-22 PROCEDURE — G2212 PROLONG OUTPT/OFFICE VIS: HCPCS | Performed by: HOSPITALIST

## 2022-09-22 RX ORDER — FUROSEMIDE 40 MG/1
40 TABLET ORAL DAILY
COMMUNITY
Start: 2022-09-02 | End: 2022-09-22

## 2022-09-22 RX ORDER — ASPIRIN 81 MG/1
81 TABLET ORAL DAILY
Qty: 30 TABLET | Refills: 11 | Status: SHIPPED | OUTPATIENT
Start: 2022-09-22 | End: 2022-11-14 | Stop reason: HOSPADM

## 2022-09-22 RX ORDER — METOPROLOL SUCCINATE 100 MG/1
100 TABLET, EXTENDED RELEASE ORAL DAILY
Qty: 30 TABLET | Refills: 11 | Status: SHIPPED | OUTPATIENT
Start: 2022-09-22

## 2022-09-22 RX ORDER — ATORVASTATIN CALCIUM 80 MG/1
80 TABLET, FILM COATED ORAL DAILY
Qty: 90 TABLET | Refills: 3 | Status: SHIPPED | OUTPATIENT
Start: 2022-09-22

## 2022-09-22 RX ORDER — BUMETANIDE 2 MG/1
2 TABLET ORAL DAILY
Qty: 30 TABLET | Refills: 11 | Status: SHIPPED | OUTPATIENT
Start: 2022-09-22 | End: 2022-10-03

## 2022-09-22 RX ORDER — METOPROLOL SUCCINATE 50 MG/1
1 TABLET, EXTENDED RELEASE ORAL DAILY
COMMUNITY
Start: 2022-07-01 | End: 2022-09-22

## 2022-09-22 RX ORDER — DAPAGLIFLOZIN AND METFORMIN HYDROCHLORIDE 5; 1000 MG/1; MG/1
1 TABLET, FILM COATED, EXTENDED RELEASE ORAL 2 TIMES DAILY
COMMUNITY
Start: 2022-09-19 | End: 2022-10-26

## 2022-09-22 NOTE — PATIENT INSTRUCTIONS
Medication Changes  - We are stopping your furosemide and starting you on bumetanide (Bumex).   - We are stopping your pravastatin and starting you on atorvastatin (Lipitor).  - We are decreasing your aspirin from 325mg daily to 81mg daily (a prescription was sent to your pharmacy, but it may be cheaper to get over the counter rather than fill the prescription.  - We are increasing the dose of your metoprolol from 50mg daily to 100mg daily (a new prescription was sent to your pharmacy for 100mg pills, but you can also take 2 of your 50mg pills until you run out before starting the new prescription.  - You are being started on a new medication called apixaban (Eliquis), which is a blood thinner that you take twice daily.  - While you are waiting on your new prescription for Xigduo (dapagliflozin with metformin), if you are able to get samples of dapagliflozin (Farxiga), you should take them once daily. Once you receive the Xigduo, you should stop taking the dapagliflozin pills.  - You should stop taking your pioglitazone (Actos)    Weight Monitoring  - Please weigh yourself every morning after you use the restroom while wearing the same amount of clothing.  - Please write down your weight readings in a log and bring that log with you to your next heart failure clinic appointment.    Blood Pressure Monitoring  - Please check your blood pressure at least once daily ~2-4 hours after you have taken your morning blood pressure medications.  - Please write down your blood pressure readings in a log and bring that log with you to your next heart failure clinic appointment.    Diet  - It is very important that you monitor your fluid and sodium (salt) intake.  - Please try to limit sodium intake to less than 2,000 mg each day.  - Please try to limit fluid intake to ~2 liters (64 ounces) each day.

## 2022-09-22 NOTE — PROGRESS NOTES
[facility] Failure Clinic    Mani is a 77 y.o. male, who was referred the Heart Failure Clinic from [place]/Deb's office for a new diagnosis for ischemic HFrEF. He will be establishing care with the clinic to manage the patients HF in a multidisciplinary clinic in [facility] to the 2022 ACC/AHA Guidelines.      Referring Provider: Tyree Fleming MD   Primary Cardiologist: None   Encounter Provider:  [facility] FAIL [facility]      Chief Complaint:   Chief Complaint   Patient presents with   • Congestive Heart Failure     New patient.    • Shortness of Breath     Moderate everyday. Worsening with exertion.       HPI:  Congestive Heart Failure  Patient presents for evaluation of HFrEF (EF 31-35%) with akinesis. Patient's current complaints are fatigue, weight gain, shortness of air, and edema. He denies chest pain at this time. In addition to his new diagnosis of HFrEF, he has a past medical history of CAD/previous MI with 2 stents placed in 1998 (2 AVE stents to the proximal/mid LAD), HLD, HTN, T2DM, Colon Cancer (in remission), carotid enterectomy, arthritis, and back surgery. Of note, he has had elevated HR at his last visits and upon admission to clinic today his HR was 88 but his ECG was positive for atrial flutter. He was seen today in clinic with this wife.  He states he is compliant all of the time with his medications. He states he is compliant most of the time with his diet. His wife is the primary caregiver and assists with this medications. He he able to obtain most of his medications but would like to limit un-needed polypharmacy and wants has means, albeit limited, for affording medications. The patient does have insurance both Medicare and AARP.      Social History:  Social History     Socioeconomic History   • Marital status:    Tobacco Use   • Smoking status: Current Every Day Smoker     Types: Pipe   • Smokeless tobacco: Never Used   Vaping Use   • Vaping Use: Never  "used   Substance and Sexual Activity   • Alcohol use: Yes     Comment: occ   • Drug use: No   • Sexual activity: Defer   He admits to not using any recreational drugs.      Current Regimen:  Heart Failure  Losartan 100 mg every day  Furosemide 40 mg every day  Metoprolol succinate 50 mg every day      Other CV Meds  Amlodipine 5mg daily   Aspirin 325mg daily  Pravastatin 80mg daily    DM  Pioglitazone 30mg daily  Sitagliptin/Metformin 100mg/1000mg XR 1 tablet daily       Medication Adherence    What concerns does the patient have in regards to their medications: Patient admits to adherence/compliances with his medications. Wife assist with medications.   Any gaps in refill history greater than 2 weeks in the last 3 months: no  Demonstrates understanding of importance of adherence: yes  Informant: patient  Reliability of informant: reliable  Provider-estimated medication adherence level: %  Reasons for non-adherence: no problems identified  Adherence tools used: medication list  Support network for adherence: family member     He does worry about cost of medications.       Immunization Status:  Patient had COVID earlier this year.       OBJECTIVE:    /90   Pulse 96   Ht 182.9 cm (72\")   Wt 114 kg (252 lb 3.2 oz)   SpO2 98%   BMI 34.20 kg/m²     Body mass index is 34.2 kg/m².  Wt Readings from Last 1 Encounters:   09/22/22 114 kg (252 lb 3.2 oz)         Lab Review:  Renal Function: Estimated Creatinine Clearance: 45.7 mL/min (A) (by C-G formula based on SCr of 1.82 mg/dL (H)).    Lab Results   Component Value Date    GLUCOSE 176 (H) 09/22/2022    CALCIUM 9.3 09/22/2022     09/22/2022    K 4.9 09/22/2022    CO2 26.0 09/22/2022     09/22/2022    BUN 49 (H) 09/22/2022    CREATININE 1.82 (H) 09/22/2022    EGFRIFNONA 61 03/16/2017    BCR 26.9 (H) 09/22/2022    ANIONGAP 13.0 09/22/2022     No results found for: MG  Lab Results   Component Value Date    PROBNP 1,959.0 (H) 09/22/2022     Lipid " Panel    Lipid Panel 9/22/22   Total Cholesterol 131   Triglycerides 64   HDL Cholesterol 58   VLDL Cholesterol 13   LDL Cholesterol  60   LDL/HDL Ratio 1.04               Lab 09/22/22  0825   HEMOGLOBIN A1C 6.60*       Results for orders placed during the hospital encounter of 09/15/22    Adult Transthoracic Echo Complete W/ Cont if Necessary Per Protocol    Interpretation Summary  · The left ventricular cavity is moderately dilated.  · The right atrial cavity is borderline dilated.  · Left ventricular diastolic dysfunction is noted.  · The following left ventricular wall segments are hypokinetic: mid anterior, apical anterior, mid anterolateral, apical lateral, apical inferior, mid inferior, mid anteroseptal and basal anterior. The following left ventricular wall segments are akinetic: apical septal and apex.  · Left ventricular ejection fraction appears to be 31 - 35%. Left ventricular systolic function is severely decreased.    SEVERE ISCHEMIC CARDIOMYOPATHY        6 MINUTE WALK  Distance: 156m (Time 2 mins 35 secs.)  Device Used: No  SpO2: Start 100% End 93% (Lap 1 HR 98 O2 96 ; Lap 2  O2 96 ; Lap 3  O2 94 ; Lap 4  O2 94 ; Lap 5  O2 93 ; Lap 6  O2 93)  Supplemental O2 Used?: No              ASSESSMENT/PLAN OF CARE:    The patient was seen in conjunction with the physician (Dr. Aguirre), the APC (Rubens Barrera), the HF Navigator (Jayne Zafar RN), and medical assistant (Patricia Chamorro). The plan was made in collaboration. Patient admits to be adherent/complaint to all medications. He and his wife did express concerns related to cost with medication. We have asked the pharmacy patient access technician Pooja Tyler to review his case in order to help with patient assistance.     Patient was provide the Living with HF manual which we will use for education of the next several visits.     HFrEF  -Patient was taking metoprolol XL 50mg daily at home. Given his new diagnosis of HFrEF and  atrial flutter, we will increase this metoprolol XL to 100mg daily. This will hopefully decrease SNS drive and improve contraction and hopefully symptoms. -The ideal goal is to get HR below 70BPM and a dose up to 200mg. The patient was education on this agent, how to take, and the possible adverse effects with focus on hypotension and bradycardia. Patient was told to monitor blood pressure/HR regularly and to call if signs of dizziness, falling or hypotension. The patient was able to understand this via the teach back method. He has a blood pressure cuff and encouraged him to check values a few times daily and log them.   -Patient has been on losartan 100mg daily with no major issues or signs of hypotension. We will retain the patient on this for now. The goal would be to continue to progress, as able overtime, to GDMT with valsartan/sacubitril. Of note, he is at goal dose. He educated on indication, how to take, and adverse drug effects. He were aware his Scr was elevated to 1.8 (from 1.5 from 9/2). He has not had changes to losartan so this is not likely the cause. This could be related to HF/.volume.   -The patient had recently been prescribed dapagliflozin 5mg/metoformin 1000mg 1 tablet by mouth twice daily. This was sent to his mail order pharmacy and had not arrived. The goal was to stop his pioglitizone and Janumet to be replaced with the dapgliflozin/metoformin. To help with cost we have provided the patient patient assistant cards an are going to work with our patient  to help with cost. Of note, offered for to check with PCP or other providers if they had samples. Would like to get patient on SGLT2-inhibitor to help HF mortality/morbidity reduction, improve Scr, improve K, and help with DM management. He was education on the indication, the dose, how to take, and adverse events.   -At some point we will need to entertain if the patient will have the ability to be on an aldosterone  receptor antagonist  -He was given furosemide 40mg daily which he had been taking. We are going to recommend to change to bumetanide 2mg daily. He was educated on this change, indication, how to take, how to measure/monitor volume status. He was educated to void in morning and take first weight right after as well as to track this. He was provided a scale to assist with this process. BNP was slightly improved from 9/2 but still elevated.   -He got iron studies to review in the context of HFrEF given iron deficiency is common with HFrEF. We will monitor and follow.   -At somepoint may need to considered stopping amlodipine if we blood pressure issues.     Aflutter  -Patient was on metoprolol XL 50mg daily. His HR was 88 today but has been as high as 125 recently. We have increased dose to 100mg XL daily for HFrEF but this should help with Aflutter.   -He was not on a blood thinner but given his [id number] score of 6 he would warrant this at this time give his elevates stroke risk. He was started with apixaban 5mg twice daily (Scr 1.8, age 77, weight 114kg-no dose adjustment needed based on these values). He was provided a co-pay card and also having the pharmacy care access technician looking at ways to help. He was education on the indication, dose, how to take, what to do if missing dose, signs/symptoms of bleeding, and safety. Patient understood via teach back.   -Given the starting of the apixiban, we decided to decrease aspirin to 81mg daily. He was educated about bleeding risk.      CAD (2 proximal/mid LAD stents in 1998)  -Patient was on aspirin 325mg daily, we reduced to 81mg daily due to apixaban starting. Team decided at this time to keep him on aspirin given high risk of ischemia.   -Patient is on beta-blocker (which we titrated for HFrEF and Aflutter) and losartan.  -He denied chest pain and has no NTG but has not needed.    -Patient is on statin already (pravastatin 80mg daily) with an LDL of 60.  However, given ASCVD and DM, have put him on GDMT statin (atorvastatin 80mg daily). He has not had issues in the past with statins therapy. Provided education regarding sign and symptoms of muscle cramps/pain. He did also get Lpa for baseline.     DM  -Patient has great control with A1C of 6.6%.   -His PCP is making adjustment to regimen to be able to get him on a SGLT-2 inhibitor. You can see this plan above. We are working to help with patient assistance.   -See above for statin    HTN  -Losartan 100mg daily  -Metoprolol 100mg XL daily  -Amlodipine 5mg daily  Blood pressures has been above goal per the ACC/AHA guidelines given HF. We will work to make improvements overtime as we work to get them on GDMT. I have encouraged him to monitor and track BP/HR    HL  -LDL at goal but will adjust to GDMT statin  -Checking Lpa  -Consider adjustments based on risk profile.     Colon Cancer  -Given history will watch for GI bleeding closely given new use of apxiaban     Arthritis  -acetaminophine 1300mg BID. Given his HFrEF, we talked about meds to avoid including NSAIDS and other products. Provide information in the Living with HF booklet. Furthermore encouraged to call with questions.   -This does limit some of his ability to be active of note.     He is on a variety of supplements and uses diphenhydramine for sleep. None of these agents should be of concern related to his HFrEF.         Follow-up: 2 weeks, Oct 3 at 9am   Thank you for allowing me to participate in the care of your patient.       Francisco Christiansen, PharmD, FACC, FAHA, BCCP, BCPS-AQ Cardiology, CACP   [facility]  09/22/22  11:19 CDT

## 2022-09-23 ENCOUNTER — TELEPHONE (OUTPATIENT)
Dept: CARDIOLOGY | Facility: HOSPITAL | Age: 77
End: 2022-09-23

## 2022-09-23 LAB — LPA SERPL-SCNC: 14.3 NMOL/L

## 2022-09-23 NOTE — TELEPHONE ENCOUNTER
"Called to check in with patient per the request of Dr. Aguirre.  Patient was resting when I called so his wife provided a full update.  Patient has been weighing daily.  She states she isn't sure of the exact number, but his weight has gone down.  She states his HR and BP have been \"fine.\"  She also reports the patient is feeling well.  His only complaint was that he was up all night last night using the restroom.  He did take his Bumex last evening.  I suggested that she have the patient take it first thing in the AM.  She expressed understanding regarding this.  I encouraged her to call me back with any changes, questions, or concerns.  She agreed to do so.    "

## 2022-10-03 ENCOUNTER — HOSPITAL ENCOUNTER (OUTPATIENT)
Dept: CARDIOLOGY | Facility: HOSPITAL | Age: 77
Discharge: HOME | End: 2022-10-03
Admitting: HOSPITALIST

## 2022-10-03 VITALS
BODY MASS INDEX: 32.05 KG/M2 | DIASTOLIC BLOOD PRESSURE: 86 MMHG | HEIGHT: 72 IN | HEART RATE: 122 BPM | WEIGHT: 236.6 LBS | OXYGEN SATURATION: 99 % | SYSTOLIC BLOOD PRESSURE: 140 MMHG

## 2022-10-03 DIAGNOSIS — I10 PRIMARY HYPERTENSION: ICD-10-CM

## 2022-10-03 DIAGNOSIS — R11.2 NAUSEA AND VOMITING, UNSPECIFIED VOMITING TYPE: ICD-10-CM

## 2022-10-03 DIAGNOSIS — E11.9 TYPE 2 DIABETES MELLITUS WITHOUT COMPLICATION, WITHOUT LONG-TERM CURRENT USE OF INSULIN: ICD-10-CM

## 2022-10-03 DIAGNOSIS — I48.91 ATRIAL FIBRILLATION, UNSPECIFIED TYPE: ICD-10-CM

## 2022-10-03 DIAGNOSIS — I25.10 CORONARY ARTERY DISEASE INVOLVING NATIVE CORONARY ARTERY OF NATIVE HEART WITHOUT ANGINA PECTORIS: Chronic | ICD-10-CM

## 2022-10-03 DIAGNOSIS — I50.42 CHRONIC COMBINED SYSTOLIC (CONGESTIVE) AND DIASTOLIC (CONGESTIVE) HEART FAILURE: Primary | ICD-10-CM

## 2022-10-03 LAB
ANION GAP SERPL CALCULATED.3IONS-SCNC: 15 MMOL/L (ref 5–15)
BUN SERPL-MCNC: 33 MG/DL (ref 8–23)
BUN/CREAT SERPL: 18.5 (ref 7–25)
CALCIUM SPEC-SCNC: 10 MG/DL (ref 8.6–10.5)
CHLORIDE SERPL-SCNC: 98 MMOL/L (ref 98–107)
CO2 SERPL-SCNC: 28 MMOL/L (ref 22–29)
CREAT SERPL-MCNC: 1.78 MG/DL (ref 0.76–1.27)
EGFRCR SERPLBLD CKD-EPI 2021: 39.3 ML/MIN/1.73
GLUCOSE SERPL-MCNC: 229 MG/DL (ref 65–99)
MAGNESIUM SERPL-MCNC: 2 MG/DL (ref 1.6–2.4)
NT-PROBNP SERPL-MCNC: 2002 PG/ML (ref 0–1800)
POTASSIUM SERPL-SCNC: 3.9 MMOL/L (ref 3.5–5.2)
SODIUM SERPL-SCNC: 141 MMOL/L (ref 136–145)

## 2022-10-03 PROCEDURE — G0463 HOSPITAL OUTPT CLINIC VISIT: HCPCS

## 2022-10-03 PROCEDURE — 83880 ASSAY OF NATRIURETIC PEPTIDE: CPT | Performed by: HOSPITALIST

## 2022-10-03 PROCEDURE — 93010 ELECTROCARDIOGRAM REPORT: CPT | Performed by: INTERNAL MEDICINE

## 2022-10-03 PROCEDURE — 99215 OFFICE O/P EST HI 40 MIN: CPT | Performed by: HOSPITALIST

## 2022-10-03 PROCEDURE — 93005 ELECTROCARDIOGRAM TRACING: CPT | Performed by: HOSPITALIST

## 2022-10-03 PROCEDURE — 83735 ASSAY OF MAGNESIUM: CPT | Performed by: HOSPITALIST

## 2022-10-03 PROCEDURE — 80048 BASIC METABOLIC PNL TOTAL CA: CPT | Performed by: HOSPITALIST

## 2022-10-03 RX ORDER — BUMETANIDE 1 MG/1
1.5 TABLET ORAL DAILY
Qty: 45 TABLET | Refills: 11 | Status: SHIPPED | OUTPATIENT
Start: 2022-10-03 | End: 2022-10-17

## 2022-10-03 RX ORDER — SPIRONOLACTONE 25 MG/1
25 TABLET ORAL DAILY
Qty: 30 TABLET | Refills: 11 | Status: SHIPPED | OUTPATIENT
Start: 2022-10-03 | End: 2022-10-17

## 2022-10-03 RX ORDER — ONDANSETRON 8 MG/1
8 TABLET, ORALLY DISINTEGRATING ORAL EVERY 8 HOURS PRN
COMMUNITY

## 2022-10-03 RX ORDER — OMEPRAZOLE 20 MG/1
40 CAPSULE, DELAYED RELEASE ORAL DAILY
Qty: 30 CAPSULE | Refills: 11 | Status: SHIPPED | OUTPATIENT
Start: 2022-10-03 | End: 2023-03-30 | Stop reason: SDUPTHER

## 2022-10-03 NOTE — PROGRESS NOTES
[facility] Failure [facility]    Mani is a 77 y.o. male, who is followed by the Heart Failure Clinic.      Referring Provider: Tyree Fleming MD   Primary Cardiologist: Fidel Harris MD  Encounter Provider:  [facility] FAIL [facility]      Chief Complaint:   Chief Complaint   Patient presents with   • HFrEF     1 wk follow up. No CP, abnormal SOB, Palp or dizziness reported.       HPI:  Congestive Heart Failure  Patient presents for re-evaluation of congestive heart failure. Patient's current complaints are nausea - started Saturday morning. Patient feels NOT associated with new medications as he started these immediatley following his clinic visit last week.. He denies any cardiac symptoms. He states he is compliant all of the time with his medications. He states he is noncompliant some of the time with his diet.    Social History:  Social History     Socioeconomic History   • Marital status:    Tobacco Use   • Smoking status: Current Every Day Smoker     Types: Pipe   • Smokeless tobacco: Never Used   Vaping Use   • Vaping Use: Never used   Substance and Sexual Activity   • Alcohol use: Yes     Comment: occ   • Drug use: No   • Sexual activity: Defer       Current Regimen:  Heart Failure  Losartan 100 mg every day  Amlodipine 5 mg every day  Bumetanide 2 mg every day  Metoprolol succinate 100 mg every day    Other CV Meds  Aspirin 81 mg every day  Eliquis 5 mg twice daily  Lipitor 80 mg nightly      Medication Adherence    Any gaps in refill history greater than 2 weeks in the last 3 months: no  Demonstrates understanding of importance of adherence: yes  Informant: spouse  Reliability of informant: reliable  Provider-estimated medication adherence level: %  Reasons for non-adherence: no problems identified  Adherence tools used: medication list  Support network for adherence: family member           Immunization Status:  -The patient reports they have been vaccinated with  "seasonal influenza. Administration Dates: 09/27/18, 10/21/19  -The patient reports they have been vaccinated with Covid vaccination. Administration Dates: 12/28/21, 3/11/21    New CDC pneumococcal guidelines recommend all HF patients receive 1 dose of PCV20. If they previously received PPSV23 x 1, they should get 1 dose of PCV20 at least 1 year after their last PPSV23 dose.      OBJECTIVE:    /86   Pulse (!) 122   Ht 182.9 cm (72\")   Wt 107 kg (236 lb 9.6 oz)   SpO2 99%   BMI 32.09 kg/m²     Body mass index is 32.09 kg/m².  Wt Readings from Last 1 Encounters:   10/03/22 107 kg (236 lb 9.6 oz)       Home BP Readings: provided in clinic today  Home Daily Weight Log: provided in clinic today      Lab Review:  Renal Function: Estimated Creatinine Clearance: 44.3 mL/min (A) (by C-G formula based on SCr of 1.82 mg/dL (H)).    Lab Results   Component Value Date    GLUCOSE 176 (H) 09/22/2022    CALCIUM 9.3 09/22/2022     09/22/2022    K 4.9 09/22/2022    CO2 26.0 09/22/2022     09/22/2022    BUN 49 (H) 09/22/2022    CREATININE 1.82 (H) 09/22/2022    EGFRIFNONA 61 03/16/2017    BCR 26.9 (H) 09/22/2022    ANIONGAP 13.0 09/22/2022     No results found for: MG  Lab Results   Component Value Date    PROBNP 1,959.0 (H) 09/22/2022       Results for orders placed during the hospital encounter of 09/15/22    Adult Transthoracic Echo Complete W/ Cont if Necessary Per Protocol    Interpretation Summary  · The left ventricular cavity is moderately dilated.  · The right atrial cavity is borderline dilated.  · Left ventricular diastolic dysfunction is noted.  · The following left ventricular wall segments are hypokinetic: mid anterior, apical anterior, mid anterolateral, apical lateral, apical inferior, mid inferior, mid anteroseptal and basal anterior. The following left ventricular wall segments are akinetic: apical septal and apex.  · Left ventricular ejection fraction appears to be 31 - 35%. Left ventricular " systolic function is severely decreased.    SEVERE ISCHEMIC CARDIOMYOPATHY        6 MINUTE WALK  Not assessed with follow-up visit.    ASSESSMENT/PLAN OF CARE:  Changes to medications: yes    CHANGES:  - MD instructed to start spironolactone 25 mg daily.  - MD instructed to decrease Bumex from 2 mg to 1.5 mg daily.  - MD instructed to start Prilosec 40 mg daily.    CONTINUATIONS:  - MD continued metoprolol succinate 100 mg daily.  - MD continued losartan 100 mg daily with plan to transition to Entresto at future visit.  - MD continued Eliquis 5 mg twice daily.  - MD continued Lipitor 80 mg daily.  - MD continued aspirin 81 mg daily, may ultimately transition to combination Eliquis and Plavix should patient need stent.   - MD continued amlodipine 5 mg daily, although likely discontinue when transitioning to Entresto.  - MD continued metformin 1000 mg twice daily.  - MD continued Xigduo 5/1000 twice daily.    Follow-up: 1 week(s)  Thank you for allowing me to participate in the care of your patient.       Martha Riojas, Alex  [facility] Failure [facility]  10/03/22  09:30 CDT

## 2022-10-03 NOTE — PROGRESS NOTES
Reason For Visit:  Follow-up of heart failure    Subjective        Mani Arreola is a 77 y.o. male with a PMH of CAD s/p PCI, HTN, HLD, T2DM, and carotid stenosis s/p CEA who presents for follow-up of heart failure.    Today, the patient reports that he is overall doing well from a cardiac perspective.  He started his medications after his last visit and tolerated them quite well.  He has had robust urinary response to Bumex and lost 16 pounds.  He reports resolution of his lower extremity edema.  He has not had any notable lightheadedness or dizziness, denies significant low BP issues.  He continues to be without shortness of breath, chest pain, orthopnea, PND, or palpitations.  He has had some mild nausea and vomiting that started on Saturday.  He denies diarrhea or evidence of blood in stools.  His wife does report 1 sick contact with some GI illness.  Patient does mention that he has not been taking his medications this morning due to feeling nauseous, but plans to take them when he returns home.    Prior history:  Patient developed lower extremity edema spring 2022 and was intermittently treated with Lasix titrated up to 40 mg daily.  Echo 9/15/2022 demonstrated severely reduced LV systolic function.  He was otherwise relatively asymptomatic and was referred to heart failure clinic for further evaluation and management. At initial visit 9/23/2022 he was noted to be in rate controlled A. Fib v flutter.    -9/23/2022: Increased metoprolol 50->100mg daily, continue losartan 100 mg daily, started Dapagliflozin 10 mg daily, transitioned from Lasix to Bumex 2 mg daily, started Eliquis 5 mg twice daily, transition from pravastatin to atorvastatin, stop pioglitazone    ROS otherwise unremarkable except as noted above    Pertinent past medical, surgical, family, and social history were reviewed and updated in the EMR.      Current Outpatient Medications:   •  acetaminophen (TYLENOL) 650 MG 8 hr tablet, Take 1,300 mg by  "mouth 2 (Two) Times a Day., Disp: , Rfl:   •  amLODIPine (NORVASC) 5 MG tablet, Take 5 mg by mouth daily., Disp: , Rfl:   •  apixaban (ELIQUIS) 5 MG tablet tablet, Take 1 tablet by mouth 2 (Two) Times a Day., Disp: 60 tablet, Rfl: 11  •  aspirin (aspirin) 81 MG EC tablet, Take 1 tablet by mouth Daily., Disp: 30 tablet, Rfl: 11  •  atorvastatin (LIPITOR) 80 MG tablet, Take 1 tablet by mouth Daily. (Patient taking differently: Take 80 mg by mouth Every Night.), Disp: 90 tablet, Rfl: 3  •  bumetanide (BUMEX) 1 MG tablet, Take 1.5 tablets by mouth Daily., Disp: 45 tablet, Rfl: 11  •  diphenhydrAMINE (BENADRYL) 25 mg capsule, Take 50 mg by mouth At Night As Needed for Itching. Patient states he takes them regularly., Disp: , Rfl:   •  folic acid (FOLVITE) 400 MCG tablet, Take 400 mcg by mouth daily., Disp: , Rfl:   •  losartan (COZAAR) 100 MG tablet, Take 100 mg by mouth daily., Disp: , Rfl:   •  metoprolol succinate XL (TOPROL-XL) 100 MG 24 hr tablet, Take 1 tablet by mouth Daily., Disp: 30 tablet, Rfl: 11  •  Multiple Vitamins-Minerals (MULTIVITAMIN ADULT PO), Take 1 tablet by mouth Daily., Disp: , Rfl:   •  Omega-3 Fatty Acids (FISH OIL PO), Take 1,000 mg by mouth Every Night., Disp: , Rfl:   •  ondansetron ODT (ZOFRAN-ODT) 8 MG disintegrating tablet, Place 8 mg on the tongue Every 8 (Eight) Hours As Needed for Nausea or Vomiting., Disp: , Rfl:   •  traMADol (ULTRAM) 50 MG tablet, Take 50 mg by mouth 2 (Two) Times a Day., Disp: , Rfl:   •  vitamin C (ASCORBIC ACID) 500 MG tablet, Take 500 mg by mouth daily., Disp: , Rfl:   •  Xigduo XR 5-1000 MG tablet, Take 1 tablet by mouth 2 (Two) Times a Day., Disp: , Rfl:   •  omeprazole (priLOSEC) 20 MG capsule, Take 2 capsules by mouth Daily., Disp: 30 capsule, Rfl: 11  •  spironolactone (ALDACTONE) 25 MG tablet, Take 1 tablet by mouth Daily., Disp: 30 tablet, Rfl: 11     Objective   Vital Signs:  /86   Pulse (!) 122   Ht 182.9 cm (72\")   Wt 107 kg (236 lb 9.6 oz)   " "SpO2 99%   BMI 32.09 kg/m²   Estimated body mass index is 32.09 kg/m² as calculated from the following:    Height as of this encounter: 182.9 cm (72\").    Weight as of this encounter: 107 kg (236 lb 9.6 oz).      Constitutional:       Appearance: Healthy appearance. Not in distress.   Neck:      Vascular: JVD normal.   Pulmonary:      Effort: Pulmonary effort is normal.      Breath sounds: Normal breath sounds.   Cardiovascular:      Tachycardia present. Irregularly irregular rhythm.      Murmurs: There is no murmur.      No gallop. No click. No rub.   Edema:     Peripheral edema absent.   Abdominal:      General: There is no distension.      Palpations: Abdomen is soft.      Tenderness: There is no abdominal tenderness.   Skin:     General: Skin is warm and dry.   Neurological:      Mental Status: Alert and oriented to person, place and time.        Result Review :  The following data was reviewed by: Dominic Aguirre MD on 09/22/2022:  CMP   CMP    CMP 9/22/22 10/3/22   Glucose 176 (A) 229 (A)   BUN 49 (A) 33 (A)   Creatinine 1.82 (A) 1.78 (A)   Sodium 143 141   Potassium 4.9 3.9   Chloride 104 98   Calcium 9.3 10.0   Albumin 4.50    Total Bilirubin 0.3    Alkaline Phosphatase 70    AST (SGOT) 20    ALT (SGPT) 16    (A) Abnormal value            CBC   CBC    CBC 9/22/22   WBC 5.52   RBC 4.50   Hemoglobin 12.8 (A)   Hematocrit 41.4   MCV 92.0   MCH 28.4   MCHC 30.9 (A)   RDW 15.8 (A)   Platelets 150   (A) Abnormal value            Lipid Panel   Lipid Panel    Lipid Panel 9/22/22   Total Cholesterol 131   Triglycerides 64   HDL Cholesterol 58   VLDL Cholesterol 13   LDL Cholesterol  60   LDL/HDL Ratio 1.04           A1c   Most Recent A1C    HGBA1C Most Recent 9/22/22   Hemoglobin A1C 6.60 (A)   (A) Abnormal value              Studies Previously Reviewed  9/22 labs also notable for ferritin 50, iron saturation 14%    Adult Transthoracic Echo Complete W/ Cont if Necessary Per Protocol (09/15/2022 10:03)  On my review " of echocardiogram, LV dilated with LVIDD 5.8.  LV systolic function is severely reduced around 30% EF with hypokinesis of all anteroseptal segments and all of the apex. Rhythm difficult to determine, possible A. fib or flutter with variable conduction, which precludes grading of diastolic dysfunction, but diastolic function is abnormal.  There is moderate biatrial dilation  RV is suboptimally imaged but appears normal in size with low normal systolic function  There is no significant valvular disease and insufficient TR jet to assess RVSP.    Coronary angiogram 3/9/1998  Single-vessel CAD with subtotal occlusion of the proximal LAD with extension into the D1 ostium treated with balloon angioplasty at the bifurcation and subsequently 2 stent placements reportedly with a good result.  Reportedly right dominant with angiographically normal LMCA, RCA, and LCx distributions.  LVgram w/ 53% EF and anterior hypokinesis.           Assessment and Plan   Diagnoses and all orders for this visit:    1. Chronic combined systolic (congestive) and diastolic (congestive) heart failure (HCC) (Primary)  -     Stress Test With Myocardial Perfusion (1 Day); Future    2. Coronary artery disease involving native coronary artery of native heart without angina pectoris    3. Atrial fibrillation, unspecified type ([facility])    4. Primary hypertension    5. Nausea and vomiting, unspecified vomiting type    6. Type 2 diabetes mellitus without complication, without long-term current use of insulin ([facility])    Other orders  -     BNP; Standing  -     Basic Metabolic Panel; Standing  -     ECG 12 Lead; Standing  -     BNP  -     Basic Metabolic Panel  -     ECG 12 Lead  -     Magnesium; Standing  -     Magnesium  -     bumetanide (BUMEX) 1 MG tablet; Take 1.5 tablets by mouth Daily.  Dispense: 45 tablet; Refill: 11  -     spironolactone (ALDACTONE) 25 MG tablet; Take 1 tablet by mouth Daily.  Dispense: 30 tablet; Refill: 11  -     omeprazole (priLOSEC)  20 MG capsule; Take 2 capsules by mouth Daily.  Dispense: 30 capsule; Refill: 11        #HFrEF  Suspect ischemic cardiomyopathy given CAD history with prior apical akinesis from old LAD infarct with regional wall motion abnormalities on most recent echo suspicious for LAD stenosis.  We will continue to focus on optimizing GDMT.  Patient does appear relatively euvolemic on exam today with 16 pounds of weight loss and resolution of edema; therefore will decrease loop diuretic while initiating spironolactone with goal to maintain euvolemia.  - Etiology: Likely ischemic, possibly a component of TICM given atrial flutter and some elevated HR noted on PCP visits.  Will plan for ischemic evaluation with myocardial perfusion SPECT stress test  - LVEF: 30-35%  - NYHA Class: 2 although with exertion limited more by back pain then cardiac symptoms  - KCCQ: date 9/22/2022, score 45, not updated today  - 6MWT: date 9/22/2022, walked 156 meters with heart rate increase up to the 130s and oxygen saturation down to 93%; stopped after 2 minutes and 35 seconds due to back pain; not updated today  - BB: Increase metoprolol succinate from 50 mg daily to 100 mg daily  - ACEi/ARB/ARNi: Continue losartan 100 mg daily with plan to transition to Entresto at a future visit  - SGLT2i: Continue dapagliflozin 5 mg BID  - MRA: Start spironolactone 25 mg daily  - Isosorbide/Hydralazine not indicated currently  - Ivabradine: Not indicated currently  - Diuretics: Decrease Bumex from 2 mg to 1.5 mg daily  - Electrolytes: K low normal on labs today, will likely benefit from spironolactone being started above  - ICD/CRT: We will consider ICD pending GDMT optimization and potential coronary revascularization if indicated based on stress test.  I discussed a possible LifeVest with the patient at 9/22 visit, and he ultimately elected to defer this.  - IV Iron: Iron studies 9/22 do qualify the patient for IV iron; we will further explore this in the  future after patient has otherwise been optimized (primarily for improvement in functional status and quality of life, which currently are not severely impaired by his heart failure)  - Patient has been advised on sodium and fluid restriction as well as daily weight and BP monitoring    #Atrial fibrillation/flutter   At initial heart failure clinic visit, ECG demonstrated was thought to be atrial flutter or less likely coarse atrial fibrillation.  Today, he is in atrial fibrillation, and HR more elevated in clinic today likely related to not taking metoprolol this morning.  Atrial arrhythmia is overall a new diagnosis.  Given initial suspicion for atrial flutter, patient was referred to Dr. Cote for possible ablation, but this has not yet been scheduled.  I will discuss with Dr. Cote about possibly seeing this patient versus initially a trial of cardioversion after HFrEF optimization.  - Eliquis 5 mg twice daily  - metoprolol as above    #Nausea/vomiting  Etiology unclear, but does not appear cardiovascular as there are no signs of low output or congestion on exam.  Discussed the possibility of DKA while on Dapagliflozin, but anion gap is relatively stable from his last visit.  Possibly a viral GI illness.  For now, patient well CTM and has been taking Zofran as needed at home.  I advised him to follow-up with his PCP if this does not improve in the next couple of days.    #CAD s/p PCI (2 stents to the proximal LAD in 1998)  #PAD s/p right CEA  No current anginal or strokelike symptoms.  Based on his echocardiogram, there is suspicion that there may be some obstructive LAD disease.    - Continue Lipitor 80 mg daily, which patient is tolerating well since transitioning to 9/22  - Continue aspirin 81 mg daily, may ultimately transition to combination Eliquis and Plavix should patient need a stent, or if no indications for PCI may simply stop aspirin  - Myocardial perfusion SPECT stress test    #HTN  - Beta-blocker,  RAAS inhibitor, MRA, and diuretic as noted above  - Continue amlodipine 5 mg daily although plan will likely be to discontinue this when transitioning to Entresto    #T2DM  - Metformin 1000 mg twice daily  - Continue Xigduo 5/1000 (dapagliflozin/metformin) BID  - Continue PCP follow-up  - Advised to start checking blood sugar at home       I spent 45minutes caring for Mani on this date of service. This time includes time spent by me in the following activities:preparing for the visit, reviewing tests, performing a medically appropriate examination and/or evaluation , counseling and educating the patient/family/caregiver, ordering medications, tests, or procedures, referring and communicating with other health care professionals  and documenting information in the medical record  Follow Up   Return in about 1 week (around 10/10/2022).  Patient was given instructions and counseling regarding his condition or for health maintenance advice. Please see specific information pulled into the AVS if appropriate.

## 2022-10-06 LAB
QT INTERVAL: 360 MS
QTC INTERVAL: 473 MS

## 2022-10-10 ENCOUNTER — HOSPITAL ENCOUNTER (OUTPATIENT)
Dept: CARDIOLOGY | Facility: HOSPITAL | Age: 77
Discharge: HOME | End: 2022-10-10

## 2022-10-10 VITALS
HEIGHT: 72 IN | BODY MASS INDEX: 32.51 KG/M2 | DIASTOLIC BLOOD PRESSURE: 90 MMHG | SYSTOLIC BLOOD PRESSURE: 160 MMHG | WEIGHT: 240 LBS

## 2022-10-10 DIAGNOSIS — I48.92 ATRIAL FLUTTER WITH CONTROLLED RESPONSE: ICD-10-CM

## 2022-10-10 DIAGNOSIS — I50.20 HFREF (HEART FAILURE WITH REDUCED EJECTION FRACTION): Primary | ICD-10-CM

## 2022-10-10 DIAGNOSIS — I10 ESSENTIAL HYPERTENSION: Chronic | ICD-10-CM

## 2022-10-10 LAB
ANION GAP SERPL CALCULATED.3IONS-SCNC: 15 MMOL/L (ref 5–15)
BUN SERPL-MCNC: 45 MG/DL (ref 8–23)
BUN/CREAT SERPL: 23.1 (ref 7–25)
CALCIUM SPEC-SCNC: 10.4 MG/DL (ref 8.6–10.5)
CHLORIDE SERPL-SCNC: 99 MMOL/L (ref 98–107)
CO2 SERPL-SCNC: 26 MMOL/L (ref 22–29)
CREAT SERPL-MCNC: 1.95 MG/DL (ref 0.76–1.27)
EGFRCR SERPLBLD CKD-EPI 2021: 35.2 ML/MIN/1.73
GLUCOSE SERPL-MCNC: 267 MG/DL (ref 65–99)
NT-PROBNP SERPL-MCNC: 2348 PG/ML (ref 0–1800)
POTASSIUM SERPL-SCNC: 5 MMOL/L (ref 3.5–5.2)
SODIUM SERPL-SCNC: 140 MMOL/L (ref 136–145)

## 2022-10-10 PROCEDURE — 93005 ELECTROCARDIOGRAM TRACING: CPT

## 2022-10-10 PROCEDURE — 99214 OFFICE O/P EST MOD 30 MIN: CPT | Performed by: PHYSICIAN ASSISTANT

## 2022-10-10 PROCEDURE — 80048 BASIC METABOLIC PNL TOTAL CA: CPT | Performed by: PHYSICIAN ASSISTANT

## 2022-10-10 PROCEDURE — 83880 ASSAY OF NATRIURETIC PEPTIDE: CPT | Performed by: PHYSICIAN ASSISTANT

## 2022-10-10 PROCEDURE — 94618 PULMONARY STRESS TESTING: CPT

## 2022-10-10 NOTE — PROGRESS NOTES
[facility] -  CLINIC FOLLOW UP     Patient Care Team:  Tyree Fleming MD as PCP - General  Tyree Fleming MD as PCP - Family Medicine  Cirilo Bhandari MD as Consulting Physician (Gastroenterology)  Tyree Fleming MD as Referring Physician (Family Medicine)  Fidel Harris MD as Cardiologist (Cardiology)    Chief Complaint:     Subjective     HPI: Today I had the pleasure of seeing Mani Arreola in the heart failure clinic for follow up. He is a 77 year old male with a history of new diagnosis of HFrEF, previous CAD/previous MI with 2 stents placed in 1998 (2 AVE stents to the proximal/mid LAD), HLD, HTN, T2DM, Colon Cancer (in remission), carotid enterectomy, arthritis, and back surgery.  He established care with our HF clinic 9-22-22 after a 6 month history of lower extremity edema and echo revealing newly found reduced LVEF of 31-35%. He was not experiencing recurrent anginal symptoms, but given his previous CAD history with apical akinesis from old LAD infarct with regional wall motion abnormalities his etiology was felt to be ischemic cardiomyopathy. He was actually found to be in atrial flutter of unknown duration at his initial visit. We initiated him on DOAC due to CHADsVASC score of 6 and referred to Dr. Cote for ablation (as well as potential need for ICD if ultimately becomes indicated).    From a GDMT standpoint at his last visit:  Toprol was increased from 50 to 100mg   Jardiance 10mg was started.  Changed Lasix to Bumex 2mg daily and then decreased it to 1.5mg daily  K+ was 4.9 at initial visit, so Spironolactone not started, but was added last time (10-3).    Wife states that he has been eating more since Oct 3 and feels that his weight is caloric, rather than edema. However, on physical exam, he has about 1+ pitting edema in legs. BP is a little lower in the 100s/60s, based on his home BP log, which could be contributing to his dizziness. On review  "of his labs today, his renal function is slightly worse up to 1.9, Potassium elevated at 5.0, and BNP increased at 2348 (prev 2002). He denies any PND or orthopnea, sleeps on a very thin pillow at night. He denies any chest pain suggestive of angina, but states he never had angina to begin with and after stent placement, he overall didn't feel any difference.     In regards to his fluid intake, he probably drinks around 88oz a day, including 2 20 oz cokes and 2-3 16 oz glasses of water with ice.       Objective     Visit Vitals  /90   Ht 182.9 cm (72\")   Wt 109 kg (240 lb)   BMI 32.55 kg/m²           Vitals reviewed.   Constitutional:       Appearance: Normal appearance. Well-groomed and not in distress. Chronically ill-appearing.   Eyes:      Extraocular Movements: Extraocular movements intact.      Conjunctiva/sclera: Conjunctivae normal.      Pupils: Pupils are equal, round, and reactive to light.   HENT:      Head: Normocephalic and atraumatic.      Nose: Nose normal.    Mouth/Throat:      Lips: Pink.      Mouth: Mucous membranes are moist.      Pharynx: Oropharynx is clear.   Neck:      Vascular: No carotid bruit or JVD. JVD normal.   Pulmonary:      Effort: Pulmonary effort is normal.      Breath sounds: Normal breath sounds.   Chest:      Chest wall: Not tender to palpatation.   Cardiovascular:      PMI at left midclavicular line. Normal rate. Irregularly irregular rhythm. Normal S1. Normal S2.      Murmurs: There is no murmur.      No gallop. No rub.   Pulses:     Radial: 2+ bilaterally.  Edema:     Pretibial: bilateral 1+ edema of the pretibial area.     Ankle: bilateral 1+ edema of the ankle.  Abdominal:      General: Bowel sounds are normal.      Palpations: Abdomen is soft.   Musculoskeletal: Normal range of motion.      Extremities: No clubbing present.     Cervical back: Normal range of motion. Skin:     General: Skin is warm and dry.      Coloration: Skin is pale.   Neurological:      General: " No focal deficit present.      Mental Status: Alert and oriented to person, place, and time.      Cranial Nerves: Cranial nerves are intact.   Psychiatric:         Attention and Perception: Attention normal.         Mood and Affect: Affect normal.         Speech: Speech normal.         Behavior: Behavior normal.         Cognition and Memory: Cognition normal.             The following portions of the patient's history were reviewed and updated as appropriate: allergies, current medications, past medical history, past social history, past and problem list.     Review of Systems   Constitutional: Negative.    HENT: Negative.    Eyes: Negative.    Respiratory: Negative.    Cardiovascular: Negative.    Gastrointestinal: Negative.    Endocrine: Negative.    Genitourinary: Negative.    Musculoskeletal: Negative.    Skin: Negative.    Allergic/Immunologic: Negative.    Neurological: Negative.    Hematological: Negative.    Psychiatric/Behavioral: Negative.           ECG 12 Lead    Date/Time: 10/10/2022 1:16 PM  Performed by: Catherine Kim PA  Authorized by: Catherine Kim PA   Comparison: compared with previous ECG   Rhythm: atrial flutter  Rate: normal  QRS axis: left  Other findings: poor R wave progression              Medication Review: yes    Current Outpatient Medications:   •  acetaminophen (TYLENOL) 650 MG 8 hr tablet, Take 1,300 mg by mouth 2 (Two) Times a Day., Disp: , Rfl:   •  amLODIPine (NORVASC) 5 MG tablet, Take 5 mg by mouth daily., Disp: , Rfl:   •  apixaban (ELIQUIS) 5 MG tablet tablet, Take 1 tablet by mouth 2 (Two) Times a Day., Disp: 60 tablet, Rfl: 11  •  aspirin (aspirin) 81 MG EC tablet, Take 1 tablet by mouth Daily., Disp: 30 tablet, Rfl: 11  •  atorvastatin (LIPITOR) 80 MG tablet, Take 1 tablet by mouth Daily. (Patient taking differently: Take 80 mg by mouth Every Night.), Disp: 90 tablet, Rfl: 3  •  bumetanide (BUMEX) 1 MG tablet, Take 1.5 tablets by mouth Daily., Disp: 45 tablet, Rfl:  11  •  diphenhydrAMINE (BENADRYL) 25 mg capsule, Take 50 mg by mouth At Night As Needed for Itching. Patient states he takes them regularly., Disp: , Rfl:   •  folic acid (FOLVITE) 400 MCG tablet, Take 400 mcg by mouth daily., Disp: , Rfl:   •  losartan (COZAAR) 100 MG tablet, Take 100 mg by mouth daily., Disp: , Rfl:   •  metoprolol succinate XL (TOPROL-XL) 100 MG 24 hr tablet, Take 1 tablet by mouth Daily., Disp: 30 tablet, Rfl: 11  •  Multiple Vitamins-Minerals (MULTIVITAMIN ADULT PO), Take 1 tablet by mouth Daily., Disp: , Rfl:   •  Omega-3 Fatty Acids (FISH OIL PO), Take 1,000 mg by mouth Every Night., Disp: , Rfl:   •  omeprazole (priLOSEC) 20 MG capsule, Take 2 capsules by mouth Daily., Disp: 30 capsule, Rfl: 11  •  ondansetron ODT (ZOFRAN-ODT) 8 MG disintegrating tablet, Place 8 mg on the tongue Every 8 (Eight) Hours As Needed for Nausea or Vomiting., Disp: , Rfl:   •  spironolactone (ALDACTONE) 25 MG tablet, Take 1 tablet by mouth Daily., Disp: 30 tablet, Rfl: 11  •  traMADol (ULTRAM) 50 MG tablet, Take 50 mg by mouth 2 (Two) Times a Day., Disp: , Rfl:   •  vitamin C (ASCORBIC ACID) 500 MG tablet, Take 500 mg by mouth daily., Disp: , Rfl:   •  Xigduo XR 5-1000 MG tablet, Take 1 tablet by mouth 2 (Two) Times a Day., Disp: , Rfl:    No Known Allergies    I have reviewed       Lab Results   Component Value Date    GLUCOSE 267 (H) 10/10/2022    CALCIUM 10.4 10/10/2022     10/10/2022    K 5.0 10/10/2022    CO2 26.0 10/10/2022    CL 99 10/10/2022    BUN 45 (H) 10/10/2022    CREATININE 1.95 (H) 10/10/2022    EGFRIFNONA 61 03/16/2017    BCR 23.1 10/10/2022    ANIONGAP 15.0 10/10/2022         Results for orders placed during the hospital encounter of 09/15/22    Adult Transthoracic Echo Complete W/ Cont if Necessary Per Protocol    Interpretation Summary  · The left ventricular cavity is moderately dilated.  · The right atrial cavity is borderline dilated.  · Left ventricular diastolic dysfunction is noted.  ·  The following left ventricular wall segments are hypokinetic: mid anterior, apical anterior, mid anterolateral, apical lateral, apical inferior, mid inferior, mid anteroseptal and basal anterior. The following left ventricular wall segments are akinetic: apical septal and apex.  · Left ventricular ejection fraction appears to be 31 - 35%. Left ventricular systolic function is severely decreased.    SEVERE ISCHEMIC CARDIOMYOPATHY     Assessment:   Diagnoses and all orders for this visit:    1. HFrEF (heart failure with reduced ejection fraction) ([facility]) (Primary)  -     BNP; Standing  -     Basic Metabolic Panel; Standing  -     BNP  -     Basic Metabolic Panel    Atrial flutter of unknown duration: Overall, patient is asymptomatic with subjective tachycardia or palpitations. However, has not had cardiac monitor to asses for mean HR. Ideally, would prefer cardioversion first and refer to EP for flutter ablation. CHADsVASC score of 6, on Eliquis. Despite renal insufficiency, will continue on 5mg BID dosing.     Borderline Hyperkalemia: Will hold kevin. Repeat labs in 1 week follow up. Consider retrialing again.    Dizziness: may be related to borderline BP. Advised him to bring his own BP cuff next week and we will calibrate it with ours. Given his weight gain, some lower extremity edema on exam, difficult to say if he's truly over diuresed.     HFrEF: Advised him, after discussion with Dr. Aguirre, to hold the spironolactone, continue Bumex 1.5mg daily. If he gains 2 lbs in a day or 4 lbs in week, then can take an extra 1.5mg dose of Bumex x 1 and return to daily dosing thereafter. Will reassess in 1 week with repeat labs.           I spent 50 minutes caring for Mani on this date of service. This time includes time spent by me in the following activities:preparing for the visit, reviewing tests, obtaining and/or reviewing a separately obtained history, performing a medically appropriate examination and/or evaluation ,  counseling and educating the patient/family/caregiver, ordering medications, tests, or procedures, referring and communicating with other health care professionals  and documenting information in the medical record        Electronically signed by CHETNA Chavez

## 2022-10-10 NOTE — PROGRESS NOTES
[facility]    Mani is a 77 y.o. male, who is followed by the Heart Failure Clinic.      Referring Provider: Tyree Fleming MD   Primary Cardiologist: Steven  Encounter Provider: Catherine Kim PA     Chief Complaint: No chief complaint on file.      HPI:  Congestive Heart Failure  Patient presents for re-evaluation of congestive heart failure. Several medication adjustments made during previous clinic visits on 9-22-22 and 10-3-22. Patient & wife deny issues or side effects from medications at this time. Patient's current complaints are mild dizziness. He denies cardiac complaints. He states he is compliant all of the time with his medications. He states he is compliant most of the time with his diet.    Social History:  Social History     Socioeconomic History   • Marital status:    Tobacco Use   • Smoking status: Every Day     Types: Pipe   • Smokeless tobacco: Never   Vaping Use   • Vaping Use: Never used   Substance and Sexual Activity   • Alcohol use: Yes     Comment: occ   • Drug use: No   • Sexual activity: Defer       Current Regimen:  Heart Failure  Losartan 100mg once daily  Bumetanide 1.5mg once daily  Metoprolol Succinate 100mg once daily  Xigduo  5/1000 twice daily  Spironolactone 25mg once daily    Other CV Meds  Amlodipine 5mg once daily   Apixaban 5mg twice daily   Aspirin 81mg once daily  Atorvastatin 80mg once daily      Medication Adherence    Adherence tools used: medication list  Support network for adherence: family member           Immunization Status:  -The patient reports they have been vaccinated with seasonal influenza. Administration Dates: 9-27-22    -The patient reports they have been vaccinated with Covid vaccination. Administration Dates: 12-28-21    3-11-22          OBJECTIVE:        10/10/22  0903   Weight: 109 kg (240 lb)     Pulse Readings from Last 3 Encounters:   10/03/22 (!) 122   09/22/22 96   02/15/22 67     BP Readings from Last  3 Encounters:   10/10/22 160/90   10/03/22 140/86   09/22/22 154/90       Lab Review:    Estimated Creatinine Clearance: 41.8 mL/min (A) (by C-G formula based on SCr of 1.95 mg/dL (H)).     Lab Results   Component Value Date    GLUCOSE 267 (H) 10/10/2022    CALCIUM 10.4 10/10/2022     10/10/2022    K 5.0 10/10/2022    CO2 26.0 10/10/2022    CL 99 10/10/2022    BUN 45 (H) 10/10/2022    CREATININE 1.95 (H) 10/10/2022    EGFRIFNONA 61 03/16/2017    BCR 23.1 10/10/2022    ANIONGAP 15.0 10/10/2022   2  Lab Results   Component Value Date    GLUCOSE 229 (H) 10/03/2022    CALCIUM 10.0 10/03/2022     10/03/2022    K 3.9 10/03/2022    CO2 28.0 10/03/2022    CL 98 10/03/2022    BUN 33 (H) 10/03/2022    CREATININE 1.78 (H) 10/03/2022    EGFRIFNONA 61 03/16/2017    BCR 18.5 10/03/2022    ANIONGAP 15.0 10/03/2022     .  Lab Results   Component Value Date    MG 2.0 10/03/2022     Lab Results   Component Value Date    PROBNP 2,348.0 (H) 10/10/2022     Results for orders placed during the hospital encounter of 09/15/22    Adult Transthoracic Echo Complete W/ Cont if Necessary Per Protocol    Interpretation Summary  · The left ventricular cavity is moderately dilated.  · The right atrial cavity is borderline dilated.  · Left ventricular diastolic dysfunction is noted.  · The following left ventricular wall segments are hypokinetic: mid anterior, apical anterior, mid anterolateral, apical lateral, apical inferior, mid inferior, mid anteroseptal and basal anterior. The following left ventricular wall segments are akinetic: apical septal and apex.  · Left ventricular ejection fraction appears to be 31 - 35%. Left ventricular systolic function is severely decreased.    SEVERE ISCHEMIC CARDIOMYOPATHY        ASSESSMENT/PLAN OF CARE:    Several medication adjustments made during previous clinic visits on 9-22-22 and 10-3-22:   - Metoprolol succinate increased to 100mg daily on 9-22-22   - apixaban 5mg BID initiated 9-22   -  dapagliflozin added on 9-22-22   - Aspirin decreased to 81mg daily on 9-22-22   - Furosemide transitioned to bumetanide on 9-22-22   - Pravastain transition to atorvastatin on 9-22-22   - spironolactone 25mg initiated 10-3-22    Changes to medications today: Yes    Based on increase in BUN / serum creatinine levels and discussion with MD, APRN plans continue bumetanide at current dose & reevaluate next week.   Given potassium level of 5.0 today, APRN instructed patient to hold spironolactone until reevaluation next Monday in clinic.   Patient was instructed by clinic RN to call with weight gain and hypo/hypertension. Patient was also advised by Clinic RN to monitor urine output.      Follow-up: October 17, 2022  Thank you for allowing me to participate in the care of your patient.       Tonny Cardona, PharmD  [facility]  10/10/22  08:22 CDT

## 2022-10-10 NOTE — PATIENT INSTRUCTIONS
Stop/Hold spironolactone  Continue Bumex 1.5mg daily  Follow up next Monday in clinic  If weight continues to increase >2 lbs rosy day or 4 lbs in a week, take extra 1.5mg Bumex in afternoon. please call office by Wednesday or Thursday  Strict fluid restriction of 64oz a day

## 2022-10-13 PROBLEM — I50.20 HFREF (HEART FAILURE WITH REDUCED EJECTION FRACTION) (HCC): Status: ACTIVE | Noted: 2022-10-13

## 2022-10-17 ENCOUNTER — HOSPITAL ENCOUNTER (OUTPATIENT)
Dept: CARDIOLOGY | Facility: HOSPITAL | Age: 77
Discharge: HOME | End: 2022-10-17

## 2022-10-17 VITALS
OXYGEN SATURATION: 96 % | SYSTOLIC BLOOD PRESSURE: 112 MMHG | BODY MASS INDEX: 31.42 KG/M2 | WEIGHT: 232 LBS | HEIGHT: 72 IN | HEART RATE: 77 BPM | DIASTOLIC BLOOD PRESSURE: 78 MMHG

## 2022-10-17 DIAGNOSIS — I25.10 CORONARY ARTERY DISEASE INVOLVING NATIVE CORONARY ARTERY OF NATIVE HEART WITHOUT ANGINA PECTORIS: Chronic | ICD-10-CM

## 2022-10-17 DIAGNOSIS — E11.9 TYPE 2 DIABETES MELLITUS WITHOUT COMPLICATION, WITHOUT LONG-TERM CURRENT USE OF INSULIN: ICD-10-CM

## 2022-10-17 DIAGNOSIS — R11.2 NAUSEA AND VOMITING, UNSPECIFIED VOMITING TYPE: ICD-10-CM

## 2022-10-17 DIAGNOSIS — I47.1 PAROXYSMAL SVT (SUPRAVENTRICULAR TACHYCARDIA): ICD-10-CM

## 2022-10-17 DIAGNOSIS — I48.92 ATRIAL FLUTTER WITH CONTROLLED RESPONSE: ICD-10-CM

## 2022-10-17 DIAGNOSIS — R82.81 PYURIA: ICD-10-CM

## 2022-10-17 DIAGNOSIS — E87.29 METABOLIC ACIDOSIS, INCREASED ANION GAP: ICD-10-CM

## 2022-10-17 DIAGNOSIS — I10 ESSENTIAL HYPERTENSION: Chronic | ICD-10-CM

## 2022-10-17 DIAGNOSIS — I50.20 HFREF (HEART FAILURE WITH REDUCED EJECTION FRACTION): Primary | ICD-10-CM

## 2022-10-17 LAB
ACETONE BLD QL: NEGATIVE
ANION GAP SERPL CALCULATED.3IONS-SCNC: 18 MMOL/L (ref 5–15)
B-OH-BUTYR SERPL-SCNC: 0.47 MMOL/L (ref 0.02–0.27)
BACTERIA UR QL AUTO: ABNORMAL /HPF
BILIRUB UR QL STRIP: NEGATIVE
BUN SERPL-MCNC: 26 MG/DL (ref 8–23)
BUN/CREAT SERPL: 13.2 (ref 7–25)
CALCIUM SPEC-SCNC: 10.2 MG/DL (ref 8.6–10.5)
CHLORIDE SERPL-SCNC: 99 MMOL/L (ref 98–107)
CLARITY UR: ABNORMAL
CO2 SERPL-SCNC: 23 MMOL/L (ref 22–29)
COLOR UR: YELLOW
CREAT SERPL-MCNC: 1.97 MG/DL (ref 0.76–1.27)
EGFRCR SERPLBLD CKD-EPI 2021: 34.8 ML/MIN/1.73
GLUCOSE SERPL-MCNC: 232 MG/DL (ref 65–99)
GLUCOSE UR STRIP-MCNC: ABNORMAL MG/DL
HGB UR QL STRIP.AUTO: ABNORMAL
HYALINE CASTS UR QL AUTO: ABNORMAL /LPF
KETONES UR QL STRIP: NEGATIVE
LEUKOCYTE ESTERASE UR QL STRIP.AUTO: ABNORMAL
NITRITE UR QL STRIP: NEGATIVE
PH UR STRIP.AUTO: 5.5 [PH] (ref 5–8)
POTASSIUM SERPL-SCNC: 4.4 MMOL/L (ref 3.5–5.2)
PROT UR QL STRIP: ABNORMAL
RBC # UR STRIP: ABNORMAL /HPF
REF LAB TEST METHOD: ABNORMAL
SODIUM SERPL-SCNC: 140 MMOL/L (ref 136–145)
SP GR UR STRIP: 1.02 (ref 1–1.03)
SQUAMOUS #/AREA URNS HPF: ABNORMAL /HPF
UROBILINOGEN UR QL STRIP: ABNORMAL
WBC # UR STRIP: ABNORMAL /HPF
WBC CLUMPS # UR AUTO: ABNORMAL /HPF

## 2022-10-17 PROCEDURE — 99215 OFFICE O/P EST HI 40 MIN: CPT | Performed by: HOSPITALIST

## 2022-10-17 PROCEDURE — 84436 ASSAY OF TOTAL THYROXINE: CPT | Performed by: STUDENT IN AN ORGANIZED HEALTH CARE EDUCATION/TRAINING PROGRAM

## 2022-10-17 PROCEDURE — 82009 KETONE BODYS QUAL: CPT | Performed by: HOSPITALIST

## 2022-10-17 PROCEDURE — 87186 SC STD MICRODIL/AGAR DIL: CPT | Performed by: HOSPITALIST

## 2022-10-17 PROCEDURE — 82010 KETONE BODYS QUAN: CPT | Performed by: HOSPITALIST

## 2022-10-17 PROCEDURE — G2212 PROLONG OUTPT/OFFICE VIS: HCPCS | Performed by: HOSPITALIST

## 2022-10-17 PROCEDURE — 96360 HYDRATION IV INFUSION INIT: CPT

## 2022-10-17 PROCEDURE — 87077 CULTURE AEROBIC IDENTIFY: CPT | Performed by: HOSPITALIST

## 2022-10-17 PROCEDURE — 80048 BASIC METABOLIC PNL TOTAL CA: CPT | Performed by: HOSPITALIST

## 2022-10-17 PROCEDURE — 84443 ASSAY THYROID STIM HORMONE: CPT | Performed by: STUDENT IN AN ORGANIZED HEALTH CARE EDUCATION/TRAINING PROGRAM

## 2022-10-17 PROCEDURE — 84479 ASSAY OF THYROID (T3 OR T4): CPT | Performed by: STUDENT IN AN ORGANIZED HEALTH CARE EDUCATION/TRAINING PROGRAM

## 2022-10-17 PROCEDURE — 87086 URINE CULTURE/COLONY COUNT: CPT | Performed by: HOSPITALIST

## 2022-10-17 PROCEDURE — 81001 URINALYSIS AUTO W/SCOPE: CPT | Performed by: HOSPITALIST

## 2022-10-17 RX ORDER — BUMETANIDE 1 MG/1
1 TABLET ORAL DAILY PRN
Qty: 30 TABLET | Refills: 11 | Status: SHIPPED | OUTPATIENT
Start: 2022-10-17

## 2022-10-17 RX ADMIN — SODIUM CHLORIDE 250 ML: 9 INJECTION, SOLUTION INTRAVENOUS at 12:15

## 2022-10-17 NOTE — PROGRESS NOTES
[facility]    Mani is a 77 y.o. male, who is followed by the Heart Failure Clinic.      Referring Provider: Tyree Fleming MD   Primary Cardiologist: Timothy Ortega Provider:  No name on file      Chief Complaint:   Chief Complaint   Patient presents with   • HFrEF     1 wk follow up. Patient not feeling well this morning have some back pain. Denies CP, abnormal SOB and dizziness.        HPI:  Congestive Heart Failure  Patient presents for re-evaluation of congestive heart failure. Patient's current complaints are severe nausea, some diarrhea and ongoing general fatigue. He denies cardiac complains. He states he is compliant all of the time with his medications. He states he is compliant most of the time with his diet.    Social History:  Social History     Socioeconomic History   • Marital status:    Tobacco Use   • Smoking status: Every Day     Types: Pipe   • Smokeless tobacco: Never   Vaping Use   • Vaping Use: Never used   Substance and Sexual Activity   • Alcohol use: Yes     Comment: occ   • Drug use: No   • Sexual activity: Defer       Current Regimen:  Heart Failure  Losartan   Bumetanide 1.5mg every day  Metoprolol Succinate       Other CV Meds  Aspirin 81mg every day     Lab Results   Component Value Date    GLUCOSE 232 (H) 10/17/2022    CALCIUM 10.2 10/17/2022     10/17/2022    K 4.4 10/17/2022    CO2 23.0 10/17/2022    CL 99 10/17/2022    BUN 26 (H) 10/17/2022    CREATININE 1.97 (H) 10/17/2022    EGFRIFNONA 61 03/16/2017    BCR 13.2 10/17/2022    ANIONGAP 18.0 (H) 10/17/2022         Medication Adherence    What concerns does the patient have in regards to their medications: Patient complains of ongoing upset stomach & lack of energy.   Any gaps in refill history greater than 2 weeks in the last 3 months: no  Demonstrates understanding of importance of adherence: no  Informant: spouse  Provider-estimated medication adherence level: %  Adherence  "tools used: medication list  Support network for adherence: family member         OBJECTIVE:    /78   Pulse 77   Ht 182.9 cm (72\")   Wt 105 kg (232 lb)   SpO2 96%   BMI 31.46 kg/m²     Body mass index is 31.46 kg/m².  Wt Readings from Last 1 Encounters:   10/17/22 105 kg (232 lb)     BP Readings from Last 3 Encounters:   10/17/22 112/78   10/10/22 160/90   10/03/22 140/86     Pulse Readings from Last 3 Encounters:   10/17/22 77   10/03/22 (!) 122   09/22/22 96       Lab Review:  Renal Function: Estimated Creatinine Clearance: 41 mL/min (A) (by C-G formula based on SCr of 1.95 mg/dL (H)).    Lab Results   Component Value Date    MG 2.0 10/03/2022     Lab Results   Component Value Date    PROBNP 2,348.0 (H) 10/10/2022     Lab Results   Component Value Date    GLUCOSE 232 (H) 10/17/2022    CALCIUM 10.2 10/17/2022     10/17/2022    K 4.4 10/17/2022    CO2 23.0 10/17/2022    CL 99 10/17/2022    BUN 26 (H) 10/17/2022    CREATININE 1.97 (H) 10/17/2022    EGFRIFNONA 61 03/16/2017    BCR 13.2 10/17/2022    ANIONGAP 18.0 (H) 10/17/2022       Results for orders placed during the hospital encounter of 09/15/22    Adult Transthoracic Echo Complete W/ Cont if Necessary Per Protocol    Interpretation Summary  · The left ventricular cavity is moderately dilated.  · The right atrial cavity is borderline dilated.  · Left ventricular diastolic dysfunction is noted.  · The following left ventricular wall segments are hypokinetic: mid anterior, apical anterior, mid anterolateral, apical lateral, apical inferior, mid inferior, mid anteroseptal and basal anterior. The following left ventricular wall segments are akinetic: apical septal and apex.  · Left ventricular ejection fraction appears to be 31 - 35%. Left ventricular systolic function is severely decreased.    SEVERE ISCHEMIC CARDIOMYOPATHY        ASSESSMENT/PLAN OF CARE:    Patient received 250 ml 0.9% normal saline over ~90 minutes today in clinic    - MD " instructed patient to hold Xigduo until reevaluated by PCP  - MD instructed patient to hold amlodipine  - MD instructed patient to change his scheduled daily bumetanide to daily PRN if daily weight greater than 230 lbs    Patient to follow up w/  (PCP) tomorrow at 13:00     Heart Failure Clinic Follow-up: Wednesday, 10/26/22     Thank you for allowing me to participate in the care of your patient.       Tonny Cardona, PharmD  [facility]  10/17/22  11:04 CDT

## 2022-10-17 NOTE — PROGRESS NOTES
Reason For Visit:  Follow-up of heart failure    Subjective        Mani Arreola is a 77 y.o. male with a PMH of CAD s/p PCI, HTN, HLD, T2DM, and carotid stenosis s/p CEA who presents for follow-up of heart failure.    Today, the patient reports that he overall is not feeling great.  He has had significant worsening of his nausea during the past week, and reports that he has not been able to maintain very good p.o. intake.  He did have a misunderstanding about his medications and decrease Bumex to 1 mg daily, but he has continued to lose weight and is down to 232 pounds on our scale (225 pounds on his home scale, which runs about 7-8 pounds lower than clinic scales consistently).  Is been monitoring his BP at home and has overall stable numbers ranging from the high 100s to 120 over 60s to 70s on most checks.  He denies significant lightheadedness, and has no edema.  He denies any chest pain or shortness of breath.  His wife reports that he seems to be sleeping more comfortably and breathing well.  His primary issue is his nausea.  He also does endorse some diarrhea for the past 2 weeks, which he describes as runny bowel movements 1-2 times per day.    Prior history:  Patient developed lower extremity edema spring 2022 and was intermittently treated with Lasix titrated up to 40 mg daily.  Echo 9/15/2022 demonstrated severely reduced LV systolic function.  He was otherwise relatively asymptomatic and was referred to heart failure clinic for further evaluation and management. At initial visit 9/23/2022 he was noted to be in rate controlled A. Fib v flutter.    -9/23/2022: Increased metoprolol 50->100mg daily, continue losartan 100 mg daily, started Dapagliflozin 10 mg daily, transitioned from Lasix to Bumex 2 mg daily, started Eliquis 5 mg twice daily, transition from pravastatin to atorvastatin, stop pioglitazone  - 10/3/2022: Weight down 16 pounds (236), swelling resolved, mild nausea.  Increased metoprolol to 100  mg daily, started spironolactone 25 mg daily, decrease Bumex to 1.5 mg daily  - 10/10/2022: Weight up to 240, mild swelling, nausea improved.  Spironolactone stopped due to K up to 5.0.  Creatinine slightly increased.    ROS otherwise unremarkable except as noted above.  Patient does deny urinary frequency other than after taking his diuretic as well as any pain with urination.    Pertinent past medical, surgical, family, and social history were reviewed and updated in the EMR.      Current Outpatient Medications:   •  acetaminophen (TYLENOL) 650 MG 8 hr tablet, Take 1,300 mg by mouth 2 (Two) Times a Day., Disp: , Rfl:   •  amLODIPine (NORVASC) 5 MG tablet, Take 5 mg by mouth daily., Disp: , Rfl:   •  apixaban (ELIQUIS) 5 MG tablet tablet, Take 1 tablet by mouth 2 (Two) Times a Day., Disp: 60 tablet, Rfl: 11  •  aspirin (aspirin) 81 MG EC tablet, Take 1 tablet by mouth Daily., Disp: 30 tablet, Rfl: 11  •  atorvastatin (LIPITOR) 80 MG tablet, Take 1 tablet by mouth Daily. (Patient taking differently: Take 80 mg by mouth Every Night.), Disp: 90 tablet, Rfl: 3  •  bumetanide (BUMEX) 1 MG tablet, Take 1.5 tablets by mouth Daily., Disp: 45 tablet, Rfl: 11  •  diphenhydrAMINE (BENADRYL) 25 mg capsule, Take 50 mg by mouth At Night As Needed for Itching. Patient states he takes them regularly., Disp: , Rfl:   •  folic acid (FOLVITE) 400 MCG tablet, Take 400 mcg by mouth daily., Disp: , Rfl:   •  losartan (COZAAR) 100 MG tablet, Take 100 mg by mouth daily., Disp: , Rfl:   •  metoprolol succinate XL (TOPROL-XL) 100 MG 24 hr tablet, Take 1 tablet by mouth Daily., Disp: 30 tablet, Rfl: 11  •  Multiple Vitamins-Minerals (MULTIVITAMIN ADULT PO), Take 1 tablet by mouth Daily., Disp: , Rfl:   •  Omega-3 Fatty Acids (FISH OIL PO), Take 1,000 mg by mouth Every Night., Disp: , Rfl:   •  omeprazole (priLOSEC) 20 MG capsule, Take 2 capsules by mouth Daily., Disp: 30 capsule, Rfl: 11  •  ondansetron ODT (ZOFRAN-ODT) 8 MG disintegrating  "tablet, Place 8 mg on the tongue Every 8 (Eight) Hours As Needed for Nausea or Vomiting., Disp: , Rfl:   •  traMADol (ULTRAM) 50 MG tablet, Take 50 mg by mouth 2 (Two) Times a Day., Disp: , Rfl:   •  vitamin C (ASCORBIC ACID) 500 MG tablet, Take 500 mg by mouth daily., Disp: , Rfl:   •  Xigduo XR 5-1000 MG tablet, Take 1 tablet by mouth 2 (Two) Times a Day., Disp: , Rfl:      Objective   Vital Signs:  /78   Pulse 77   Ht 182.9 cm (72\")   Wt 105 kg (232 lb)   SpO2 96%   BMI 31.46 kg/m²   Estimated body mass index is 31.46 kg/m² as calculated from the following:    Height as of this encounter: 182.9 cm (72\").    Weight as of this encounter: 105 kg (232 lb).      Constitutional:       Comments: Slightly uncomfortable appearing male in no acute distress.   Neck:      Vascular: JVD normal.   Pulmonary:      Effort: Pulmonary effort is normal.      Breath sounds: Normal breath sounds.   Cardiovascular:      High normal rate Irregularly irregular rhythm.      Murmurs: There is no murmur.      No gallop. No click. No rub.   Edema:     Peripheral edema absent.   Abdominal:      General: There is no distension.      Palpations: Abdomen is soft.      Tenderness: There is no abdominal tenderness.   Skin:     General: Skin is warm and dry.   Neurological:      Mental Status: Alert and oriented to person, place and time.        Result Review :  The following data was reviewed by: Dominic Aguirre MD on 09/22/2022:  CMP   CMP    CMP 10/3/22 10/10/22 10/17/22   Glucose 229 (A) 267 (A) 232 (A)   BUN 33 (A) 45 (A) 26 (A)   Creatinine 1.78 (A) 1.95 (A) 1.97 (A)   Sodium 141 140 140   Potassium 3.9 5.0 4.4   Chloride 98 99 99   Calcium 10.0 10.4 10.2   (A) Abnormal value            CBC   CBC    CBC 9/22/22   WBC 5.52   RBC 4.50   Hemoglobin 12.8 (A)   Hematocrit 41.4   MCV 92.0   MCH 28.4   MCHC 30.9 (A)   RDW 15.8 (A)   Platelets 150   (A) Abnormal value            Lipid Panel   Lipid Panel    Lipid Panel 9/22/22   Total " Cholesterol 131   Triglycerides 64   HDL Cholesterol 58   VLDL Cholesterol 13   LDL Cholesterol  60   LDL/HDL Ratio 1.04           A1c   Most Recent A1C    HGBA1C Most Recent 9/22/22   Hemoglobin A1C 6.60 (A)   (A) Abnormal value              Studies Previously Reviewed  9/22 labs also notable for ferritin 50, iron saturation 14%    Adult Transthoracic Echo Complete W/ Cont if Necessary Per Protocol (09/15/2022 10:03)  On my review of echocardiogram, LV dilated with LVIDD 5.8.  LV systolic function is severely reduced around 30% EF with hypokinesis of all anteroseptal segments and all of the apex. Rhythm difficult to determine, possible A. fib or flutter with variable conduction, which precludes grading of diastolic dysfunction, but diastolic function is abnormal.  There is moderate biatrial dilation  RV is suboptimally imaged but appears normal in size with low normal systolic function  There is no significant valvular disease and insufficient TR jet to assess RVSP.    Coronary angiogram 3/9/1998  Single-vessel CAD with subtotal occlusion of the proximal LAD with extension into the D1 ostium treated with balloon angioplasty at the bifurcation and subsequently 2 stent placements reportedly with a good result.  Reportedly right dominant with angiographically normal LMCA, RCA, and LCx distributions.  LVgram w/ 53% EF and anterior hypokinesis.           Assessment and Plan   Diagnoses and all orders for this visit:    1. HFrEF (heart failure with reduced ejection fraction) (HCC) (Primary)  -     Basic Metabolic Panel; Standing  -     Basic Metabolic Panel    Other orders  -     Beta Hydroxybutyrate Quantitative; Standing  -     Beta Hydroxybutyrate Quantitative        #HFrEF  - Etiology: Suspect ischemic, possibly a component of TICM given atrial flutter.    -Pending ischemic evaluation with myocardial perfusion SPECT stress test  - LVEF: 30-35%  - NYHA Class: Difficult to assess as activity more limited by back  pain  - KCCQ: date 9/22/2022, score 45, not updated today  - 6MWT: date 9/22/2022, walked 156 meters with heart rate increase up to the 130s and oxygen saturation down to 93%; stopped after 2 minutes and 35 seconds due to back pain; not updated today  - BB: Continue metoprolol succinate 100 mg daily  - ACEi/ARB/ARNi: Continue losartan 100 mg daily with plan to transition to Entresto at a future visit  - SGLT2i: Continue dapagliflozin 5 mg BID  - MRA: Continue holding spironolactone 25 mg daily given prior borderline hyperkalemia  - Isosorbide/Hydralazine: Not indicated currently  - Ivabradine: Not indicated currently  - Volume status: I suspect that he may be a little on the dry side with downtrending weight and mild SIMRAN with creatinine almost 2; given to 250 cc of IVF in clinic today and will adjust diuretics (see below)  - Diuretics: Decrease Bumex from 1 mg daily to 1 mg PRN based on weights; will hold until home weight up to 230 pounds then take PRN  - Electrolytes: K normal, previously as high as 5 while on spironolactone and now back to 4.4; may consider retrial of spironolactone at 12.5 mg daily at a future visit  - ICD/CRT: We will consider ICD pending GDMT optimization and potential coronary revascularization if indicated based on stress test.  I discussed a possible LifeVest with the patient at 9/22 visit, and he ultimately elected to defer this.  He is scheduled for an appointment with Dr. Cote.  - IV Iron: Iron studies 9/22 do qualify the patient for IV iron; we will further explore this in the future after patient has otherwise been optimized (primarily for improvement in functional status and quality of life, which currently are not severely impaired by his heart failure)    #Atrial fibrillation/flutter   ECG is primarily consistent with atrial flutter and possibly some coarse atrial fibrillation.  This likely complicates HFrEF management, and TICM remains a consideration.  HR now reasonably well  controlled and primarily in the 90s on metoprolol.  He is scheduled with Dr. Cote soon to discuss ablation and other antiarrhythmic options.  - Eliquis 5 mg twice daily  - metoprolol as above  - Upcoming appointment with EP    # Nausea/vomiting  # Anion gap metabolic acidosis  # Pyuria  Patient has had mild nausea that is waxed and waned for the last 2 weeks with intermittent vomiting.  He cannot identify any clear triggers.  This does correlate with mild acidosis and increased anion gap on labs today, which raise suspicion for DKA in the setting of Dapagliflozin use.  Ultimately, low suspicion for this as serum acetone levels were normal.  Still, I suspect that acidosis and nausea/vomiting issues are related (acidosis possibly related to some diarrhea loss although he reports diarrhea is infrequent).  Gastroenteritis remains a consideration as well.  UA sent to check ketones did come back with some pyuria with negative nitrites; while this could contribute to his N/V symptoms, he does not have typical UTI symptoms and we agreed to hold off on antibiotics for now.  I have sent a urine culture and will forward UA results to his PCPs office.  We discussed options for observation admission, and ultimately I spoke to his PCP today on the phone; we arranged for follow-up in their clinic tomorrow at 1 PM.    #CAD s/p PCI (2 stents to the proximal LAD in 1998)  #PAD s/p right CEA  Continues to be without anginal or strokelike symptoms.  Based on his echocardiogram, there is suspicion that there may be some obstructive LAD disease.    - Continue Lipitor 80 mg daily  - Continue Eliquis 5 mg twice daily  - Aspirin currently on hold  - Myocardial perfusion SPECT stress test pending    #HTN  - Beta-blocker, RAAS inhibitor, MRA, and diuretic as noted above  - BP in the low normal range.  We will stop amlodipine 5 mg daily to allow more room for HFrEF GDMT titration    #T2DM  - Holding Xigduo 5/1000 (dapagliflozin/metformin) BID  until he sees PCP tomorrow as an/V did start after transitioning to this medication and it is unclear if there may be an association.     I spent 80 minutes caring for Mani on this date of service. This time includes time spent by me in the following activities:preparing for the visit, reviewing tests, performing a medically appropriate examination and/or evaluation , counseling and educating the patient/family/caregiver, ordering medications, tests, or procedures, referring and communicating with other health care professionals , documenting information in the medical record and care coordination  Follow Up   Return in about 2 weeks (around 10/31/2022).  Patient was given instructions and counseling regarding his condition or for health maintenance advice. Please see specific information pulled into the AVS if appropriate.

## 2022-10-17 NOTE — PATIENT INSTRUCTIONS
Medication Changes  - Today, we are adjusting your bumetanide to 1mg only as needed. You should not take your bumetanide every day. There are instructions below on how to take your bumetanide based on your weights.  - You should not take your Xigduo (Metformin-Dapagliflozin) until you see Dr. Fleming.  - You should stop taking your amlodipine.  - You are scheduled to see Dr. Fleming tomorrow at 1pm.    Weight Monitoring  - Please weigh yourself every morning after you use the restroom while wearing the same amount of clothing.  - Please write down your weight readings in a log and bring that log with you to your next heart failure clinic appointment.  - You should not take your bumetanide until after your weight reaches 230 pounds on your home scale.  - If your weight increases by 2 lbs in 1 day or 4 lbs in 1 week, you should take 1mg bumetanide daily until your weight comes back down. If your weight does not come down, please call the heart failure clinic.    Blood Pressure Monitoring  - Please check your blood pressure at least once daily ~2-4 hours after you have taken your morning blood pressure medications.  - Please write down your blood pressure readings in a log and bring that log with you to your next heart failure clinic appointment.    Diet  - It is very important that you monitor your fluid and sodium (salt) intake.  - Please try to limit sodium intake to less than 2,000 mg each day.  - Please try to limit fluid intake to ~2 liters (64 ounces) each day.

## 2022-10-18 DIAGNOSIS — I25.10 CORONARY ARTERY DISEASE INVOLVING NATIVE CORONARY ARTERY OF NATIVE HEART WITHOUT ANGINA PECTORIS: Primary | ICD-10-CM

## 2022-10-18 DIAGNOSIS — I50.20 HFREF (HEART FAILURE WITH REDUCED EJECTION FRACTION): ICD-10-CM

## 2022-10-19 LAB — BACTERIA SPEC AEROBE CULT: ABNORMAL

## 2022-10-20 ENCOUNTER — OFFICE VISIT (OUTPATIENT)
Dept: CARDIOLOGY | Facility: CLINIC | Age: 77
End: 2022-10-20

## 2022-10-20 ENCOUNTER — PREP FOR SURGERY (OUTPATIENT)
Dept: OTHER | Facility: HOSPITAL | Age: 77
End: 2022-10-20

## 2022-10-20 VITALS
BODY MASS INDEX: 31.15 KG/M2 | WEIGHT: 230 LBS | SYSTOLIC BLOOD PRESSURE: 120 MMHG | OXYGEN SATURATION: 99 % | RESPIRATION RATE: 18 BRPM | DIASTOLIC BLOOD PRESSURE: 82 MMHG | HEIGHT: 72 IN | HEART RATE: 97 BPM

## 2022-10-20 DIAGNOSIS — I48.3 TYPICAL ATRIAL FLUTTER: ICD-10-CM

## 2022-10-20 DIAGNOSIS — I47.1 PAROXYSMAL SVT (SUPRAVENTRICULAR TACHYCARDIA): ICD-10-CM

## 2022-10-20 DIAGNOSIS — I48.3 TYPICAL ATRIAL FLUTTER: Primary | ICD-10-CM

## 2022-10-20 DIAGNOSIS — I48.92 ATRIAL FLUTTER WITH CONTROLLED RESPONSE: Primary | ICD-10-CM

## 2022-10-20 DIAGNOSIS — I25.10 CORONARY ARTERY DISEASE INVOLVING NATIVE CORONARY ARTERY OF NATIVE HEART WITHOUT ANGINA PECTORIS: Chronic | ICD-10-CM

## 2022-10-20 LAB
T-UPTAKE NFR SERPL: 0.84 TBI (ref 0.8–1.3)
T4 SERPL-MCNC: 10.4 MCG/DL (ref 4.5–11.7)
TSH SERPL DL<=0.05 MIU/L-ACNC: 1.47 UIU/ML (ref 0.27–4.2)

## 2022-10-20 PROCEDURE — 93000 ELECTROCARDIOGRAM COMPLETE: CPT | Performed by: STUDENT IN AN ORGANIZED HEALTH CARE EDUCATION/TRAINING PROGRAM

## 2022-10-20 PROCEDURE — 99215 OFFICE O/P EST HI 40 MIN: CPT | Performed by: STUDENT IN AN ORGANIZED HEALTH CARE EDUCATION/TRAINING PROGRAM

## 2022-10-20 RX ORDER — SODIUM CHLORIDE 0.9 % (FLUSH) 0.9 %
10 SYRINGE (ML) INJECTION EVERY 12 HOURS SCHEDULED
Status: CANCELLED | OUTPATIENT
Start: 2022-10-20

## 2022-10-20 RX ORDER — SODIUM CHLORIDE 0.9 % (FLUSH) 0.9 %
10 SYRINGE (ML) INJECTION AS NEEDED
Status: CANCELLED | OUTPATIENT
Start: 2022-10-20

## 2022-10-20 NOTE — PROGRESS NOTES
"Chief Complaint  Atrial Flutter (NP - no symptoms reported while in office /)    Subjective        History of Present Illness    EP History:  1.  Typical atrial flutter    Cardiology history:  1.  Hypertension  2.  Hyperlipidemia  3.  Coronary artery disease w/ Prior MI and PCI  4.  Chronic systolic heart failure  -9/7/2022: EF 31 to 35%  5.  COTY with carotid endarterectomy (2004)    Medical History:   1.  Type 2 diabetes  3.  Obesity, BMI 31    Mani Arreola is a 77 y.o. male with past medical history as above who presents to the clinic for evaluation of typical atrial flutter.  He was first seen by Dr. Aguirre in September of this year after he was reporting increased lower extremity edema.  He was otherwise doing relatively well with fatigue but only minimal shortness of breath with exertion.  He states that he has never had previous arrhythmias in the past but was noted to have atrial flutter at the time of his evaluation.  He was started on apixaban for oral anticoagulation and his metoprolol was increased for rate control.    Objective   Vital Signs:  /82 (BP Location: Left arm, Patient Position: Sitting)   Pulse 97   Resp 18   Ht 182.9 cm (72\")   Wt 104 kg (230 lb)   SpO2 99%   BMI 31.19 kg/m²   Estimated body mass index is 31.19 kg/m² as calculated from the following:    Height as of this encounter: 182.9 cm (72\").    Weight as of this encounter: 104 kg (230 lb).      Physical Exam  Vitals reviewed.   Constitutional:       Appearance: Normal appearance.   HENT:      Head: Normocephalic and atraumatic.   Eyes:      Extraocular Movements: Extraocular movements intact.      Conjunctiva/sclera: Conjunctivae normal.   Cardiovascular:      Rate and Rhythm: Normal rate. Rhythm irregular.      Pulses: Normal pulses.      Heart sounds: Normal heart sounds.   Pulmonary:      Effort: Pulmonary effort is normal.      Breath sounds: Normal breath sounds.   Musculoskeletal:         General: Swelling (1+ edema) " present.   Neurological:      General: No focal deficit present.      Mental Status: He is alert and oriented to person, place, and time.   Psychiatric:         Mood and Affect: Mood normal.         Judgment: Judgment normal.        Result Review :  The following data was reviewed by: Francisca Cote MD on 10/20/2022:  CMP    CMP 10/3/22 10/10/22 10/17/22   Glucose 229 (A) 267 (A) 232 (A)   BUN 33 (A) 45 (A) 26 (A)   Creatinine 1.78 (A) 1.95 (A) 1.97 (A)   Sodium 141 140 140   Potassium 3.9 5.0 4.4   Chloride 98 99 99   Calcium 10.0 10.4 10.2   (A) Abnormal value            CBC    CBC 9/22/22   WBC 5.52   RBC 4.50   Hemoglobin 12.8 (A)   Hematocrit 41.4   MCV 92.0   MCH 28.4   MCHC 30.9 (A)   RDW 15.8 (A)   Platelets 150   (A) Abnormal value            TSH    TSH 10/17/22   TSH 1.470         Echocardiogram reviewed: EF 31 to 35%    Prior ECG previously performed by Dr. Aguirre on 10/10/22 was directly visualized and independently interpreted with the following findings:  Typical atrial flutter      ECG 12 Lead    Date/Time: 10/20/2022 9:16 PM  Performed by: Francisca Cote MD  Authorized by: Francisca Cote MD   Comparison: compared with previous ECG from 10/10/2022  Similar to previous ECG  Rhythm: atrial flutter  Rate: normal  Conduction: conduction normal  Other findings: non-specific ST-T wave changes    Clinical impression: abnormal EKG                Assessment and Plan   Diagnoses and all orders for this visit:    1. Atrial flutter with controlled response (HCC) (Primary)    2. Coronary artery disease involving native coronary artery of native heart without angina pectoris    3. Typical atrial flutter (HCC)    4. Paroxysmal SVT (supraventricular tachycardia) (HCC)  -     Thyroid Panel With TSH; Future    Other orders  -     ECG 12 Lead        Mani Arreola is a 77 y.o. male with past medical history as above who presents to the clinic for evaluation of typical atrial flutter and chronic systolic heart failure.  He  denies specific symptoms though does endorse increased fatigue and recent exacerbation of his heart failure requiring increased furosemide administration.  I suspect that these things are related and that his flutter is likely contributing to his overall poor volume control.  Given this and his significantly reduced EF, I do think that he would benefit from restoration of normal sinus rhythm.  The best way to do this would be through an atrial flutter ablation.    I have discussed risks, benefits, and alternatives of an electrophysiology study and ablation for atrial flutter with the patient.  Alternatives discussed include continued observation and medical management.  An electrophysiology study with ablation is an inherently high risk procedure with possible complications that include but are not limited to vascular access complications, internal bleeding, tamponade requiring pericardiocentesis or cardiac surgery, stroke, MI, embolism, myocardial injury, injury to the normal conduction system requiring a pacemaker, and ultimately death.  We also discussed that given the nature of the procedure, therapeutic efficacy can be variable.  Possibilities of recurrent arrhythmias and the possible need for additional procedures and/or medical therapy was discussed. Questions asked were appropriately answered.  No guarantees were made or implied.   Despite this, they would still like to proceed.    Plan:  -Schedule for atrial flutter ablation  -No additional medication changes at this time  -We will need to continue to monitor his chronic systolic heart failure as he may require an ICD in the future  - Will discuss the treatment plan with Dr. Timothy SR Risk: High (high risk procedure decision as above)       Follow Up   Return in about 6 weeks (around 11/29/2022).  Patient was given instructions and counseling regarding his condition or for health maintenance advice. Please see specific information pulled into the AVS if  appropriate.     EMR Dragon/Transcription disclaimer: Much of this encounter note is an electronic transcription/translation of spoken language to printed text. The electronic translation of spoken language may permit erroneous, or at times, nonsensical words or phrases to be inadvertently transcribed; although I have reviewed the note for such errors, some may still exist.

## 2022-10-20 NOTE — PATIENT INSTRUCTIONS
Schedule for atrial flutter ablation Nov 14th  Return to clinic Nov 29th  No medication changes for now

## 2022-10-26 ENCOUNTER — HOSPITAL ENCOUNTER (OUTPATIENT)
Dept: CARDIOLOGY | Facility: HOSPITAL | Age: 77
Discharge: HOME | End: 2022-10-26

## 2022-10-26 VITALS
BODY MASS INDEX: 32.14 KG/M2 | WEIGHT: 237 LBS | DIASTOLIC BLOOD PRESSURE: 100 MMHG | OXYGEN SATURATION: 100 % | HEART RATE: 114 BPM | SYSTOLIC BLOOD PRESSURE: 160 MMHG

## 2022-10-26 DIAGNOSIS — R11.0 NAUSEA: ICD-10-CM

## 2022-10-26 DIAGNOSIS — I48.92 ATRIAL FLUTTER WITH CONTROLLED RESPONSE: ICD-10-CM

## 2022-10-26 DIAGNOSIS — I25.10 CORONARY ARTERY DISEASE INVOLVING NATIVE CORONARY ARTERY OF NATIVE HEART WITHOUT ANGINA PECTORIS: Chronic | ICD-10-CM

## 2022-10-26 DIAGNOSIS — E11.9 TYPE 2 DIABETES MELLITUS WITHOUT COMPLICATION, WITHOUT LONG-TERM CURRENT USE OF INSULIN: Chronic | ICD-10-CM

## 2022-10-26 DIAGNOSIS — I10 ESSENTIAL HYPERTENSION: Chronic | ICD-10-CM

## 2022-10-26 DIAGNOSIS — I50.20 HFREF (HEART FAILURE WITH REDUCED EJECTION FRACTION): Primary | ICD-10-CM

## 2022-10-26 LAB
ANION GAP SERPL CALCULATED.3IONS-SCNC: 11 MMOL/L (ref 5–15)
BUN SERPL-MCNC: 23 MG/DL (ref 8–23)
BUN/CREAT SERPL: 13.8 (ref 7–25)
CALCIUM SPEC-SCNC: 9.8 MG/DL (ref 8.6–10.5)
CHLORIDE SERPL-SCNC: 102 MMOL/L (ref 98–107)
CO2 SERPL-SCNC: 25 MMOL/L (ref 22–29)
CREAT SERPL-MCNC: 1.67 MG/DL (ref 0.76–1.27)
EGFRCR SERPLBLD CKD-EPI 2021: 42.4 ML/MIN/1.73
GLUCOSE SERPL-MCNC: 242 MG/DL (ref 65–99)
MAGNESIUM SERPL-MCNC: 2 MG/DL (ref 1.6–2.4)
NT-PROBNP SERPL-MCNC: 2367 PG/ML (ref 0–1800)
POTASSIUM SERPL-SCNC: 5.1 MMOL/L (ref 3.5–5.2)
SODIUM SERPL-SCNC: 138 MMOL/L (ref 136–145)

## 2022-10-26 PROCEDURE — G0463 HOSPITAL OUTPT CLINIC VISIT: HCPCS

## 2022-10-26 PROCEDURE — 80048 BASIC METABOLIC PNL TOTAL CA: CPT | Performed by: PHYSICIAN ASSISTANT

## 2022-10-26 PROCEDURE — 83880 ASSAY OF NATRIURETIC PEPTIDE: CPT | Performed by: PHYSICIAN ASSISTANT

## 2022-10-26 PROCEDURE — 99215 OFFICE O/P EST HI 40 MIN: CPT | Performed by: HOSPITALIST

## 2022-10-26 PROCEDURE — 83735 ASSAY OF MAGNESIUM: CPT | Performed by: PHYSICIAN ASSISTANT

## 2022-10-26 NOTE — PROGRESS NOTES
.  [facility]    Mani is a 77 y.o. male, who is followed by the Heart Failure Clinic.      Referring Provider: Tyree Fleming MD   Primary Cardiologist:Dominic Aguirre  Encounter Provider:  No name on file      Chief Complaint:   Chief Complaint   Patient presents with   • HFrEF     10 day follow up. Patient reports feeling a little better.        HPI:  Congestive Heart Failure  Patient presents for follow-up in HF clinic. Patient has not major complains at this time. Of note, his wife has been keeping great records. He has not been taking diuretic due to some mild dehydration during last visit. At this point, his weight is starting to trend up to 231 pounds. He also had his metformin/dapagliflozin held and he is starting to see increase in his blood sugar. The patient also brought records of medication spend to fax back to [employer] in order to get apixaban covered. This was sent during this visit. He has not heard from the [place] [employer] [place] folks.       Social History:  Social History     Socioeconomic History   • Marital status:    Tobacco Use   • Smoking status: Every Day     Types: Pipe   • Smokeless tobacco: Never   Vaping Use   • Vaping Use: Never used   Substance and Sexual Activity   • Alcohol use: Yes     Comment: occ   • Drug use: No   • Sexual activity: Defer       Current Regimen:        Medication Adherence    What concerns does the patient have in regards to their medications: No issues, not missed dose. Patient has brought in solid records. Still working to get the patient assistance completed. Provided income forms and faxed to company for SGTL2 inhibitor.   Any gaps in refill history greater than 2 weeks in the last 3 months: no  Demonstrates understanding of importance of adherence: yes  Informant: caregiver  Reliability of informant: reliable  Provider-estimated medication adherence level: %  Reasons for non-adherence: no problems identified  Adherence tools used:  medication list  Support network for adherence: family member           OBJECTIVE:    /100 (BP Location: Left arm, Patient Position: Sitting)   Pulse 114   Wt 108 kg (237 lb)   SpO2 100%   BMI 32.14 kg/m²     Body mass index is 32.14 kg/m².  Wt Readings from Last 1 Encounters:   10/26/22 108 kg (237 lb)         Lab Review:  Renal Function: Estimated Creatinine Clearance: 48.5 mL/min (A) (by C-G formula based on SCr of 1.67 mg/dL (H)).    Lab Results   Component Value Date    GLUCOSE 242 (H) 10/26/2022    CALCIUM 9.8 10/26/2022     10/26/2022    K 5.1 10/26/2022    CO2 25.0 10/26/2022     10/26/2022    BUN 23 10/26/2022    CREATININE 1.67 (H) 10/26/2022    EGFRIFNONA 61 03/16/2017    BCR 13.8 10/26/2022    ANIONGAP 11.0 10/26/2022     Lab Results   Component Value Date    MG 2.0 10/26/2022     Lab Results   Component Value Date    PROBNP 2,367.0 (H) 10/26/2022       Results for orders placed during the hospital encounter of 09/15/22    Adult Transthoracic Echo Complete W/ Cont if Necessary Per Protocol    Interpretation Summary  · The left ventricular cavity is moderately dilated.  · The right atrial cavity is borderline dilated.  · Left ventricular diastolic dysfunction is noted.  · The following left ventricular wall segments are hypokinetic: mid anterior, apical anterior, mid anterolateral, apical lateral, apical inferior, mid inferior, mid anteroseptal and basal anterior. The following left ventricular wall segments are akinetic: apical septal and apex.  · Left ventricular ejection fraction appears to be 31 - 35%. Left ventricular systolic function is severely decreased.    SEVERE ISCHEMIC CARDIOMYOPATHY              ASSESSMENT/PLAN OF CARE:     #HFrEF  - Etiology: Suspect ischemic, possibly a component of TICM given atrial flutter.               -Pending ischemic evaluation with myocardial perfusion SPECT stress test  - LVEF: 30-35%  - BB: Continue metoprolol succinate 100 mg daily  -  ACEi/ARB/ARNi: Continue losartan 100 mg daily with plan to transition to Entresto at a future visit  - SGLT2i: Has been on hold since last visit.   - MRA: Continue holding spironolactone 25 mg daily given prior borderline hyperkalemia, previous dehydration. Will likely to continue to until we get into a better diuretic pattern.   - Isosorbide/Hydralazine: Not indicated currently  - Ivabradine: Not indicated currently  - Volume status: Per record, patient weight is increasing since we have been holding diuresis.   - Diuretics: He has been holding since last visit.   - Electrolytes: K normal, previously as high as 5 while on spironolactone and now back to 4.4; may consider retrial of spironolactone at 12.5 mg daily at a future visit  - ICD/CRT: We will consider ICD pending GDMT optimization and potential coronary revascularization if indicated based on stress test.  I discussed a possible [employer] with the patient at 9/22 visit, and he ultimately elected to defer this.  He is scheduled for an appointment with Dr. Cote.  - IV Iron: Iron studies 9/22 do qualify the patient for IV iron; we will further explore this in the future after patient has otherwise been optimized (primarily for improvement in functional status and quality of life, which currently are not severely impaired by his heart failure)    Plan to attempt re-starting just dapagliflozin today. We will need to work on changing this PAT form from [place] [employer] to just dapagliflozin and not the combined product.      #Atrial fibrillation/flutter   ECG is primarily consistent with atrial flutter and possibly some coarse atrial fibrillation.  This likely complicates HFrEF management, and TICM remains a consideration.  HR now reasonably well controlled and primarily in the 90s on metoprolol.  He is scheduled with Dr. Cote soon to discuss ablation and other antiarrhythmic options.  - Eliquis 5 mg twice daily, we faxed the needed medication spend to income ratio to  document in order to get this approved.   - metoprolol as above  - Had follow up with EP, plan for abalation in early November.      # Nausea/vomiting  # Anion gap metabolic acidosis  # Pyuria  -No issues since last visit. He has not needed ondansetron.     #CAD s/p PCI (2 stents to the proximal LAD in 1998)  #PAD s/p right CEA  Continues to be without anginal or strokelike symptoms.  Based on his echocardiogram, there is suspicion that there may be some obstructive LAD disease.    - Continue Lipitor 80 mg daily  - Continue Eliquis 5 mg twice daily  - Aspirin currently on hold  - Myocardial perfusion SPECT stress test pending     #HTN  - Beta-blocker, RAAS inhibitor, MRA, and diuretic as noted above  - BP in the low normal range.  We stopped amlodipine 5 mg daily last visit to allow more room for HFrEF GDMT titration     #T2DM  - Holding Xigduo 5/1000 (dapagliflozin/metformin) BID last visit. We are going to restart with just dapagliflozin.      All plans were construction in conjunction with the team.        Follow-up: 11/4/20222  Thank you for allowing me to participate in the care of your patient.       Francisco Christiansen, PharmD, FACC, FAHA, BCCP, BCPS-AQ Cardiology, CACP   [facility]  10/26/22  12:32 CDT

## 2022-10-26 NOTE — PROGRESS NOTES
Reason For Visit:  Follow-up of heart failure    Subjective        Mani Arreola is a 77 y.o. male with a PMH of CAD s/p PCI, HTN, HLD, T2DM, and carotid stenosis s/p CEA who presents for follow-up of heart failure.    Today, Mr. Arreola reports that he is feeling much better.  His prior nausea symptoms resolved within a few days of his last visit.  He now has been able to eat better, and his weight has come up 6 to 7 pounds since his last visit.  He just crossed the threshold of 230 pounds on his home scale in the last couple of days; he had been advised not to take Bumex until above this way.  He denies any chest pain, shortness of breath, lightheadedness, orthopnea, or lower extremity edema.  He did have 1 episode of palpitations over the weekend, but otherwise has not had any significant palpitations.  Patient has been seen by Dr. Cote, who is planning to do an atrial flutter ablation on 11/14.    Prior history:  Patient developed lower extremity edema spring 2022 and was intermittently treated with Lasix titrated up to 40 mg daily.  Echo 9/15/2022 demonstrated severely reduced LV systolic function.  He was otherwise relatively asymptomatic and was referred to heart failure clinic for further evaluation and management. At initial visit 9/23/2022 he was noted to be in rate controlled A. Fib v flutter.    -9/23/2022: Increased metoprolol 50->100mg daily, continue losartan 100 mg daily, started Dapagliflozin 10 mg daily, transitioned from Lasix to Bumex 2 mg daily, started Eliquis 5 mg twice daily, transition from pravastatin to atorvastatin, stop pioglitazone  - 10/3/2022: Weight down 16 pounds (236), swelling resolved, mild nausea.  Increased metoprolol to 100 mg daily, started spironolactone 25 mg daily, decrease Bumex to 1.5 mg daily  - 10/10/2022: Weight up to 240, mild swelling, nausea improved.  Spironolactone stopped due to K up to 5.0.  Creatinine slightly increased.  - 10/17/2022: Weight down to 232 pounds  in office (home scale is about 7 to 8 pounds lower than clinic scale).  CR elevated and patient having ongoing significant nausea and generally feeling unwell.  Concern for DKA, but labs were ultimately not consistent with this.  UCX grew bacteria, but patient without urinary symptoms and was not treated.  Patient given IVF and Bumex transitioned to PRN for weight gain.  Amlodipine stopped.  Xigduo held.    ROS otherwise unremarkable except as noted above.  Patient does deny urinary frequency other than after taking his diuretic as well as any pain with urination.    Pertinent past medical, surgical, family, and social history were reviewed and updated in the EMR.      Current Outpatient Medications:   •  acetaminophen (TYLENOL) 650 MG 8 hr tablet, Take 1,300 mg by mouth 2 (Two) Times a Day., Disp: , Rfl:   •  apixaban (ELIQUIS) 5 MG tablet tablet, Take 1 tablet by mouth 2 (Two) Times a Day., Disp: 60 tablet, Rfl: 11  •  atorvastatin (LIPITOR) 80 MG tablet, Take 1 tablet by mouth Daily. (Patient taking differently: Take 1 tablet by mouth Every Night.), Disp: 90 tablet, Rfl: 3  •  diphenhydrAMINE (BENADRYL) 25 mg capsule, Take 50 mg by mouth At Night As Needed for Itching. Patient states he takes them regularly., Disp: , Rfl:   •  folic acid (FOLVITE) 400 MCG tablet, Take 400 mcg by mouth daily., Disp: , Rfl:   •  losartan (COZAAR) 100 MG tablet, Take 100 mg by mouth daily., Disp: , Rfl:   •  metoprolol succinate XL (TOPROL-XL) 100 MG 24 hr tablet, Take 1 tablet by mouth Daily., Disp: 30 tablet, Rfl: 11  •  Multiple Vitamins-Minerals (MULTIVITAMIN ADULT PO), Take 1 tablet by mouth Daily., Disp: , Rfl:   •  Omega-3 Fatty Acids (FISH OIL PO), Take 1,000 mg by mouth Every Night., Disp: , Rfl:   •  omeprazole (priLOSEC) 20 MG capsule, Take 2 capsules by mouth Daily., Disp: 30 capsule, Rfl: 11  •  ondansetron ODT (ZOFRAN-ODT) 8 MG disintegrating tablet, Place 8 mg on the tongue Every 8 (Eight) Hours As Needed for Nausea  "or Vomiting., Disp: , Rfl:   •  traMADol (ULTRAM) 50 MG tablet, Take 50 mg by mouth 2 (Two) Times a Day., Disp: , Rfl:   •  vitamin C (ASCORBIC ACID) 500 MG tablet, Take 500 mg by mouth daily., Disp: , Rfl:   •  aspirin (aspirin) 81 MG EC tablet, Take 1 tablet by mouth Daily., Disp: 30 tablet, Rfl: 11  •  bumetanide (BUMEX) 1 MG tablet, Take 1 tablet by mouth Daily As Needed. For weight gain., Disp: 30 tablet, Rfl: 11  •  dapagliflozin Propanediol 10 MG tablet, Take 10 mg by mouth Daily., Disp: 30 tablet, Rfl: 11     Objective   Vital Signs:  /100 (BP Location: Left arm, Patient Position: Sitting)   Pulse 114   Wt 108 kg (237 lb)   SpO2 100%   BMI 32.14 kg/m²   Estimated body mass index is 32.14 kg/m² as calculated from the following:    Height as of 10/20/22: 182.9 cm (72\").    Weight as of this encounter: 108 kg (237 lb).      Constitutional:       Appearance: Healthy appearance. Not in distress.   Neck:      Vascular: JVD normal.   Pulmonary:      Effort: Pulmonary effort is normal.      Breath sounds: Normal breath sounds.   Cardiovascular:      Normal rate. Irregularly irregular rhythm.      Murmurs: There is no murmur.      No gallop. No click. No rub.   Edema:     Peripheral edema absent.   Abdominal:      General: There is no distension.      Palpations: Abdomen is soft.      Tenderness: There is no abdominal tenderness.   Skin:     General: Skin is warm and dry.   Neurological:      Mental Status: Oriented to person, place and time.        Result Review :  The following data was reviewed by: Dominic Aguirre MD on 09/22/2022:  CMP   CMP    CMP 10/10/22 10/17/22 10/26/22   Glucose 267 (A) 232 (A) 242 (A)   BUN 45 (A) 26 (A) 23   Creatinine 1.95 (A) 1.97 (A) 1.67 (A)   Sodium 140 140 138   Potassium 5.0 4.4 5.1   Chloride 99 99 102   Calcium 10.4 10.2 9.8   (A) Abnormal value            CBC   CBC    CBC 9/22/22   WBC 5.52   RBC 4.50   Hemoglobin 12.8 (A)   Hematocrit 41.4   MCV 92.0   MCH 28.4   MCHC " 30.9 (A)   RDW 15.8 (A)   Platelets 150   (A) Abnormal value            Lipid Panel   Lipid Panel    Lipid Panel 9/22/22   Total Cholesterol 131   Triglycerides 64   HDL Cholesterol 58   VLDL Cholesterol 13   LDL Cholesterol  60   LDL/HDL Ratio 1.04           A1c   Most Recent A1C    HGBA1C Most Recent 9/22/22   Hemoglobin A1C 6.60 (A)   (A) Abnormal value              Studies Previously Reviewed  9/22 labs also notable for ferritin 50, iron saturation 14%    Adult Transthoracic Echo Complete W/ Cont if Necessary Per Protocol (09/15/2022 10:03)  On my review of echocardiogram, LV dilated with LVIDD 5.8.  LV systolic function is severely reduced around 30% EF with hypokinesis of all anteroseptal segments and all of the apex. Rhythm difficult to determine, possible A. fib or flutter with variable conduction, which precludes grading of diastolic dysfunction, but diastolic function is abnormal.  There is moderate biatrial dilation  RV is suboptimally imaged but appears normal in size with low normal systolic function  There is no significant valvular disease and insufficient TR jet to assess RVSP.    Coronary angiogram 3/9/1998  Single-vessel CAD with subtotal occlusion of the proximal LAD with extension into the D1 ostium treated with balloon angioplasty at the bifurcation and subsequently 2 stent placements reportedly with a good result.  Reportedly right dominant with angiographically normal LMCA, RCA, and LCx distributions.  LVgram w/ 53% EF and anterior hypokinesis.           Assessment and Plan   Diagnoses and all orders for this visit:    1. HFrEF (heart failure with reduced ejection fraction) (HCC) (Primary)  -     Basic Metabolic Panel; Future  -     BNP; Future  -     Magnesium; Future  -     Basic Metabolic Panel; Standing  -     Basic Metabolic Panel  -     BNP; Standing  -     BNP  -     Magnesium; Standing  -     Magnesium    2. Atrial flutter with controlled response (HCC)    3. Coronary artery disease  involving native coronary artery of native heart without angina pectoris    4. Essential hypertension    5. Type 2 diabetes mellitus without complication, without long-term current use of insulin (HCC)    6. Nausea    Other orders  -     dapagliflozin Propanediol 10 MG tablet; Take 10 mg by mouth Daily.  Dispense: 30 tablet; Refill: 11        #HFrEF  - Etiology: Suspect ischemic, possibly a component of TICM given atrial flutter.    -Pending ischemic evaluation with myocardial perfusion SPECT stress test  - LVEF: 30-35%  - NYHA Class: Difficult to assess as activity more limited by back pain  - KCCQ: date 9/22/2022, score 45, not updated today  - 6MWT: date 9/22/2022, walked 156 meters with heart rate increase up to the 130s and oxygen saturation down to 93%; stopped after 2 minutes and 35 seconds due to back pain; not updated today  - BB: Continue metoprolol succinate 100 mg daily  - ACEi/ARB/ARNi: Continue losartan 100 mg daily with plan to transition to Entresto at a future visit  - SGLT2i: Dapagliflozin as part of Xigduo has been on hold.  We will plan to trial Dapagliflozin 10 mg daily alone until his next follow-up visit.  - MRA: We will not plan to restart spironolactone 25 mg daily given hyperkalemia today and intermittent hyperkalemia in the past  - Isosorbide/Hydralazine: Not indicated currently  - Ivabradine: Not indicated currently  - Volume status: Appears euvolemic today, and I suspect that he is currently around his dry weight.  - Diuretics: Continue Bumex PRN for weight gain or swelling  - ICD/CRT: We will consider ICD with Dr. Cote pending GDMT optimization and potential coronary revascularization if indicated based on stress test.  I discussed a possible LifeVest with the patient at 9/22 visit, and he ultimately elected to defer this.  He is scheduled for an appointment with Dr. Cote.  - IV Iron: Iron studies 9/22 do qualify the patient for IV iron; we will further explore this in the future after  patient has otherwise been optimized (primarily for improvement in functional status and quality of life, which currently are not severely impaired by his heart failure)    #Atrial flutter  Complicates HFrEF management, and TICM remains a consideration.  HR elevated with activity coming into clinic, but within normal range on exam after patient has been resting.  - Eliquis 5 mg twice daily  - metoprolol as above  - Ablation scheduled with Dr. Cote    # Prior nausea  The etiology of his nausea at prior visits remains unclear.  I suspect that this was either related to dehydration or Xigduo.  Regarding Xigduo, it is unclear whether this could be related to Dapagliflozin or metformin.  Given potential HFrEF benefit, I think is reasonable to trial of Dapagliflozin alone until his follow-up visit to see if he is able to tolerate it now that he is euvolemic and we can avoid the possibility of metformin also contributing to nausea.  She was instructed to notify the heart failure clinic and hold Dapagliflozin should he developed recurrent nausea.  Of note, there had been a question of DKA at his last visit, but labs were ultimately not consistent with this (beta hydroxybutyrate was minimally elevated but not to the range of DKA, serum acetone was normal and there were no urine ketones).  Still, he will be monitored very closely with reinitiation of SGLT2 inhibitor.    #CAD s/p PCI (2 stents to the proximal LAD in 1998)  #PAD s/p right CEA  No anginal or strokelike symptoms.  Echo concerning that there may be some obstructive LAD disease.    - Continue Lipitor 80 mg daily  - Continue Eliquis 5 mg twice daily  - Aspirin currently on hold  - Myocardial perfusion SPECT stress test pending    #HTN  BP high today, but has been well controlled on home checks recently.  Notably, patient did have a long walk into clinic right before BP was checked.  - Beta-blocker, RAAS inhibitor as noted above    #T2DM  - Continue holding  metformin, but will restart Dapagliflozin as discussed above.       Follow Up   Return in about 9 days (around 11/4/2022).  Patient was given instructions and counseling regarding his condition or for health maintenance advice. Please see specific information pulled into the AVS if appropriate.

## 2022-11-03 NOTE — PROGRESS NOTES
[facility]  Heart Failure Clinic  Subjective:     Encounter Date:11/04/2022      Patient ID: Mani Arreola is a 77 y.o. male     Chief Complaint:  History of Present Illness  Mani Knapp is a 77-year-old male with past medical history of coronary artery disease status post PCI, hypertension, hyperlipidemia, type 2 diabetes, and carotid stenosis status post CEA who presents for subsequent heart failure clinic follow-up.  Today he is feeling better than his previous visit.  He is currently on antibiotic for a kidney infection, but when he comes for his heart failure he feels like he is doing a lot better.  He had been taking 1 mg of Bumex daily until Monday when we advised him to do it as needed for weight gain.  Since Monday he is went from 226 pounds on his home scale to 229 pounds.  He says he still feels okay at this weight.  He does not have any additional swelling or shortness of breath.  His nausea and vomiting that we have had to address at previous visits is resolved.  He is able to take proper oral intake at this time.  His blood pressure log shows stable systolic readings with pressures in the 110s to the 130s.  He is due for stress test this following Tuesday, as well as an ablation the next week after that.     Previous History  9/22/2022: Metoprolol increased from 50mg to 100mg, started Dapagliflozin 10mg daily, switched lasix to bumex 2mg daily, Eliquis 5mg bid initiated. Transitioned pravastatin to atorvastatin.   10/3/2022: His weight was down 16 pounds (236), his swelling was resolved, started spironolactone 25mg daily, decreased Bumex to 1.5mg daily.  10/10/2022: Weight up to 240 pounds, mild swlling, Spironolactone stopped due to K of 5.0.  10/17/2022: Weight down to 232 pounds. Significant nausea. DKA work-up completed that was negative. Amlodipine stopped. Xigduo held. Bumex transitioned to prn.   10/26/2022: Dapagliflozin started back (No combotherapy with metformin),     Weight  History  9/22/2022: 252 pounds 3.2 ounces  10/3/2022: 236 pounds 9.6 ounces  10/10/2022: 240 pounds  10/17/2022: 232 pounds  10/26/2022: 237 pounds  11/4/2022: 235 pounds 9.6 ounces        The following portions of the patient's history were reviewed and updated as appropriate: allergies, current medications, past medical history, past social history, past and problem list.    No Known Allergies    Current Outpatient Medications:   •  acetaminophen (TYLENOL) 650 MG 8 hr tablet, Take 1,300 mg by mouth 2 (Two) Times a Day., Disp: , Rfl:   •  apixaban (ELIQUIS) 5 MG tablet tablet, Take 1 tablet by mouth 2 (Two) Times a Day., Disp: 60 tablet, Rfl: 11  •  aspirin (aspirin) 81 MG EC tablet, Take 1 tablet by mouth Daily., Disp: 30 tablet, Rfl: 11  •  atorvastatin (LIPITOR) 80 MG tablet, Take 1 tablet by mouth Daily. (Patient taking differently: Take 1 tablet by mouth Every Night.), Disp: 90 tablet, Rfl: 3  •  bumetanide (BUMEX) 1 MG tablet, Take 1 tablet by mouth Daily As Needed. For weight gain., Disp: 30 tablet, Rfl: 11  •  dapagliflozin Propanediol 10 MG tablet, Take 10 mg by mouth Daily., Disp: 30 tablet, Rfl: 11  •  diphenhydrAMINE (BENADRYL) 25 mg capsule, Take 50 mg by mouth At Night As Needed for Itching. Patient states he takes them regularly., Disp: , Rfl:   •  folic acid (FOLVITE) 400 MCG tablet, Take 400 mcg by mouth daily., Disp: , Rfl:   •  losartan (COZAAR) 100 MG tablet, Take 100 mg by mouth daily., Disp: , Rfl:   •  metoprolol succinate XL (TOPROL-XL) 100 MG 24 hr tablet, Take 1 tablet by mouth Daily., Disp: 30 tablet, Rfl: 11  •  Multiple Vitamins-Minerals (MULTIVITAMIN ADULT PO), Take 1 tablet by mouth Daily., Disp: , Rfl:   •  Omega-3 Fatty Acids (FISH OIL PO), Take 1,000 mg by mouth Every Night., Disp: , Rfl:   •  omeprazole (priLOSEC) 20 MG capsule, Take 2 capsules by mouth Daily., Disp: 30 capsule, Rfl: 11  •  ondansetron ODT (ZOFRAN-ODT) 8 MG disintegrating tablet, Place 8 mg on the tongue Every 8  (Eight) Hours As Needed for Nausea or Vomiting., Disp: , Rfl:   •  traMADol (ULTRAM) 50 MG tablet, Take 50 mg by mouth 2 (Two) Times a Day., Disp: , Rfl:   •  vitamin C (ASCORBIC ACID) 500 MG tablet, Take 500 mg by mouth daily., Disp: , Rfl:     Social History     Socioeconomic History   • Marital status:    Tobacco Use   • Smoking status: Every Day     Types: Pipe   • Smokeless tobacco: Never   Vaping Use   • Vaping Use: Never used   Substance and Sexual Activity   • Alcohol use: Yes     Comment: occ   • Drug use: No   • Sexual activity: Defer       Review of Systems   Constitutional: Negative.   HENT: Negative.    Eyes: Negative.    Cardiovascular: Negative.    Respiratory: Negative.    Endocrine: Negative.    Hematologic/Lymphatic: Negative.    Skin: Negative.    Musculoskeletal: Negative.    Gastrointestinal: Negative.    Genitourinary: Negative.    Neurological: Negative.             Objective:     Constitutional:       Appearance: Healthy appearance. Not in distress.   Neck:      Vascular: JVD normal.   Pulmonary:      Effort: Pulmonary effort is normal.      Breath sounds: Normal breath sounds.   Cardiovascular:      PMI at left midclavicular line. Normal rate. Irregularly irregular rhythm. Normal S1. Normal S2.      Murmurs: There is no murmur.      No gallop. No click. No rub.   Musculoskeletal: Normal range of motion.      Cervical back: Normal range of motion. Skin:     General: Skin is warm and dry.   Neurological:      Mental Status: Alert and oriented to person, place and time.           Procedures  /78 (BP Location: Right arm, Patient Position: Sitting)   Pulse (!) 122   Resp 16   Wt 107 kg (235 lb 9.6 oz)   SpO2 99%   BMI 31.95 kg/m²       11/04/22  0904   Weight: 107 kg (235 lb 9.6 oz)      Lab Review:   I have reviewed the following lab work      BMP    BMP 10/17/22 10/26/22 11/4/22   BUN 26 (A) 23 28 (A)   Creatinine 1.97 (A) 1.67 (A) 1.79 (A)   Sodium 140 138 137   Potassium 4.4  5.1 4.7   Chloride 99 102 102   CO2 23.0 25.0 24.0   Calcium 10.2 9.8 10.1   (A) Abnormal value             Lab Results   Component Value Date    CHOL 131 09/22/2022    TRIG 64 09/22/2022    HDL 58 09/22/2022    LDL 60 09/22/2022      Results for orders placed during the hospital encounter of 09/15/22    Adult Transthoracic Echo Complete W/ Cont if Necessary Per Protocol    Interpretation Summary  · The left ventricular cavity is moderately dilated.  · The right atrial cavity is borderline dilated.  · Left ventricular diastolic dysfunction is noted.  · The following left ventricular wall segments are hypokinetic: mid anterior, apical anterior, mid anterolateral, apical lateral, apical inferior, mid inferior, mid anteroseptal and basal anterior. The following left ventricular wall segments are akinetic: apical septal and apex.  · Left ventricular ejection fraction appears to be 31 - 35%. Left ventricular systolic function is severely decreased.    SEVERE ISCHEMIC CARDIOMYOPATHY     Assessment:          Diagnosis Plan   1. HFrEF (heart failure with reduced ejection fraction) ([facility])  Basic Metabolic Panel    BNP    Basic Metabolic Panel    Basic Metabolic Panel    BNP    BNP      2. Essential hypertension        3. Type 2 diabetes mellitus without complication, without long-term current use of insulin ([facility])        4. Atrial flutter with controlled response ([facility])               Plan:       HFrEF  - Etiology: Suspect ischemic, pending ischemic evaluation with stress test.  Stress test scheduled for 11/8/2022.  - LVEF: 30-35%  - NYHA Class: II-III  - KCCQ: date 9/22/2022, score 45   - 6MWT: date 9/22/2022, walked 156 meters  - BB: Metoprolol Succinate 100mg daily  - ACEi/ARB/ARNi: Losartan 100mg daily with plans to transition to Entresto  - SGLT2i: Dapagliflozin 10mg daily  - MRA: Spironolactone held due to hyperkalemia  - Isosorbide/Hydralazine Not indicated  - Ivabradine: Not indicated  - Diuretics: Bumex prn  - ICD/CRT:  Defer on LifeVest. Plans for ablation 11/14/2022  - IV Iron: Qualifies, but will optimize GDMT before pursuing this.  - Na Restriction: 2g sodium restriction  - Fluid Restriction: 2l fluid restriction  - Daily Weight: Continue to daily weight    Patient has rapid ventricular response in the office today at a rate of 122.  He is on Toprol 100 mg daily.  He is asymptomatic to this increased rate at this time.  He has plans for an ablation in 10 days.  If he becomes symptomatic I told him to present to the emergency room, but at this time we will maintain his current medication regimen until his ablation is completed.  We will continue to try and optimize his heart failure regimen.  He is pending ischemic evaluation with stress test on 11/8/2022.  Once both the ablation and his stress test are complete we will attempt further optimization of his GDMT.    Atrial Flutter: Plans for ablation with Dr. Cote 11/14/2022. Continue eliquis 5mg.     Hypertension: Blood pressure log shows systolics 110s to 130s at home.  Continue current medication regimen.  Continue to do daily blood pressure readings.    Nausea: Resolved.    Type 2 DM: Metformin was held due to potential cause of his nausea.  He is on Farxiga 10 mg.  His blood sugars are slightly uncontrolled at home per patient report.  He says he has readings in the 170s to 180s.  He has a primary care doctor appointment this coming Monday.  Given his history of CAD, would recommend a GLP-1 agonist such as liraglutide added to his regimen for tighter blood sugar control and CAD benefit.  Management per primary care physician.    We will plan on seeing him 1 week after his ablation.       I spent 35 minutes caring for Mani on this date of service. This time includes time spent by me in the following activities:preparing for the visit, reviewing tests, obtaining and/or reviewing a separately obtained history, performing a medically appropriate examination and/or evaluation ,  counseling and educating the patient/family/caregiver, ordering medications, tests, or procedures, referring and communicating with other health care professionals , documenting information in the medical record, independently interpreting results and communicating that information with the patient/family/caregiver and care coordination

## 2022-11-04 ENCOUNTER — HOSPITAL ENCOUNTER (OUTPATIENT)
Dept: CARDIOLOGY | Facility: HOSPITAL | Age: 77
Discharge: HOME | End: 2022-11-04

## 2022-11-04 VITALS
OXYGEN SATURATION: 99 % | BODY MASS INDEX: 31.95 KG/M2 | SYSTOLIC BLOOD PRESSURE: 118 MMHG | WEIGHT: 235.6 LBS | HEART RATE: 122 BPM | RESPIRATION RATE: 16 BRPM | DIASTOLIC BLOOD PRESSURE: 78 MMHG

## 2022-11-04 DIAGNOSIS — I48.92 ATRIAL FLUTTER WITH CONTROLLED RESPONSE: ICD-10-CM

## 2022-11-04 DIAGNOSIS — I50.20 HFREF (HEART FAILURE WITH REDUCED EJECTION FRACTION): Primary | ICD-10-CM

## 2022-11-04 DIAGNOSIS — E11.9 TYPE 2 DIABETES MELLITUS WITHOUT COMPLICATION, WITHOUT LONG-TERM CURRENT USE OF INSULIN: Chronic | ICD-10-CM

## 2022-11-04 DIAGNOSIS — I10 ESSENTIAL HYPERTENSION: Chronic | ICD-10-CM

## 2022-11-04 LAB
ANION GAP SERPL CALCULATED.3IONS-SCNC: 11 MMOL/L (ref 5–15)
BUN SERPL-MCNC: 28 MG/DL (ref 8–23)
BUN/CREAT SERPL: 15.6 (ref 7–25)
CALCIUM SPEC-SCNC: 10.1 MG/DL (ref 8.6–10.5)
CHLORIDE SERPL-SCNC: 102 MMOL/L (ref 98–107)
CO2 SERPL-SCNC: 24 MMOL/L (ref 22–29)
CREAT SERPL-MCNC: 1.79 MG/DL (ref 0.76–1.27)
EGFRCR SERPLBLD CKD-EPI 2021: 39 ML/MIN/1.73
GLUCOSE SERPL-MCNC: 258 MG/DL (ref 65–99)
NT-PROBNP SERPL-MCNC: 1473 PG/ML (ref 0–1800)
POTASSIUM SERPL-SCNC: 4.7 MMOL/L (ref 3.5–5.2)
SODIUM SERPL-SCNC: 137 MMOL/L (ref 136–145)

## 2022-11-04 PROCEDURE — 83880 ASSAY OF NATRIURETIC PEPTIDE: CPT

## 2022-11-04 PROCEDURE — 99214 OFFICE O/P EST MOD 30 MIN: CPT

## 2022-11-04 PROCEDURE — G0463 HOSPITAL OUTPT CLINIC VISIT: HCPCS

## 2022-11-04 PROCEDURE — 80048 BASIC METABOLIC PNL TOTAL CA: CPT

## 2022-11-04 NOTE — PATIENT INSTRUCTIONS
We want you to aim for a dry weight of 230 pounds.  If you get above 230 pounds taking 1 mg Bumex.  If you have to do this more than 3 times a week please contact the office so we may adjust her diuretics.  We will want to see you after you have had your ablation.  We will contact you after your stress test next week with results.  If you have any concerns at all please give us a call.

## 2022-11-08 ENCOUNTER — HOSPITAL ENCOUNTER (OUTPATIENT)
Dept: CARDIOLOGY | Facility: HOSPITAL | Age: 77
Discharge: HOME | End: 2022-11-08

## 2022-11-08 VITALS
HEIGHT: 72 IN | DIASTOLIC BLOOD PRESSURE: 101 MMHG | WEIGHT: 235.89 LBS | SYSTOLIC BLOOD PRESSURE: 148 MMHG | HEART RATE: 88 BPM | BODY MASS INDEX: 31.95 KG/M2

## 2022-11-08 DIAGNOSIS — I50.20 HFREF (HEART FAILURE WITH REDUCED EJECTION FRACTION): ICD-10-CM

## 2022-11-08 DIAGNOSIS — I25.10 CORONARY ARTERY DISEASE INVOLVING NATIVE CORONARY ARTERY OF NATIVE HEART WITHOUT ANGINA PECTORIS: ICD-10-CM

## 2022-11-08 PROCEDURE — 0 TECHNETIUM TETROFOSMIN KIT: Performed by: HOSPITALIST

## 2022-11-08 PROCEDURE — 93018 CV STRESS TEST I&R ONLY: CPT | Performed by: INTERNAL MEDICINE

## 2022-11-08 PROCEDURE — 78452 HT MUSCLE IMAGE SPECT MULT: CPT | Performed by: INTERNAL MEDICINE

## 2022-11-08 PROCEDURE — 78452 HT MUSCLE IMAGE SPECT MULT: CPT

## 2022-11-08 PROCEDURE — 25010000002 REGADENOSON 0.4 MG/5ML SOLUTION: Performed by: HOSPITALIST

## 2022-11-08 PROCEDURE — A9502 TC99M TETROFOSMIN: HCPCS | Performed by: HOSPITALIST

## 2022-11-08 PROCEDURE — 93017 CV STRESS TEST TRACING ONLY: CPT

## 2022-11-08 RX ADMIN — TETROFOSMIN 1 DOSE: 1.38 INJECTION, POWDER, LYOPHILIZED, FOR SOLUTION INTRAVENOUS at 09:33

## 2022-11-08 RX ADMIN — REGADENOSON 0.4 MG: 0.08 INJECTION, SOLUTION INTRAVENOUS at 10:38

## 2022-11-08 RX ADMIN — TETROFOSMIN 1 DOSE: 1.38 INJECTION, POWDER, LYOPHILIZED, FOR SOLUTION INTRAVENOUS at 10:36

## 2022-11-09 LAB
BH CV NUCLEAR PRIOR STUDY: 3
BH CV REST NUCLEAR ISOTOPE DOSE: 8.3 MCI
BH CV STRESS BP STAGE 1: NORMAL
BH CV STRESS COMMENTS STAGE 1: NORMAL
BH CV STRESS DOSE REGADENOSON STAGE 1: 0.4
BH CV STRESS DURATION MIN STAGE 1: 0
BH CV STRESS DURATION SEC STAGE 1: 10
BH CV STRESS HR STAGE 1: 97
BH CV STRESS NUCLEAR ISOTOPE DOSE: 26 MCI
BH CV STRESS PROTOCOL 1: NORMAL
BH CV STRESS RECOVERY BP: NORMAL MMHG
BH CV STRESS RECOVERY HR: 102 BPM
BH CV STRESS STAGE 1: 1
LV EF NUC BP: 33 %
MAXIMAL PREDICTED HEART RATE: 145 BPM
PERCENT MAX PREDICTED HR: 66.9 %
STRESS BASELINE BP: NORMAL MMHG
STRESS BASELINE HR: 90 BPM
STRESS PERCENT HR: 79 %
STRESS POST EXERCISE DUR MIN: 0 MIN
STRESS POST EXERCISE DUR SEC: 10 SEC
STRESS POST PEAK BP: NORMAL MMHG
STRESS POST PEAK HR: 97 BPM
STRESS TARGET HR: 123 BPM

## 2022-11-14 ENCOUNTER — ANESTHESIA EVENT (OUTPATIENT)
Dept: CARDIOLOGY | Facility: HOSPITAL | Age: 77
End: 2022-11-14

## 2022-11-14 ENCOUNTER — HOSPITAL ENCOUNTER (OUTPATIENT)
Facility: HOSPITAL | Age: 77
Discharge: HOME | End: 2022-11-14
Attending: STUDENT IN AN ORGANIZED HEALTH CARE EDUCATION/TRAINING PROGRAM | Admitting: STUDENT IN AN ORGANIZED HEALTH CARE EDUCATION/TRAINING PROGRAM

## 2022-11-14 ENCOUNTER — ANESTHESIA (OUTPATIENT)
Dept: CARDIOLOGY | Facility: HOSPITAL | Age: 77
End: 2022-11-14

## 2022-11-14 VITALS
BODY MASS INDEX: 31.42 KG/M2 | WEIGHT: 232 LBS | HEART RATE: 70 BPM | TEMPERATURE: 96.6 F | SYSTOLIC BLOOD PRESSURE: 115 MMHG | HEIGHT: 72 IN | RESPIRATION RATE: 19 BRPM | OXYGEN SATURATION: 98 % | DIASTOLIC BLOOD PRESSURE: 78 MMHG

## 2022-11-14 DIAGNOSIS — I48.3 TYPICAL ATRIAL FLUTTER: ICD-10-CM

## 2022-11-14 PROBLEM — I50.22 CHRONIC SYSTOLIC HEART FAILURE (HCC): Status: ACTIVE | Noted: 2022-11-14

## 2022-11-14 LAB
ANION GAP SERPL CALCULATED.3IONS-SCNC: 13 MMOL/L (ref 5–15)
BASOPHILS # BLD AUTO: 0.04 10*3/MM3 (ref 0–0.2)
BASOPHILS NFR BLD AUTO: 0.7 % (ref 0–1.5)
BUN SERPL-MCNC: 24 MG/DL (ref 8–23)
BUN/CREAT SERPL: 16 (ref 7–25)
CALCIUM SPEC-SCNC: 9.5 MG/DL (ref 8.6–10.5)
CHLORIDE SERPL-SCNC: 100 MMOL/L (ref 98–107)
CO2 SERPL-SCNC: 26 MMOL/L (ref 22–29)
CREAT SERPL-MCNC: 1.5 MG/DL (ref 0.76–1.27)
DEPRECATED RDW RBC AUTO: 49.7 FL (ref 37–54)
EGFRCR SERPLBLD CKD-EPI 2021: 48.2 ML/MIN/1.73
EOSINOPHIL # BLD AUTO: 0.15 10*3/MM3 (ref 0–0.4)
EOSINOPHIL NFR BLD AUTO: 2.6 % (ref 0.3–6.2)
ERYTHROCYTE [DISTWIDTH] IN BLOOD BY AUTOMATED COUNT: 15.5 % (ref 12.3–15.4)
GLUCOSE SERPL-MCNC: 208 MG/DL (ref 65–99)
HCT VFR BLD AUTO: 47.7 % (ref 37.5–51)
HGB BLD-MCNC: 15.2 G/DL (ref 13–17.7)
IMM GRANULOCYTES # BLD AUTO: 0.02 10*3/MM3 (ref 0–0.05)
IMM GRANULOCYTES NFR BLD AUTO: 0.3 % (ref 0–0.5)
INR PPP: 1.25 (ref 0.91–1.09)
LYMPHOCYTES # BLD AUTO: 0.98 10*3/MM3 (ref 0.7–3.1)
LYMPHOCYTES NFR BLD AUTO: 16.9 % (ref 19.6–45.3)
MCH RBC QN AUTO: 28.7 PG (ref 26.6–33)
MCHC RBC AUTO-ENTMCNC: 31.9 G/DL (ref 31.5–35.7)
MCV RBC AUTO: 90 FL (ref 79–97)
MONOCYTES # BLD AUTO: 0.61 10*3/MM3 (ref 0.1–0.9)
MONOCYTES NFR BLD AUTO: 10.5 % (ref 5–12)
NEUTROPHILS NFR BLD AUTO: 4.01 10*3/MM3 (ref 1.7–7)
NEUTROPHILS NFR BLD AUTO: 69 % (ref 42.7–76)
NRBC BLD AUTO-RTO: 0 /100 WBC (ref 0–0.2)
PLATELET # BLD AUTO: 169 10*3/MM3 (ref 140–450)
PMV BLD AUTO: 10.2 FL (ref 6–12)
POTASSIUM SERPL-SCNC: 4.4 MMOL/L (ref 3.5–5.2)
PROTHROMBIN TIME: 15.2 SECONDS (ref 11.9–14.6)
QT INTERVAL: 320 MS
QT INTERVAL: 416 MS
QTC INTERVAL: 436 MS
QTC INTERVAL: 463 MS
RBC # BLD AUTO: 5.3 10*6/MM3 (ref 4.14–5.8)
SODIUM SERPL-SCNC: 139 MMOL/L (ref 136–145)
WBC NRBC COR # BLD: 5.81 10*3/MM3 (ref 3.4–10.8)

## 2022-11-14 PROCEDURE — C1730 CATH, EP, 19 OR FEW ELECT: HCPCS | Performed by: STUDENT IN AN ORGANIZED HEALTH CARE EDUCATION/TRAINING PROGRAM

## 2022-11-14 PROCEDURE — 93653 COMPRE EP EVAL TX SVT: CPT | Performed by: STUDENT IN AN ORGANIZED HEALTH CARE EDUCATION/TRAINING PROGRAM

## 2022-11-14 PROCEDURE — 25010000002 PROPOFOL 1000 MG/100ML EMULSION

## 2022-11-14 PROCEDURE — 85610 PROTHROMBIN TIME: CPT | Performed by: STUDENT IN AN ORGANIZED HEALTH CARE EDUCATION/TRAINING PROGRAM

## 2022-11-14 PROCEDURE — 93005 ELECTROCARDIOGRAM TRACING: CPT | Performed by: STUDENT IN AN ORGANIZED HEALTH CARE EDUCATION/TRAINING PROGRAM

## 2022-11-14 PROCEDURE — C1894 INTRO/SHEATH, NON-LASER: HCPCS | Performed by: STUDENT IN AN ORGANIZED HEALTH CARE EDUCATION/TRAINING PROGRAM

## 2022-11-14 PROCEDURE — C1732 CATH, EP, DIAG/ABL, 3D/VECT: HCPCS | Performed by: STUDENT IN AN ORGANIZED HEALTH CARE EDUCATION/TRAINING PROGRAM

## 2022-11-14 PROCEDURE — C1766 INTRO/SHEATH,STRBLE,NON-PEEL: HCPCS | Performed by: STUDENT IN AN ORGANIZED HEALTH CARE EDUCATION/TRAINING PROGRAM

## 2022-11-14 PROCEDURE — 80048 BASIC METABOLIC PNL TOTAL CA: CPT | Performed by: STUDENT IN AN ORGANIZED HEALTH CARE EDUCATION/TRAINING PROGRAM

## 2022-11-14 PROCEDURE — 85025 COMPLETE CBC W/AUTO DIFF WBC: CPT | Performed by: STUDENT IN AN ORGANIZED HEALTH CARE EDUCATION/TRAINING PROGRAM

## 2022-11-14 PROCEDURE — C1760 CLOSURE DEV, VASC: HCPCS | Performed by: STUDENT IN AN ORGANIZED HEALTH CARE EDUCATION/TRAINING PROGRAM

## 2022-11-14 RX ORDER — SODIUM CHLORIDE 0.9 % (FLUSH) 0.9 %
10 SYRINGE (ML) INJECTION AS NEEDED
Status: DISCONTINUED | OUTPATIENT
Start: 2022-11-14 | End: 2022-11-14 | Stop reason: HOSPADM

## 2022-11-14 RX ORDER — SODIUM CHLORIDE, SODIUM LACTATE, POTASSIUM CHLORIDE, CALCIUM CHLORIDE 600; 310; 30; 20 MG/100ML; MG/100ML; MG/100ML; MG/100ML
INJECTION, SOLUTION INTRAVENOUS CONTINUOUS PRN
Status: DISCONTINUED | OUTPATIENT
Start: 2022-11-14 | End: 2022-11-14 | Stop reason: SURG

## 2022-11-14 RX ORDER — SODIUM CHLORIDE 0.9 % (FLUSH) 0.9 %
10 SYRINGE (ML) INJECTION EVERY 12 HOURS SCHEDULED
Status: DISCONTINUED | OUTPATIENT
Start: 2022-11-14 | End: 2022-11-14 | Stop reason: HOSPADM

## 2022-11-14 RX ORDER — SODIUM CHLORIDE 9 MG/ML
INJECTION, SOLUTION INTRAVENOUS CONTINUOUS PRN
Status: DISCONTINUED | OUTPATIENT
Start: 2022-11-14 | End: 2022-11-14 | Stop reason: SURG

## 2022-11-14 RX ORDER — BUPIVACAINE HCL/0.9 % NACL/PF 0.125 %
PLASTIC BAG, INJECTION (ML) EPIDURAL AS NEEDED
Status: DISCONTINUED | OUTPATIENT
Start: 2022-11-14 | End: 2022-11-14 | Stop reason: SURG

## 2022-11-14 RX ORDER — PROPOFOL 10 MG/ML
INJECTION, EMULSION INTRAVENOUS CONTINUOUS PRN
Status: DISCONTINUED | OUTPATIENT
Start: 2022-11-14 | End: 2022-11-14 | Stop reason: SURG

## 2022-11-14 RX ORDER — LIDOCAINE HYDROCHLORIDE 20 MG/ML
INJECTION, SOLUTION INFILTRATION; PERINEURAL
Status: DISCONTINUED | OUTPATIENT
Start: 2022-11-14 | End: 2022-11-14 | Stop reason: HOSPADM

## 2022-11-14 RX ADMIN — SODIUM CHLORIDE: 0.9 INJECTION, SOLUTION INTRAVENOUS at 08:08

## 2022-11-14 RX ADMIN — PROPOFOL 100 MCG/KG/MIN: 10 INJECTION, EMULSION INTRAVENOUS at 08:12

## 2022-11-14 RX ADMIN — SODIUM CHLORIDE, POTASSIUM CHLORIDE, SODIUM LACTATE AND CALCIUM CHLORIDE: 600; 310; 30; 20 INJECTION, SOLUTION INTRAVENOUS at 09:13

## 2022-11-14 RX ADMIN — Medication 200 MCG: at 08:41

## 2022-11-14 RX ADMIN — Medication 200 MCG: at 08:26

## 2022-11-14 NOTE — INTERVAL H&P NOTE
H&P reviewed. The patient was examined and there are no changes to the H&P.      Since the time of the last clinic visit, denies significant change in symptoms.  Denies missing any doses of his medications.  No new ER visits or hospitalizations.    Exam:  Unchanged from last clinic visit.    Labs:  Reviewed.    Assessment/plan:  Mani Arreola is a 77 y.o. male who presents to the hospital for outpatient EP study and ablation for typical atrial flutter.  There are no apparent contraindications to proceeding and he is medically appropriate for outpatient management with this procedure.      I have discussed risks, benefits, and alternatives of an electrophysiology study and ablation for atrial flutter with the patient.  Alternatives discussed include continued observation and medical management.  An electrophysiology study with ablation is an inherently high risk procedure with possible complications that include but are not limited to vascular access complications, internal bleeding, tamponade requiring pericardiocentesis or cardiac surgery, stroke, MI, embolism, myocardial injury, injury to the normal conduction system requiring a pacemaker, and ultimately death.  We also discussed that given the nature of the procedure, therapeutic efficacy can be variable.  Possibilities of recurrent arrhythmias and the possible need for additional procedures and/or medical therapy was discussed. Questions asked were appropriately answered.  No guarantees were made or implied.     Despite this, they would still like to proceed.    Plan:  - Proceed with EP study and ablation for typical atrial flutter

## 2022-11-14 NOTE — ANESTHESIA PREPROCEDURE EVALUATION
Anesthesia Evaluation     Patient summary reviewed   no history of anesthetic complications:  NPO Solid Status: > 8 hours  NPO Liquid Status: > 4 hours           Airway   Mallampati: II  TM distance: >3 FB  Neck ROM: full  Dental          Pulmonary    (+) a smoker (pipe) Current,   (-) asthma, sleep apnea  Cardiovascular   Exercise tolerance: good (4-7 METS)    Patient on routine beta blocker and Beta blocker given within 24 hours of surgery    (+) hypertension, past MI (1998) , CAD, cardiac stents (1998) dysrhythmias Atrial Flutter, hyperlipidemia,       Neuro/Psych  (-) seizures, TIA, CVA  GI/Hepatic/Renal/Endo    (+)   diabetes mellitus,   (-) liver disease, no renal disease    Musculoskeletal     Abdominal    Substance History      OB/GYN          Other      history of cancer                      Anesthesia Plan    ASA 3     general     intravenous induction     Anesthetic plan, risks, benefits, and alternatives have been provided, discussed and informed consent has been obtained with: patient and spouse/significant other.

## 2022-11-14 NOTE — DISCHARGE INSTRUCTIONS
Post-Atrial Flutter Ablation Instructions    ACTIVITY    Avoid driving, operating machinery, or alcohol consumption for 24 hours after receiving sedation. In addition, avoid signing legal documents or participating in legal proceedings.    You may resume normal activities after 24 hours except for the following:    Avoid heavy lifting (no more than 10 pounds) and vigorous activities for a minimum of 7 days. This includes activities such as jogging, exercise classes, sports activities, etc.    You may resume walking and other normal activities.    Avoid sitting more than 2 consecutive hours during waking hours for the first 3 days. If you are traveling, stop for brief (5 minutes) walks every two hours.    MEDICATIONS    Continue Coumadin, Eliquis, Pradaxa, or Xarelto at your usual dose this evening. Do not stop this medication without consulting with your cardiologist.    If you are on Coumadin have your PT/INR checked in 7-10 days.     MEDICAL FOLLOW UP    See Dr. Cote as scheduled on 12/1/22.    PLEASE NOTIFY US IF YOU DEVELOP    Fever of 101 or greater during your first few days at home    The occurrence of a new, persistent cough or unexplained shortness of breath.    A groin bruise may be expected. Initial soreness and discomfort should improve over the first few days. If you continue to have discomfort or if bruising is excessive, please call us.    Patients often experience palpitations and even arrhythmias during the healing period.    Please call if you have an episode of fast heart rhythms accompanied by fast heart rate greater than 120 beats per minute for extended period.    Mild chest soreness is common and may be treated with Tylenol, Advil, or Motrin.  This soreness typically worsens when taking deep breaths.  If you notice these symptoms, start one of these medications according to the package directions and continue for 2-3 days. If your symptoms are not relieved or become severe, please call us.       REASONS TO GO TO THE EMERGENCY ROOM FOR EVALUATION:   Severe chest pain  Significant shortness of breath at rest  Near passing out or passing out episodes soon after your ablation  Symptoms of chest pain or shortness of breath associated with low blood pressure (reading less than 90 for the top number / systolic blood pressure)  Brisk bleeding or significant swelling from the groin where IVs were placed  Any concerns that an emergent or life threatening complication is occurring    If you have a clinical questions between the hours of 8am and 4pm, Monday - Friday please call the office and let us know your concern. Please leave a message if your call is not an emergency.    For emergencies, please go for evaluation at your nearest emergency department.    If you have a [employer] account, this an excellent way to communicate.  [employer] messages are checked Monday through Friday. Please allow 24-36 hours for your message to be answered.

## 2022-11-14 NOTE — ANESTHESIA POSTPROCEDURE EVALUATION
"Patient: Mani Arreola    Procedure Summary     Date: 11/14/22 Room / Location: [facility]ATH [facility]  /  [facility] CATH INVASIVE LOCATION    Anesthesia Start: 0806 Anesthesia Stop: 0929    Procedure: EP/Ablation, atrial flutter Diagnosis:       Typical atrial flutter (HCC)      (atrial flutter)    Providers: Francisca Cote MD Provider: Durga Ramirez CRNA    Anesthesia Type: general ASA Status: 3          Anesthesia Type: general    Vitals  No vitals data found for the desired time range.          Post Anesthesia Care and Evaluation    Patient location during evaluation: PHASE II  Patient participation: complete - patient participated  Level of consciousness: awake and alert  Pain score: 0    Airway patency: patent  Anesthetic complications: No anesthetic complications  PONV Status: none  Cardiovascular status: acceptable  Respiratory status: acceptable  Hydration status: acceptable    Comments: Blood pressure (!) 157/102, pulse (!) 125, temperature 96.6 °F (35.9 °C), temperature source Temporal, resp. rate 22, height 182.9 cm (72\"), weight 105 kg (232 lb), SpO2 98 %.    No anesthesia care post op    "

## 2022-11-21 ENCOUNTER — HOSPITAL ENCOUNTER (OUTPATIENT)
Dept: CARDIOLOGY | Facility: HOSPITAL | Age: 77
Discharge: HOME | End: 2022-11-21

## 2022-11-21 VITALS
BODY MASS INDEX: 32.41 KG/M2 | DIASTOLIC BLOOD PRESSURE: 80 MMHG | HEART RATE: 87 BPM | OXYGEN SATURATION: 94 % | RESPIRATION RATE: 16 BRPM | SYSTOLIC BLOOD PRESSURE: 136 MMHG | WEIGHT: 239 LBS

## 2022-11-21 DIAGNOSIS — I48.92 ATRIAL FLUTTER WITH CONTROLLED RESPONSE: ICD-10-CM

## 2022-11-21 DIAGNOSIS — I50.20 HFREF (HEART FAILURE WITH REDUCED EJECTION FRACTION): Primary | ICD-10-CM

## 2022-11-21 DIAGNOSIS — I50.22 CHRONIC SYSTOLIC HEART FAILURE: ICD-10-CM

## 2022-11-21 DIAGNOSIS — I10 ESSENTIAL HYPERTENSION: Chronic | ICD-10-CM

## 2022-11-21 LAB
ANION GAP SERPL CALCULATED.3IONS-SCNC: 8 MMOL/L (ref 5–15)
BUN SERPL-MCNC: 23 MG/DL (ref 8–23)
BUN/CREAT SERPL: 15.3 (ref 7–25)
CALCIUM SPEC-SCNC: 9.9 MG/DL (ref 8.6–10.5)
CHLORIDE SERPL-SCNC: 104 MMOL/L (ref 98–107)
CO2 SERPL-SCNC: 30 MMOL/L (ref 22–29)
CREAT SERPL-MCNC: 1.5 MG/DL (ref 0.76–1.27)
EGFRCR SERPLBLD CKD-EPI 2021: 48.2 ML/MIN/1.73
GLUCOSE SERPL-MCNC: 203 MG/DL (ref 65–99)
POTASSIUM SERPL-SCNC: 5 MMOL/L (ref 3.5–5.2)
QT INTERVAL: 384 MS
QTC INTERVAL: 411 MS
SODIUM SERPL-SCNC: 142 MMOL/L (ref 136–145)

## 2022-11-21 PROCEDURE — 93005 ELECTROCARDIOGRAM TRACING: CPT

## 2022-11-21 PROCEDURE — 80048 BASIC METABOLIC PNL TOTAL CA: CPT

## 2022-11-21 PROCEDURE — G0463 HOSPITAL OUTPT CLINIC VISIT: HCPCS

## 2022-11-21 PROCEDURE — 99214 OFFICE O/P EST MOD 30 MIN: CPT

## 2022-11-21 PROCEDURE — 94618 PULMONARY STRESS TESTING: CPT

## 2022-11-21 RX ORDER — SACUBITRIL AND VALSARTAN 49; 51 MG/1; MG/1
1 TABLET, FILM COATED ORAL 2 TIMES DAILY
Qty: 60 TABLET | Refills: 11 | Status: SHIPPED | OUTPATIENT
Start: 2022-11-21 | End: 2022-12-05

## 2022-11-21 RX ORDER — SACUBITRIL AND VALSARTAN 49; 51 MG/1; MG/1
1 TABLET, FILM COATED ORAL 2 TIMES DAILY
Qty: 60 TABLET | Refills: 11 | Status: CANCELLED | OUTPATIENT
Start: 2022-11-21

## 2022-11-21 NOTE — PATIENT INSTRUCTIONS
Today we will switch your losartan to Entresto.  This will still have the same blood pressure effect but will also have a little bit of a diuretic in it so you should see some change in your urination with it.  Continue to monitor your weights daily.  Continue to take your Bumex as needed.  We will see you back in 2 weeks.

## 2022-11-21 NOTE — PROGRESS NOTES
[facility]  Heart Failure Clinic  Subjective:     Encounter Date:11/21/2022      Patient ID: Mani Arreola is a 77 y.o. male     Chief Complaint:  History of Present Illness  Mani Arreola is a 77-year-old male with known coronary artery disease status post PCI, hypertension, hyperlipidemia, type 2 diabetes, and carotid stenosis status post CEA who presents for subsequent heart failure clinic follow-up.  Today he says he feels like he is doing a little bit better than his previous visit.  His wife, who is accompanying him today, says that she felt like he is doing a lot better.  She says that they have been going to more activities and that he has been able to tolerate activity better.  He brings in his weight log that shows that his weights have fluctuated some but he stayed close to his dry weight of 230.  Today on his home scale he says he is to 232.8.  He said he is not taking Bumex the last 2 days because they have had family events.  He says that he is typically taking his as needed Bumex about 1-2 times a week.  He will take it when he gets above 230 pounds.  He says the 1 or 2 doses typically brings him back down to his dry weight very easily.  He is curious about his stress test results and what we can do to further optimize his heart failure.    Previous History  9/22/2022: Metoprolol increased from 50mg to 100mg, started Dapagliflozin 10mg daily, switched lasix to bumex 2mg daily, Eliquis 5mg bid initiated. Transitioned pravastatin to atorvastatin.   10/3/2022: His weight was down 16 pounds (236), his swelling was resolved, started spironolactone 25mg daily, decreased Bumex to 1.5mg daily.  10/10/2022: Weight up to 240 pounds, mild swlling, Spironolactone stopped due to K of 5.0.  10/17/2022: Weight down to 232 pounds. Significant nausea. DKA work-up completed that was negative. Amlodipine stopped. Xigduo held. Bumex transitioned to prn.   10/26/2022: Dapagliflozin started back (No  combotherapy with metformin),   11/04/2022: No medication adjustments were made at this time.  We are holding off with adjustments giving his pending ablation.                The following portions of the patient's history were reviewed and updated as appropriate: allergies, current medications, past medical history, past social history, past and problem list.    No Known Allergies    Current Outpatient Medications:   •  acetaminophen (TYLENOL) 650 MG 8 hr tablet, Take 1,300 mg by mouth 2 (Two) Times a Day., Disp: , Rfl:   •  apixaban (ELIQUIS) 5 MG tablet tablet, Take 1 tablet by mouth 2 (Two) Times a Day., Disp: 60 tablet, Rfl: 11  •  atorvastatin (LIPITOR) 80 MG tablet, Take 1 tablet by mouth Daily. (Patient taking differently: Take 1 tablet by mouth Every Night.), Disp: 90 tablet, Rfl: 3  •  bumetanide (BUMEX) 1 MG tablet, Take 1 tablet by mouth Daily As Needed. For weight gain., Disp: 30 tablet, Rfl: 11  •  dapagliflozin Propanediol 10 MG tablet, Take 10 mg by mouth Daily., Disp: 30 tablet, Rfl: 11  •  diphenhydrAMINE (BENADRYL) 25 mg capsule, Take 50 mg by mouth At Night As Needed for Itching. Patient states he takes them regularly., Disp: , Rfl:   •  folic acid (FOLVITE) 400 MCG tablet, Take 400 mcg by mouth daily., Disp: , Rfl:   •  metoprolol succinate XL (TOPROL-XL) 100 MG 24 hr tablet, Take 1 tablet by mouth Daily., Disp: 30 tablet, Rfl: 11  •  Multiple Vitamins-Minerals (MULTIVITAMIN ADULT PO), Take 1 tablet by mouth Daily., Disp: , Rfl:   •  Omega-3 Fatty Acids (FISH OIL PO), Take 1,000 mg by mouth Every Night., Disp: , Rfl:   •  omeprazole (priLOSEC) 20 MG capsule, Take 2 capsules by mouth Daily., Disp: 30 capsule, Rfl: 11  •  ondansetron ODT (ZOFRAN-ODT) 8 MG disintegrating tablet, Place 8 mg on the tongue Every 8 (Eight) Hours As Needed for Nausea or Vomiting., Disp: , Rfl:   •  sacubitril-valsartan (Entresto) 49-51 MG tablet, Take 1 tablet by mouth 2 (Two) Times a Day., Disp: 60 tablet, Rfl: 11  •   traMADol (ULTRAM) 50 MG tablet, Take 50 mg by mouth 2 (Two) Times a Day., Disp: , Rfl:   •  vitamin C (ASCORBIC ACID) 500 MG tablet, Take 500 mg by mouth daily., Disp: , Rfl:     Social History     Socioeconomic History   • Marital status:    Tobacco Use   • Smoking status: Every Day     Types: Pipe   • Smokeless tobacco: Never   Vaping Use   • Vaping Use: Never used   Substance and Sexual Activity   • Alcohol use: Yes     Comment: occ   • Drug use: No   • Sexual activity: Defer       Review of Systems   Constitutional: Positive for malaise/fatigue (Improving).   HENT: Negative.    Eyes: Negative.    Cardiovascular: Positive for dyspnea on exertion (Improving).   Respiratory: Negative.    Endocrine: Negative.    Hematologic/Lymphatic: Negative.    Skin: Negative.    Musculoskeletal: Negative.    Gastrointestinal: Negative.    Genitourinary: Negative.    Neurological: Negative.    Psychiatric/Behavioral: Negative.    Allergic/Immunologic: Negative.             Objective:     Constitutional:       Appearance: Healthy appearance. Not in distress.   Neck:      Vascular: JVD normal.   Pulmonary:      Effort: Pulmonary effort is normal.      Breath sounds: Normal breath sounds.   Cardiovascular:      PMI at left midclavicular line. Normal rate. Regular rhythm. Normal S1. Normal S2.      Murmurs: There is no murmur.      No gallop. No click. No rub.   Musculoskeletal: Normal range of motion.      Cervical back: Normal range of motion. Skin:     General: Skin is warm and dry.   Neurological:      Mental Status: Alert and oriented to person, place and time.           Procedures  /80 (BP Location: Right arm, Patient Position: Sitting)   Pulse 87   Resp 16   Wt 108 kg (239 lb)   SpO2 94%   BMI 32.41 kg/m²       11/21/22  0921   Weight: 108 kg (239 lb)      Lab Review:   I have reviewed the following lab.      BMP    BMP 11/4/22 11/14/22 11/21/22   BUN 28 (A) 24 (A) 23   Creatinine 1.79 (A) 1.50 (A) 1.50 (A)    Sodium 137 139 142   Potassium 4.7 4.4 5.0   Chloride 102 100 104   CO2 24.0 26.0 30.0 (A)   Calcium 10.1 9.5 9.9   (A) Abnormal value       Comments are available for some flowsheets but are not being displayed.            Lab Results   Component Value Date    CHOL 131 09/22/2022    TRIG 64 09/22/2022    HDL 58 09/22/2022    LDL 60 09/22/2022      Results for orders placed during the hospital encounter of 09/15/22    Adult Transthoracic Echo Complete W/ Cont if Necessary Per Protocol    Interpretation Summary  · The left ventricular cavity is moderately dilated.  · The right atrial cavity is borderline dilated.  · Left ventricular diastolic dysfunction is noted.  · The following left ventricular wall segments are hypokinetic: mid anterior, apical anterior, mid anterolateral, apical lateral, apical inferior, mid inferior, mid anteroseptal and basal anterior. The following left ventricular wall segments are akinetic: apical septal and apex.  · Left ventricular ejection fraction appears to be 31 - 35%. Left ventricular systolic function is severely decreased.    SEVERE ISCHEMIC CARDIOMYOPATHY     Assessment:          Diagnosis Plan   1. HFrEF (heart failure with reduced ejection fraction) ([facility])        2. Essential hypertension        3. Atrial flutter with controlled response ([facility])  ECG 12 Lead Rhythm Change    ECG 12 Lead Rhythm Change      4. Chronic systolic heart failure (CMS/HCC)  Basic Metabolic Panel    Basic Metabolic Panel    Basic Metabolic Panel             Plan:       HFrEF  - Etiology: Ischemic  - LVEF: 30 to 35%  - NYHA Class: II/III  - KCCQ: date 11/21/2022, score 55 (improved from score 45 on last questionnaire)  - 6MWT: date 11/21/2022, walked 106 meters (stopped early due to back pain rather than exertional intolerance.)  - BB: Metoprolol succinate 100 mg daily  - ACEi/ARB/ARNi: We are switching to Entresto 49/51 mg twice daily.  - SGLT2i: Dapagliflozin 10 mg daily  - MRA: Spironolactone held at  previous visit due to hyperkalemia  - Isosorbide/Hydralazine not currently indicated  - Ivabradine: Not currently indicated  - Diuretics: Bumex as needed  - ICD/CRT: Patient has deferred LifeVest.  - IV Iron: We will optimize GDMT before pursuing IV iron.  - Na Restriction: Continue 2g sodium restriction  - Fluid Restriction: Continue 2l fluid restriction  - Daily Weight: Continue daily weights    Stress test from 11/8/2022 reviewed with Dr. Aguirre.  Based on the stress test shows no significant inducible ischemia.  He does show large infarction from previous injury.  We are going to attempt to optimize his GDMT.  We will consider viability study if symptoms do not continue to improve.  Based on his questionnaire results the patient is feeling better compared to previous visits.  His activity tolerance is about the same but he is limited due to musculoskeletal issues.  Today we will switch him to middle dose Entresto today.  He will follow-up with us in 2 weeks.      Hypertension: Patient is slightly hypertensive in the office today we are switching his losartan to Entresto.  He is to monitor blood pressures at home and call us if he sustains blood pressures greater than 140.    Type 2 diabetes: Stable managed by primary care provider.    Atrial flutter: Status post ablation.  EKG in office today shows normal sinus rhythm with a first-degree block.  He cannot feel when he is in a different rhythm.  Plans for follow-up with Dr. Cote in 2 weeks.  Continue on Eliquis 5 mg twice daily.       EMR Dragon/Transcription disclaimer: Much of this encounter note is an electronic transcription/translation of spoken language to printed text. The electronic translation of spoken language may permit erroneous, or at times, nonsensical words or phrases to be inadvertently transcribed; although I have reviewed the note for such errors, some may still exist.   I spent 30 minutes caring for Mani on this date of service. This time  includes time spent by me in the following activities:preparing for the visit, reviewing tests, obtaining and/or reviewing a separately obtained history, performing a medically appropriate examination and/or evaluation , counseling and educating the patient/family/caregiver, ordering medications, tests, or procedures, referring and communicating with other health care professionals , documenting information in the medical record, independently interpreting results and communicating that information with the patient/family/caregiver and care coordination

## 2022-11-21 NOTE — PROGRESS NOTES
[facility] Heart Failure Clinic    Mani is a 77 y.o. male, who is followed by the Heart Failure Clinic.        Chief Complaint: No chief complaint on file.      HPI:  Congestive Heart Failure  Patient presents for follow-up visit for congestive heart failure.He states he is compliant all of the time with his medications.    Current Regimen:  Heart Failure  •  losartan (COZAAR) 100 MG tablet, Take 100 mg by mouth daily  •  bumetanide (BUMEX) 1 MG tablet, Take 1 tablet by mouth Daily As Needed. For weight gain.  •  metoprolol succinate XL (TOPROL-XL) 100 MG 24 hr tablet, Take 1 tablet by mouth Daily  •  dapagliflozin Propanediol 10 MG tablet, Take 10 mg by mouth Daily.    Other CV Meds  •  apixaban (ELIQUIS) 5 MG tablet tablet, Take 1 tablet by mouth 2 (Two) Times a Day  •  atorvastatin (LIPITOR) 80 MG tablet, Take 1 tablet by mouth Daily. (Patient taking differently: Take 1 tablet by mouth Every Night.)      Medication Adherence    Adherence tools used: medication list  Support network for adherence: family member           OBJECTIVE:  /88 (BP Location: Right arm, Patient Position: Sitting)   Pulse 76   Resp 16   Wt 108 kg (239 lb)   SpO2 98%   BMI 32.41 kg/m²     Body mass index is 32.41 kg/m².  Wt Readings from Last 1 Encounters:   11/21/22 108 kg (239 lb)       Home BP Readings: BROUGHT INTO CLINIC TODAY  Home Pulse Readings: BROUGHT INTO CLINIC TODAY  Home Daily Weight Log: BROUGHT INTO CLINIC TODAY      Lab Review:  Renal Function: Estimated Creatinine Clearance: 54 mL/min (A) (by C-G formula based on SCr of 1.5 mg/dL (H)).    Lab Results   Component Value Date    GLUCOSE 208 (H) 11/14/2022    CALCIUM 9.5 11/14/2022     11/14/2022    K 4.4 11/14/2022    CO2 26.0 11/14/2022     11/14/2022    BUN 24 (H) 11/14/2022    CREATININE 1.50 (H) 11/14/2022    EGFRIFNONA 61 03/16/2017    BCR 16.0 11/14/2022    ANIONGAP 13.0 11/14/2022     Lab Results   Component Value Date    MG 2.0  10/26/2022     Lab Results   Component Value Date    PROBNP 1,473.0 11/04/2022       Results for orders placed during the hospital encounter of 09/15/22    Adult Transthoracic Echo Complete W/ Cont if Necessary Per Protocol    Interpretation Summary  · The left ventricular cavity is moderately dilated.  · The right atrial cavity is borderline dilated.  · Left ventricular diastolic dysfunction is noted.  · The following left ventricular wall segments are hypokinetic: mid anterior, apical anterior, mid anterolateral, apical lateral, apical inferior, mid inferior, mid anteroseptal and basal anterior. The following left ventricular wall segments are akinetic: apical septal and apex.  · Left ventricular ejection fraction appears to be 31 - 35%. Left ventricular systolic function is severely decreased.    SEVERE ISCHEMIC CARDIOMYOPATHY        ASSESSMENT/PLAN OF CARE:  Changes to medications: yes    - APRN instructed to stop losartan and start Entresto 49/51 mg twice daily.   - APRN educated patient to continue to monitor weight and blood pressure daily.  - APRN educated to continue sodium and fluid restrictions.  - APRN instructed to continue Bumex as needed.  - APRN instructed to continue other medications.    Follow-up: 2 week(s)    Thank you for allowing me to participate in the care of your patient. Patient was instructed to contact our Heart Failure Clinic if they have any concerns before their next clinical visit.      Martha Riojas, Alex  [facility] Heart Failure Clinic  11/21/22  09:44 CST

## 2022-12-01 ENCOUNTER — PREP FOR SURGERY (OUTPATIENT)
Dept: OTHER | Facility: HOSPITAL | Age: 77
End: 2022-12-01

## 2022-12-01 ENCOUNTER — OFFICE VISIT (OUTPATIENT)
Dept: CARDIOLOGY | Facility: CLINIC | Age: 77
End: 2022-12-01

## 2022-12-01 VITALS
SYSTOLIC BLOOD PRESSURE: 162 MMHG | HEART RATE: 104 BPM | HEIGHT: 72 IN | WEIGHT: 238 LBS | RESPIRATION RATE: 18 BRPM | OXYGEN SATURATION: 98 % | DIASTOLIC BLOOD PRESSURE: 100 MMHG | BODY MASS INDEX: 32.23 KG/M2

## 2022-12-01 DIAGNOSIS — I48.4 ATYPICAL ATRIAL FLUTTER: Primary | ICD-10-CM

## 2022-12-01 DIAGNOSIS — I50.22 CHRONIC SYSTOLIC HEART FAILURE: ICD-10-CM

## 2022-12-01 PROCEDURE — 93000 ELECTROCARDIOGRAM COMPLETE: CPT | Performed by: STUDENT IN AN ORGANIZED HEALTH CARE EDUCATION/TRAINING PROGRAM

## 2022-12-01 PROCEDURE — 99214 OFFICE O/P EST MOD 30 MIN: CPT | Performed by: STUDENT IN AN ORGANIZED HEALTH CARE EDUCATION/TRAINING PROGRAM

## 2022-12-01 RX ORDER — SODIUM CHLORIDE 9 MG/ML
40 INJECTION, SOLUTION INTRAVENOUS AS NEEDED
Status: CANCELLED | OUTPATIENT
Start: 2022-12-01

## 2022-12-01 RX ORDER — SODIUM CHLORIDE 0.9 % (FLUSH) 0.9 %
10 SYRINGE (ML) INJECTION EVERY 12 HOURS SCHEDULED
Status: CANCELLED | OUTPATIENT
Start: 2022-12-01

## 2022-12-01 RX ORDER — SODIUM CHLORIDE 0.9 % (FLUSH) 0.9 %
10 SYRINGE (ML) INJECTION AS NEEDED
Status: CANCELLED | OUTPATIENT
Start: 2022-12-01

## 2022-12-01 NOTE — PATIENT INSTRUCTIONS
Repeat ablation Dec 29th  Follow up in clinic Jan 10th  Don't stop your blood thinner prior to the procedure

## 2022-12-01 NOTE — PROGRESS NOTES
"Chief Complaint  Atrial Flutter (2 wk - s/p ablation)    Subjective        History of Present Illness    EP History:  1.  Typical atrial flutter  -11/14/22:  CTI ablation  2.  Atypical atrial flutter     Cardiology history:  1.  Hypertension  2.  Hyperlipidemia  3.  Coronary artery disease w/ Prior MI and PCI  4.  Chronic systolic heart failure  -9/7/2022: EF 31 to 35%  5.  COTY with carotid endarterectomy (2004)     Medical History:   1.  Type 2 diabetes  3.  Obesity, BMI 31      Mani Arreola is a 77 y.o. male with problem list as above who presents to the clinic for follow up of atrial flutter s/p CTI ablation.  He has been doing about the same since the time of the ablation.  Still has some shortness of breath and fatigue.  Taking all medications as prescribed.    Objective   Vital Signs:  /100 (BP Location: Right arm, Patient Position: Sitting)   Pulse 104   Resp 18   Ht 182.9 cm (72\")   Wt 108 kg (238 lb)   SpO2 98%   BMI 32.28 kg/m²   Estimated body mass index is 32.28 kg/m² as calculated from the following:    Height as of this encounter: 182.9 cm (72\").    Weight as of this encounter: 108 kg (238 lb).      Physical Exam  Vitals reviewed.   Constitutional:       Appearance: Normal appearance.   HENT:      Head: Normocephalic and atraumatic.   Eyes:      Extraocular Movements: Extraocular movements intact.      Conjunctiva/sclera: Conjunctivae normal.   Cardiovascular:      Rate and Rhythm: Tachycardia present. Rhythm irregular.      Pulses: Normal pulses.      Heart sounds: Normal heart sounds.   Pulmonary:      Effort: Pulmonary effort is normal.      Breath sounds: Normal breath sounds.   Musculoskeletal:         General: No swelling.   Neurological:      General: No focal deficit present.      Mental Status: He is alert and oriented to person, place, and time.   Psychiatric:         Mood and Affect: Mood normal.         Judgment: Judgment normal.        Result Review :      ECG 12 " Lead    Date/Time: 12/1/2022 10:18 AM  Performed by: Francisca Cote MD  Authorized by: Francisca Cote MD   Comparison: compared with previous ECG from 11/14/2022  Comparison to previous ECG: Atypical atrial flutter has replaced typical atrial flutter  Rhythm: atrial flutter  Rate: tachycardic  BPM: 104  Conduction: conduction normal  QRS axis: normal  Other findings: non-specific ST-T wave changes    Clinical impression: abnormal EKG           CHADVASC2 SCORE   No data recorded       Assessment and Plan   Diagnoses and all orders for this visit:    1. Atypical atrial flutter (HCC) (Primary)    2. Chronic systolic heart failure (CMS/HCC)    Other orders  -     ECG 12 Lead        Mani Arreola is a 77 y.o. male with problem list as above who presents to the clinic for follow up of atrial flutter.  Unfortunately, he appears to be back in atrial flutter today despite an apparently successful typical flutter ablation recently.  The flutter today appears more atypical on the ECG as well.  I think that given his HFrEF and ongoing AFL, he would benefit from a repeat EPS to see if this is CTI recurrence or an atypical flutter from elsewhere.    I have discussed risks, benefits, and alternatives of an electrophysiology study and ablation for atrial flutter with the patient.  Alternatives discussed include continued observation and medical management.  An electrophysiology study with ablation is an inherently high risk procedure with possible complications that include but are not limited to vascular access complications, internal bleeding, tamponade requiring pericardiocentesis or cardiac surgery, stroke, MI, embolism, myocardial injury, injury to the normal conduction system requiring a pacemaker, and ultimately death.  We also discussed that given the nature of the procedure, therapeutic efficacy can be variable.  Possibilities of recurrent arrhythmias and the possible need for additional procedures and/or medical therapy was  discussed. Questions asked were appropriately answered.  No guarantees were made or implied.     Despite this, they would still like to proceed.      Plan:  - Schedule for EPS/ablation in late December  - Continue oral anticoagulation  - No additional medication changes at this time         Follow Up   Return in about 6 weeks (around 1/10/2023).  Patient was given instructions and counseling regarding his condition or for health maintenance advice. Please see specific information pulled into the AVS if appropriate.     Part of this note may be an electronic transcription/translation of spoken language to printed text using the Dragon Dictation System.

## 2022-12-05 ENCOUNTER — HOSPITAL ENCOUNTER (OUTPATIENT)
Dept: CARDIOLOGY | Facility: HOSPITAL | Age: 77
Discharge: HOME | End: 2022-12-05

## 2022-12-05 VITALS
DIASTOLIC BLOOD PRESSURE: 100 MMHG | BODY MASS INDEX: 32.41 KG/M2 | HEART RATE: 100 BPM | OXYGEN SATURATION: 99 % | SYSTOLIC BLOOD PRESSURE: 170 MMHG | WEIGHT: 239 LBS

## 2022-12-05 DIAGNOSIS — I25.10 CORONARY ARTERY DISEASE INVOLVING NATIVE CORONARY ARTERY OF NATIVE HEART WITHOUT ANGINA PECTORIS: Chronic | ICD-10-CM

## 2022-12-05 DIAGNOSIS — I10 PRIMARY HYPERTENSION: ICD-10-CM

## 2022-12-05 DIAGNOSIS — I50.22 CHRONIC SYSTOLIC HEART FAILURE: Primary | ICD-10-CM

## 2022-12-05 DIAGNOSIS — Z59.9 NEED FOR FINANCIAL SUPPORT: ICD-10-CM

## 2022-12-05 DIAGNOSIS — I48.4 ATYPICAL ATRIAL FLUTTER: ICD-10-CM

## 2022-12-05 LAB
ANION GAP SERPL CALCULATED.3IONS-SCNC: 13 MMOL/L (ref 5–15)
BUN SERPL-MCNC: 29 MG/DL (ref 8–23)
BUN/CREAT SERPL: 20.4 (ref 7–25)
CALCIUM SPEC-SCNC: 9.2 MG/DL (ref 8.6–10.5)
CHLORIDE SERPL-SCNC: 104 MMOL/L (ref 98–107)
CO2 SERPL-SCNC: 26 MMOL/L (ref 22–29)
CREAT SERPL-MCNC: 1.42 MG/DL (ref 0.76–1.27)
EGFRCR SERPLBLD CKD-EPI 2021: 51.5 ML/MIN/1.73
GLUCOSE SERPL-MCNC: 244 MG/DL (ref 65–99)
POTASSIUM SERPL-SCNC: 4.1 MMOL/L (ref 3.5–5.2)
SODIUM SERPL-SCNC: 143 MMOL/L (ref 136–145)

## 2022-12-05 PROCEDURE — 80048 BASIC METABOLIC PNL TOTAL CA: CPT

## 2022-12-05 PROCEDURE — 99215 OFFICE O/P EST HI 40 MIN: CPT | Performed by: HOSPITALIST

## 2022-12-05 PROCEDURE — G0463 HOSPITAL OUTPT CLINIC VISIT: HCPCS

## 2022-12-05 RX ORDER — SACUBITRIL AND VALSARTAN 97; 103 MG/1; MG/1
1 TABLET, FILM COATED ORAL 2 TIMES DAILY
Qty: 60 TABLET | Refills: 11 | Status: SHIPPED | OUTPATIENT
Start: 2022-12-05 | End: 2022-12-29 | Stop reason: HOSPADM

## 2022-12-05 SDOH — ECONOMIC STABILITY - INCOME SECURITY: PROBLEM RELATED TO HOUSING AND ECONOMIC CIRCUMSTANCES, UNSPECIFIED: Z59.9

## 2022-12-05 NOTE — PROGRESS NOTES
Reason For Visit:  Follow-up CHF    Subjective        Mani Arreola is a 77 y.o. male with a PMH of CAD s/p PCI, HTN, HLD, T2DM, and carotid stenosis s/p CEA who presents for follow-up of his HFrEF.    Today, Mr. Arreola reports that he is doing well without significant chest pain, shortness of breath, lightheadedness, palpitations, orthopnea, or peripheral edema.  He has been monitoring his weight daily and taking Bumex PRN; he takes Bumex infrequently although did take it 3 days in a row in the past week with good response.  Of note, he reports taking the Bumex for weight gain but did not experience any significant symptoms.  Unfortunately, he did have recurrence of his atrial flutter, and he is scheduled to have another ablation with Dr. Cote in late December.  On his home log, his HR was in the 50s to 70s primarily when in sinus rhythm and is now back to the 90s to low 100s and atrial flutter.  BP on his home log is on the higher side with systolics primarily 130s to 140s (occasionally 150s) and diastolics 80s to 90s.    The patient does report some financial difficulties related to his medications.  His Eliquis, Dapagliflozin, and Entresto were all expensive.  He was able to use a 30-day free trial card for Entresto, but is concerned about the refill.  He was denied patient assistance for the Dapagliflozin for unclear reasons as he previously had been approved for Xigduo.    Prior history:  Patient developed lower extremity edema spring 2022 and was intermittently treated with Lasix titrated up to 40 mg daily.  Echo 9/15/2022 demonstrated severely reduced LV systolic function.  He was otherwise relatively asymptomatic and was referred to heart failure clinic for further evaluation and management. At initial visit 9/23/2022 he was noted to be in rate controlled A. Fib v flutter.     -9/23/2022: Increase metoprolol 50->100mg daily, continue losartan 100 mg daily, start Dapagliflozin 10 mg daily, transition from  Lasix to Bumex 2 mg daily, start Eliquis 5 mg BID, transition from pravastatin to atorvastatin, stop pioglitazone  - 10/3/2022: Weight down 16 pounds (236), swelling resolved, mild nausea.  Increase metoprolol to 100 mg daily, start spironolactone 25 mg daily, decrease Bumex to 1.5 mg daily  - 10/10/2022: Weight up to 240, mild swelling, nausea improved.  Spironolactone stopped due to K up to 5.0.  Creatinine slightly increased.  - 10/17/2022: Weight down to 232 pounds in office (home scale is about 7 to 8 pounds lower than clinic scale).  CR elevated and patient having ongoing significant nausea and generally feeling unwell.  Concern for DKA, but labs were ultimately not consistent with this.  UCX grew bacteria, but patient without urinary symptoms and was not treated.  Patient given IVF and Bumex transitioned to PRN for weight gain.  Amlodipine stopped.  Xigduo held.  - 10/26/2022: Restarted Dapagliflozin 10 mg daily (without metformin combo pill/Xigduo)  - 11/4/2022: Feeling well without recurrent nausea.  No med changes pending upcoming ablation.  - 11/14/2022: Atrial flutter ablation with Dr. Cote  - 11/21/2022: Continued to feel well.  Taking Bumex PRN a couple of times a week.  Switched from losartan to Entresto 49/51 mg twice daily.    ROS: Pertinent positives and negatives included in the HPI    Pertinent past medical, surgical, family, and social history were reviewed and updated in the EMR.      Current Outpatient Medications:   •  acetaminophen (TYLENOL) 650 MG 8 hr tablet, Take 1,300 mg by mouth 2 (Two) Times a Day., Disp: , Rfl:   •  apixaban (ELIQUIS) 5 MG tablet tablet, Take 1 tablet by mouth 2 (Two) Times a Day., Disp: 60 tablet, Rfl: 11  •  atorvastatin (LIPITOR) 80 MG tablet, Take 1 tablet by mouth Daily. (Patient taking differently: Take 80 mg by mouth Every Night.), Disp: 90 tablet, Rfl: 3  •  bumetanide (BUMEX) 1 MG tablet, Take 1 tablet by mouth Daily As Needed. For weight gain. (Patient  "taking differently: Take 1 mg by mouth Daily As Needed. Takes if daily weight > 230lbs), Disp: 30 tablet, Rfl: 11  •  dapagliflozin Propanediol 10 MG tablet, Take 10 mg by mouth Daily., Disp: 30 tablet, Rfl: 11  •  diphenhydrAMINE (BENADRYL) 25 mg capsule, Take 50 mg by mouth At Night As Needed for Itching. Patient states he takes them regularly., Disp: , Rfl:   •  folic acid (FOLVITE) 400 MCG tablet, Take 400 mcg by mouth daily., Disp: , Rfl:   •  metoprolol succinate XL (TOPROL-XL) 100 MG 24 hr tablet, Take 1 tablet by mouth Daily., Disp: 30 tablet, Rfl: 11  •  Multiple Vitamins-Minerals (MULTIVITAMIN ADULT PO), Take 1 tablet by mouth Daily., Disp: , Rfl:   •  Omega-3 Fatty Acids (FISH OIL PO), Take 1,000 mg by mouth Every Night., Disp: , Rfl:   •  omeprazole (priLOSEC) 20 MG capsule, Take 2 capsules by mouth Daily., Disp: 30 capsule, Rfl: 11  •  ondansetron ODT (ZOFRAN-ODT) 8 MG disintegrating tablet, Place 8 mg on the tongue Every 8 (Eight) Hours As Needed for Nausea or Vomiting., Disp: , Rfl:   •  sacubitril-valsartan (Entresto)  MG tablet, Take 1 tablet by mouth 2 (Two) Times a Day., Disp: 60 tablet, Rfl: 11  •  traMADol (ULTRAM) 50 MG tablet, Take 50 mg by mouth 2 (Two) Times a Day., Disp: , Rfl:   •  vitamin C (ASCORBIC ACID) 500 MG tablet, Take 500 mg by mouth daily., Disp: , Rfl:      Objective   Vital Signs:  /100 (BP Location: Left arm, Patient Position: Sitting)   Pulse 100   Wt 108 kg (239 lb)   SpO2 99%   BMI 32.41 kg/m²   Estimated body mass index is 32.41 kg/m² as calculated from the following:    Height as of 12/1/22: 182.9 cm (72\").    Weight as of this encounter: 108 kg (239 lb).      Constitutional:       Appearance: Healthy appearance. Not in distress.   Neck:      Vascular: JVD normal.   Pulmonary:      Effort: Pulmonary effort is normal.      Breath sounds: Normal breath sounds.   Cardiovascular:      Borderline tachycardic Regular rhythm.      Murmurs: There is no murmur.     "  No gallop. No click. No rub.   Edema:     Pretibial: bilateral trace edema of the pretibial area.  Abdominal:      General: There is no distension.      Palpations: Abdomen is soft.      Tenderness: There is no abdominal tenderness.   Skin:     General: Skin is warm and dry.   Neurological:      Mental Status: Alert and oriented to person, place and time.        Result Review :  The following data was reviewed by: Dominic Aguirre MD on 12/05/2022:  CMP   CMP    CMP 11/14/22 11/21/22 12/5/22   Glucose 208 (A) 203 (A) 244 (A)   BUN 24 (A) 23 29 (A)   Creatinine 1.50 (A) 1.50 (A) 1.42 (A)   Sodium 139 142 143   Potassium 4.4 5.0 4.1   Chloride 100 104 104   Calcium 9.5 9.9 9.2   (A) Abnormal value       Comments are available for some flowsheets but are not being displayed.           CBC   CBC    CBC 9/22/22 11/14/22   WBC 5.52 5.81   RBC 4.50 5.30   Hemoglobin 12.8 (A) 15.2   Hematocrit 41.4 47.7   MCV 92.0 90.0   MCH 28.4 28.7   MCHC 30.9 (A) 31.9   RDW 15.8 (A) 15.5 (A)   Platelets 150 169   (A) Abnormal value                     Stress Test With Myocardial Perfusion (1 Day) (11/08/2022 12:11)  •  Large anterior infarction involving the proximal to mid left anterior descending coronary artery distribution.  Hypokinesis to akinesis in this distribution  •  There is no significant inducible ischemia  •  Left ventricular ejection fraction is moderately reduced (Calculated EF = 33%).  •  Refer to bull's-eye diagram below    Adult Transthoracic Echo Complete W/ Cont if Necessary Per Protocol (09/15/2022 10:03)  • The left ventricular cavity is moderately dilated.  • The right atrial cavity is borderline dilated.  • Left ventricular diastolic dysfunction is noted.  • The following left ventricular wall segments are hypokinetic: mid anterior, apical anterior, mid anterolateral, apical lateral, apical inferior, mid inferior, mid anteroseptal and basal anterior. The following left ventricular wall segments are akinetic:  apical septal and apex.  • Left ventricular ejection fraction appears to be 31 - 35%. Left ventricular systolic function is severely decreased.         Assessment and Plan   Diagnoses and all orders for this visit:    1. Chronic systolic heart failure (CMS/HCC) (Primary)  -     Basic Metabolic Panel; Future  -     Basic Metabolic Panel; Standing  -     Basic Metabolic Panel  -     MRI Cardiac Function Complete With & Without Morphology; Future    2. Primary hypertension    3. Coronary artery disease involving native coronary artery of native heart without angina pectoris  -     MRI Cardiac Function Complete With & Without Morphology; Future    4. Atypical atrial flutter (HCC)    5. Need for financial support    Other orders  -     sacubitril-valsartan (Entresto)  MG tablet; Take 1 tablet by mouth 2 (Two) Times a Day.  Dispense: 60 tablet; Refill: 11      # HFrEF  - Etiology: Ischemic   Stress test demonstrated evidence of LAD territory scar.  Unclear if this is related to late presentation with initial stent several years ago versus recurrent obstructive CAD.  Plan for cardiac MRI to assess for viability which will also reassess LVEF for guidance of potential ICD.  - LVEF: 30 to 35%  - NYHA Class: II  - KCCQ: date 11/21/2022, score 55 (improved from score 45 on prior questionnaire).  Not updated today  - 6MWT: date 11/21/2022, walked 106 meters (stopped early due to back pain rather than exertional intolerance.)  Not updated today  - BB: Metoprolol succinate 100 mg daily  - ACEi/ARB/ARNi:  Increase Entresto to 97/103 mg twice daily.  Plan to make this adjustment at next prescription refill after completion of 49/51 mg dose while awaiting prescription drug assistance paperwork due to cost concerns.  I did advise daily BP monitoring and to notify the clinic if SBP frequently more than 150 or DBP more than 90 for consideration of increasing Entresto dose sooner.  - SGLT2i: Dapagliflozin 10 mg daily  - MRA:  Spironolactone held at previous visit due to hyperkalemia.  K 4.1 today, but will hold off on restarting spironolactone until more consistent stability is demonstrated.  - Isosorbide/Hydralazine: not currently indicated, but could consider if BP remains elevated after maximizing Entresto dose.  - Ivabradine: Not currently indicated  - Diuretics: Bumex as needed  - ICD/CRT: Patient has deferred LifeVest.  - IV Iron: We will optimize GDMT before pursuing IV iron.    I do suspect that Mr. Arreola will do better in sinus rhythm, and I agree with a repeat ablation with Dr. Cote later this month.    Time spent during this visit communicating with our pharmacist, RN, and Dapagliflozin representative regarding options for patient assistance and other financial means to assist with obtaining his several expensive medications.  He was also provided with co-pay cards for Entresto, Eliquis, and Dapagliflozin but it is unclear whether or not he will qualify based on his insurance coverage.     I spent 45 minutes caring for Mani on this date of service. This time includes time spent by me in the following activities:preparing for the visit, reviewing tests, obtaining and/or reviewing a separately obtained history, performing a medically appropriate examination and/or evaluation , counseling and educating the patient/family/caregiver, ordering medications, tests, or procedures, referring and communicating with other health care professionals , documenting information in the medical record and care coordination    Follow Up   Return in about 5 weeks (around 1/9/2023).  Patient was given instructions and counseling regarding his condition or for health maintenance advice. Please see specific information pulled into the AVS if appropriate.       EMR Dragon/Transcription disclaimer: Much of this encounter note is an electronic transcription/translation of spoken language to printed text. The electronic translation of spoken language  may permit erroneous, or at times, nonsensical words or phrases to be inadvertently transcribed; although I have reviewed the note for such errors, some may still exist.

## 2022-12-05 NOTE — PROGRESS NOTES
[facility]    Mani is a 77 y.o. male, who is followed by the Heart Failure Clinic.        Chief Complaint:   Chief Complaint   Patient presents with   • Chronic systolic heart failure     2 wk follow up        HPI:  Congestive Heart Failure  Patient presents for follow-up visit for congestive heart failure.He states he is compliant all of the time with his medications.     Current Regimen:    Heart Failure  Entresto 49/51 BID   Metoprolol Succinate 100mg daily  Bumetanide 1mg daily PRN (takes if daily weight >230 lbs)  Dapagliflozin 10mg daily     Other CV Meds  Apixaban 5mg BID  Atorvastatin 80mg daily     Medication Adherence    What concerns does the patient have in regards to their medications: Patient has no issues or concerns with medications at this time. Patient no longer having issues with upset stomach as previously described at previous visit.   Any gaps in refill history greater than 2 weeks in the last 3 months: no  Demonstrates understanding of importance of adherence: yes  Informant: significant other  Reliability of informant: reliable  Provider-estimated medication adherence level: %  Adherence tools used: patient uses a pill box to manage medications, medication list  Support network for adherence: family member           OBJECTIVE:  /100 (BP Location: Left arm, Patient Position: Sitting)   Pulse 100   Wt 108 kg (239 lb)   SpO2 99%   BMI 32.41 kg/m²     Body mass index is 32.41 kg/m².  Wt Readings from Last 1 Encounters:   12/05/22 108 kg (239 lb)       Home BP Readings: ***  Home Pulse Readings: ***  Home Daily Weight Log: ***      Lab Review:  Renal Function: Estimated Creatinine Clearance: 57.1 mL/min (A) (by C-G formula based on SCr of 1.42 mg/dL (H)).    Lab Results   Component Value Date    GLUCOSE 244 (H) 12/05/2022    CALCIUM 9.2 12/05/2022     12/05/2022    K 4.1 12/05/2022    CO2 26.0 12/05/2022     12/05/2022    BUN 29 (H)  12/05/2022    CREATININE 1.42 (H) 12/05/2022    EGFRIFNONA 61 03/16/2017    BCR 20.4 12/05/2022    ANIONGAP 13.0 12/05/2022     Lab Results   Component Value Date    MG 2.0 10/26/2022     Lab Results   Component Value Date    PROBNP 1,473.0 11/04/2022       Results for orders placed during the hospital encounter of 09/15/22    Adult Transthoracic Echo Complete W/ Cont if Necessary Per Protocol    Interpretation Summary  · The left ventricular cavity is moderately dilated.  · The right atrial cavity is borderline dilated.  · Left ventricular diastolic dysfunction is noted.  · The following left ventricular wall segments are hypokinetic: mid anterior, apical anterior, mid anterolateral, apical lateral, apical inferior, mid inferior, mid anteroseptal and basal anterior. The following left ventricular wall segments are akinetic: apical septal and apex.  · Left ventricular ejection fraction appears to be 31 - 35%. Left ventricular systolic function is severely decreased.    SEVERE ISCHEMIC CARDIOMYOPATHY        ASSESSMENT/PLAN OF CARE:    Patient states he is feeling good overall.     - MD plans to increase Entresto to 97/103mg BID  - Spironolactone remains on hold   Encouraged patient to continue daily weights.   Will involve Patient Assistance Technician in case to assist with Entresto/Eliquis/Jardiance assistance paperwork if applicable.       Follow-up: 1/09/22 at 10:00     Thank you for allowing me to participate in the care of your patient. Patient was instructed to contact our Heart Failure Clinic if they have any concerns before their next clinical visit.      Tonny Cardona, PharmD  [facility]  12/05/22  14:15 CST

## 2022-12-29 ENCOUNTER — HOSPITAL ENCOUNTER (OUTPATIENT)
Facility: HOSPITAL | Age: 77
Setting detail: HOSPITAL OUTPATIENT SURGERY
Discharge: HOME | End: 2022-12-29
Attending: STUDENT IN AN ORGANIZED HEALTH CARE EDUCATION/TRAINING PROGRAM | Admitting: STUDENT IN AN ORGANIZED HEALTH CARE EDUCATION/TRAINING PROGRAM

## 2022-12-29 ENCOUNTER — ANESTHESIA EVENT (OUTPATIENT)
Dept: CARDIOLOGY | Facility: HOSPITAL | Age: 77
End: 2022-12-29

## 2022-12-29 ENCOUNTER — ANESTHESIA (OUTPATIENT)
Dept: CARDIOLOGY | Facility: HOSPITAL | Age: 77
End: 2022-12-29

## 2022-12-29 VITALS
HEART RATE: 64 BPM | SYSTOLIC BLOOD PRESSURE: 148 MMHG | DIASTOLIC BLOOD PRESSURE: 92 MMHG | OXYGEN SATURATION: 99 % | WEIGHT: 230.8 LBS | BODY MASS INDEX: 31.26 KG/M2 | TEMPERATURE: 96.9 F | HEIGHT: 72 IN | RESPIRATION RATE: 18 BRPM

## 2022-12-29 DIAGNOSIS — I48.4 ATYPICAL ATRIAL FLUTTER: ICD-10-CM

## 2022-12-29 PROBLEM — I50.22 CHRONIC HFREF (HEART FAILURE WITH REDUCED EJECTION FRACTION): Status: ACTIVE | Noted: 2022-12-29

## 2022-12-29 LAB
ANION GAP SERPL CALCULATED.3IONS-SCNC: 11 MMOL/L (ref 5–15)
BASOPHILS # BLD AUTO: 0.03 10*3/MM3 (ref 0–0.2)
BASOPHILS NFR BLD AUTO: 0.4 % (ref 0–1.5)
BUN SERPL-MCNC: 20 MG/DL (ref 8–23)
BUN/CREAT SERPL: 14.6 (ref 7–25)
CALCIUM SPEC-SCNC: 8.9 MG/DL (ref 8.6–10.5)
CHLORIDE SERPL-SCNC: 105 MMOL/L (ref 98–107)
CO2 SERPL-SCNC: 26 MMOL/L (ref 22–29)
CREAT SERPL-MCNC: 1.37 MG/DL (ref 0.76–1.27)
DEPRECATED RDW RBC AUTO: 50.1 FL (ref 37–54)
EGFRCR SERPLBLD CKD-EPI 2021: 53.8 ML/MIN/1.73
EOSINOPHIL # BLD AUTO: 0.2 10*3/MM3 (ref 0–0.4)
EOSINOPHIL NFR BLD AUTO: 2.9 % (ref 0.3–6.2)
ERYTHROCYTE [DISTWIDTH] IN BLOOD BY AUTOMATED COUNT: 15.4 % (ref 12.3–15.4)
GLUCOSE SERPL-MCNC: 199 MG/DL (ref 65–99)
HCT VFR BLD AUTO: 45.8 % (ref 37.5–51)
HGB BLD-MCNC: 14.2 G/DL (ref 13–17.7)
IMM GRANULOCYTES # BLD AUTO: 0.02 10*3/MM3 (ref 0–0.05)
IMM GRANULOCYTES NFR BLD AUTO: 0.3 % (ref 0–0.5)
INR PPP: 1.46 (ref 0.91–1.09)
LYMPHOCYTES # BLD AUTO: 0.95 10*3/MM3 (ref 0.7–3.1)
LYMPHOCYTES NFR BLD AUTO: 13.7 % (ref 19.6–45.3)
MCH RBC QN AUTO: 27.7 PG (ref 26.6–33)
MCHC RBC AUTO-ENTMCNC: 31 G/DL (ref 31.5–35.7)
MCV RBC AUTO: 89.5 FL (ref 79–97)
MONOCYTES # BLD AUTO: 0.61 10*3/MM3 (ref 0.1–0.9)
MONOCYTES NFR BLD AUTO: 8.8 % (ref 5–12)
NEUTROPHILS NFR BLD AUTO: 5.14 10*3/MM3 (ref 1.7–7)
NEUTROPHILS NFR BLD AUTO: 73.9 % (ref 42.7–76)
NRBC BLD AUTO-RTO: 0 /100 WBC (ref 0–0.2)
PLATELET # BLD AUTO: 210 10*3/MM3 (ref 140–450)
PMV BLD AUTO: 10.3 FL (ref 6–12)
POTASSIUM SERPL-SCNC: 4 MMOL/L (ref 3.5–5.2)
PROTHROMBIN TIME: 17.9 SECONDS (ref 11.8–14.8)
RBC # BLD AUTO: 5.12 10*6/MM3 (ref 4.14–5.8)
SODIUM SERPL-SCNC: 142 MMOL/L (ref 136–145)
WBC NRBC COR # BLD: 6.95 10*3/MM3 (ref 3.4–10.8)

## 2022-12-29 PROCEDURE — 93662 INTRACARDIAC ECG (ICE): CPT | Performed by: STUDENT IN AN ORGANIZED HEALTH CARE EDUCATION/TRAINING PROGRAM

## 2022-12-29 PROCEDURE — C1732 CATH, EP, DIAG/ABL, 3D/VECT: HCPCS | Performed by: STUDENT IN AN ORGANIZED HEALTH CARE EDUCATION/TRAINING PROGRAM

## 2022-12-29 PROCEDURE — C1760 CLOSURE DEV, VASC: HCPCS | Performed by: STUDENT IN AN ORGANIZED HEALTH CARE EDUCATION/TRAINING PROGRAM

## 2022-12-29 PROCEDURE — C1730 CATH, EP, 19 OR FEW ELECT: HCPCS | Performed by: STUDENT IN AN ORGANIZED HEALTH CARE EDUCATION/TRAINING PROGRAM

## 2022-12-29 PROCEDURE — 25010000002 HEPARIN (PORCINE) PER 1000 UNITS

## 2022-12-29 PROCEDURE — C1894 INTRO/SHEATH, NON-LASER: HCPCS | Performed by: STUDENT IN AN ORGANIZED HEALTH CARE EDUCATION/TRAINING PROGRAM

## 2022-12-29 PROCEDURE — 93653 COMPRE EP EVAL TX SVT: CPT | Performed by: STUDENT IN AN ORGANIZED HEALTH CARE EDUCATION/TRAINING PROGRAM

## 2022-12-29 PROCEDURE — 85610 PROTHROMBIN TIME: CPT | Performed by: STUDENT IN AN ORGANIZED HEALTH CARE EDUCATION/TRAINING PROGRAM

## 2022-12-29 PROCEDURE — 93623 PRGRMD STIMJ&PACG IV RX NFS: CPT | Performed by: STUDENT IN AN ORGANIZED HEALTH CARE EDUCATION/TRAINING PROGRAM

## 2022-12-29 PROCEDURE — C1759 CATH, INTRA ECHOCARDIOGRAPHY: HCPCS | Performed by: STUDENT IN AN ORGANIZED HEALTH CARE EDUCATION/TRAINING PROGRAM

## 2022-12-29 PROCEDURE — 25010000002 PROPOFOL 1000 MG/100ML EMULSION

## 2022-12-29 PROCEDURE — 93462 L HRT CATH TRNSPTL PUNCTURE: CPT | Performed by: STUDENT IN AN ORGANIZED HEALTH CARE EDUCATION/TRAINING PROGRAM

## 2022-12-29 PROCEDURE — 25010000002 HEPARIN (PORCINE) 1000-0.9 UT/500ML-% SOLUTION: Performed by: STUDENT IN AN ORGANIZED HEALTH CARE EDUCATION/TRAINING PROGRAM

## 2022-12-29 PROCEDURE — 93656 COMPRE EP EVAL ABLTJ ATR FIB: CPT | Performed by: STUDENT IN AN ORGANIZED HEALTH CARE EDUCATION/TRAINING PROGRAM

## 2022-12-29 PROCEDURE — 25010000002 PROPOFOL 10 MG/ML EMULSION

## 2022-12-29 PROCEDURE — 85347 COAGULATION TIME ACTIVATED: CPT

## 2022-12-29 PROCEDURE — 25010000002 HEPARIN (PORCINE) 2000-0.9 UNIT/L-% SOLUTION: Performed by: STUDENT IN AN ORGANIZED HEALTH CARE EDUCATION/TRAINING PROGRAM

## 2022-12-29 PROCEDURE — 25010000002 PROTAMINE SULFATE PER 10 MG

## 2022-12-29 PROCEDURE — 80048 BASIC METABOLIC PNL TOTAL CA: CPT | Performed by: STUDENT IN AN ORGANIZED HEALTH CARE EDUCATION/TRAINING PROGRAM

## 2022-12-29 PROCEDURE — C1766 INTRO/SHEATH,STRBLE,NON-PEEL: HCPCS | Performed by: STUDENT IN AN ORGANIZED HEALTH CARE EDUCATION/TRAINING PROGRAM

## 2022-12-29 PROCEDURE — 85025 COMPLETE CBC W/AUTO DIFF WBC: CPT | Performed by: STUDENT IN AN ORGANIZED HEALTH CARE EDUCATION/TRAINING PROGRAM

## 2022-12-29 PROCEDURE — 93655 ICAR CATH ABLTJ DSCRT ARRHYT: CPT | Performed by: STUDENT IN AN ORGANIZED HEALTH CARE EDUCATION/TRAINING PROGRAM

## 2022-12-29 PROCEDURE — 93622 COMP EP EVAL L VENTR PAC&REC: CPT | Performed by: STUDENT IN AN ORGANIZED HEALTH CARE EDUCATION/TRAINING PROGRAM

## 2022-12-29 RX ORDER — FENTANYL CITRATE 50 UG/ML
25 INJECTION, SOLUTION INTRAMUSCULAR; INTRAVENOUS
Status: CANCELLED | OUTPATIENT
Start: 2022-12-29

## 2022-12-29 RX ORDER — PROPOFOL 10 MG/ML
INJECTION, EMULSION INTRAVENOUS CONTINUOUS PRN
Status: DISCONTINUED | OUTPATIENT
Start: 2022-12-29 | End: 2022-12-29 | Stop reason: SURG

## 2022-12-29 RX ORDER — IBUPROFEN 600 MG/1
600 TABLET ORAL ONCE AS NEEDED
Status: DISCONTINUED | OUTPATIENT
Start: 2022-12-29 | End: 2022-12-29 | Stop reason: HOSPADM

## 2022-12-29 RX ORDER — LIDOCAINE HYDROCHLORIDE 20 MG/ML
INJECTION, SOLUTION INFILTRATION; PERINEURAL
Status: DISCONTINUED | OUTPATIENT
Start: 2022-12-29 | End: 2022-12-29 | Stop reason: HOSPADM

## 2022-12-29 RX ORDER — HEPARIN SODIUM 200 [USP'U]/100ML
INJECTION, SOLUTION INTRAVENOUS
Status: DISCONTINUED | OUTPATIENT
Start: 2022-12-29 | End: 2022-12-29 | Stop reason: HOSPADM

## 2022-12-29 RX ORDER — PROPOFOL 10 MG/ML
VIAL (ML) INTRAVENOUS AS NEEDED
Status: DISCONTINUED | OUTPATIENT
Start: 2022-12-29 | End: 2022-12-29 | Stop reason: SURG

## 2022-12-29 RX ORDER — SODIUM CHLORIDE 0.9 % (FLUSH) 0.9 %
10 SYRINGE (ML) INJECTION EVERY 12 HOURS SCHEDULED
Status: DISCONTINUED | OUTPATIENT
Start: 2022-12-29 | End: 2022-12-29 | Stop reason: HOSPADM

## 2022-12-29 RX ORDER — NALOXONE HCL 0.4 MG/ML
0.4 VIAL (ML) INJECTION AS NEEDED
Status: DISCONTINUED | OUTPATIENT
Start: 2022-12-29 | End: 2022-12-29 | Stop reason: HOSPADM

## 2022-12-29 RX ORDER — OXYCODONE AND ACETAMINOPHEN 10; 325 MG/1; MG/1
1 TABLET ORAL ONCE AS NEEDED
Status: DISCONTINUED | OUTPATIENT
Start: 2022-12-29 | End: 2022-12-29 | Stop reason: HOSPADM

## 2022-12-29 RX ORDER — PROTAMINE SULFATE 10 MG/ML
INJECTION, SOLUTION INTRAVENOUS AS NEEDED
Status: DISCONTINUED | OUTPATIENT
Start: 2022-12-29 | End: 2022-12-29 | Stop reason: SURG

## 2022-12-29 RX ORDER — BUPIVACAINE HCL/0.9 % NACL/PF 0.125 %
PLASTIC BAG, INJECTION (ML) EPIDURAL AS NEEDED
Status: DISCONTINUED | OUTPATIENT
Start: 2022-12-29 | End: 2022-12-29 | Stop reason: SURG

## 2022-12-29 RX ORDER — SODIUM CHLORIDE 0.9 % (FLUSH) 0.9 %
10 SYRINGE (ML) INJECTION AS NEEDED
Status: CANCELLED | OUTPATIENT
Start: 2022-12-29

## 2022-12-29 RX ORDER — FENTANYL CITRATE 50 UG/ML
25 INJECTION, SOLUTION INTRAMUSCULAR; INTRAVENOUS
Status: DISCONTINUED | OUTPATIENT
Start: 2022-12-29 | End: 2022-12-29 | Stop reason: HOSPADM

## 2022-12-29 RX ORDER — FLUMAZENIL 0.1 MG/ML
0.2 INJECTION INTRAVENOUS AS NEEDED
Status: DISCONTINUED | OUTPATIENT
Start: 2022-12-29 | End: 2022-12-29 | Stop reason: HOSPADM

## 2022-12-29 RX ORDER — DEXTROSE MONOHYDRATE 25 G/50ML
12.5 INJECTION, SOLUTION INTRAVENOUS AS NEEDED
Status: CANCELLED | OUTPATIENT
Start: 2022-12-29

## 2022-12-29 RX ORDER — LIDOCAINE HYDROCHLORIDE 10 MG/ML
0.5 INJECTION, SOLUTION EPIDURAL; INFILTRATION; INTRACAUDAL; PERINEURAL ONCE AS NEEDED
Status: CANCELLED | OUTPATIENT
Start: 2022-12-29

## 2022-12-29 RX ORDER — SODIUM CHLORIDE 9 MG/ML
INJECTION, SOLUTION INTRAVENOUS CONTINUOUS PRN
Status: DISCONTINUED | OUTPATIENT
Start: 2022-12-29 | End: 2022-12-29 | Stop reason: SURG

## 2022-12-29 RX ORDER — SODIUM CHLORIDE, SODIUM LACTATE, POTASSIUM CHLORIDE, CALCIUM CHLORIDE 600; 310; 30; 20 MG/100ML; MG/100ML; MG/100ML; MG/100ML
9 INJECTION, SOLUTION INTRAVENOUS CONTINUOUS
Status: CANCELLED | OUTPATIENT
Start: 2022-12-29

## 2022-12-29 RX ORDER — DROPERIDOL 2.5 MG/ML
0.62 INJECTION, SOLUTION INTRAMUSCULAR; INTRAVENOUS ONCE AS NEEDED
Status: DISCONTINUED | OUTPATIENT
Start: 2022-12-29 | End: 2022-12-29 | Stop reason: HOSPADM

## 2022-12-29 RX ORDER — LABETALOL HYDROCHLORIDE 5 MG/ML
5 INJECTION, SOLUTION INTRAVENOUS
Status: DISCONTINUED | OUTPATIENT
Start: 2022-12-29 | End: 2022-12-29 | Stop reason: HOSPADM

## 2022-12-29 RX ORDER — SODIUM CHLORIDE 0.9 % (FLUSH) 0.9 %
10 SYRINGE (ML) INJECTION EVERY 12 HOURS SCHEDULED
Status: CANCELLED | OUTPATIENT
Start: 2022-12-29

## 2022-12-29 RX ORDER — HEPARIN SODIUM 1000 [USP'U]/ML
INJECTION, SOLUTION INTRAVENOUS; SUBCUTANEOUS AS NEEDED
Status: DISCONTINUED | OUTPATIENT
Start: 2022-12-29 | End: 2022-12-29 | Stop reason: SURG

## 2022-12-29 RX ORDER — SODIUM CHLORIDE 0.9 % (FLUSH) 0.9 %
10 SYRINGE (ML) INJECTION AS NEEDED
Status: DISCONTINUED | OUTPATIENT
Start: 2022-12-29 | End: 2022-12-29 | Stop reason: HOSPADM

## 2022-12-29 RX ORDER — ONDANSETRON 2 MG/ML
4 INJECTION INTRAMUSCULAR; INTRAVENOUS ONCE AS NEEDED
Status: DISCONTINUED | OUTPATIENT
Start: 2022-12-29 | End: 2022-12-29 | Stop reason: HOSPADM

## 2022-12-29 RX ORDER — SODIUM CHLORIDE 9 MG/ML
40 INJECTION, SOLUTION INTRAVENOUS AS NEEDED
Status: DISCONTINUED | OUTPATIENT
Start: 2022-12-29 | End: 2022-12-29 | Stop reason: HOSPADM

## 2022-12-29 RX ORDER — OXYCODONE AND ACETAMINOPHEN 7.5; 325 MG/1; MG/1
2 TABLET ORAL EVERY 4 HOURS PRN
Status: DISCONTINUED | OUTPATIENT
Start: 2022-12-29 | End: 2022-12-29 | Stop reason: HOSPADM

## 2022-12-29 RX ORDER — SODIUM CHLORIDE, SODIUM LACTATE, POTASSIUM CHLORIDE, CALCIUM CHLORIDE 600; 310; 30; 20 MG/100ML; MG/100ML; MG/100ML; MG/100ML
INJECTION, SOLUTION INTRAVENOUS CONTINUOUS PRN
Status: DISCONTINUED | OUTPATIENT
Start: 2022-12-29 | End: 2022-12-29 | Stop reason: SURG

## 2022-12-29 RX ADMIN — SODIUM CHLORIDE: 0.9 INJECTION, SOLUTION INTRAVENOUS at 12:07

## 2022-12-29 RX ADMIN — Medication 160 MCG: at 12:54

## 2022-12-29 RX ADMIN — PROPOFOL 100 MCG/KG/MIN: 10 INJECTION, EMULSION INTRAVENOUS at 12:12

## 2022-12-29 RX ADMIN — Medication 100 MCG: at 12:42

## 2022-12-29 RX ADMIN — PROTAMINE SULFATE 50 MG: 10 INJECTION, SOLUTION INTRAVENOUS at 14:30

## 2022-12-29 RX ADMIN — SODIUM CHLORIDE, POTASSIUM CHLORIDE, SODIUM LACTATE AND CALCIUM CHLORIDE: 600; 310; 30; 20 INJECTION, SOLUTION INTRAVENOUS at 13:36

## 2022-12-29 RX ADMIN — Medication 160 MCG: at 14:03

## 2022-12-29 RX ADMIN — HEPARIN SODIUM 20000 UNITS: 1000 INJECTION, SOLUTION INTRAVENOUS; SUBCUTANEOUS at 13:13

## 2022-12-29 RX ADMIN — Medication 160 MCG: at 13:40

## 2022-12-29 RX ADMIN — PROPOFOL 40 MG: 10 INJECTION, EMULSION INTRAVENOUS at 12:12

## 2022-12-29 RX ADMIN — Medication 160 MCG: at 13:28

## 2022-12-29 RX ADMIN — Medication 100 MCG: at 12:32

## 2022-12-29 NOTE — ANESTHESIA POSTPROCEDURE EVALUATION
Patient: Mani Arreola    Procedure Summary     Date: 12/29/22 Room / Location: [facility] CATH LAB 4 /  [facility] CATH INVASIVE LOCATION    Anesthesia Start: 1201 Anesthesia Stop: 1451    Procedure: EP/Ablation, atypical atrial flutter Diagnosis:       Atypical atrial flutter (HCC)      (Atypical atrial flutter)    Providers: Francisca Cote MD Provider: Durga Ramirez CRNA    Anesthesia Type: general ASA Status: 3          Anesthesia Type: general    Vitals  No vitals data found for the desired time range.          Post Anesthesia Care and Evaluation    Patient location during evaluation: PHASE II  Patient participation: complete - patient participated  Level of consciousness: awake and alert  Pain score: 0    Airway patency: patent  Anesthetic complications: No anesthetic complications  PONV Status: none  Cardiovascular status: acceptable  Respiratory status: acceptable  Hydration status: acceptable  No anesthesia care post op

## 2022-12-29 NOTE — INTERVAL H&P NOTE
H&P reviewed. The patient was examined and there are no changes to the H&P.      Since the time of the last clinic visit, denies significant change in symptoms.  Denies missing any doses of his medications.  No new ER visits or hospitalizations.    Exam:  Unchanged from last clinic visit.    Labs:  Reviewed.      Assessment/plan:  Mani Arreola is a 77 y.o. male who presents to the hospital for outpatient EP study and ablation for recurrent atrial flutter.  It is unclear if this is typical or atypical based on his ECG.  Given this, we will plan for EPS to remap and proceed with ablation either of the CTI or atypical flutter as necessary.  There are no apparent contraindications to proceeding and he is medically appropriate for outpatient management with this procedure.      I have discussed risks, benefits, and alternatives of an electrophysiology study and ablation for atrial flutter with the patient.  Alternatives discussed include continued observation and medical management.  An electrophysiology study with ablation is an inherently high risk procedure with possible complications that include but are not limited to vascular access complications, internal bleeding, tamponade requiring pericardiocentesis or cardiac surgery, stroke, MI, embolism, myocardial injury, injury to the normal conduction system requiring a pacemaker, and ultimately death.  We also discussed that given the nature of the procedure, therapeutic efficacy can be variable.  Possibilities of recurrent arrhythmias and the possible need for additional procedures and/or medical therapy was discussed. Questions asked were appropriately answered.  No guarantees were made or implied.     Despite this, they would still like to proceed.    Plan:  - Proceed with EP study and ablation of atrial flutter

## 2022-12-29 NOTE — ANESTHESIA PREPROCEDURE EVALUATION
Anesthesia Evaluation     Patient summary reviewed   no history of anesthetic complications:  NPO Solid Status: > 8 hours  NPO Liquid Status: > 4 hours           Airway   Mallampati: II  TM distance: >3 FB  Neck ROM: full  Dental          Pulmonary    (+) a smoker (pipe) Current,   (-) asthma, sleep apnea  Cardiovascular   Exercise tolerance: good (4-7 METS)    PT is on anticoagulation therapy    (+) hypertension, past MI (1998) , CAD, cardiac stents (1998) dysrhythmias Atrial Flutter, hyperlipidemia,       Neuro/Psych  (-) seizures, TIA, CVA  GI/Hepatic/Renal/Endo    (+) obesity,   diabetes mellitus,   (-) GERD, liver disease, no renal disease    Musculoskeletal     Abdominal    Substance History      OB/GYN          Other      history of cancer                      Anesthesia Plan    ASA 3     general     intravenous induction     Anesthetic plan, risks, benefits, and alternatives have been provided, discussed and informed consent has been obtained with: patient and spouse/significant other.

## 2022-12-30 NOTE — DISCHARGE INSTRUCTIONS
Post-Atrial Fibrillation Ablation Instructions    ACTIVITY    Avoid driving, operating machinery, or alcohol consumption for 24 hours after receiving sedation. In addition, avoid signing legal documents or participating in legal proceedings.    You may resume normal activities after 24 hours except for the following:    Avoid heavy lifting (no more than 10 pounds) and vigorous activities for a minimum of 7 days. This includes activities such as jogging, exercise classes, sports activities, etc.    You may resume walking and other normal activities.    Avoid sitting more than 2 consecutive hours during waking hours for the first 3 days. If you are traveling, stop for brief (5 minutes) walks every two hours.    MEDICATIONS    Continue Coumadin, Eliquis, Pradaxa, or Xarelto at your usual dose this evening. Do not stop this medication without consulting with your cardiologist.    If you are on Coumadin have your PT/INR checked in 7-10 days.     Antacid: You are already on an antacid medication.  Please do not stop your antacid medication for 30 days.    MEDICAL FOLLOW UP    See Dr. Cote as scheduled on 1/12/22.    PLEASE NOTIFY US IF YOU DEVELOP    Fever of 101 or greater during your first few days at home    The occurrence of a new, persistent cough or unexplained shortness of breath.    A groin bruise may be expected. Initial soreness and discomfort should improve over the first few days. If you continue to have discomfort or if bruising is excessive, please call us.    Patients often experience palpitations and even atrial fibrillation during the healing period.    Please call if you have an episode of atrial fibrillation accompanied by fast heart rate greater than 120 beats per minute for extended period or Atrial Fibrillation that last longer than 1-2 days.    Mild chest soreness is common and may be treated with Tylenol, Advil, or Motrin.  This soreness typically worsens when taking deep breaths.  If you notice  these symptoms, start one of these medications according to the package directions and continue for 2-3 days. If your symptoms are not relieved or become severe, please call us.      REASONS TO GO TO THE EMERGENCY ROOM FOR EVALUATION:   Severe chest pain  Significant shortness of breath at rest  Near passing out or passing out episodes soon after your ablation  Symptoms of chest pain or shortness of breath associated with low blood pressure (reading less than 90 for the top number / systolic blood pressure)  Brisk bleeding or significant swelling from the groin where IVs were placed  Any concerns that an emergent or life threatening complication is occurring    If you have a clinical questions between the hours of 8am and 4pm, Monday - Friday please call the office and let us know your concern. Please leave a message if your call is not an emergency.    For emergencies, please go for evaluation at your nearest emergency department.    If you have a [employer] account, this an excellent way to communicate.  [employer] messages are checked Monday through Friday. Please allow 24-36 hours for your message to be answered.

## 2023-01-05 LAB
ACT BLD: 540 SECONDS (ref 82–152)
ACT BLD: 600 SECONDS (ref 82–152)

## 2023-01-09 ENCOUNTER — HOSPITAL ENCOUNTER (OUTPATIENT)
Dept: CARDIOLOGY | Facility: HOSPITAL | Age: 78
Discharge: HOME | End: 2023-01-09
Payer: MEDICARE

## 2023-01-09 VITALS
SYSTOLIC BLOOD PRESSURE: 189 MMHG | WEIGHT: 233 LBS | OXYGEN SATURATION: 99 % | BODY MASS INDEX: 31.6 KG/M2 | DIASTOLIC BLOOD PRESSURE: 93 MMHG | HEART RATE: 59 BPM

## 2023-01-09 DIAGNOSIS — I48.4 ATYPICAL ATRIAL FLUTTER: ICD-10-CM

## 2023-01-09 DIAGNOSIS — I10 ESSENTIAL HYPERTENSION: Chronic | ICD-10-CM

## 2023-01-09 DIAGNOSIS — E11.9 TYPE 2 DIABETES MELLITUS WITHOUT COMPLICATION, WITHOUT LONG-TERM CURRENT USE OF INSULIN: Chronic | ICD-10-CM

## 2023-01-09 DIAGNOSIS — I50.22 CHRONIC HFREF (HEART FAILURE WITH REDUCED EJECTION FRACTION): Primary | ICD-10-CM

## 2023-01-09 DIAGNOSIS — E78.00 PURE HYPERCHOLESTEROLEMIA: Chronic | ICD-10-CM

## 2023-01-09 DIAGNOSIS — I25.10 CORONARY ARTERY DISEASE INVOLVING NATIVE CORONARY ARTERY OF NATIVE HEART WITHOUT ANGINA PECTORIS: Chronic | ICD-10-CM

## 2023-01-09 LAB
ANION GAP SERPL CALCULATED.3IONS-SCNC: 12 MMOL/L (ref 5–15)
BUN SERPL-MCNC: 13 MG/DL (ref 8–23)
BUN/CREAT SERPL: 10.4 (ref 7–25)
CALCIUM SPEC-SCNC: 9.3 MG/DL (ref 8.6–10.5)
CHLORIDE SERPL-SCNC: 104 MMOL/L (ref 98–107)
CO2 SERPL-SCNC: 27 MMOL/L (ref 22–29)
CREAT SERPL-MCNC: 1.25 MG/DL (ref 0.76–1.27)
EGFRCR SERPLBLD CKD-EPI 2021: 60 ML/MIN/1.73
GLUCOSE SERPL-MCNC: 214 MG/DL (ref 65–99)
NT-PROBNP SERPL-MCNC: 968.3 PG/ML (ref 0–1800)
POTASSIUM SERPL-SCNC: 4.3 MMOL/L (ref 3.5–5.2)
SODIUM SERPL-SCNC: 143 MMOL/L (ref 136–145)

## 2023-01-09 PROCEDURE — G0463 HOSPITAL OUTPT CLINIC VISIT: HCPCS | Performed by: HOSPITALIST

## 2023-01-09 PROCEDURE — 93005 ELECTROCARDIOGRAM TRACING: CPT | Performed by: HOSPITALIST

## 2023-01-09 PROCEDURE — 99214 OFFICE O/P EST MOD 30 MIN: CPT | Performed by: HOSPITALIST

## 2023-01-09 PROCEDURE — 80048 BASIC METABOLIC PNL TOTAL CA: CPT | Performed by: HOSPITALIST

## 2023-01-09 PROCEDURE — 93010 ELECTROCARDIOGRAM REPORT: CPT | Performed by: HOSPITALIST

## 2023-01-09 PROCEDURE — 83880 ASSAY OF NATRIURETIC PEPTIDE: CPT | Performed by: HOSPITALIST

## 2023-01-09 RX ORDER — AMLODIPINE BESYLATE 5 MG/1
5 TABLET ORAL DAILY
Qty: 30 TABLET | Refills: 11 | Status: SHIPPED | OUTPATIENT
Start: 2023-01-09 | End: 2023-02-23 | Stop reason: SDUPTHER

## 2023-01-09 NOTE — PROGRESS NOTES
Reason For Visit:  CHF    Subjective        Mani Arreola is a 77 y.o. male with a PMH of CAD s/p PCI, HTN, HLD, T2DM, atrial flutter s/p ablation, and carotid stenosis s/p CEA who presents for follow-up of his HFrEF.    Today, Mr. Arreola returns for follow-up and reports that he is overall feeling well.  He denies any significant chest pain, shortness of breath, palpitations, lightheadedness, orthopnea, or lower extremity edema.  His weight has decreased some in the setting of following a calorie restricted diet and attempt to lose weight.  After discussion with Dr. Cote, he is trying to get his weight down to 200 pounds.  He has been checking his BP regularly, and this has been consistently elevated in the 140s to 150s systolic for the last 5 days.  He did complete his cardiac MRI, and results are included below.    Prior history:  Patient developed lower extremity edema spring 2022 and was intermittently treated with Lasix titrated up to 40 mg daily.  Echo 9/15/2022 demonstrated severely reduced LV systolic function.  He was otherwise relatively asymptomatic and was referred to heart failure clinic for further evaluation and management. At initial visit 9/23/2022 he was noted to be in rate controlled A. Fib v flutter.     -9/23/2022: Increase metoprolol 50->100mg daily, continue losartan 100 mg daily, start Dapagliflozin 10 mg daily, transition from Lasix to Bumex 2 mg daily, start Eliquis 5 mg BID, transition from pravastatin to atorvastatin, stop pioglitazone  - 10/3/2022: Weight down 16 pounds (236), swelling resolved, mild nausea.  Increase metoprolol to 100 mg daily, start spironolactone 25 mg daily, decrease Bumex to 1.5 mg daily  - 10/10/2022: Weight up to 240, mild swelling, nausea improved.  Spironolactone stopped due to K up to 5.0.  Creatinine slightly increased.  - 10/17/2022: Weight down to 232 pounds in office (home scale is about 7 to 8 pounds lower than clinic scale).  CR elevated and patient  having ongoing significant nausea and generally feeling unwell.  Concern for DKA, but labs were ultimately not consistent with this.  UCX grew bacteria, but patient without urinary symptoms and was not treated.  Patient given IVF and Bumex transitioned to PRN for weight gain.  Amlodipine stopped.  Xigduo held.  - 10/26/2022: Restarted Dapagliflozin 10 mg daily (without metformin combo pill/Xigduo)  - 11/4/2022: Feeling well without recurrent nausea.  No med changes pending upcoming ablation.  - 11/14/2022: Atrial flutter ablation with Dr. Cote  - 11/21/2022: Continued to feel well.  Taking Bumex PRN a couple of times a week.  Switched from losartan to Entresto 49/51 mg twice daily.  - 12/5/2022: Increase Entresto to 97/103 mg twice daily.  Patient did have recurrent atrial flutter and was pending another ablation with Dr. Cote.  - 12/29/2022: Repeat ablation with Dr. Cote    ROS: Pertinent positives negatives are included above    Pertinent past medical, surgical, family, and social history were reviewed and updated in the EMR.      Current Outpatient Medications:   •  acetaminophen (TYLENOL) 650 MG 8 hr tablet, Take 1,300 mg by mouth 2 (Two) Times a Day., Disp: , Rfl:   •  amLODIPine (NORVASC) 5 MG tablet, Take 1 tablet by mouth Daily., Disp: 30 tablet, Rfl: 11  •  apixaban (ELIQUIS) 5 MG tablet tablet, Take 1 tablet by mouth 2 (Two) Times a Day., Disp: 60 tablet, Rfl: 11  •  atorvastatin (LIPITOR) 80 MG tablet, Take 1 tablet by mouth Daily. (Patient taking differently: Take 80 mg by mouth Every Night.), Disp: 90 tablet, Rfl: 3  •  bumetanide (BUMEX) 1 MG tablet, Take 1 tablet by mouth Daily As Needed. For weight gain., Disp: 30 tablet, Rfl: 11  •  dapagliflozin Propanediol 10 MG tablet, Take 10 mg by mouth Daily., Disp: 30 tablet, Rfl: 11  •  diphenhydrAMINE (BENADRYL) 25 mg capsule, Take 50 mg by mouth At Night As Needed for Itching. Patient states he takes them regularly., Disp: , Rfl:   •  folic acid (FOLVITE)  400 MCG tablet, Take 400 mcg by mouth daily., Disp: , Rfl:   •  metoprolol succinate XL (TOPROL-XL) 100 MG 24 hr tablet, Take 1 tablet by mouth Daily., Disp: 30 tablet, Rfl: 11  •  Multiple Vitamins-Minerals (MULTIVITAMIN ADULT PO), Take 1 tablet by mouth Daily., Disp: , Rfl:   •  Omega-3 Fatty Acids (FISH OIL PO), Take 1,000 mg by mouth Every Night., Disp: , Rfl:   •  omeprazole (priLOSEC) 20 MG capsule, Take 2 capsules by mouth Daily., Disp: 30 capsule, Rfl: 11  •  ondansetron ODT (ZOFRAN-ODT) 8 MG disintegrating tablet, Place 8 mg on the tongue Every 8 (Eight) Hours As Needed for Nausea or Vomiting., Disp: , Rfl:   •  sacubitril-valsartan (ENTRESTO)  MG tablet, Take 1 tablet by mouth 2 (Two) Times a Day., Disp: 180 tablet, Rfl: 3  •  traMADol (ULTRAM) 50 MG tablet, Take 50 mg by mouth 2 (Two) Times a Day., Disp: , Rfl:   •  vitamin C (ASCORBIC ACID) 500 MG tablet, Take 500 mg by mouth daily., Disp: , Rfl:      Objective   Vital Signs:  BP (!) 189/93   Pulse 59   Wt 106 kg (233 lb)   SpO2 99%   BMI 31.60 kg/m²   Estimated body mass index is 31.6 kg/m² as calculated from the following:    Height as of 12/29/22: 182.9 cm (72\").    Weight as of this encounter: 106 kg (233 lb).      Constitutional:       Appearance: Healthy appearance. Not in distress.   Neck:      Vascular: JVD normal.   Pulmonary:      Effort: Pulmonary effort is normal.      Breath sounds: Normal breath sounds.   Cardiovascular:      Normal rate. Regular rhythm.      Murmurs: There is no murmur.      No gallop. No click. No rub.   Edema:     Pretibial: bilateral trace edema of the pretibial area.  Abdominal:      General: There is no distension.      Palpations: Abdomen is soft.      Tenderness: There is no abdominal tenderness.   Skin:     General: Skin is warm and dry.   Neurological:      Mental Status: Alert and oriented to person, place and time.        Result Review :  The following data was reviewed by: Dominic Aguirre MD on  01/09/2023:  CMP   CMP    CMP 12/5/22 12/29/22 1/9/23   Glucose 244 (A) 199 (A) 214 (A)   BUN 29 (A) 20 13   Creatinine 1.42 (A) 1.37 (A) 1.25   eGFR 51.5 (A) 53.8 (A) 60.0 (A)   Sodium 143 142 143   Potassium 4.1 4.0 4.3   Chloride 104 105 104   Calcium 9.2 8.9 9.3   BUN/Creatinine Ratio 20.4 14.6 10.4   Anion Gap 13.0 11.0 12.0   (A) Abnormal value       Comments are available for some flowsheets but are not being displayed.           CBC   CBC    CBC 9/22/22 11/14/22 12/29/22   WBC 5.52 5.81 6.95   RBC 4.50 5.30 5.12   Hemoglobin 12.8 (A) 15.2 14.2   Hematocrit 41.4 47.7 45.8   MCV 92.0 90.0 89.5   MCH 28.4 28.7 27.7   MCHC 30.9 (A) 31.9 31.0 (A)   RDW 15.8 (A) 15.5 (A) 15.4   Platelets 150 169 210   (A) Abnormal value            Lipid Panel   Lipid Panel    Lipid Panel 9/22/22   Total Cholesterol 131   Triglycerides 64   HDL Cholesterol 58   VLDL Cholesterol 13   LDL Cholesterol  60   LDL/HDL Ratio 1.04                    Cardiac MRI 1/5/2023 at [place] [facility]  - Ischemic cardiomyopathy; mild depression of LV systolic function; moderate scar burden; LVEF 50% (normal LVEF for age/gender equals 65 to 90%)  - Normal RV systolic function; RVEF 52%  - There is a subendocardial infarction involving the anteroseptal, mid anterior, anteroapical, and apical walls.  The infarction occupies 50 to 75% of the transmural thickness.  There is a low likelihood of functional recovery of left ventricular wall motion with successful revascularization of the LAD territory.  - Small pericardial effusion; large epicardial fat pad; trivial bilateral pleural effusions.  - Dilated main PA suggest pulmonary hypertension.    Stress Test With Myocardial Perfusion (1 Day) (11/08/2022 12:11)  •  Large anterior infarction involving the proximal to mid left anterior descending coronary artery distribution.  Hypokinesis to akinesis in this distribution  •  There is no significant inducible ischemia  •  Left ventricular ejection fraction is  moderately reduced (Calculated EF = 33%).  •  Refer to bull's-eye diagram below     Adult Transthoracic Echo Complete W/ Cont if Necessary Per Protocol (09/15/2022 10:03)  • The left ventricular cavity is moderately dilated.  • The right atrial cavity is borderline dilated.  • Left ventricular diastolic dysfunction is noted.  • The following left ventricular wall segments are hypokinetic: mid anterior, apical anterior, mid anterolateral, apical lateral, apical inferior, mid inferior, mid anteroseptal and basal anterior. The following left ventricular wall segments are akinetic: apical septal and apex.  • Left ventricular ejection fraction appears to be 31 - 35%. Left ventricular systolic function is severely decreased.       Assessment and Plan   Diagnoses and all orders for this visit:    1. Chronic HFrEF (heart failure with reduced ejection fraction) ([facility]) (Primary)  - Etiology: Ischemic  - LVEF: 50%  - NYHA Class: II  - KCCQ: date 11/21/2022, score 55 (improved from score of 45 on prior questionnaire).  - 6MWT: date 11/21/2022, walked 106 meters (stopped early due to back pain rather than exertional intolerance).  - BB: Toprol- mg daily  - ACEi/ARB/ARNi: Entresto 97/23 mg twice daily  - SGLT2i: Dapagliflozin 10 mg daily  - MRA: Spironolactone on hold due to prior hyperkalemia  - Diuretics: Bumex 1 mg PRN  - IV iron: Plan to repeat iron studies at a future visit    2. Coronary artery disease involving native coronary artery of native heart without angina pectoris  3. Pure hypercholesterolemia  History of LAD PCI.  Most recent stress test demonstrated anterior infarct, and cardiac MRI demonstrated nonviable LAD territory.  No current anginal symptoms.  Most recent LDL was at goal (< 70).  - Eliquis 5 mg twice daily  - Atorvastatin 80 mg daily    4. Essential hypertension  BP above goal of 130/80 today and on recent home log.  - Continue metoprolol, Entresto as noted above  - Start amlodipine 5 mg daily  -  Patient will follow up with Dr. Fleming later this month at which time it would be reasonable to consider increase amlodipine if BP remains above goal.    5. Type 2 diabetes mellitus without complication, without long-term current use of insulin (HCC)  Glucose highly elevated on labs today and on home log.  - Continue Dapagliflozin as noted above  - Continue follow-up with Dr. Fleming regarding diabetes.    6. Atypical atrial flutter (HCC)  ECG today shows sinus bradycardia.  - Continue follow-up with Dr. Cote    Other orders  -     Basic Metabolic Panel; Standing  -     BNP; Standing  -     Basic Metabolic Panel  -     BNP  -     sacubitril-valsartan (ENTRESTO)  MG tablet; Take 1 tablet by mouth 2 (Two) Times a Day.  Dispense: 180 tablet; Refill: 3  -     amLODIPine (NORVASC) 5 MG tablet; Take 1 tablet by mouth Daily.  Dispense: 30 tablet; Refill: 11            Follow Up   Return in about 7 weeks (around 2/27/2023).  Patient was given instructions and counseling regarding his condition or for health maintenance advice. Please see specific information pulled into the AVS if appropriate.       EMR Dragon/Transcription disclaimer: Much of this encounter note is an electronic transcription/translation of spoken language to printed text. The electronic translation of spoken language may permit erroneous, or at times, nonsensical words or phrases to be inadvertently transcribed; although I have reviewed the note for such errors, some may still exist.

## 2023-01-12 ENCOUNTER — OFFICE VISIT (OUTPATIENT)
Dept: CARDIOLOGY | Facility: CLINIC | Age: 78
End: 2023-01-12
Payer: MEDICARE

## 2023-01-12 VITALS
DIASTOLIC BLOOD PRESSURE: 86 MMHG | BODY MASS INDEX: 31.02 KG/M2 | WEIGHT: 229 LBS | OXYGEN SATURATION: 97 % | HEIGHT: 72 IN | SYSTOLIC BLOOD PRESSURE: 170 MMHG | HEART RATE: 64 BPM

## 2023-01-12 DIAGNOSIS — I48.4 ATYPICAL ATRIAL FLUTTER: Primary | ICD-10-CM

## 2023-01-12 DIAGNOSIS — I25.10 CORONARY ARTERY DISEASE INVOLVING NATIVE CORONARY ARTERY OF NATIVE HEART WITHOUT ANGINA PECTORIS: Chronic | ICD-10-CM

## 2023-01-12 DIAGNOSIS — I50.22 CHRONIC HFREF (HEART FAILURE WITH REDUCED EJECTION FRACTION): ICD-10-CM

## 2023-01-12 PROBLEM — I48.0 PAROXYSMAL ATRIAL FIBRILLATION (HCC): Status: ACTIVE | Noted: 2023-01-12

## 2023-01-12 PROCEDURE — 93000 ELECTROCARDIOGRAM COMPLETE: CPT | Performed by: PHYSICIAN ASSISTANT

## 2023-01-12 PROCEDURE — 99214 OFFICE O/P EST MOD 30 MIN: CPT | Performed by: PHYSICIAN ASSISTANT

## 2023-01-12 NOTE — PROGRESS NOTES
[facility] -  CLINIC FOLLOW UP     Patient Care Team:  Tyree Fleming MD as PCP - General  Tyree Fleming MD as PCP - Family Medicine  Cirilo Bhandari MD as Consulting Physician (Gastroenterology)  Tyree Fleming MD as Referring Physician (Family Medicine)  Fidel Harris MD as Cardiologist (Cardiology)    Chief Complaint: s/p PVI     Subjective     HPI: Today I had the pleasure of seeing Mani Arreola in the EP clinic for follow up. He is a 77 year old male with a history of CAD s/p PCI, HTN, HLD, T2DM, atrial flutter s/p ablation, and carotid stenosis s/p CEA who folows up after requiring a second EP study and ablation of atypical left atrial flutter. Initially, he had a CTI typical atrial flutter ablation in November. However, in December, he presented to EP clinic back in atrial flutter despite an apparently successful typical flutter ablation recently.  The flutter appeared more atypical and given his HFrEF and ongoing AFL, he underwent a repeat EPS to see if this is CTI recurrence or an atypical flutter from elsewhere. This was performed December 29th with the following findings:    1. Counterclockwise mitral annular on presentation  2. Heterogenous scar of the left atrium with dense deposits along the posterior wall  3.  4/4 pulmonary veins isolated  4.  Posterior wall isolated  5.  Anteroseptal mitral line with successful termination of tachycardia and bidirectional block  6.  Durable CTI ablation from prior procedure    He follows up today for evaluation after this second procedure. EKG today shows SR at 64 bpm. He denies any significant chest pain, shortness of breath, palpitations, lightheadedness, orthopnea, or lower extremity edema. He is pleased with the outcome of his ablations so far. From a heart failure standpoint, he actually looks really well and only takes his diuretics PRN with Lasix and [place] was still on hold due to hyperkalemia.  "        Objective     Visit Vitals  /86 (BP Location: Left arm, Patient Position: Sitting, Cuff Size: Large Adult)   Pulse 64   Ht 182.9 cm (72.01\")   Wt 104 kg (229 lb)   SpO2 97%   BMI 31.05 kg/m²           Vitals reviewed.   Constitutional:       Appearance: Healthy appearance. Not in distress.   Eyes:      Extraocular Movements: Extraocular movements intact.      Conjunctiva/sclera: Conjunctivae normal.      Pupils: Pupils are equal, round, and reactive to light.   HENT:      Head: Normocephalic and atraumatic.      Nose: Nose normal.    Mouth/Throat:      Lips: Pink.      Mouth: Mucous membranes are moist.      Pharynx: Oropharynx is clear.   Neck:      Vascular: No carotid bruit or JVD. JVD normal.   Pulmonary:      Effort: Pulmonary effort is normal.      Breath sounds: Normal breath sounds.   Chest:      Chest wall: Not tender to palpatation.   Cardiovascular:      PMI at left midclavicular line. Normal rate. Regular rhythm. Normal S1. Normal S2.      Murmurs: There is no murmur.      No gallop. No rub.   Pulses:     Radial: 2+ bilaterally.     Posterior tibial: 2+ bilaterally.  Edema:     Peripheral edema absent.   Abdominal:      General: Bowel sounds are normal.      Palpations: Abdomen is soft.   Musculoskeletal: Normal range of motion.      Extremities: No clubbing present.     Cervical back: Normal range of motion. Skin:     General: Skin is warm and dry.   Neurological:      General: No focal deficit present.      Mental Status: Alert and oriented to person, place, and time.      Cranial Nerves: Cranial nerves are intact.   Psychiatric:         Attention and Perception: Attention normal.         Mood and Affect: Affect normal.         Speech: Speech normal.         Behavior: Behavior normal.         Cognition and Memory: Cognition normal.             The following portions of the patient's history were reviewed and updated as appropriate: allergies, current medications, past medical history, " past social history, past and problem list.     Review of Systems   Constitutional: Negative.    HENT: Negative.    Eyes: Negative.    Respiratory: Negative.    Cardiovascular: Negative.    Gastrointestinal: Negative.    Endocrine: Negative.    Genitourinary: Negative.    Musculoskeletal: Negative.    Skin: Negative.    Allergic/Immunologic: Negative.    Neurological: Negative.    Hematological: Negative.    Psychiatric/Behavioral: Negative.             ECG 12 Lead    Date/Time: 1/17/2023 2:05 PM  Performed by: Catherine Kim PA  Authorized by: Catherine Kim PA   Comparison: compared with previous ECG from 1/9/2023  Similar to previous ECG  Rhythm: sinus rhythm  Rate: normal  QRS axis: left    Clinical impression: normal ECG              Medication Review: yes    Current Outpatient Medications:   •  acetaminophen (TYLENOL) 650 MG 8 hr tablet, Take 1,300 mg by mouth 2 (Two) Times a Day., Disp: , Rfl:   •  amLODIPine (NORVASC) 5 MG tablet, Take 1 tablet by mouth Daily. (Patient taking differently: Take 5 mg by mouth Daily. Patient states he hasn't picked up medication yet.), Disp: 30 tablet, Rfl: 11  •  apixaban (ELIQUIS) 5 MG tablet tablet, Take 1 tablet by mouth 2 (Two) Times a Day., Disp: 60 tablet, Rfl: 11  •  atorvastatin (LIPITOR) 80 MG tablet, Take 1 tablet by mouth Daily. (Patient taking differently: Take 80 mg by mouth Every Night.), Disp: 90 tablet, Rfl: 3  •  bumetanide (BUMEX) 1 MG tablet, Take 1 tablet by mouth Daily As Needed. For weight gain., Disp: 30 tablet, Rfl: 11  •  dapagliflozin Propanediol 10 MG tablet, Take 10 mg by mouth Daily., Disp: 30 tablet, Rfl: 11  •  diphenhydrAMINE (BENADRYL) 25 mg capsule, Take 50 mg by mouth At Night As Needed for Itching. Patient states he takes them regularly., Disp: , Rfl:   •  folic acid (FOLVITE) 400 MCG tablet, Take 400 mcg by mouth daily., Disp: , Rfl:   •  metoprolol succinate XL (TOPROL-XL) 100 MG 24 hr tablet, Take 1 tablet by mouth Daily., Disp: 30  tablet, Rfl: 11  •  Multiple Vitamins-Minerals (MULTIVITAMIN ADULT PO), Take 1 tablet by mouth Daily., Disp: , Rfl:   •  Omega-3 Fatty Acids (FISH OIL PO), Take 1,000 mg by mouth Every Night., Disp: , Rfl:   •  omeprazole (priLOSEC) 20 MG capsule, Take 2 capsules by mouth Daily., Disp: 30 capsule, Rfl: 11  •  ondansetron ODT (ZOFRAN-ODT) 8 MG disintegrating tablet, Place 8 mg on the tongue Every 8 (Eight) Hours As Needed for Nausea or Vomiting., Disp: , Rfl:   •  sacubitril-valsartan (ENTRESTO)  MG tablet, Take 1 tablet by mouth 2 (Two) Times a Day., Disp: 180 tablet, Rfl: 3  •  traMADol (ULTRAM) 50 MG tablet, Take 50 mg by mouth 2 (Two) Times a Day., Disp: , Rfl:   •  vitamin C (ASCORBIC ACID) 500 MG tablet, Take 500 mg by mouth daily., Disp: , Rfl:    No Known Allergies    I have reviewed       CBC:  Lab Results - Last 18 Months   Lab Units 12/29/22  0950 11/14/22  0701 09/22/22  0825   WBC 10*3/mm3 6.95   < > 5.52   HEMOGLOBIN g/dL 14.2   < > 12.8*   HEMATOCRIT % 45.8   < > 41.4   PLATELETS 10*3/mm3 210   < > 150   IRON mcg/dL  --   --  68    < > = values in this interval not displayed.      BMP/CMP:  Lab Results - Last 18 Months   Lab Units 01/09/23  0957   SODIUM mmol/L 143   POTASSIUM mmol/L 4.3   CHLORIDE mmol/L 104   CO2 mmol/L 27.0   GLUCOSE mg/dL 214*   BUN mg/dL 13   CREATININE mg/dL 1.25   CALCIUM mg/dL 9.3     BNP:   Lab Results - Last 18 Months   Lab Units 01/09/23  0957   PROBNP pg/mL 968.3      THYROID:  Lab Results - Last 18 Months   Lab Units 10/17/22  1042   TSH uIU/mL 1.470       Results for orders placed during the hospital encounter of 09/15/22    Adult Transthoracic Echo Complete W/ Cont if Necessary Per Protocol    Interpretation Summary  · The left ventricular cavity is moderately dilated.  · The right atrial cavity is borderline dilated.  · Left ventricular diastolic dysfunction is noted.  · The following left ventricular wall segments are hypokinetic: mid anterior, apical anterior,  mid anterolateral, apical lateral, apical inferior, mid inferior, mid anteroseptal and basal anterior. The following left ventricular wall segments are akinetic: apical septal and apex.  · Left ventricular ejection fraction appears to be 31 - 35%. Left ventricular systolic function is severely decreased.    SEVERE ISCHEMIC CARDIOMYOPATHY     Assessment:   Diagnoses and all orders for this visit:    1. Atypical atrial flutter (HCC) (Primary)    2. Chronic HFrEF (heart failure with reduced ejection fraction) (HCC)    3. Coronary artery disease involving native coronary artery of native heart without angina pectoris    Other orders  -     ECG 12 Lead      Atypical left atrial flutter s/p ablation December 29th and typical right atrial flutter s/p CTI ablation 11/14/22: Doing well and maintaining sinus rhythm. Continue anticoagulation. Follow up with Dr. Cote in 3 months.     HFrEF: Just recently evaluated with Dr. Aguirre. Only taking Lasix as needed and spironolactone currently on hold due to hyperkalemia. Follow up with heart failure clinic in Feb as scheduled.     Obesity:: will be planning on decreasing weight with a goal weight of 200lbs.     Follow up with Dr. Cote in 3 months.   I spent 30 minutes caring for Mani on this date of service. This time includes time spent by me in the following activities:preparing for the visit, reviewing tests, obtaining and/or reviewing a separately obtained history, performing a medically appropriate examination and/or evaluation , counseling and educating the patient/family/caregiver, ordering medications, tests, or procedures, referring and communicating with other health care professionals  and documenting information in the medical record        Electronically signed by CHETNA Chavez

## 2023-01-24 LAB
QT INTERVAL: 462 MS
QTC INTERVAL: 453 MS

## 2023-02-23 ENCOUNTER — HOSPITAL ENCOUNTER (OUTPATIENT)
Dept: CARDIOLOGY | Facility: HOSPITAL | Age: 78
Discharge: HOME | End: 2023-02-23
Admitting: HOSPITALIST
Payer: MEDICARE

## 2023-02-23 VITALS
WEIGHT: 222.6 LBS | HEART RATE: 68 BPM | BODY MASS INDEX: 30.18 KG/M2 | OXYGEN SATURATION: 98 % | DIASTOLIC BLOOD PRESSURE: 93 MMHG | SYSTOLIC BLOOD PRESSURE: 154 MMHG

## 2023-02-23 DIAGNOSIS — I48.4 ATYPICAL ATRIAL FLUTTER: ICD-10-CM

## 2023-02-23 DIAGNOSIS — I50.22 CHRONIC SYSTOLIC HEART FAILURE: Primary | Chronic | ICD-10-CM

## 2023-02-23 DIAGNOSIS — E11.9 TYPE 2 DIABETES MELLITUS WITHOUT COMPLICATION, WITHOUT LONG-TERM CURRENT USE OF INSULIN: Chronic | ICD-10-CM

## 2023-02-23 DIAGNOSIS — I10 ESSENTIAL HYPERTENSION: Chronic | ICD-10-CM

## 2023-02-23 DIAGNOSIS — E78.00 PURE HYPERCHOLESTEROLEMIA: Chronic | ICD-10-CM

## 2023-02-23 DIAGNOSIS — I25.10 CORONARY ARTERY DISEASE INVOLVING NATIVE CORONARY ARTERY OF NATIVE HEART WITHOUT ANGINA PECTORIS: Chronic | ICD-10-CM

## 2023-02-23 LAB
ANION GAP SERPL CALCULATED.3IONS-SCNC: 11 MMOL/L (ref 5–15)
BUN SERPL-MCNC: 10 MG/DL (ref 8–23)
BUN/CREAT SERPL: 7.6 (ref 7–25)
CALCIUM SPEC-SCNC: 9.3 MG/DL (ref 8.6–10.5)
CHLORIDE SERPL-SCNC: 103 MMOL/L (ref 98–107)
CO2 SERPL-SCNC: 28 MMOL/L (ref 22–29)
CREAT SERPL-MCNC: 1.32 MG/DL (ref 0.76–1.27)
EGFRCR SERPLBLD CKD-EPI 2021: 56.2 ML/MIN/1.73
GLUCOSE SERPL-MCNC: 122 MG/DL (ref 65–99)
POTASSIUM SERPL-SCNC: 4 MMOL/L (ref 3.5–5.2)
SODIUM SERPL-SCNC: 142 MMOL/L (ref 136–145)

## 2023-02-23 PROCEDURE — 99214 OFFICE O/P EST MOD 30 MIN: CPT | Performed by: HOSPITALIST

## 2023-02-23 PROCEDURE — 80048 BASIC METABOLIC PNL TOTAL CA: CPT | Performed by: HOSPITALIST

## 2023-02-23 PROCEDURE — G0463 HOSPITAL OUTPT CLINIC VISIT: HCPCS | Performed by: HOSPITALIST

## 2023-02-23 RX ORDER — AMLODIPINE BESYLATE 10 MG/1
10 TABLET ORAL DAILY
Qty: 30 TABLET | Refills: 11 | Status: SHIPPED | OUTPATIENT
Start: 2023-02-23

## 2023-02-23 NOTE — PROGRESS NOTES
Reason For Visit:  HFrEF (7 wk follow up )    Subjective        Mani Arreola is a 77 y.o. male with a PMH of HF due to ICM with improved EF, CAD s/p LAD PCI, HTN, HLD, T2DM, atrial flutter s/p ablation, and carotid stenosis s/p CEA who presents for CHF follow-up.    Patient developed CHF symptoms in spring 2022 and was found to have severely reduced LV systolic function (EF 31 to 35%) September 2022.  He was referred to the heart failure clinic and initiated on GDMT that was slowly titrated up (see prior documentation for full details).  Stress test revealed evidence of a large anterior infarct, and follow-up cardiac MRI indicated that this territory was nonviable.  The MRI did reveal recovered LV function (EF 50%).  He also was previously referred to EP due to atrial flutter and underwent to ablations (most recently 12/2022).  At most recent CHF clinic follow-up 1/9/2023, he was doing well from a CHF perspective but did have elevated BP for which she was started on amlodipine 5 mg daily.    Today, the patient reports that he has been doing well.  He followed up with Dr. Fleming who started him on Janumet, which has improved his glucose control.  He has been monitoring his BP closely, and for the last 14 days his BP has been in the 140s to 150s systolic on 11 checks.  He has been restricting his calories per recommendation from Dr. Cote, and has been successfully losing weight.  He gets occasional swelling in his legs for which he takes Bumex as needed; he took Bumex 4 times in January and 3 times so far in February.  He denies chest pain, shortness of breath, lightheadedness, palpitations, orthopnea.    ROS: Pertinent positives and negatives included above.    Pertinent past medical, surgical, family, and social history were reviewed and updated in the EMR.      Current Outpatient Medications:   •  acetaminophen (TYLENOL) 650 MG 8 hr tablet, Take 1,300 mg by mouth 2 (Two) Times a Day., Disp: , Rfl:   •  amLODIPine  (NORVASC) 5 MG tablet, Take 1 tablet by mouth Daily. (Patient taking differently: Take 5 mg by mouth Daily. Patient states he hasn't picked up medication yet.), Disp: 30 tablet, Rfl: 11  •  apixaban (ELIQUIS) 5 MG tablet tablet, Take 1 tablet by mouth 2 (Two) Times a Day., Disp: 60 tablet, Rfl: 11  •  atorvastatin (LIPITOR) 80 MG tablet, Take 1 tablet by mouth Daily. (Patient taking differently: Take 80 mg by mouth Every Night.), Disp: 90 tablet, Rfl: 3  •  bumetanide (BUMEX) 1 MG tablet, Take 1 tablet by mouth Daily As Needed. For weight gain., Disp: 30 tablet, Rfl: 11  •  dapagliflozin Propanediol 10 MG tablet, Take 10 mg by mouth Daily., Disp: 30 tablet, Rfl: 11  •  diphenhydrAMINE (BENADRYL) 25 mg capsule, Take 50 mg by mouth At Night As Needed for Itching. Patient states he takes them regularly., Disp: , Rfl:   •  folic acid (FOLVITE) 400 MCG tablet, Take 400 mcg by mouth daily., Disp: , Rfl:   •  metoprolol succinate XL (TOPROL-XL) 100 MG 24 hr tablet, Take 1 tablet by mouth Daily., Disp: 30 tablet, Rfl: 11  •  Multiple Vitamins-Minerals (MULTIVITAMIN ADULT PO), Take 1 tablet by mouth Daily., Disp: , Rfl:   •  Omega-3 Fatty Acids (FISH OIL PO), Take 1,000 mg by mouth Every Night., Disp: , Rfl:   •  omeprazole (priLOSEC) 20 MG capsule, Take 2 capsules by mouth Daily., Disp: 30 capsule, Rfl: 11  •  ondansetron ODT (ZOFRAN-ODT) 8 MG disintegrating tablet, Place 8 mg on the tongue Every 8 (Eight) Hours As Needed for Nausea or Vomiting., Disp: , Rfl:   •  sacubitril-valsartan (ENTRESTO)  MG tablet, Take 1 tablet by mouth 2 (Two) Times a Day., Disp: 180 tablet, Rfl: 3  •  traMADol (ULTRAM) 50 MG tablet, Take 50 mg by mouth 2 (Two) Times a Day., Disp: , Rfl:   •  vitamin C (ASCORBIC ACID) 500 MG tablet, Take 500 mg by mouth daily., Disp: , Rfl:      Objective   Vital Signs:  Wt 101 kg (222 lb 9.6 oz)   BMI 30.18 kg/m²   Estimated body mass index is 30.18 kg/m² as calculated from the following:    Height as of  "1/12/23: 182.9 cm (72.01\").    Weight as of this encounter: 101 kg (222 lb 9.6 oz).      Constitutional:       Appearance: Healthy appearance. Not in distress.   Neck:      Vascular: JVD normal.   Pulmonary:      Effort: Pulmonary effort is normal.      Breath sounds: Normal breath sounds.   Cardiovascular:      Normal rate. Regular rhythm.      Murmurs: There is no murmur.      No gallop. No click. No rub.   Edema:     Comments: Trace to 1+ bilateral lower extremity edema.  Abdominal:      General: There is no distension.      Palpations: Abdomen is soft.      Tenderness: There is no abdominal tenderness.   Skin:     General: Skin is warm and dry.   Neurological:      Mental Status: Alert and oriented to person, place and time.        Result Review :  The following data was reviewed by: Dominic Aguirre MD on 02/23/2023:  BMP   BMP    BMP 12/29/22 1/9/23 2/23/23   BUN 20 13 10   Creatinine 1.37 (A) 1.25 1.32 (A)   Sodium 142 143 142   Potassium 4.0 4.3 4.0   Chloride 105 104 103   CO2 26.0 27.0 28.0   Calcium 8.9 9.3 9.3   (A) Abnormal value                     Previously Reviewed    Cardiac MRI 1/5/2023 at [facility]  - Ischemic cardiomyopathy; mild depression of LV systolic function; moderate scar burden; LVEF 50% (normal LVEF for age/gender equals 65 to 90%)  - Normal RV systolic function; RVEF 52%  - There is a subendocardial infarction involving the anteroseptal, mid anterior, anteroapical, and apical walls.  The infarction occupies 50 to 75% of the transmural thickness.  There is a low likelihood of functional recovery of left ventricular wall motion with successful revascularization of the LAD territory.  - Small pericardial effusion; large epicardial fat pad; trivial bilateral pleural effusions.  - Dilated main PA suggest pulmonary hypertension.     Stress Test With Myocardial Perfusion (1 Day) (11/08/2022 12:11)  •  Large anterior infarction involving the proximal to mid left anterior descending coronary " artery distribution.  Hypokinesis to akinesis in this distribution  •  There is no significant inducible ischemia  •  Left ventricular ejection fraction is moderately reduced (Calculated EF = 33%).  •  Refer to bull's-eye diagram below     Adult Transthoracic Echo Complete W/ Cont if Necessary Per Protocol (09/15/2022 10:03)  • The left ventricular cavity is moderately dilated.  • The right atrial cavity is borderline dilated.  • Left ventricular diastolic dysfunction is noted.  • The following left ventricular wall segments are hypokinetic: mid anterior, apical anterior, mid anterolateral, apical lateral, apical inferior, mid inferior, mid anteroseptal and basal anterior. The following left ventricular wall segments are akinetic: apical septal and apex.  • Left ventricular ejection fraction appears to be 31 - 35%. Left ventricular systolic function is severely decreased.       Assessment and Plan   Diagnoses and all orders for this visit:    1. Chronic systolic heart failure (HCC) (Primary)  Overall doing well from a CHF perspective and now has improved LV function with only occasional edema no significant CHF symptoms  - Etiology: Ischemic  - LVEF: 50%  - NYHA Class: I/II (activity primarily limited by back pain)  - KCCQ: date 11/21/2022, score 55 (improved from score of 45 on prior questionnaire).  - 6MWT: date 11/21/2022, walked 106 meters (stopped early due to back pain rather than exertional intolerance, no plans to repeat).  - BB: Toprol- mg daily  - ACEi/ARB/ARNi: Entresto 97/23 mg twice daily  - SGLT2i: Dapagliflozin 10 mg daily  - MRA: Spironolactone on hold due to prior hyperkalemia and now with improved EF and minimal CHF symptoms  - Diuretics: Bumex 1 mg PRN; patient instructed to take Bumex the next 1 to 2 days due to mild edema  - IV iron: We will hold off on IV iron considerations given LV function is now recovered      2. Essential hypertension  BP is recently been consistently above goal.   Given the infrequency of his Bumex use, we discussed the possibility of transitioning from amlodipine to HCTZ, which may also help avoid potential lower extremity swelling side effects from CCB as well as decreasing Bumex use.  For now, the patient is feeling well and will stick with amlodipine; plan to increase dose today.  - BP goal less than 130/80 given ASCVD history  - Beta-blocker and ARNI as noted above  - Increase amlodipine to 10 mg daily    3. Coronary artery disease involving native coronary artery of native heart without angina pectoris  - Eliquis 5 mg twice daily  - Atorvastatin 80 mg daily  - Most recent LDL at goal of less than 70    4. Pure hypercholesterolemia  - Statin and lipid goal as noted above    5. Type 2 diabetes mellitus without complication, without long-term current use of insulin (HCC)  Glycemic control improving.  - Continue follow-up with PCP  - On Farxiga as noted above    6. Atypical atrial flutter (HCC)  - Eliquis and beta-blocker as noted above  - Continue follow-up with Dr. Cote    Other orders  -     Basic Metabolic Panel; Standing  -     Basic Metabolic Panel  -     amLODIPine (NORVASC) 10 MG tablet; Take 1 tablet by mouth Daily.  Dispense: 30 tablet; Refill: 11        Follow Up   Return in about 3 months (around 5/23/2023).  Patient was given instructions and counseling regarding his condition or for health maintenance advice. Please see specific information pulled into the AVS if appropriate.       EMR Dragon/Transcription disclaimer: Much of this encounter note is an electronic transcription/translation of spoken language to printed text. The electronic translation of spoken language may permit erroneous, or at times, nonsensical words or phrases to be inadvertently transcribed; although I have reviewed the note for such errors, some may still exist.

## 2023-03-30 RX ORDER — OMEPRAZOLE 20 MG/1
40 CAPSULE, DELAYED RELEASE ORAL DAILY
Qty: 180 CAPSULE | Refills: 4 | Status: SHIPPED | OUTPATIENT
Start: 2023-03-30

## 2023-04-18 ENCOUNTER — OFFICE VISIT (OUTPATIENT)
Dept: CARDIOLOGY | Facility: CLINIC | Age: 78
End: 2023-04-18
Payer: MEDICARE

## 2023-04-18 VITALS
HEART RATE: 87 BPM | DIASTOLIC BLOOD PRESSURE: 84 MMHG | BODY MASS INDEX: 29.53 KG/M2 | WEIGHT: 218 LBS | SYSTOLIC BLOOD PRESSURE: 112 MMHG | HEIGHT: 72 IN | OXYGEN SATURATION: 99 %

## 2023-04-18 DIAGNOSIS — I50.20 HFREF (HEART FAILURE WITH REDUCED EJECTION FRACTION): ICD-10-CM

## 2023-04-18 DIAGNOSIS — I48.0 PAROXYSMAL ATRIAL FIBRILLATION: ICD-10-CM

## 2023-04-18 DIAGNOSIS — I48.4 ATYPICAL ATRIAL FLUTTER: Primary | ICD-10-CM

## 2023-04-18 NOTE — PROGRESS NOTES
[facility] -  CLINIC FOLLOW UP     Patient Care Team:  Tyree Fleming MD as PCP - General  Tyree Fleming MD as PCP - Family Medicine  Cirilo Bhandari MD as Consulting Physician (Gastroenterology)  Tyree Fleming MD as Referring Physician (Family Medicine)  Fidel Harris MD as Cardiologist (Cardiology)    Chief Complaint: follow up ablation     Subjective       EP History:  1.  Typical atrial flutter  -11/14/22:  CTI ablation  2.  Atypical atrial flutter  -EPS 12-29-22: mitral annual flutter s/p anteroseptal mitral line, repeat PVI, posterior wall isolation     Cardiology history:  1.  Hypertension  2.  Hyperlipidemia  3.  Coronary artery disease w/ Prior MI and PCI  4.  Chronic systolic heart failure  -9/7/2022: EF 31 to 35%  5.  COTY with carotid endarterectomy (2004)     Medical History:   1.  Type 2 diabetes  3.  Obesity, BMI 31    HPI: Today I had the pleasure of seeing Mani Arreola in the cardiology clinic for follow up. He is a 77 year old male with a history of CAD s/p PCI, HTN, HLD, T2DM, atrial flutter s/p ablation, and carotid stenosis s/p CEA who folows up after requiring a second EP study and ablation of atypical left atrial flutter. Initially, he had a CTI typical atrial flutter ablation in November. However, in December, he presented to EP clinic back in atrial flutter despite an apparently successful typical flutter ablation recently.  The flutter appeared more atypical and given his HFrEF and ongoing AFL, he underwent a repeat EPS to see if this is CTI recurrence or an atypical flutter from elsewhere. This was performed December 29th with the following findings:   1. Counterclockwise mitral annular on presentation  2. Heterogenous scar of the left atrium with dense deposits along the posterior wall  3.  4/4 pulmonary veins isolated  4.  Posterior wall isolated  5.  Anteroseptal mitral line with successful termination of tachycardia and  "bidirectional block  6.  Durable CTI ablation from prior procedure    In February, he was doing well from heart failure standpoint and was successfully losing weight. Overall, he was denying any  chest pain, shortness of breath, lightheadedness, palpitations, orthopnea. His LV function had improved to 50% as well.     He had bloodwork today in anticipation of Dr. Fleming's appointment next week. His wife feels that his urine output is decreased, but overall just doesn't eat much due to early satiety and doesn't drink much either. The most strenuous thing he has done is mow the yard and his back has been hurting since sitting in [employer].     Objective     Visit Vitals  /84   Pulse 87   Ht 182.9 cm (72.01\")   Wt 98.9 kg (218 lb)   SpO2 99%   BMI 29.56 kg/m²           Vitals reviewed.   Constitutional:       Appearance: Healthy appearance. Not in distress.   Eyes:      Extraocular Movements: Extraocular movements intact.      Conjunctiva/sclera: Conjunctivae normal.      Pupils: Pupils are equal, round, and reactive to light.   HENT:      Head: Normocephalic and atraumatic.      Nose: Nose normal.    Mouth/Throat:      Lips: Pink.      Mouth: Mucous membranes are moist.      Pharynx: Oropharynx is clear.   Neck:      Vascular: No carotid bruit or JVD. JVD normal.   Pulmonary:      Effort: Pulmonary effort is normal.      Breath sounds: Normal breath sounds.   Chest:      Chest wall: Not tender to palpatation.   Cardiovascular:      PMI at left midclavicular line. Normal rate. Regular rhythm. Normal S1. Normal S2.      Murmurs: There is no murmur.      No gallop. No rub.   Pulses:     Radial: 2+ bilaterally.     Posterior tibial: 2+ bilaterally.  Abdominal:      General: Bowel sounds are normal.      Palpations: Abdomen is soft.   Musculoskeletal: Normal range of motion.      Extremities: No clubbing present.     Cervical back: Normal range of motion. Skin:     General: Skin is warm and dry.   Neurological:      " General: No focal deficit present.      Mental Status: Alert and oriented to person, place, and time.      Cranial Nerves: Cranial nerves are intact.   Psychiatric:         Attention and Perception: Attention normal.         Mood and Affect: Affect normal.         Speech: Speech normal.         Behavior: Behavior normal.         Cognition and Memory: Cognition normal.             The following portions of the patient's history were reviewed and updated as appropriate: allergies, current medications, past medical history, past social history, past and problem list.     Review of Systems   Constitutional: Negative.    HENT: Negative.    Eyes: Negative.    Respiratory: Negative.    Cardiovascular: Negative.    Gastrointestinal: Negative.    Endocrine: Negative.    Genitourinary: Negative.    Musculoskeletal: Negative.    Skin: Negative.    Allergic/Immunologic: Negative.    Neurological: Negative.    Hematological: Negative.    Psychiatric/Behavioral: Negative.             ECG 12 Lead    Date/Time: 4/18/2023 2:31 PM  Performed by: Catherine Kim PA  Authorized by: Catherine Kim PA   Comparison: compared with previous ECG from 1/17/2023  Rhythm: sinus rhythm  Rate: normal  QRS axis: left  Other findings: poor R wave progression    Clinical impression: abnormal EKG              Medication Review: yes    Current Outpatient Medications:   •  acetaminophen (TYLENOL) 650 MG 8 hr tablet, Take 2 tablets by mouth 2 (Two) Times a Day., Disp: , Rfl:   •  amLODIPine (NORVASC) 10 MG tablet, Take 1 tablet by mouth Daily., Disp: 30 tablet, Rfl: 11  •  apixaban (ELIQUIS) 5 MG tablet tablet, Take 1 tablet by mouth 2 (Two) Times a Day., Disp: 60 tablet, Rfl: 11  •  atorvastatin (LIPITOR) 80 MG tablet, Take 1 tablet by mouth Daily. (Patient taking differently: Take 1 tablet by mouth Every Night.), Disp: 90 tablet, Rfl: 3  •  bumetanide (BUMEX) 1 MG tablet, Take 1 tablet by mouth Daily As Needed. For weight gain., Disp: 30 tablet,  Rfl: 11  •  dapagliflozin Propanediol 10 MG tablet, Take 10 mg by mouth Daily., Disp: 30 tablet, Rfl: 11  •  diphenhydrAMINE (BENADRYL) 25 mg capsule, Take 2 capsules by mouth At Night As Needed for Allergies or Sleep. Patient states he takes them regularly., Disp: , Rfl:   •  folic acid (FOLVITE) 400 MCG tablet, Take 1 tablet by mouth Daily., Disp: , Rfl:   •  metoprolol succinate XL (TOPROL-XL) 100 MG 24 hr tablet, Take 1 tablet by mouth Daily., Disp: 30 tablet, Rfl: 11  •  Multiple Vitamins-Minerals (MULTIVITAMIN ADULT PO), Take 1 tablet by mouth Daily., Disp: , Rfl:   •  Omega-3 Fatty Acids (FISH OIL PO), Take 1,000 mg by mouth Every Night., Disp: , Rfl:   •  omeprazole (priLOSEC) 20 MG capsule, Take 2 capsules by mouth Daily., Disp: 180 capsule, Rfl: 4  •  ondansetron ODT (ZOFRAN-ODT) 8 MG disintegrating tablet, Place 1 tablet on the tongue Every 8 (Eight) Hours As Needed for Nausea or Vomiting., Disp: , Rfl:   •  sacubitril-valsartan (ENTRESTO)  MG tablet, Take 1 tablet by mouth 2 (Two) Times a Day., Disp: 180 tablet, Rfl: 3  •  SITagliptin-metFORMIN HCl ER (JANUMET XR) 100-1000 MG tablet, Take 1 tablet by mouth Daily., Disp: , Rfl:   •  traMADol (ULTRAM) 50 MG tablet, Take 1 tablet by mouth 2 (Two) Times a Day., Disp: , Rfl:   •  vitamin C (ASCORBIC ACID) 500 MG tablet, Take 1 tablet by mouth Daily., Disp: , Rfl:    No Known Allergies    I have reviewed       CBC:  Lab Results - Last 18 Months   Lab Units 12/29/22  0950 11/14/22  0701 09/22/22  0825   WBC 10*3/mm3 6.95   < > 5.52   HEMOGLOBIN g/dL 14.2   < > 12.8*   HEMATOCRIT % 45.8   < > 41.4   PLATELETS 10*3/mm3 210   < > 150   IRON mcg/dL  --   --  68    < > = values in this interval not displayed.      BMP/CMP:  Lab Results - Last 18 Months   Lab Units 02/23/23  0958   SODIUM mmol/L 142   POTASSIUM mmol/L 4.0   CHLORIDE mmol/L 103   CO2 mmol/L 28.0   GLUCOSE mg/dL 122*   BUN mg/dL 10   CREATININE mg/dL 1.32*   CALCIUM mg/dL 9.3     BNP:   Lab  Results - Last 18 Months   Lab Units 01/09/23  0957   PROBNP pg/mL 968.3      THYROID:  Lab Results - Last 18 Months   Lab Units 10/17/22  1042   TSH uIU/mL 1.470       Results for orders placed during the hospital encounter of 09/15/22    Adult Transthoracic Echo Complete W/ Cont if Necessary Per Protocol    Interpretation Summary  · The left ventricular cavity is moderately dilated.  · The right atrial cavity is borderline dilated.  · Left ventricular diastolic dysfunction is noted.  · The following left ventricular wall segments are hypokinetic: mid anterior, apical anterior, mid anterolateral, apical lateral, apical inferior, mid inferior, mid anteroseptal and basal anterior. The following left ventricular wall segments are akinetic: apical septal and apex.  · Left ventricular ejection fraction appears to be 31 - 35%. Left ventricular systolic function is severely decreased.    SEVERE ISCHEMIC CARDIOMYOPATHY     Assessment:   Diagnoses and all orders for this visit:    1. Atypical atrial flutter (Primary)    2. HFrEF (heart failure with reduced ejection fraction)    3. Paroxysmal atrial fibrillation    Other orders  -     ECG 12 Lead      Atypical atrial flutter s/p ablation of mitral flutter and redo PVI: Doing well without recurrence. Continue anticoagulation for CHADsVASC score of 5.     HFrEF: Improvement of EF to 50%, doing well from GDMT management standpoint and requiring minimal doses of diuretics.   -Cardiac MRI showed nonviable LAD territory.   -Followed by Dr. Aguirre in heart failure clinic.     Follow up in 6 months with Dr. Cote      I spent 30 minutes caring for Mani on this date of service. This time includes time spent by me in the following activities:preparing for the visit, reviewing tests, obtaining and/or reviewing a separately obtained history, performing a medically appropriate examination and/or evaluation , counseling and educating the patient/family/caregiver, ordering medications,  tests, or procedures, referring and communicating with other health care professionals  and documenting information in the medical record        Electronically signed by CHETNA Chavez

## 2023-05-22 ENCOUNTER — HOSPITAL ENCOUNTER (OUTPATIENT)
Dept: CARDIOLOGY | Facility: HOSPITAL | Age: 78
Discharge: HOME | End: 2023-05-22
Payer: MEDICARE

## 2023-05-22 VITALS
SYSTOLIC BLOOD PRESSURE: 149 MMHG | DIASTOLIC BLOOD PRESSURE: 75 MMHG | OXYGEN SATURATION: 98 % | RESPIRATION RATE: 12 BRPM | BODY MASS INDEX: 28.47 KG/M2 | HEART RATE: 72 BPM | WEIGHT: 210 LBS

## 2023-05-22 DIAGNOSIS — I48.4 ATYPICAL ATRIAL FLUTTER: ICD-10-CM

## 2023-05-22 DIAGNOSIS — I25.10 CORONARY ARTERY DISEASE INVOLVING NATIVE CORONARY ARTERY OF NATIVE HEART WITHOUT ANGINA PECTORIS: ICD-10-CM

## 2023-05-22 DIAGNOSIS — I10 PRIMARY HYPERTENSION: ICD-10-CM

## 2023-05-22 DIAGNOSIS — I48.0 PAROXYSMAL ATRIAL FIBRILLATION: Primary | ICD-10-CM

## 2023-05-22 DIAGNOSIS — I48.0 PAROXYSMAL ATRIAL FIBRILLATION: ICD-10-CM

## 2023-05-22 DIAGNOSIS — I50.22 CHRONIC HFREF (HEART FAILURE WITH REDUCED EJECTION FRACTION): Primary | ICD-10-CM

## 2023-05-22 DIAGNOSIS — E78.00 PURE HYPERCHOLESTEROLEMIA: ICD-10-CM

## 2023-05-22 LAB
ANION GAP SERPL CALCULATED.3IONS-SCNC: 12 MMOL/L (ref 5–15)
BUN SERPL-MCNC: 13 MG/DL (ref 8–23)
BUN/CREAT SERPL: 13.1 (ref 7–25)
CALCIUM SPEC-SCNC: 9.7 MG/DL (ref 8.6–10.5)
CHLORIDE SERPL-SCNC: 105 MMOL/L (ref 98–107)
CO2 SERPL-SCNC: 25 MMOL/L (ref 22–29)
CREAT SERPL-MCNC: 0.99 MG/DL (ref 0.76–1.27)
EGFRCR SERPLBLD CKD-EPI 2021: 78.9 ML/MIN/1.73
GLUCOSE SERPL-MCNC: 151 MG/DL (ref 65–99)
POTASSIUM SERPL-SCNC: 4.5 MMOL/L (ref 3.5–5.2)
SODIUM SERPL-SCNC: 142 MMOL/L (ref 136–145)

## 2023-05-22 PROCEDURE — G0463 HOSPITAL OUTPT CLINIC VISIT: HCPCS | Performed by: HOSPITALIST

## 2023-05-22 PROCEDURE — 80048 BASIC METABOLIC PNL TOTAL CA: CPT

## 2023-05-22 NOTE — PROGRESS NOTES
"[facility]    Mani is a 78 y.o. male, who is followed by the Heart Failure Clinic.        CHIEF COMPLAINT: No chief complaint on file.      HPI:  Congestive Heart Failure  Patient presents for a follow-up visit for congestive heart failure. Patient's current complaints are none. Patient states the only complaint he has is back pain - \"you can't do anything for that.\" Patient was denied on his Eliquis assistance, so paperwork has been started for Xarelto. I educated patient/spouse that once the determination has been made for Xarelto approval/denial he is to continue Eliquis. I told patient/wife that he was approved for Entresto and they should be receiving information or the medication in the mail. He states he is compliant all of the time with his medications. He states he is compliant most of the time with his diet.    CURRENT MEDICATION REGIMEN:  Heart Failure Medications    sacubitril-valsartan (ENTRESTO)  MG tablet, Take 1 tablet by mouth 2 (Two) Times a Day. (Patient taking differently: Take 1 tablet by mouth 2 (Two) Times a Day.) APPROVED for patient assistance.    bumetanide (BUMEX) 1 MG tablet, Take 1 tablet by mouth Daily As Needed. For weight gain.    metoprolol succinate XL (TOPROL-XL) 100 MG 24 hr tablet, Take 1 tablet by mouth Daily.    dapagliflozin Propanediol 10 MG tablet, Take 10 mg by mouth Daily.    Other Cardiovascular Medications    amLODIPine (NORVASC) 10 MG tablet, Take 1 tablet by mouth Daily.    atorvastatin (LIPITOR) 80 MG tablet, Take 1 tablet by mouth Daily. (Patient taking differently: Take 1 tablet by mouth Every Night.)    rivaroxaban (XARELTO) 20 MG tablet, Take 1 tablet by mouth Daily. (Patient taking differently: Take 1 tablet by mouth Daily.) Working on PATIENT ASSISTANCE PAPERWORK - patient does have Eliquis at home but was denied due to income on 5/18/23. I educated patient to continue Eliquis therapy until Xarelto determination.    Other " Medications    acetaminophen (TYLENOL) 650 MG 8 hr tablet, Take 2 tablets by mouth 2 (Two) Times a Day.    diphenhydrAMINE (BENADRYL) 25 mg capsule, Take 2 capsules by mouth At Night As Needed for Allergies or Sleep. Patient states he takes them regularly.    folic acid (FOLVITE) 400 MCG tablet, Take 1 tablet by mouth Daily.    Multiple Vitamins-Minerals (MULTIVITAMIN ADULT PO), Take 1 tablet by mouth Daily.    Omega-3 Fatty Acids (FISH OIL PO), Take 1,000 mg by mouth Every Night    omeprazole (priLOSEC) 20 MG capsule, Take 2 capsules by mouth Daily.    ondansetron ODT (ZOFRAN-ODT) 8 MG disintegrating tablet, Place 1 tablet on the tongue Every 8 (Eight) Hours As Needed for Nausea or Vomiting.    SITagliptin-metFORMIN HCl ER (JANUMET XR) 100-1000 MG tablet, Take 1 tablet by mouth Daily.    traMADol (ULTRAM) 50 MG tablet, Take 1 tablet by mouth Every 8 (Eight) Hours As Needed.    vitamin C (ASCORBIC ACID) 500 MG tablet, Take 1 tablet by mouth Daiy      Medication Adherence    Adherence tools used: patient uses a pill box to manage medications, medication list  Support network for adherence: family member           OBJECTIVE:  There were no vitals taken for this visit.    There is no height or weight on file to calculate BMI.  Wt Readings from Last 1 Encounters:   04/18/23 98.9 kg (218 lb)       Home BP Readings Brought into Clinic: yes  Home Pulse Readings Brought into Clinic: yes  Home Daily Weight Log Brought into Clinic: yes      LAB REVIEW:  Renal Function: CrCl cannot be calculated (Patient's most recent lab result is older than the maximum 30 days allowed.).    Lab Results   Component Value Date    GLUCOSE 122 (H) 02/23/2023    CALCIUM 9.3 02/23/2023     02/23/2023    K 4.0 02/23/2023    CO2 28.0 02/23/2023     02/23/2023    BUN 10 02/23/2023    CREATININE 1.32 (H) 02/23/2023    EGFR 56.2 (L) 02/23/2023    BCR 7.6 02/23/2023    ANIONGAP 11.0 02/23/2023     Lab Results   Component Value Date    MG 2.0  10/26/2022     Lab Results   Component Value Date    PROBNP 968.3 01/09/2023       Results for orders placed during the hospital encounter of 09/15/22    Adult Transthoracic Echo Complete W/ Cont if Necessary Per Protocol    Interpretation Summary  · The left ventricular cavity is moderately dilated.  · The right atrial cavity is borderline dilated.  · Left ventricular diastolic dysfunction is noted.  · The following left ventricular wall segments are hypokinetic: mid anterior, apical anterior, mid anterolateral, apical lateral, apical inferior, mid inferior, mid anteroseptal and basal anterior. The following left ventricular wall segments are akinetic: apical septal and apex.  · Left ventricular ejection fraction appears to be 31 - 35%. Left ventricular systolic function is severely decreased.    SEVERE ISCHEMIC CARDIOMYOPATHY        ASSESSMENT/PLAN OF CARE:  Changes to Current Medication Regimen: no      Patient was educated to continue daily monitoring of their blood pressure, pulse, and weight; with daily home log being brought into next follow-up clinic visit. Patient was also encouraged to continue sodium intake restriction, fluid intake restriction, and follow heart failure diet guidelines as provided by the Heart Failure Nurse Navigator. Thank you for allowing me to participate in the care of your patient. Patient was instructed to contact our Heart Failure Clinic if they have any concerns before their next clinical visit.    FOLLOW-UP:   2 month(s) - Appointment Date/Time: 8/23/23 @ 1000      Martha Riojas PharmD  [facility]  05/22/23  09:48 CDT

## 2023-05-22 NOTE — PROGRESS NOTES
[facility]  Heart Failure Clinic  Subjective:     Encounter Date:05/22/2023      Patient ID: Mani Arreola is a 78 y.o. male     Chief Complaint:  History of Present Illness  Mani Arreola is a 77-year-old male with known coronary artery disease status post PCI, hypertension, hyperlipidemia, type 2 diabetes, and carotid stenosis status post CEA who presents for subsequent heart failure clinic follow-up.    Patient was last seen in the heart failure clinic 2/23/2023.  At that time he reported taking Bumex as needed with roughly 4 times in January and 3 times in February.  Blood pressure is elevated this visit his amlodipine was increased to 10 mg daily.  No other heart failure medications were adjusted.    Today the patient states has been doing well.  He presents a weight log which reveals consistent weights of around 203 pounds.  He has not needed any as needed Bumex in around 2 months.  He presents a blood pressure log which reveals systolics typically in the 100s to 110s.  His only complaint today is chronic back pain which he said we will follow-up with his primary care provider about.  He denies having any chest pain, palpitations, presyncope, or syncope.  He denies any dyspnea on exertion.  He said he was able to walk in from the art far into the parking garage without stopping.  He denies having any lower extremity swelling.          The following portions of the patient's history were reviewed and updated as appropriate: allergies, current medications, past medical history, past social history, past and problem list.    No Known Allergies    Current Outpatient Medications:     acetaminophen (TYLENOL) 650 MG 8 hr tablet, Take 2 tablets by mouth 2 (Two) Times a Day., Disp: , Rfl:     amLODIPine (NORVASC) 10 MG tablet, Take 1 tablet by mouth Daily., Disp: 30 tablet, Rfl: 11    atorvastatin (LIPITOR) 80 MG tablet, Take 1 tablet by mouth Daily. (Patient taking differently: Take 1 tablet by mouth  Every Night.), Disp: 90 tablet, Rfl: 3    bumetanide (BUMEX) 1 MG tablet, Take 1 tablet by mouth Daily As Needed. For weight gain., Disp: 30 tablet, Rfl: 11    dapagliflozin Propanediol 10 MG tablet, Take 10 mg by mouth Daily., Disp: 30 tablet, Rfl: 11    diphenhydrAMINE (BENADRYL) 25 mg capsule, Take 2 capsules by mouth At Night As Needed for Allergies or Sleep. Patient states he takes them regularly., Disp: , Rfl:     folic acid (FOLVITE) 400 MCG tablet, Take 1 tablet by mouth Daily., Disp: , Rfl:     metoprolol succinate XL (TOPROL-XL) 100 MG 24 hr tablet, Take 1 tablet by mouth Daily., Disp: 30 tablet, Rfl: 11    Multiple Vitamins-Minerals (MULTIVITAMIN ADULT PO), Take 1 tablet by mouth Daily., Disp: , Rfl:     Omega-3 Fatty Acids (FISH OIL PO), Take 1,000 mg by mouth Every Night., Disp: , Rfl:     omeprazole (priLOSEC) 20 MG capsule, Take 2 capsules by mouth Daily., Disp: 180 capsule, Rfl: 4    ondansetron ODT (ZOFRAN-ODT) 8 MG disintegrating tablet, Place 1 tablet on the tongue Every 8 (Eight) Hours As Needed for Nausea or Vomiting., Disp: , Rfl:     sacubitril-valsartan (ENTRESTO)  MG tablet, Take 1 tablet by mouth 2 (Two) Times a Day. (Patient taking differently: Take 1 tablet by mouth 2 (Two) Times a Day. APPROVED for patient assistance on 5/18/23), Disp: 180 tablet, Rfl: 3    SITagliptin-metFORMIN HCl ER (JANUMET XR) 100-1000 MG tablet, Take 1 tablet by mouth Daily., Disp: , Rfl:     traMADol (ULTRAM) 50 MG tablet, Take 1 tablet by mouth Every 8 (Eight) Hours As Needed., Disp: , Rfl:     vitamin C (ASCORBIC ACID) 500 MG tablet, Take 1 tablet by mouth Daily., Disp: , Rfl:     rivaroxaban (XARELTO) 20 MG tablet, Take 1 tablet by mouth Daily. (Patient taking differently: Take 1 tablet by mouth Daily. Working on PATIENT ASSISTANCE PAPERWORK), Disp: 90 tablet, Rfl: 4    Social History     Socioeconomic History    Marital status:    Tobacco Use    Smoking status: Every Day     Types: Pipe     Smokeless tobacco: Never   Vaping Use    Vaping Use: Never used   Substance and Sexual Activity    Alcohol use: Yes     Comment: occ    Drug use: No    Sexual activity: Defer       Review of Systems   Constitutional: Negative.   HENT: Negative.     Eyes: Negative.    Cardiovascular: Negative.    Respiratory: Negative.     Endocrine: Negative.    Hematologic/Lymphatic: Negative.    Skin: Negative.    Musculoskeletal: Negative.    Gastrointestinal: Negative.    Genitourinary: Negative.    Neurological: Negative.    Psychiatric/Behavioral: Negative.            Objective:     Constitutional:       Appearance: Healthy appearance. Not in distress.   Neck:      Vascular: JVD normal.   Pulmonary:      Effort: Pulmonary effort is normal.      Breath sounds: Normal breath sounds. No wheezing. No rhonchi. No rales.   Cardiovascular:      PMI at left midclavicular line. Normal rate. Regular rhythm. Normal S1. Normal S2.       Murmurs: There is no murmur.      No gallop.  No click. No rub.   Edema:     Peripheral edema absent.   Musculoskeletal: Normal range of motion.      Cervical back: Normal range of motion. Skin:     General: Skin is warm and dry.   Neurological:      Mental Status: Alert and oriented to person, place and time.         Procedures  /75 (BP Location: Left arm, Patient Position: Sitting)   Pulse 72   Resp 12   Wt 95.3 kg (210 lb)   SpO2 98%   BMI 28.47 kg/m²       05/22/23  0953   Weight: 95.3 kg (210 lb)      Lab Review:   I have reviewed      BMP          1/9/2023    09:57 2/23/2023    09:58 5/22/2023    09:48   BMP   BUN 13   10   13     Creatinine 1.25   1.32   0.99     Sodium 143   142   142     Potassium 4.3   4.0   4.5     Chloride 104   103   105     CO2 27.0   28.0   25.0     Calcium 9.3   9.3   9.7        Lab Results   Component Value Date    CHOL 131 09/22/2022    TRIG 64 09/22/2022    HDL 58 09/22/2022    LDL 60 09/22/2022      Results for orders placed during the hospital encounter of  09/15/22    Adult Transthoracic Echo Complete W/ Cont if Necessary Per Protocol    Interpretation Summary  · The left ventricular cavity is moderately dilated.  · The right atrial cavity is borderline dilated.  · Left ventricular diastolic dysfunction is noted.  · The following left ventricular wall segments are hypokinetic: mid anterior, apical anterior, mid anterolateral, apical lateral, apical inferior, mid inferior, mid anteroseptal and basal anterior. The following left ventricular wall segments are akinetic: apical septal and apex.  · Left ventricular ejection fraction appears to be 31 - 35%. Left ventricular systolic function is severely decreased.    SEVERE ISCHEMIC CARDIOMYOPATHY     Assessment:          Diagnosis Plan   1. Chronic HFrEF (heart failure with reduced ejection fraction)  Basic Metabolic Panel    Basic Metabolic Panel    Basic Metabolic Panel      2. Primary hypertension        3. Coronary artery disease involving native coronary artery of native heart without angina pectoris        4. Atypical atrial flutter        5. Pure hypercholesterolemia               Plan:       1.  Chronic HFrEF  - Etiology: Ischemic  - LVEF: 50%  - NYHA Class: I/II (activity primarily limited by back pain)  - KCCQ: date 11/21/2022, score 55 (improved from score of 45 on prior questionnaire).  - 6MWT: date 11/21/2022, walked 106 meters (stopped early due to back pain rather than exertional intolerance, no plans to repeat).  - BB: Toprol- mg daily  - ACEi/ARB/ARNi: Entresto 97/23 mg twice daily  - SGLT2i: Dapagliflozin 10 mg daily  - MRA: Spironolactone on hold due to prior hyperkalemia and now with improved EF and minimal CHF symptoms  - Diuretics: Bumex 1 mg PRN; patient instructed to take Bumex the next 1 to 2 days due to mild edema  - IV iron: We will hold off on IV iron considerations given LV function is now recovered    Overall he appears euvolemic today.  Will not make any medication adjustments.  He was  encouraged to continue to do the logs that he currently does at home for his weights, blood pressure, and fluid intake.  The patient and the wife are agreement with the plan today.    2.  Primary hypertension  Blood pressure log reveals systolics typically in the 100s to 110s at home.  Slightly elevated in the clinic today  -Continue to log blood pressure at home  -Continue current medication regimen for hypertension including amlodipine 10, Toprol-XL, and Entresto    3.  Coronary artery disease  No anginal symptoms at this time  -Continue Eliquis 5 mg twice daily as well as atorvastatin 80 mg daily    4.  Atypical atrial flutter  -Followed by Dr. Cote electrophysiology.  -Tolerating Eliquis well  -Status post ablation    5.  Pure hypercholesterolemia  LDL with in goal less than 70  -Continue atorvastatin 80 mg daily

## 2023-05-23 NOTE — DISCHARGE INSTRUCTIONS
DAY OF SURGERY INSTRUCTIONS        YOUR SURGEON: ***PEDRO STRENGE    PROCEDURE: ***LUMBAR FUSION    DATE OF SURGERY: ***MARCH 13,2017    ARRIVAL TIME: AS DIRECTED BY OFFICE    DAY OF SURGERY TAKE ONLY THESE MEDICATIONS: ***METOPROLOL        BEFORE YOU COME TO THE HOSPITAL  (Pre-op instructions)  • Do not eat, drink, smoke or chew gum after midnight the night before surgery.  This also includes no mints.  • Morning of surgery take only the medicines you have been instructed with a sip of water unless otherwise instructed  by your physician.  • Do not shave, wear makeup or dark nail polish.  • Remove all jewelry including rings.  • Leave anything you consider valuable at home.  • Leave your suitcase in the car until after your surgery.  • Bring the following with you if applicable:  o Picture ID and insurance, Medicare or Medicaid cards  o Co-pay/deductible required by insurance (cash, check, credit card)  o Medications (no narcotics) in original bottles (not a list) including all over-the-counter medications.  o Copy of advance directive, living will or power-of- documents if not brought to PAT  o CPAP or BIPAP mask and tubing  o Skin prep instruction sheet  o Relaxation aids (MP3 player, book, magazine)  • Confirm your arrival time with you surgeon the day before your surgery (surgery times are subject to change)  • On the day of surgery check in at registration located at the main entrance of the hospital.       Outpatient Surgery Guidelines, Adult  Outpatient procedures are those for which the person having the procedure is allowed to go home the same day as the procedure. Various procedures are done on an outpatient basis. You should follow some general guidelines if you will be having an outpatient procedure.  LET YOUR HEALTH CARE PROVIDER KNOW ABOUT:  · Any allergies you have.  · All medicines you are taking, including vitamins, herbs, eye drops, creams, and over-the-counter medicines.  · Previous  Patient Quality Outreach    Patient is due for the following:   Breast Cancer Screening - Mammogram  Cervical Cancer Screening - PAP Needed  Physical Preventive Adult Physical    Next Steps:   No follow up needed at this time.    Type of outreach:    Sent [employer] message. 2nd attempt        Next Steps:  Reach out within 90 days via Phone.    Max number of attempts reached: Yes. Will try again in 90 days if patient still on fail list.    Questions for provider review:    None           Jessica Jay RN       problems you or members of your family have had with the use of anesthetics.  · Any blood disorders you have.  · Previous surgeries you have had.  · Medical conditions you have.  RISKS AND COMPLICATIONS  Your health care provider will discuss possible risks and complications with you before surgery. Common risks and complications include:    · Problems due to the use of anesthetics.  · Blood loss and replacement (does not apply to minor surgical procedures).  · Temporary increase in pain due to surgery.  · Uncorrected pain or problems that the surgery was meant to correct.  · Infection.  · New damage.  BEFORE THE PROCEDURE  · Ask your health care provider about changing or stopping your regular medicines. You may need to stop taking certain medicines in the days or weeks before the procedure.  · Stop smoking at least 2 weeks before surgery. This lowers your risk for complications during and after surgery. Ask your health care provider for help with this if needed.  · Eat your usual meals and a light supper the day before surgery. Continue fluid intake. Do not drink alcohol.  · Do not eat or drink after midnight the night before your surgery.   · Arrange for someone to take you home and to stay with you for 24 hours after the procedure. Medicine given for your procedure may affect your ability to drive or to care for yourself.  · Call your health care provider's office if you develop an illness or problem that may prevent you from safely having your procedure.  AFTER THE PROCEDURE  After surgery, you will be taken to a recovery area, where your progress will be monitored. If there are no complications, you will be allowed to go home when you are awake, stable, and taking fluids well. You may have numbness around the surgical site. Healing will take some time. You will have tenderness at the surgical site and may have some swelling and bruising. You may also have some nausea.  HOME CARE INSTRUCTIONS  · Do not drive  for 24 hours, or as directed by your health care provider. Do not drive while taking prescription pain medicines.  · Do not drink alcohol for 24 hours.  · Do not make important decisions or sign legal documents for 24 hours.  · You may resume a normal diet and activities as directed.  · Do not lift anything heavier than 10 pounds (4.5 kg) or play contact sports until your health care provider says it is okay.  · Change your bandages (dressings) as directed.  · Only take over-the-counter or prescription medicines as directed by your health care provider.  · Follow up with your health care provider as directed.  SEEK MEDICAL CARE IF:  · You have increased bleeding (more than a small spot) from the surgical site.  · You have redness, swelling, or increasing pain in the wound.  · You see pus coming from the wound.  · You have a fever.  · You notice a bad smell coming from the wound or dressing.  · You feel lightheaded or faint.  · You develop a rash.  · You have trouble breathing.  · You develop allergies.  MAKE SURE YOU:  · Understand these instructions.  · Will watch your condition.  · Will get help right away if you are not doing well or get worse.     This information is not intended to replace advice given to you by your health care provider. Make sure you discuss any questions you have with your health care provider.     Document Released: 09/12/2002 Document Revised: 05/03/2016 Document Reviewed: 05/22/2014  [employer] Interactive Patient Education ©2016 [employer]       Fall Prevention in Hospitals, Adult  As a hospital patient, your condition and the treatments you receive can increase your risk for falls. Some additional risk factors for falls in a hospital include:  · Being in an unfamiliar environment.  · Being on bed rest.  · Your surgery.  · Taking certain medicines.  · Your tubing requirements, such as intravenous (IV) therapy or catheters.  It is important that you learn how to decrease fall risks while  at the hospital. Below are important tips that can help prevent falls.  SAFETY TIPS FOR PREVENTING FALLS  Talk about your risk of falling.  · Ask your health care provider why you are at risk for falling. Is it your medicine, illness, tubing placement, or something else?  · Make a plan with your health care provider to keep you safe from falls.  · Ask your health care provider or pharmacist about side effects of your medicines. Some medicines can make you dizzy or affect your coordination.  Ask for help.  · Ask for help before getting out of bed. You may need to press your call button.  · Ask for assistance in getting safely to the toilet.  · Ask for a walker or cane to be put at your bedside. Ask that most of the side rails on your bed be placed up before your health care provider leaves the room.  · Ask family or friends to sit with you.  · Ask for things that are out of your reach, such as your glasses, hearing aids, telephone, bedside table, or call button.  Follow these tips to avoid falling:  · Stay lying or seated, rather than standing, while waiting for help.  · Wear rubber-soled slippers or shoes whenever you walk in the hospital.  · Avoid quick, sudden movements.  ¨ Change positions slowly.  ¨ Sit on the side of your bed before standing.  ¨ Stand up slowly and wait before you start to walk.  · Let your health care provider know if there is a spill on the floor.  · Pay careful attention to the medical equipment, electrical cords, and tubes around you.  · When you need help, use your call button by your bed or in the bathroom. Wait for one of your health care providers to help you.  · If you feel dizzy or unsure of your footing, return to bed and wait for assistance.  · Avoid being distracted by the TV, telephone, or another person in your room.  · Do not lean or support yourself on rolling objects, such as IV poles or bedside tables.     This information is not intended to replace advice given to you by  your health care provider. Make sure you discuss any questions you have with your health care provider.     Document Released: 12/15/2001 Document Revised: 01/08/2016 Document Reviewed: 08/25/2013  [employer] Interactive Patient Education ©2016 [employer]       Surgical Site Infections FAQs  What is a Surgical Site Infection (SSI)?  A surgical site infection is an infection that occurs after surgery in the part of the body where the surgery took place. Most patients who have surgery do not develop an infection. However, infections develop in about 1 to 3 out of every 100 patients who have surgery.  Some of the common symptoms of a surgical site infection are:  · Redness and pain around the area where you had surgery  · Drainage of cloudy fluid from your surgical wound  · Fever  Can SSIs be treated?  Yes. Most surgical site infections can be treated with antibiotics. The antibiotic given to you depends on the bacteria (germs) causing the infection. Sometimes patients with SSIs also need another surgery to treat the infection.  What are some of the things that hospitals are doing to prevent SSIs?  To prevent SSIs, doctors, nurses, and other healthcare providers:  · Clean their hands and arms up to their elbows with an antiseptic agent just before the surgery.  · Clean their hands with soap and water or an alcohol-based hand rub before and after caring for each patient.  · May remove some of your hair immediately before your surgery using electric clippers if the hair is in the same area where the procedure will occur. They should not shave you with a razor.  · Wear special hair covers, masks, gowns, and gloves during surgery to keep the surgery area clean.  · Give you antibiotics before your surgery starts. In most cases, you should get antibiotics within 60 minutes before the surgery starts and the antibiotics should be stopped within 24 hours after surgery.  · Clean the skin at the site of your surgery with a  special soap that kills germs.  What can I do to help prevent SSIs?  Before your surgery:  · Tell your doctor about other medical problems you may have. Health problems such as allergies, diabetes, and obesity could affect your surgery and your treatment.  · Quit smoking. Patients who smoke get more infections. Talk to your doctor about how you can quit before your surgery.  · Do not shave near where you will have surgery. Shaving with a razor can irritate your skin and make it easier to develop an infection.  At the time of your surgery:  · Speak up if someone tries to shave you with a razor before surgery. Ask why you need to be shaved and talk with your surgeon if you have any concerns.  · Ask if you will get antibiotics before surgery.  After your surgery:  · Make sure that your healthcare providers clean their hands before examining you, either with soap and water or an alcohol-based hand rub.  · If you do not see your providers clean their hands, please ask them to do so.  · Family and friends who visit you should not touch the surgical wound or dressings.  · Family and friends should clean their hands with soap and water or an alcohol-based hand rub before and after visiting you. If you do not see them clean their hands, ask them to clean their hands.  What do I need to do when I go home from the hospital?  · Before you go home, your doctor or nurse should explain everything you need to know about taking care of your wound. Make sure you understand how to care for your wound before you leave the hospital.  · Always clean your hands before and after caring for your wound.  · Before you go home, make sure you know who to contact if you have questions or problems after you get home.  · If you have any symptoms of an infection, such as redness and pain at the surgery site, drainage, or fever, call your doctor immediately.  If you have additional questions, please ask your doctor or nurse.  Developed and  co-sponsored by The Society for Healthcare Epidemiology of [place] (SHEA); Infectious Diseases Society of [place] (IDSA); American Hospital Association; Association for Professionals in Infection Control and Epidemiology (APIC); Centers for Disease Control and Prevention (CDC); and The Joint Commission.     This information is not intended to replace advice given to you by your health care provider. Make sure you discuss any questions you have with your health care provider.     Document Released: 12/23/2014 Document Revised: 01/08/2016 Document Reviewed: 03/02/2016  [employer] Interactive Patient Education ©2016 [employer]       [facility]  CHG 4% Patient Instruction Sheet    Preparing the Skin Before Surgery  Preparing or “prepping” skin before surgery can reduce the risk of infection at the surgical site. To make the process easier,[facility] has chosen 4% Chlorhexidine Gluconate (CHG) antiseptic solution.   The steps below outline the prepping process and should be carefully followed.                                                                                                                                                      Prep the skin at the following time(s):                                                      We recommend you shower the night before surgery, and again the morning of surgery with the 4% CHG antiseptic solution using half of the bottle and a cloth each time.  Dress in clean clothes/sleepwear after showering.  See instructions below for application.          Do not apply any lotions or moisturizers.       Do not shave the area to be prepped for at least 2 days prior to surgery.    Clipping the hair may be done immediately prior to your surgery at the hospital    if needed.    Directions:  Thoroughly rinse your body with water.  Apply 4% CHG to a cloth and wash skin gently, paying special attention to the operative site.  Rinse again thoroughly.  Once you have begun using this  product do not apply anything else to your skin. If itching or redness persists, rinse affected areas and discontinue use.    When using this product:  • Keep out of eyes, ears, and mouth.  • If solution should contact these areas, rinse out promptly and thoroughly with water.  • For external use only.  • Do not use in genital area, on your face or head.      PATIENT/FAMILY/RESPONSIBLE PARTY VERBALIZES UNDERSTANDING OF ABOVE EDUCATION

## 2023-08-23 ENCOUNTER — HOSPITAL ENCOUNTER (OUTPATIENT)
Dept: CARDIOLOGY | Facility: HOSPITAL | Age: 78
Discharge: HOME | End: 2023-08-23
Payer: MEDICARE

## 2023-08-23 VITALS
DIASTOLIC BLOOD PRESSURE: 79 MMHG | HEART RATE: 72 BPM | RESPIRATION RATE: 12 BRPM | SYSTOLIC BLOOD PRESSURE: 157 MMHG | OXYGEN SATURATION: 98 % | BODY MASS INDEX: 27.58 KG/M2 | WEIGHT: 203.4 LBS

## 2023-08-23 DIAGNOSIS — I48.4 ATYPICAL ATRIAL FLUTTER: ICD-10-CM

## 2023-08-23 DIAGNOSIS — I10 PRIMARY HYPERTENSION: ICD-10-CM

## 2023-08-23 DIAGNOSIS — I50.22 CHRONIC HFREF (HEART FAILURE WITH REDUCED EJECTION FRACTION): Primary | ICD-10-CM

## 2023-08-23 DIAGNOSIS — E78.00 PURE HYPERCHOLESTEROLEMIA: ICD-10-CM

## 2023-08-23 DIAGNOSIS — I25.10 CORONARY ARTERY DISEASE INVOLVING NATIVE CORONARY ARTERY OF NATIVE HEART WITHOUT ANGINA PECTORIS: ICD-10-CM

## 2023-08-23 LAB
ANION GAP SERPL CALCULATED.3IONS-SCNC: 9 MMOL/L (ref 5–15)
BUN SERPL-MCNC: 14 MG/DL (ref 8–23)
BUN/CREAT SERPL: 11.4 (ref 7–25)
CALCIUM SPEC-SCNC: 9.4 MG/DL (ref 8.6–10.5)
CHLORIDE SERPL-SCNC: 105 MMOL/L (ref 98–107)
CHOLEST SERPL-MCNC: 111 MG/DL (ref 0–200)
CO2 SERPL-SCNC: 28 MMOL/L (ref 22–29)
CREAT SERPL-MCNC: 1.23 MG/DL (ref 0.76–1.27)
EGFRCR SERPLBLD CKD-EPI 2021: 60.8 ML/MIN/1.73
GLUCOSE SERPL-MCNC: 130 MG/DL (ref 65–99)
HDLC SERPL-MCNC: 49 MG/DL (ref 40–60)
LDLC SERPL CALC-MCNC: 44 MG/DL (ref 0–100)
LDLC/HDLC SERPL: 0.87 {RATIO}
POTASSIUM SERPL-SCNC: 4.2 MMOL/L (ref 3.5–5.2)
SODIUM SERPL-SCNC: 142 MMOL/L (ref 136–145)
TRIGL SERPL-MCNC: 96 MG/DL (ref 0–150)
VLDLC SERPL-MCNC: 18 MG/DL (ref 5–40)

## 2023-08-23 PROCEDURE — 80061 LIPID PANEL: CPT

## 2023-08-23 PROCEDURE — G0463 HOSPITAL OUTPT CLINIC VISIT: HCPCS | Performed by: HOSPITALIST

## 2023-08-23 PROCEDURE — 80048 BASIC METABOLIC PNL TOTAL CA: CPT

## 2023-08-23 NOTE — PROGRESS NOTES
[facility]  Heart Failure Clinic  Subjective:     Encounter Date:08/23/2023      Patient ID: Mani Arreola is a 78 y.o. male     Chief Complaint:  History of Present Illness  Mani Arreola is a 78 y.o. male with the below pertinent PMH who presents for follow-up of congestive heart failure.    Since his previous appointment has been doing well.  He has no significant complaints.  Over the last 2 months he is only had to take as needed Bumex 1 time.  He denies any significant shortness of breath.  He was having some lower blood pressure readings so his PCP reduced his amlodipine to 5 mg daily.  He is elevated in the clinic today, however his log shows consistent systolic blood pressures of 120s to 130s.  Weight log reveals weight loss recently as he has been following a 1500-calorie diet.      ROS: Pertinent findings as noted above    Pertinent PMH  -Coronary artery disease status post PCI  - Hypertension  -Chronic HFrEF with EF recovered to 50%  - Hyperlipidemia  - Type 2 diabetes  - Carotid stenosis status post CEA    The following portions of the patient's history were reviewed and updated as appropriate: allergies, current medications, past medical history, past social history, past and problem list.    No Known Allergies    Current Outpatient Medications:     acetaminophen (TYLENOL) 650 MG 8 hr tablet, Take 2 tablets by mouth 2 (Two) Times a Day., Disp: , Rfl:     amLODIPine (NORVASC) 10 MG tablet, Take 1 tablet by mouth Daily. (Patient taking differently: Take 0.5 tablets by mouth Daily. Dr. Meggan gonzalez.), Disp: 30 tablet, Rfl: 11    apixaban (ELIQUIS) 5 MG tablet tablet, Take 1 tablet by mouth 2 (Two) Times a Day., Disp: , Rfl:     atorvastatin (LIPITOR) 80 MG tablet, Take 1 tablet by mouth Daily. (Patient taking differently: Take 1 tablet by mouth Every Night.), Disp: 90 tablet, Rfl: 3    bumetanide (BUMEX) 1 MG tablet, Take 1 tablet by mouth Daily As Needed. For weight gain., Disp: 30  tablet, Rfl: 11    dapagliflozin Propanediol 10 MG tablet, Take 10 mg by mouth Daily., Disp: 30 tablet, Rfl: 11    diphenhydrAMINE (BENADRYL) 25 mg capsule, Take 2 capsules by mouth At Night As Needed for Allergies or Sleep. Patient states he takes them regularly., Disp: , Rfl:     folic acid (FOLVITE) 400 MCG tablet, Take 1 tablet by mouth Daily., Disp: , Rfl:     metoprolol succinate XL (TOPROL-XL) 100 MG 24 hr tablet, Take 1 tablet by mouth Daily., Disp: 30 tablet, Rfl: 11    Multiple Vitamins-Minerals (MULTIVITAMIN ADULT PO), Take 1 tablet by mouth Daily., Disp: , Rfl:     Omega-3 Fatty Acids (FISH OIL PO), Take 1,000 mg by mouth Every Night., Disp: , Rfl:     omeprazole (priLOSEC) 20 MG capsule, Take 2 capsules by mouth Daily., Disp: 180 capsule, Rfl: 4    ondansetron ODT (ZOFRAN-ODT) 8 MG disintegrating tablet, Place 1 tablet on the tongue Every 8 (Eight) Hours As Needed for Nausea or Vomiting., Disp: , Rfl:     sacubitril-valsartan (ENTRESTO)  MG tablet, Take 1 tablet by mouth 2 (Two) Times a Day. (Patient taking differently: Take 1 tablet by mouth 2 (Two) Times a Day. APPROVED for patient assistance on 5/18/23), Disp: 180 tablet, Rfl: 3    SITagliptin-metFORMIN HCl ER (JANUMET XR) 100-1000 MG tablet, Take 1 tablet by mouth Daily., Disp: , Rfl:     traMADol (ULTRAM) 50 MG tablet, Take 1 tablet by mouth Every 8 (Eight) Hours As Needed., Disp: , Rfl:     vitamin C (ASCORBIC ACID) 500 MG tablet, Take 1 tablet by mouth Daily., Disp: , Rfl:     rivaroxaban (XARELTO) 20 MG tablet, Take 1 tablet by mouth Daily. (Patient not taking: Reported on 8/23/2023), Disp: 90 tablet, Rfl: 4    Social History     Socioeconomic History    Marital status:    Tobacco Use    Smoking status: Every Day     Types: Pipe    Smokeless tobacco: Never   Vaping Use    Vaping Use: Never used   Substance and Sexual Activity    Alcohol use: Yes     Comment: occ    Drug use: No    Sexual activity: Defer                Objective:      Constitutional:       Appearance: Healthy appearance. Not in distress.   Neck:      Vascular: JVD normal.   Pulmonary:      Effort: Pulmonary effort is normal.      Breath sounds: Normal breath sounds. No wheezing. No rhonchi. No rales.   Cardiovascular:      PMI at left midclavicular line. Normal rate. Regular rhythm. Normal S1. Normal S2.       Murmurs: There is no murmur.      No gallop.  No click. No rub.   Edema:     Peripheral edema absent.   Musculoskeletal: Normal range of motion.      Cervical back: Normal range of motion. Skin:     General: Skin is warm and dry.   Neurological:      Mental Status: Alert and oriented to person, place and time.         Procedures  /79 (BP Location: Left arm, Patient Position: Sitting)   Pulse 72   Resp 12   Wt 92.3 kg (203 lb 6.4 oz)   SpO2 98%   BMI 27.58 kg/mý       08/23/23  1026   Weight: 92.3 kg (203 lb 6.4 oz)      Lab Review:   I have reviewed      BMP          2/23/2023    09:58 5/22/2023    09:48 8/23/2023    10:25   BMP   BUN 10  13  14    Creatinine 1.32  0.99  1.23    Sodium 142  142  142    Potassium 4.0  4.5  4.2    Chloride 103  105  105    CO2 28.0  25.0  28.0    Calcium 9.3  9.7  9.4       Lab Results   Component Value Date    CHOL 131 09/22/2022    TRIG 64 09/22/2022    HDL 58 09/22/2022    LDL 60 09/22/2022      Results for orders placed during the hospital encounter of 09/15/22    Adult Transthoracic Echo Complete W/ Cont if Necessary Per Protocol    Interpretation Summary  ú The left ventricular cavity is moderately dilated.  ú The right atrial cavity is borderline dilated.  ú Left ventricular diastolic dysfunction is noted.  ú The following left ventricular wall segments are hypokinetic: mid anterior, apical anterior, mid anterolateral, apical lateral, apical inferior, mid inferior, mid anteroseptal and basal anterior. The following left ventricular wall segments are akinetic: apical septal and apex.  ú Left ventricular ejection fraction  appears to be 31 - 35%. Left ventricular systolic function is severely decreased.    SEVERE ISCHEMIC CARDIOMYOPATHY       Assessment and Plan   Diagnoses and all orders for this visit:    1. Chronic HFrEF (heart failure with reduced ejection fraction) (Primary)  - Etiology: Ischemic  - LVEF: 50% (noted on cardiac MRI obtained in January 2023)  - NYHA Class: I/II (activity primarily limited by back pain)  - KCCQ: date 11/21/2022, score 55 (improved from score of 45 on prior questionnaire).  - 6MWT: date 11/21/2022, walked 106 meters (stopped early due to back pain rather than exertional intolerance, no plans to repeat).  - BB: Toprol- mg daily  - ACEi/ARB/ARNi: Entresto 97/23 mg twice daily  - SGLT2i: Dapagliflozin 10 mg daily  - MRA: Spironolactone on hold due to prior hyperkalemia and now with improved EF and minimal CHF symptoms  - Diuretics: Bumex 1 mg PRN  - IV iron: We will hold off on IV iron considerations given LV function is now recovered    Overall he is doing very well with no significant cardiac complaints.  We will see him back in 6 months.  In the meantime he will continue on his current regimen and contact us if he is having any concerns with congestive heart failure.      2. Coronary artery disease involving native coronary artery of native heart without angina pectoris  3. Primary hypertension  4. Pure hypercholesterolemia  -No anginal symptoms in previous appointment  - Continue Eliquis 5 mg twice daily in light of needing anticoagulation versus being on aspirin  - Continue atorvastatin 80 mg daily  - Continue amlodipine 5 mg daily per PCP.      5. Atypical atrial flutter  -Followed by electrophysiology team  -Has an appointment this coming October  - Has not had any known recurrences since PVI.  - Continue Eliquis 5 mg twice daily           Follow Up   No follow-ups on file.  Patient was given instructions and counseling regarding his condition or for health maintenance advice. Please see  specific information pulled into the AVS if appropriate.       EMR Dragon/Transcription disclaimer: Much of this encounter note is an electronic transcription/translation of spoken language to printed text. The electronic translation of spoken language may permit erroneous, or at times, nonsensical words or phrases to be inadvertently transcribed; although I have reviewed the note for such errors, some may still exist.

## 2023-10-21 RX ORDER — APIXABAN 5 MG/1
5 TABLET, FILM COATED ORAL 2 TIMES DAILY
Qty: 60 TABLET | Refills: 11 | Status: SHIPPED | OUTPATIENT
Start: 2023-10-21

## 2023-10-23 ENCOUNTER — OFFICE VISIT (OUTPATIENT)
Dept: CARDIOLOGY | Facility: CLINIC | Age: 78
End: 2023-10-23
Payer: MEDICARE

## 2023-10-23 VITALS
HEART RATE: 68 BPM | OXYGEN SATURATION: 98 % | DIASTOLIC BLOOD PRESSURE: 82 MMHG | BODY MASS INDEX: 27.36 KG/M2 | HEIGHT: 72 IN | WEIGHT: 202 LBS | SYSTOLIC BLOOD PRESSURE: 144 MMHG

## 2023-10-23 DIAGNOSIS — I48.4 ATYPICAL ATRIAL FLUTTER: ICD-10-CM

## 2023-10-23 DIAGNOSIS — I50.22 CHRONIC HFREF (HEART FAILURE WITH REDUCED EJECTION FRACTION): ICD-10-CM

## 2023-10-23 DIAGNOSIS — I48.92 ATRIAL FLUTTER WITH CONTROLLED RESPONSE: Primary | ICD-10-CM

## 2023-10-23 PROCEDURE — 3077F SYST BP >= 140 MM HG: CPT | Performed by: PHYSICIAN ASSISTANT

## 2023-10-23 PROCEDURE — 93000 ELECTROCARDIOGRAM COMPLETE: CPT | Performed by: PHYSICIAN ASSISTANT

## 2023-10-23 PROCEDURE — 99214 OFFICE O/P EST MOD 30 MIN: CPT | Performed by: PHYSICIAN ASSISTANT

## 2023-10-23 PROCEDURE — 3079F DIAST BP 80-89 MM HG: CPT | Performed by: PHYSICIAN ASSISTANT

## 2023-10-23 NOTE — PROGRESS NOTES
"[facility] -  CLINIC FOLLOW UP     Patient Care Team:  Tyree Fleming MD as PCP - General  Tyree Fleming MD as PCP - Family Medicine  Cirilo Bhandari MD as Consulting Physician (Gastroenterology)  Tyree Fleming MD as Referring Physician (Family Medicine)  Fidel Harris MD as Cardiologist (Cardiology)    Chief Complaint: follow up to ablation     Subjective   EP History:  1.  Typical atrial flutter  -11/14/22:  CTI ablation  2.  Atypical atrial flutter  -EPS 12-29-22: mitral annual flutter s/p anteroseptal mitral line, repeat PVI, posterior wall isolation     Cardiology history:  1.  Hypertension  2.  Hyperlipidemia  3.  Coronary artery disease w/ Prior MI and PCI  4.  Chronic systolic heart failure  -9/7/2022: EF 31 to 35%  -1/2023 Cardiac MRI 50%, nonviable LAD territory  5.  COTY with carotid endarterectomy (2004)     Medical History:   1.  Type 2 diabetes  3.  Obesity, BMI 31  -Resolved BMI 27     HPI: Today I had the pleasure of seeing Mani Arreola in the cardiology clinic for follow up.  He is a 77 year old male with a history of CAD s/p PCI, HTN, HLD, T2DM, atrial flutter s/p ablation, and carotid stenosis s/p CEA who folows up after requiring a second EP study and ablation of atypical left atrial flutter last December. He is doing well and still on Eliquis. He denies any further palpitations or any heart issues.     He lost 60 lbs at least through a 1500 calorie diet. He still has issues though with his back pain and leg weakness. He continues to be seen in heart failure clinic and has been stable and compliant with GDMT, due to symptoms being controlled, he is following up with them in 6 months. He states his wife controls everything he eats and watches him \"like a hawk\".     Objective     Visit Vitals  /82   Pulse 68   Ht 182.9 cm (72.01\")   Wt 91.6 kg (202 lb)   SpO2 98%   BMI 27.39 kg/m²           Vitals reviewed.   Constitutional:       " Appearance: Not in distress.   Eyes:      Extraocular Movements: Extraocular movements intact.      Conjunctiva/sclera: Conjunctivae normal.      Pupils: Pupils are equal, round, and reactive to light.   HENT:      Head: Normocephalic and atraumatic.      Nose: Nose normal.    Mouth/Throat:      Lips: Pink.      Mouth: Mucous membranes are moist.      Pharynx: Oropharynx is clear.   Neck:      Vascular: No carotid bruit or JVD. JVD normal.   Pulmonary:      Effort: Pulmonary effort is normal.      Breath sounds: Normal breath sounds.   Chest:      Chest wall: Not tender to palpatation.   Cardiovascular:      PMI at left midclavicular line. Normal rate. Regular rhythm. Normal S1. Normal S2.       Murmurs: There is a grade 1/6 systolic murmur.      No gallop.  No rub.   Pulses:     Radial: 2+ bilaterally.  Edema:     Peripheral edema absent.   Abdominal:      General: Bowel sounds are normal.      Palpations: Abdomen is soft.   Musculoskeletal: Normal range of motion.      Extremities: No clubbing present.     Cervical back: Normal range of motion. Skin:     General: Skin is warm and dry.   Neurological:      General: No focal deficit present.      Mental Status: Alert and oriented to person, place, and time.   Psychiatric:         Attention and Perception: Attention normal.         Mood and Affect: Affect normal.         Speech: Speech normal.         Behavior: Behavior normal.         Cognition and Memory: Cognition normal.             The following portions of the patient's history were reviewed and updated as appropriate: allergies, current medications, past medical history, past social history, past and problem list.     Review of Systems   HENT: Negative.     Eyes: Negative.    Respiratory: Negative.     Cardiovascular: Negative.    Gastrointestinal: Negative.    Endocrine: Negative.    Genitourinary: Negative.    Musculoskeletal:  Positive for back pain.   Skin: Negative.    Allergic/Immunologic: Negative.     Neurological:  Positive for weakness.   Hematological: Negative.    Psychiatric/Behavioral: Negative.                ECG 12 Lead    Date/Time: 10/23/2023 2:00 PM  Performed by: Catherine Kim PA    Authorized by: Catherine Kim PA  Comparison: compared with previous ECG from 4/18/2023  Rhythm: sinus rhythm  Rate: normal  QRS axis: normal            Medication Review: yes    Current Outpatient Medications:     acetaminophen (TYLENOL) 650 MG 8 hr tablet, Take 2 tablets by mouth 2 (Two) Times a Day., Disp: , Rfl:     amLODIPine (NORVASC) 10 MG tablet, Take 1 tablet by mouth Daily. (Patient taking differently: Take 0.5 tablets by mouth Daily. Dr. Meggan gonzalez.), Disp: 30 tablet, Rfl: 11    atorvastatin (LIPITOR) 80 MG tablet, Take 1 tablet by mouth Daily. (Patient taking differently: Take 1 tablet by mouth Every Night.), Disp: 90 tablet, Rfl: 3    bumetanide (BUMEX) 1 MG tablet, Take 1 tablet by mouth Daily As Needed. For weight gain., Disp: 30 tablet, Rfl: 11    dapagliflozin Propanediol 10 MG tablet, Take 10 mg by mouth Daily., Disp: 90 tablet, Rfl: 3    diphenhydrAMINE (BENADRYL) 25 mg capsule, Take 2 capsules by mouth At Night As Needed for Allergies or Sleep. Patient states he takes them regularly., Disp: , Rfl:     Eliquis 5 MG tablet tablet, TAKE 1 TABLET BY MOUTH TWICE DAILY, Disp: 60 tablet, Rfl: 11    folic acid (FOLVITE) 400 MCG tablet, Take 1 tablet by mouth Daily., Disp: , Rfl:     metoprolol succinate XL (TOPROL-XL) 100 MG 24 hr tablet, Take 1 tablet by mouth Daily., Disp: 30 tablet, Rfl: 11    Multiple Vitamins-Minerals (MULTIVITAMIN ADULT PO), Take 1 tablet by mouth Daily., Disp: , Rfl:     Omega-3 Fatty Acids (FISH OIL PO), Take 1,000 mg by mouth Every Night., Disp: , Rfl:     omeprazole (priLOSEC) 20 MG capsule, Take 2 capsules by mouth Daily., Disp: 180 capsule, Rfl: 4    ondansetron ODT (ZOFRAN-ODT) 8 MG disintegrating tablet, Place 1 tablet on the tongue Every 8 (Eight) Hours As Needed  for Nausea or Vomiting., Disp: , Rfl:     sacubitril-valsartan (ENTRESTO)  MG tablet, Take 1 tablet by mouth 2 (Two) Times a Day. (Patient taking differently: Take 1 tablet by mouth 2 (Two) Times a Day. APPROVED for patient assistance on 5/18/23), Disp: 180 tablet, Rfl: 3    SITagliptin-metFORMIN HCl ER (JANUMET XR) 100-1000 MG tablet, Take 1 tablet by mouth Daily., Disp: , Rfl:     traMADol (ULTRAM) 50 MG tablet, Take 1 tablet by mouth Every 8 (Eight) Hours As Needed., Disp: , Rfl:     vitamin C (ASCORBIC ACID) 500 MG tablet, Take 1 tablet by mouth Daily., Disp: , Rfl:    No Known Allergies    I have reviewed       CBC:  Lab Results - Last 18 Months   Lab Units 12/29/22  0950 11/14/22  0701 09/22/22  0825   WBC 10*3/mm3 6.95   < > 5.52   HEMOGLOBIN g/dL 14.2   < > 12.8*   HEMATOCRIT % 45.8   < > 41.4   PLATELETS 10*3/mm3 210   < > 150   IRON mcg/dL  --   --  68    < > = values in this interval not displayed.      BMP/CMP:  Lab Results - Last 18 Months   Lab Units 08/23/23  1025   SODIUM mmol/L 142   POTASSIUM mmol/L 4.2   CHLORIDE mmol/L 105   CO2 mmol/L 28.0   GLUCOSE mg/dL 130*   BUN mg/dL 14   CREATININE mg/dL 1.23   CALCIUM mg/dL 9.4     BNP:   Lab Results - Last 18 Months   Lab Units 01/09/23  0957   PROBNP pg/mL 968.3      THYROID:  Lab Results - Last 18 Months   Lab Units 10/17/22  1042   TSH uIU/mL 1.470       Results for orders placed during the hospital encounter of 09/15/22    Adult Transthoracic Echo Complete W/ Cont if Necessary Per Protocol    Interpretation Summary  · The left ventricular cavity is moderately dilated.  · The right atrial cavity is borderline dilated.  · Left ventricular diastolic dysfunction is noted.  · The following left ventricular wall segments are hypokinetic: mid anterior, apical anterior, mid anterolateral, apical lateral, apical inferior, mid inferior, mid anteroseptal and basal anterior. The following left ventricular wall segments are akinetic: apical septal and  apex.  · Left ventricular ejection fraction appears to be 31 - 35%. Left ventricular systolic function is severely decreased.    SEVERE ISCHEMIC CARDIOMYOPATHY     Assessment:   Diagnoses and all orders for this visit:    1. Atrial flutter with controlled response (Primary)  -     ECG 12 Lead; Future    2. Chronic HFrEF (heart failure with reduced ejection fraction)    3. Atypical atrial flutter    Other orders  -     ECG 12 Lead    Atypical atrial flutter/PVI: Doing well without recurrence that patient can tell. He had a heterogenous scar of the left atrium with dense deposits along the posterior wall. He remain anticoagulated and doing well.   -follow up in 6 months    HFrEF: Stable and euvolemic today. Not requiring daily diuretic therapy. Followed by heart failure clinic. EF has recovered to 50% per cardiac MRI January 2023.     Obesity: Congratulated his wife on his weight loss efforts.     I spent 30 minutes caring for Mani on this date of service. This time includes time spent by me in the following activities:preparing for the visit, reviewing tests, obtaining and/or reviewing a separately obtained history, performing a medically appropriate examination and/or evaluation , counseling and educating the patient/family/caregiver, ordering medications, tests, or procedures, referring and communicating with other health care professionals , documenting information in the medical record, and independently interpreting results and communicating that information with the patient/family/caregiver        Electronically signed by CHETNA Chavez

## 2023-11-28 DIAGNOSIS — I50.22 CHRONIC HFREF (HEART FAILURE WITH REDUCED EJECTION FRACTION): Primary | ICD-10-CM

## 2023-11-28 RX ORDER — METOPROLOL SUCCINATE 100 MG/1
100 TABLET, EXTENDED RELEASE ORAL DAILY
Qty: 30 TABLET | Refills: 11 | Status: SHIPPED | OUTPATIENT
Start: 2023-11-28

## 2024-02-23 ENCOUNTER — HOSPITAL ENCOUNTER (OUTPATIENT)
Dept: CARDIOLOGY | Facility: HOSPITAL | Age: 79
Discharge: HOME | End: 2024-02-23
Payer: MEDICARE

## 2024-02-23 VITALS
HEART RATE: 61 BPM | BODY MASS INDEX: 27.31 KG/M2 | DIASTOLIC BLOOD PRESSURE: 73 MMHG | SYSTOLIC BLOOD PRESSURE: 145 MMHG | WEIGHT: 201.4 LBS | OXYGEN SATURATION: 97 %

## 2024-02-23 DIAGNOSIS — I48.4 ATYPICAL ATRIAL FLUTTER: ICD-10-CM

## 2024-02-23 DIAGNOSIS — E78.00 PURE HYPERCHOLESTEROLEMIA: ICD-10-CM

## 2024-02-23 DIAGNOSIS — I50.22 CHRONIC HFREF (HEART FAILURE WITH REDUCED EJECTION FRACTION): Primary | ICD-10-CM

## 2024-02-23 DIAGNOSIS — I25.10 CORONARY ARTERY DISEASE INVOLVING NATIVE CORONARY ARTERY OF NATIVE HEART WITHOUT ANGINA PECTORIS: ICD-10-CM

## 2024-02-23 DIAGNOSIS — I50.20 HFREF (HEART FAILURE WITH REDUCED EJECTION FRACTION): ICD-10-CM

## 2024-02-23 DIAGNOSIS — I10 PRIMARY HYPERTENSION: ICD-10-CM

## 2024-02-23 LAB
ANION GAP SERPL CALCULATED.3IONS-SCNC: 10 MMOL/L (ref 5–15)
BUN SERPL-MCNC: 24 MG/DL (ref 8–23)
BUN/CREAT SERPL: 15.8 (ref 7–25)
CALCIUM SPEC-SCNC: 9.8 MG/DL (ref 8.6–10.5)
CHLORIDE SERPL-SCNC: 104 MMOL/L (ref 98–107)
CO2 SERPL-SCNC: 29 MMOL/L (ref 22–29)
CREAT SERPL-MCNC: 1.52 MG/DL (ref 0.76–1.27)
EGFRCR SERPLBLD CKD-EPI 2021: 47.2 ML/MIN/1.73
GLUCOSE SERPL-MCNC: 128 MG/DL (ref 65–99)
POTASSIUM SERPL-SCNC: 4.2 MMOL/L (ref 3.5–5.2)
SODIUM SERPL-SCNC: 143 MMOL/L (ref 136–145)

## 2024-02-23 PROCEDURE — 80048 BASIC METABOLIC PNL TOTAL CA: CPT

## 2024-02-23 PROCEDURE — G0463 HOSPITAL OUTPT CLINIC VISIT: HCPCS | Performed by: HOSPITALIST

## 2024-02-23 NOTE — PROGRESS NOTES
[facility]  Heart Failure Clinic  Subjective:     Encounter Date:02/23/2024      Patient ID: Mani Arreola is a 78 y.o. male     Chief Complaint:  History of Present Illness  Mani Arreola is a 78 y.o. male with the below pertinent PMH who presents for follow-up of CHF.    Patient was last seen the heart failure clinic 8/23/2023.  At that time he was doing well without any significant cardiac complaints.  He had had some weight loss at home due to dietary changes.  I did not make any medication adjustments at that time and told that he could follow-up in 6 months.  He has followed up with electrophysiology since that time.  They also reported that he was doing well and could follow-up in 6 months with that.    Since the previous appointment has been doing well.  Denies any significant heart failure concerns.  He has continued to meticulously monitor his intake and output and his weights every day.  Review of his log reveals on average that he takes Bumex about every 2 weeks.  His weights are stable between 194 and 198 pounds.  He denies any heart failure symptoms.  He is tolerating his medication regimen well without any significant side effects.  Overall he has no complaints today    ROS: Pertinent findings as noted above    Pertinent PMH  -Coronary artery disease status post PCI  - Hypertension  -Chronic HFrEF with EF recovered to 50%  - Hyperlipidemia  - Type 2 diabetes  - Carotid stenosis status post CEA    The following portions of the patient's history were reviewed and updated as appropriate: allergies, current medications, past medical history, past social history, past and problem list.    No Known Allergies    Current Outpatient Medications:     acetaminophen (TYLENOL) 650 MG 8 hr tablet, Take 2 tablets by mouth 2 (Two) Times a Day., Disp: , Rfl:     amLODIPine (NORVASC) 10 MG tablet, Take 1 tablet by mouth Daily. (Patient taking differently: Take 0.5 tablets by mouth Daily. Dr. Broderick  decreased.), Disp: 30 tablet, Rfl: 11    apixaban (Eliquis) 5 MG tablet tablet, Take 1 tablet by mouth 2 (Two) Times a Day., Disp: 90 tablet, Rfl: 3    atorvastatin (LIPITOR) 80 MG tablet, Take 1 tablet by mouth Daily. (Patient taking differently: Take 1 tablet by mouth Every Night.), Disp: 90 tablet, Rfl: 3    bumetanide (BUMEX) 1 MG tablet, Take 1 tablet by mouth Daily As Needed. For weight gain., Disp: 30 tablet, Rfl: 11    dapagliflozin Propanediol 10 MG tablet, Take 10 mg by mouth Daily., Disp: 90 tablet, Rfl: 3    diphenhydrAMINE (BENADRYL) 25 mg capsule, Take 2 capsules by mouth At Night As Needed for Allergies or Sleep. Patient states he takes them regularly., Disp: , Rfl:     folic acid (FOLVITE) 400 MCG tablet, Take 1 tablet by mouth Daily., Disp: , Rfl:     metoprolol succinate XL (TOPROL-XL) 100 MG 24 hr tablet, Take 1 tablet by mouth Daily., Disp: 30 tablet, Rfl: 11    Multiple Vitamins-Minerals (MULTIVITAMIN ADULT PO), Take 1 tablet by mouth Daily., Disp: , Rfl:     Omega-3 Fatty Acids (FISH OIL PO), Take 1,000 mg by mouth Every Night., Disp: , Rfl:     omeprazole (priLOSEC) 20 MG capsule, Take 2 capsules by mouth Daily., Disp: 180 capsule, Rfl: 4    ondansetron ODT (ZOFRAN-ODT) 8 MG disintegrating tablet, Place 1 tablet on the tongue Every 8 (Eight) Hours As Needed for Nausea or Vomiting., Disp: , Rfl:     sacubitril-valsartan (ENTRESTO)  MG tablet, Take 1 tablet by mouth 2 (Two) Times a Day., Disp: 180 tablet, Rfl: 3    SITagliptin-metFORMIN HCl ER (JANUMET XR) 100-1000 MG tablet, Take 1 tablet by mouth Daily., Disp: , Rfl:     traMADol (ULTRAM) 50 MG tablet, Take 1 tablet by mouth Every 8 (Eight) Hours As Needed., Disp: , Rfl:     vitamin C (ASCORBIC ACID) 500 MG tablet, Take 1 tablet by mouth Daily., Disp: , Rfl:     Social History     Socioeconomic History    Marital status:    Tobacco Use    Smoking status: Every Day     Types: Pipe    Smokeless tobacco: Never   Vaping Use    Vaping  Use: Never used   Substance and Sexual Activity    Alcohol use: Yes     Comment: occ    Drug use: No    Sexual activity: Defer                Objective:     Constitutional:       Appearance: Healthy appearance. Not in distress.   Neck:      Vascular: JVD normal.   Pulmonary:      Effort: Pulmonary effort is normal.      Breath sounds: Normal breath sounds. No wheezing. No rhonchi. No rales.   Cardiovascular:      PMI at left midclavicular line. Normal rate. Regular rhythm. Normal S1. Normal S2.       Murmurs: There is no murmur.      No gallop.  No click. No rub.   Edema:     Peripheral edema absent.   Musculoskeletal: Normal range of motion.      Cervical back: Normal range of motion. Skin:     General: Skin is warm and dry.   Neurological:      Mental Status: Alert and oriented to person, place and time.           Procedures  /73 (BP Location: Right arm, Patient Position: Sitting)   Pulse 61   Wt 91.4 kg (201 lb 6.4 oz)   SpO2 97%   BMI 27.31 kg/m²       02/23/24  1035   Weight: 91.4 kg (201 lb 6.4 oz)      Lab Review:   I have reviewed      BMP          5/22/2023    09:48 8/23/2023    10:25 2/23/2024    10:28   BMP   BUN 13  14  24    Creatinine 0.99  1.23  1.52    Sodium 142  142  143    Potassium 4.5  4.2  4.2    Chloride 105  105  104    CO2 25.0  28.0  29.0    Calcium 9.7  9.4  9.8       Lab Results   Component Value Date    CHOL 111 08/23/2023    TRIG 96 08/23/2023    HDL 49 08/23/2023    LDL 44 08/23/2023      Results for orders placed during the hospital encounter of 09/15/22    Adult Transthoracic Echo Complete W/ Cont if Necessary Per Protocol    Interpretation Summary  · The left ventricular cavity is moderately dilated.  · The right atrial cavity is borderline dilated.  · Left ventricular diastolic dysfunction is noted.  · The following left ventricular wall segments are hypokinetic: mid anterior, apical anterior, mid anterolateral, apical lateral, apical inferior, mid inferior, mid  anteroseptal and basal anterior. The following left ventricular wall segments are akinetic: apical septal and apex.  · Left ventricular ejection fraction appears to be 31 - 35%. Left ventricular systolic function is severely decreased.    SEVERE ISCHEMIC CARDIOMYOPATHY       Assessment and Plan   Diagnoses and all orders for this visit:    1. Chronic HFrEF (heart failure with reduced ejection fraction) (Primary)  - Etiology: Ischemic  - LVEF: 50% (noted on cardiac MRI obtained in January 2023)  - NYHA Class: I/II (activity primarily limited by back pain)  - KCCQ: date 11/21/2022, score 55 (improved from score of 45 on prior questionnaire).  - 6MWT: date 11/21/2022, walked 106 meters (stopped early due to back pain rather than exertional intolerance, no plans to repeat).  - BB: Toprol- mg daily  - ACEi/ARB/ARNi: Entresto 97/23 mg twice daily  - SGLT2i: Dapagliflozin 10 mg daily  - MRA: Spironolactone on hold due to prior hyperkalemia and now with improved EF and minimal CHF symptoms  - Diuretics: Bumex 0.5 mg as needed  - IV iron: We will hold off on IV iron considerations given LV function is now recovered    Overall patient is doing well with minimal heart failure symptoms as of late.  Unfortunately kidneys have worsened over the last 6 months.  I am going to asked that he hydrate over the next few days and also going forward to only take 0.5 mg of Bumex if he has weight changes.  I advised him that some of his weight gain recently may be more due to a dietary indiscretion rather than fluid retention.  I will have patient get repeat lab work when he comes back and sees Dr. Cote in 2 months.  At that time if patient's kidney function continues to be declined we will consider de-escalation of GDMT.    2. Coronary artery disease involving native coronary artery of native heart without angina pectoris  3. Primary hypertension  4. Pure hypercholesterolemia  -No anginal symptoms as of late  - Blood pressure  elevated in the clinic, however home log reveals well-controlled blood pressure  - Continue amlodipine 5 mg daily per PCP  - Continue atorvastatin 80 mg daily  - Continue on Eliquis 5 mg twice daily       5. Atypical atrial flutter  -Is followed with electrophysiology and has not had any known recurrences  - Keep next follow-up with electrophysiology in April.               Follow Up   No follow-ups on file.  Patient was given instructions and counseling regarding his condition or for health maintenance advice. Please see specific information pulled into the AVS if appropriate.     Part of this note may be an electronic transcription/translation of spoken language to printed text using the Dragon Dictation System.

## 2024-04-01 RX ORDER — AMLODIPINE BESYLATE 10 MG/1
10 TABLET ORAL DAILY
Qty: 90 TABLET | Refills: 3 | Status: SHIPPED | OUTPATIENT
Start: 2024-04-01

## 2024-04-01 NOTE — TELEPHONE ENCOUNTER
Pt reported Dr. Broderick decreased to 5mg.    Rx Refill Note  Requested Prescriptions     Pending Prescriptions Disp Refills    amLODIPine (NORVASC) 10 MG tablet 30 tablet 11     Sig: Take 1 tablet by mouth Daily.      Last office visit with prescribing clinician: Visit date not found      Next office visit with prescribing clinician: Visit date not found            Leatha Quiroz MA  04/01/24, 08:58 CDT

## 2024-04-23 ENCOUNTER — HOSPITAL ENCOUNTER (OUTPATIENT)
Dept: CARDIOLOGY | Facility: HOSPITAL | Age: 79
Discharge: HOME | End: 2024-04-23
Payer: MEDICARE

## 2024-04-23 VITALS
BODY MASS INDEX: 28.15 KG/M2 | DIASTOLIC BLOOD PRESSURE: 64 MMHG | HEART RATE: 63 BPM | WEIGHT: 207.6 LBS | SYSTOLIC BLOOD PRESSURE: 164 MMHG | OXYGEN SATURATION: 98 %

## 2024-04-23 DIAGNOSIS — I50.22 CHRONIC HFREF (HEART FAILURE WITH REDUCED EJECTION FRACTION): Primary | ICD-10-CM

## 2024-04-23 LAB
ANION GAP SERPL CALCULATED.3IONS-SCNC: 11 MMOL/L (ref 5–15)
BUN SERPL-MCNC: 16 MG/DL (ref 8–23)
BUN/CREAT SERPL: 11 (ref 7–25)
CALCIUM SPEC-SCNC: 9.1 MG/DL (ref 8.6–10.5)
CHLORIDE SERPL-SCNC: 106 MMOL/L (ref 98–107)
CO2 SERPL-SCNC: 26 MMOL/L (ref 22–29)
CREAT SERPL-MCNC: 1.45 MG/DL (ref 0.76–1.27)
EGFRCR SERPLBLD CKD-EPI 2021: 49.6 ML/MIN/1.73
GLUCOSE SERPL-MCNC: 164 MG/DL (ref 65–99)
POTASSIUM SERPL-SCNC: 4.6 MMOL/L (ref 3.5–5.2)
SODIUM SERPL-SCNC: 143 MMOL/L (ref 136–145)

## 2024-04-23 PROCEDURE — G0463 HOSPITAL OUTPT CLINIC VISIT: HCPCS

## 2024-04-23 PROCEDURE — 80048 BASIC METABOLIC PNL TOTAL CA: CPT

## 2024-05-02 ENCOUNTER — OFFICE VISIT (OUTPATIENT)
Dept: CARDIOLOGY | Facility: CLINIC | Age: 79
End: 2024-05-02
Payer: MEDICARE

## 2024-05-02 VITALS
WEIGHT: 204 LBS | HEIGHT: 72 IN | BODY MASS INDEX: 27.63 KG/M2 | HEART RATE: 72 BPM | DIASTOLIC BLOOD PRESSURE: 74 MMHG | OXYGEN SATURATION: 100 % | SYSTOLIC BLOOD PRESSURE: 134 MMHG

## 2024-05-02 DIAGNOSIS — I48.0 PAROXYSMAL ATRIAL FIBRILLATION: ICD-10-CM

## 2024-05-02 DIAGNOSIS — I48.92 ATRIAL FLUTTER WITH RAPID VENTRICULAR RESPONSE: Primary | ICD-10-CM

## 2024-05-02 DIAGNOSIS — I48.4 ATYPICAL ATRIAL FLUTTER: ICD-10-CM

## 2024-05-02 DIAGNOSIS — I50.20 HFREF (HEART FAILURE WITH REDUCED EJECTION FRACTION): ICD-10-CM

## 2024-05-02 PROCEDURE — 3078F DIAST BP <80 MM HG: CPT | Performed by: PHYSICIAN ASSISTANT

## 2024-05-02 PROCEDURE — 99214 OFFICE O/P EST MOD 30 MIN: CPT | Performed by: PHYSICIAN ASSISTANT

## 2024-05-02 PROCEDURE — 93000 ELECTROCARDIOGRAM COMPLETE: CPT | Performed by: PHYSICIAN ASSISTANT

## 2024-05-02 PROCEDURE — 3075F SYST BP GE 130 - 139MM HG: CPT | Performed by: PHYSICIAN ASSISTANT

## 2024-05-02 NOTE — PROGRESS NOTES
[facility] -  CLINIC FOLLOW UP     Patient Care Team:  Tyree lFeming MD as PCP - General  Tyree Fleming MD as PCP - Family Medicine  Cirilo Bhandari MD as Consulting Physician (Gastroenterology)  Tyree Fleming MD as Referring Physician (Family Medicine)  Fidel Harris MD (Inactive) as Cardiologist (Cardiology)    Chief Complaint: ablation follow up     Subjective   EP History:  1.  Typical atrial flutter  -11/14/22:  CTI ablation  2.  Atypical atrial flutter  -EPS 12-29-22: mitral annual flutter s/p anteroseptal mitral line, repeat PVI, posterior wall isolation  3. Persistent afib  -PVI      Cardiology history:  1.  Hypertension  2.  Hyperlipidemia  3.  Coronary artery disease w/ Prior MI and PCI  4.  Chronic systolic heart failure  -9/7/2022: EF 31 to 35%  -1/2023 Cardiac MRI 50%, nonviable LAD territory  5.  COTY with carotid endarterectomy (2004)     Medical History:   1.  Type 2 diabetes  3.  Obesity, BMI 31  -Resolved BMI 27     HPI: Today I had the pleasure of seeing Mani Arreola in the cardiology clinic for follow up.  He is a 77 year old male with a history of CAD s/p PCI, HTN, HLD, T2DM, atrial flutter s/p ablation, and carotid stenosis s/p CEA who folows up after requiring a second EP study and ablation of atypical left atrial flutter December 2022. He is doing well and still on Eliquis. His EF has recovered to 50% per cardiac MRI January 2023, so no need for ICD. He has continued to meticulously monitor his intake and output and his weights every day.      Unfortunately, his renal function has had a decline and creatinine went to 1.52 and his GDMT had to be decreased with Bumex and entresto. Repeat Cr was 1.4. He now only takes Bumex occasionally once a month. Follow up labs will be in May.     He hasn't had any recurrence of atrial arrhythmias and states he would not necessarily know either.     Objective     Visit Vitals  /74   Pulse 72   Ht  "182.9 cm (72.01\")   Wt 92.5 kg (204 lb)   SpO2 100%   BMI 27.66 kg/m²           Vitals reviewed.   Constitutional:       Appearance: Healthy appearance. Not in distress.   Eyes:      Extraocular Movements: Extraocular movements intact.      Conjunctiva/sclera: Conjunctivae normal.      Pupils: Pupils are equal, round, and reactive to light.   HENT:      Head: Normocephalic and atraumatic.      Nose: Nose normal.    Mouth/Throat:      Lips: Pink.      Mouth: Mucous membranes are moist.      Pharynx: Oropharynx is clear.   Neck:      Vascular: No carotid bruit or JVD. JVD normal.   Pulmonary:      Effort: Pulmonary effort is normal.      Breath sounds: Normal breath sounds.   Chest:      Chest wall: Not tender to palpatation.   Cardiovascular:      PMI at left midclavicular line. Normal rate. Regular rhythm. Normal S1. Normal S2.       Murmurs: There is no murmur.      No gallop.  No rub.   Pulses:     Radial: 2+ bilaterally.  Edema:     Peripheral edema absent.   Abdominal:      General: Bowel sounds are normal.      Palpations: Abdomen is soft.   Musculoskeletal: Normal range of motion.      Extremities: No clubbing present.     Cervical back: Normal range of motion. Skin:     General: Skin is warm and dry.   Neurological:      General: No focal deficit present.      Mental Status: Alert and oriented to person, place, and time.   Psychiatric:         Attention and Perception: Attention normal.         Mood and Affect: Affect normal.         Speech: Speech normal.         Behavior: Behavior normal.         Cognition and Memory: Cognition normal.             The following portions of the patient's history were reviewed and updated as appropriate: allergies, current medications, past medical history, past social history, past and problem list.     Review of Systems   Constitutional: Negative.    HENT: Negative.     Eyes: Negative.    Respiratory: Negative.     Cardiovascular: Negative.    Gastrointestinal: Negative.  "   Endocrine: Negative.    Genitourinary: Negative.    Musculoskeletal: Negative.    Skin: Negative.    Allergic/Immunologic: Negative.    Neurological: Negative.    Hematological: Negative.    Psychiatric/Behavioral: Negative.                ECG 12 Lead    Date/Time: 5/2/2024 10:14 AM  Performed by: Catherine Kim PA    Authorized by: Catherine Kim PA  Comparison: compared with previous ECG from 10/23/2023  Rhythm: sinus rhythm  Ectopy: infrequent PVCs  Rate: normal  BPM: 69  Conduction: 1st degree AV block  QRS axis: left    Clinical impression: abnormal EKG            Medication Review: yes    Current Outpatient Medications:     acetaminophen (TYLENOL) 650 MG 8 hr tablet, Take 2 tablets by mouth 2 (Two) Times a Day., Disp: , Rfl:     amLODIPine (NORVASC) 10 MG tablet, Take 1 tablet by mouth Daily., Disp: 90 tablet, Rfl: 3    apixaban (Eliquis) 5 MG tablet tablet, Take 1 tablet by mouth 2 (Two) Times a Day., Disp: 90 tablet, Rfl: 3    atorvastatin (LIPITOR) 80 MG tablet, Take 1 tablet by mouth Daily. (Patient taking differently: Take 1 tablet by mouth Every Night.), Disp: 90 tablet, Rfl: 3    bumetanide (BUMEX) 1 MG tablet, Take 1 tablet by mouth Daily As Needed. For weight gain., Disp: 30 tablet, Rfl: 11    dapagliflozin Propanediol 10 MG tablet, Take 10 mg by mouth Daily., Disp: 90 tablet, Rfl: 3    diphenhydrAMINE (BENADRYL) 25 mg capsule, Take 2 capsules by mouth At Night As Needed for Allergies or Sleep. Patient states he takes them regularly., Disp: , Rfl:     folic acid (FOLVITE) 400 MCG tablet, Take 1 tablet by mouth Daily., Disp: , Rfl:     metoprolol succinate XL (TOPROL-XL) 100 MG 24 hr tablet, Take 1 tablet by mouth Daily., Disp: 30 tablet, Rfl: 11    Multiple Vitamins-Minerals (MULTIVITAMIN ADULT PO), Take 1 tablet by mouth Daily., Disp: , Rfl:     Omega-3 Fatty Acids (FISH OIL PO), Take 1,000 mg by mouth Every Night., Disp: , Rfl:     omeprazole (priLOSEC) 20 MG capsule, Take 2 capsules by  mouth Daily., Disp: 180 capsule, Rfl: 4    ondansetron ODT (ZOFRAN-ODT) 8 MG disintegrating tablet, Place 1 tablet on the tongue Every 8 (Eight) Hours As Needed for Nausea or Vomiting., Disp: , Rfl:     sacubitril-valsartan (ENTRESTO)  MG tablet, Take 1 tablet by mouth 2 (Two) Times a Day. (Patient taking differently: Take 0.5 tablets by mouth 2 (Two) Times a Day.), Disp: 180 tablet, Rfl: 3    SITagliptin-metFORMIN HCl ER (JANUMET XR) 100-1000 MG tablet, Take 1 tablet by mouth Daily., Disp: , Rfl:     traMADol (ULTRAM) 50 MG tablet, Take 1 tablet by mouth Every 8 (Eight) Hours As Needed., Disp: , Rfl:     vitamin C (ASCORBIC ACID) 500 MG tablet, Take 1 tablet by mouth Daily., Disp: , Rfl:    No Known Allergies    I have reviewed       CBC:  Lab Results - Last 18 Months   Lab Units 12/29/22  0950   WBC 10*3/mm3 6.95   HEMOGLOBIN g/dL 14.2   HEMATOCRIT % 45.8   PLATELETS 10*3/mm3 210      BMP/CMP:  Lab Results - Last 18 Months   Lab Units 04/23/24  1033   SODIUM mmol/L 143   POTASSIUM mmol/L 4.6   CHLORIDE mmol/L 106   CO2 mmol/L 26.0   GLUCOSE mg/dL 164*   BUN mg/dL 16   CREATININE mg/dL 1.45*   CALCIUM mg/dL 9.1     BNP:   Lab Results - Last 18 Months   Lab Units 01/09/23  0957   PROBNP pg/mL 968.3          Results for orders placed during the hospital encounter of 09/15/22    Adult Transthoracic Echo Complete W/ Cont if Necessary Per Protocol    Interpretation Summary  · The left ventricular cavity is moderately dilated.  · The right atrial cavity is borderline dilated.  · Left ventricular diastolic dysfunction is noted.  · The following left ventricular wall segments are hypokinetic: mid anterior, apical anterior, mid anterolateral, apical lateral, apical inferior, mid inferior, mid anteroseptal and basal anterior. The following left ventricular wall segments are akinetic: apical septal and apex.  · Left ventricular ejection fraction appears to be 31 - 35%. Left ventricular systolic function is severely  decreased.    SEVERE ISCHEMIC CARDIOMYOPATHY     Assessment:   Diagnoses and all orders for this visit:    1. Atrial flutter with rapid ventricular response (Primary)  -     ECG 12 Lead; Future    2. Atypical atrial flutter    3. Paroxysmal atrial fibrillation    4. HFrEF (heart failure with reduced ejection fraction)    Other orders  -     ECG 12 Lead    PAF/typical/Atypical flutter: Previous ablations successful without return per patient. He will remain anticoagulated due to elevated [id number] score. EKG shows SR.   -Follow up in 6 months with Dr. Cote.     HFrEF: EF improved per cardiac MRI to 50%, no need for ICD. Followed by HF clinic.   -Recent GDMT had to be titrated down due to renal insufficiency and Cr of 1.5. He currently does not need daily diuretic therapy        I spent 30 minutes caring for Mani on this date of service. This time includes time spent by me in the following activities:preparing for the visit, reviewing tests, obtaining and/or reviewing a separately obtained history, performing a medically appropriate examination and/or evaluation , counseling and educating the patient/family/caregiver, ordering medications, tests, or procedures, referring and communicating with other health care professionals , documenting information in the medical record, and independently interpreting results and communicating that information with the patient/family/caregiver        Electronically signed by CHETNA Chavez

## 2024-05-27 ENCOUNTER — APPOINTMENT (OUTPATIENT)
Dept: GENERAL RADIOLOGY | Facility: HOSPITAL | Age: 79
End: 2024-05-27
Payer: MEDICARE

## 2024-05-27 ENCOUNTER — APPOINTMENT (OUTPATIENT)
Dept: CT IMAGING | Facility: HOSPITAL | Age: 79
End: 2024-05-27
Payer: MEDICARE

## 2024-05-27 ENCOUNTER — HOSPITAL ENCOUNTER (INPATIENT)
Facility: HOSPITAL | Age: 79
LOS: 4 days | Discharge: HOME | End: 2024-05-31
Attending: STUDENT IN AN ORGANIZED HEALTH CARE EDUCATION/TRAINING PROGRAM | Admitting: FAMILY MEDICINE
Payer: MEDICARE

## 2024-05-27 DIAGNOSIS — N17.9 AKI (ACUTE KIDNEY INJURY): Primary | ICD-10-CM

## 2024-05-27 DIAGNOSIS — N39.0 URINARY TRACT INFECTION WITHOUT HEMATURIA, SITE UNSPECIFIED: ICD-10-CM

## 2024-05-27 PROBLEM — R68.83 CHILLS: Status: ACTIVE | Noted: 2024-05-27

## 2024-05-27 LAB
ALBUMIN SERPL-MCNC: 3.4 G/DL (ref 3.5–5.2)
ALBUMIN/GLOB SERPL: 1 G/DL
ALP SERPL-CCNC: 107 U/L (ref 39–117)
ALT SERPL W P-5'-P-CCNC: 25 U/L (ref 1–41)
ANION GAP SERPL CALCULATED.3IONS-SCNC: 17 MMOL/L (ref 5–15)
AST SERPL-CCNC: 34 U/L (ref 1–40)
B PARAPERT DNA SPEC QL NAA+PROBE: NOT DETECTED
B PERT DNA SPEC QL NAA+PROBE: NOT DETECTED
BACTERIA UR QL AUTO: ABNORMAL /HPF
BASOPHILS # BLD AUTO: 0.02 10*3/MM3 (ref 0–0.2)
BASOPHILS NFR BLD AUTO: 0.2 % (ref 0–1.5)
BILIRUB SERPL-MCNC: 1 MG/DL (ref 0–1.2)
BILIRUB UR QL STRIP: NEGATIVE
BUN SERPL-MCNC: 28 MG/DL (ref 8–23)
BUN/CREAT SERPL: 14.7 (ref 7–25)
C PNEUM DNA NPH QL NAA+NON-PROBE: NOT DETECTED
CALCIUM SPEC-SCNC: 9 MG/DL (ref 8.6–10.5)
CHLORIDE SERPL-SCNC: 98 MMOL/L (ref 98–107)
CK SERPL-CCNC: 42 U/L (ref 20–200)
CLARITY UR: ABNORMAL
CO2 SERPL-SCNC: 22 MMOL/L (ref 22–29)
COLOR UR: ABNORMAL
CREAT SERPL-MCNC: 1.9 MG/DL (ref 0.76–1.27)
DEPRECATED RDW RBC AUTO: 47.6 FL (ref 37–54)
EGFRCR SERPLBLD CKD-EPI 2021: 35.9 ML/MIN/1.73
EOSINOPHIL # BLD AUTO: 0.04 10*3/MM3 (ref 0–0.4)
EOSINOPHIL NFR BLD AUTO: 0.4 % (ref 0.3–6.2)
ERYTHROCYTE [DISTWIDTH] IN BLOOD BY AUTOMATED COUNT: 15.7 % (ref 12.3–15.4)
FLUAV SUBTYP SPEC NAA+PROBE: NOT DETECTED
FLUBV RNA ISLT QL NAA+PROBE: NOT DETECTED
GLOBULIN UR ELPH-MCNC: 3.4 GM/DL
GLUCOSE SERPL-MCNC: 170 MG/DL (ref 65–99)
GLUCOSE UR STRIP-MCNC: ABNORMAL MG/DL
GRAN CASTS URNS QL MICRO: ABNORMAL /LPF
HADV DNA SPEC NAA+PROBE: NOT DETECTED
HCOV 229E RNA SPEC QL NAA+PROBE: NOT DETECTED
HCOV HKU1 RNA SPEC QL NAA+PROBE: NOT DETECTED
HCOV NL63 RNA SPEC QL NAA+PROBE: NOT DETECTED
HCOV OC43 RNA SPEC QL NAA+PROBE: NOT DETECTED
HCT VFR BLD AUTO: 37.6 % (ref 37.5–51)
HGB BLD-MCNC: 12.2 G/DL (ref 13–17.7)
HGB UR QL STRIP.AUTO: ABNORMAL
HMPV RNA NPH QL NAA+NON-PROBE: NOT DETECTED
HPIV1 RNA ISLT QL NAA+PROBE: NOT DETECTED
HPIV2 RNA SPEC QL NAA+PROBE: NOT DETECTED
HPIV3 RNA NPH QL NAA+PROBE: NOT DETECTED
HPIV4 P GENE NPH QL NAA+PROBE: NOT DETECTED
HYALINE CASTS UR QL AUTO: ABNORMAL /LPF
IMM GRANULOCYTES # BLD AUTO: 0.06 10*3/MM3 (ref 0–0.05)
IMM GRANULOCYTES NFR BLD AUTO: 0.5 % (ref 0–0.5)
KETONES UR QL STRIP: ABNORMAL
LEUKOCYTE ESTERASE UR QL STRIP.AUTO: ABNORMAL
LIPASE SERPL-CCNC: 19 U/L (ref 13–60)
LYMPHOCYTES # BLD AUTO: 0.41 10*3/MM3 (ref 0.7–3.1)
LYMPHOCYTES NFR BLD AUTO: 3.7 % (ref 19.6–45.3)
M PNEUMO IGG SER IA-ACNC: NOT DETECTED
MAGNESIUM SERPL-MCNC: 2.1 MG/DL (ref 1.6–2.4)
MCH RBC QN AUTO: 27 PG (ref 26.6–33)
MCHC RBC AUTO-ENTMCNC: 32.4 G/DL (ref 31.5–35.7)
MCV RBC AUTO: 83.2 FL (ref 79–97)
MONOCYTES # BLD AUTO: 0.73 10*3/MM3 (ref 0.1–0.9)
MONOCYTES NFR BLD AUTO: 6.7 % (ref 5–12)
NEUTROPHILS NFR BLD AUTO: 88.5 % (ref 42.7–76)
NEUTROPHILS NFR BLD AUTO: 9.69 10*3/MM3 (ref 1.7–7)
NITRITE UR QL STRIP: NEGATIVE
NRBC BLD AUTO-RTO: 0 /100 WBC (ref 0–0.2)
PH UR STRIP.AUTO: 5.5 [PH] (ref 5–8)
PLATELET # BLD AUTO: 173 10*3/MM3 (ref 140–450)
PMV BLD AUTO: 10.9 FL (ref 6–12)
POTASSIUM SERPL-SCNC: 5.3 MMOL/L (ref 3.5–5.2)
PROT SERPL-MCNC: 6.8 G/DL (ref 6–8.5)
PROT UR QL STRIP: ABNORMAL
RBC # BLD AUTO: 4.52 10*6/MM3 (ref 4.14–5.8)
RBC # UR STRIP: ABNORMAL /HPF
REF LAB TEST METHOD: ABNORMAL
RHINOVIRUS RNA SPEC NAA+PROBE: NOT DETECTED
RSV RNA NPH QL NAA+NON-PROBE: NOT DETECTED
SARS-COV-2 RNA NPH QL NAA+NON-PROBE: NOT DETECTED
SODIUM SERPL-SCNC: 137 MMOL/L (ref 136–145)
SP GR UR STRIP: 1.02 (ref 1–1.03)
SQUAMOUS #/AREA URNS HPF: ABNORMAL /HPF
UROBILINOGEN UR QL STRIP: ABNORMAL
WBC # UR STRIP: ABNORMAL /HPF
WBC NRBC COR # BLD AUTO: 10.95 10*3/MM3 (ref 3.4–10.8)

## 2024-05-27 PROCEDURE — 83690 ASSAY OF LIPASE: CPT | Performed by: STUDENT IN AN ORGANIZED HEALTH CARE EDUCATION/TRAINING PROGRAM

## 2024-05-27 PROCEDURE — 83735 ASSAY OF MAGNESIUM: CPT | Performed by: STUDENT IN AN ORGANIZED HEALTH CARE EDUCATION/TRAINING PROGRAM

## 2024-05-27 PROCEDURE — 87154 CUL TYP ID BLD PTHGN 6+ TRGT: CPT | Performed by: STUDENT IN AN ORGANIZED HEALTH CARE EDUCATION/TRAINING PROGRAM

## 2024-05-27 PROCEDURE — 71046 X-RAY EXAM CHEST 2 VIEWS: CPT

## 2024-05-27 PROCEDURE — 25810000003 SODIUM CHLORIDE 0.9 % SOLUTION 250 ML FLEX CONT: Performed by: STUDENT IN AN ORGANIZED HEALTH CARE EDUCATION/TRAINING PROGRAM

## 2024-05-27 PROCEDURE — 25810000003 SODIUM CHLORIDE 0.9 % SOLUTION: Performed by: FAMILY MEDICINE

## 2024-05-27 PROCEDURE — 87040 BLOOD CULTURE FOR BACTERIA: CPT | Performed by: STUDENT IN AN ORGANIZED HEALTH CARE EDUCATION/TRAINING PROGRAM

## 2024-05-27 PROCEDURE — 81001 URINALYSIS AUTO W/SCOPE: CPT | Performed by: STUDENT IN AN ORGANIZED HEALTH CARE EDUCATION/TRAINING PROGRAM

## 2024-05-27 PROCEDURE — 0202U NFCT DS 22 TRGT SARS-COV-2: CPT | Performed by: STUDENT IN AN ORGANIZED HEALTH CARE EDUCATION/TRAINING PROGRAM

## 2024-05-27 PROCEDURE — 25010000002 AZITHROMYCIN PER 500 MG: Performed by: STUDENT IN AN ORGANIZED HEALTH CARE EDUCATION/TRAINING PROGRAM

## 2024-05-27 PROCEDURE — 74176 CT ABD & PELVIS W/O CONTRAST: CPT

## 2024-05-27 PROCEDURE — 25810000003 LACTATED RINGERS SOLUTION: Performed by: STUDENT IN AN ORGANIZED HEALTH CARE EDUCATION/TRAINING PROGRAM

## 2024-05-27 PROCEDURE — 25010000002 CEFTRIAXONE PER 250 MG: Performed by: STUDENT IN AN ORGANIZED HEALTH CARE EDUCATION/TRAINING PROGRAM

## 2024-05-27 PROCEDURE — 87086 URINE CULTURE/COLONY COUNT: CPT | Performed by: STUDENT IN AN ORGANIZED HEALTH CARE EDUCATION/TRAINING PROGRAM

## 2024-05-27 PROCEDURE — 93010 ELECTROCARDIOGRAM REPORT: CPT | Performed by: INTERNAL MEDICINE

## 2024-05-27 PROCEDURE — 99285 EMERGENCY DEPT VISIT HI MDM: CPT

## 2024-05-27 PROCEDURE — 87186 SC STD MICRODIL/AGAR DIL: CPT | Performed by: STUDENT IN AN ORGANIZED HEALTH CARE EDUCATION/TRAINING PROGRAM

## 2024-05-27 PROCEDURE — 85025 COMPLETE CBC W/AUTO DIFF WBC: CPT | Performed by: STUDENT IN AN ORGANIZED HEALTH CARE EDUCATION/TRAINING PROGRAM

## 2024-05-27 PROCEDURE — 87077 CULTURE AEROBIC IDENTIFY: CPT | Performed by: STUDENT IN AN ORGANIZED HEALTH CARE EDUCATION/TRAINING PROGRAM

## 2024-05-27 PROCEDURE — 93005 ELECTROCARDIOGRAM TRACING: CPT | Performed by: STUDENT IN AN ORGANIZED HEALTH CARE EDUCATION/TRAINING PROGRAM

## 2024-05-27 PROCEDURE — 82550 ASSAY OF CK (CPK): CPT | Performed by: STUDENT IN AN ORGANIZED HEALTH CARE EDUCATION/TRAINING PROGRAM

## 2024-05-27 PROCEDURE — 36415 COLL VENOUS BLD VENIPUNCTURE: CPT

## 2024-05-27 PROCEDURE — 80053 COMPREHEN METABOLIC PANEL: CPT | Performed by: STUDENT IN AN ORGANIZED HEALTH CARE EDUCATION/TRAINING PROGRAM

## 2024-05-27 RX ORDER — ONDANSETRON 4 MG/1
8 TABLET, ORALLY DISINTEGRATING ORAL EVERY 8 HOURS PRN
Status: DISCONTINUED | OUTPATIENT
Start: 2024-05-27 | End: 2024-05-31 | Stop reason: HOSPADM

## 2024-05-27 RX ORDER — ASCORBIC ACID 500 MG
500 TABLET ORAL DAILY
Status: DISCONTINUED | OUTPATIENT
Start: 2024-05-28 | End: 2024-05-31 | Stop reason: HOSPADM

## 2024-05-27 RX ORDER — BISACODYL 10 MG
10 SUPPOSITORY, RECTAL RECTAL DAILY PRN
Status: DISCONTINUED | OUTPATIENT
Start: 2024-05-27 | End: 2024-05-31 | Stop reason: HOSPADM

## 2024-05-27 RX ORDER — PANTOPRAZOLE SODIUM 40 MG/1
40 TABLET, DELAYED RELEASE ORAL
Status: DISCONTINUED | OUTPATIENT
Start: 2024-05-28 | End: 2024-05-31 | Stop reason: HOSPADM

## 2024-05-27 RX ORDER — BUMETANIDE 1 MG/1
1 TABLET ORAL DAILY PRN
Status: DISCONTINUED | OUTPATIENT
Start: 2024-05-27 | End: 2024-05-31 | Stop reason: HOSPADM

## 2024-05-27 RX ORDER — SODIUM CHLORIDE 0.9 % (FLUSH) 0.9 %
10 SYRINGE (ML) INJECTION AS NEEDED
Status: DISCONTINUED | OUTPATIENT
Start: 2024-05-27 | End: 2024-05-31 | Stop reason: HOSPADM

## 2024-05-27 RX ORDER — ATORVASTATIN CALCIUM 40 MG/1
80 TABLET, FILM COATED ORAL DAILY
Status: DISCONTINUED | OUTPATIENT
Start: 2024-05-28 | End: 2024-05-31 | Stop reason: HOSPADM

## 2024-05-27 RX ORDER — METOPROLOL SUCCINATE 100 MG/1
100 TABLET, EXTENDED RELEASE ORAL DAILY
Status: DISCONTINUED | OUTPATIENT
Start: 2024-05-28 | End: 2024-05-31 | Stop reason: HOSPADM

## 2024-05-27 RX ORDER — SODIUM CHLORIDE 9 MG/ML
40 INJECTION, SOLUTION INTRAVENOUS AS NEEDED
Status: DISCONTINUED | OUTPATIENT
Start: 2024-05-27 | End: 2024-05-31 | Stop reason: HOSPADM

## 2024-05-27 RX ORDER — TRAMADOL HYDROCHLORIDE 50 MG/1
50 TABLET ORAL EVERY 8 HOURS PRN
Status: DISCONTINUED | OUTPATIENT
Start: 2024-05-27 | End: 2024-05-31 | Stop reason: HOSPADM

## 2024-05-27 RX ORDER — AMOXICILLIN 250 MG
2 CAPSULE ORAL 2 TIMES DAILY PRN
Status: DISCONTINUED | OUTPATIENT
Start: 2024-05-27 | End: 2024-05-31 | Stop reason: HOSPADM

## 2024-05-27 RX ORDER — METFORMIN HYDROCHLORIDE 500 MG/1
1000 TABLET, EXTENDED RELEASE ORAL DAILY
Status: DISCONTINUED | OUTPATIENT
Start: 2024-05-28 | End: 2024-05-31 | Stop reason: HOSPADM

## 2024-05-27 RX ORDER — ACETAMINOPHEN 500 MG
500 TABLET ORAL EVERY 6 HOURS PRN
Status: DISCONTINUED | OUTPATIENT
Start: 2024-05-27 | End: 2024-05-31 | Stop reason: HOSPADM

## 2024-05-27 RX ORDER — POLYETHYLENE GLYCOL 3350 17 G/17G
17 POWDER, FOR SOLUTION ORAL DAILY PRN
Status: DISCONTINUED | OUTPATIENT
Start: 2024-05-27 | End: 2024-05-31 | Stop reason: HOSPADM

## 2024-05-27 RX ORDER — AMLODIPINE BESYLATE 10 MG/1
10 TABLET ORAL DAILY
Status: DISCONTINUED | OUTPATIENT
Start: 2024-05-28 | End: 2024-05-31 | Stop reason: HOSPADM

## 2024-05-27 RX ORDER — SODIUM CHLORIDE 9 MG/ML
75 INJECTION, SOLUTION INTRAVENOUS CONTINUOUS
Status: DISCONTINUED | OUTPATIENT
Start: 2024-05-28 | End: 2024-05-31 | Stop reason: HOSPADM

## 2024-05-27 RX ORDER — SODIUM CHLORIDE 0.9 % (FLUSH) 0.9 %
10 SYRINGE (ML) INJECTION EVERY 12 HOURS SCHEDULED
Status: DISCONTINUED | OUTPATIENT
Start: 2024-05-28 | End: 2024-05-31 | Stop reason: HOSPADM

## 2024-05-27 RX ORDER — BISACODYL 5 MG/1
5 TABLET, DELAYED RELEASE ORAL DAILY PRN
Status: DISCONTINUED | OUTPATIENT
Start: 2024-05-27 | End: 2024-05-31 | Stop reason: HOSPADM

## 2024-05-27 RX ORDER — LANOLIN ALCOHOL/MO/W.PET/CERES
400 CREAM (GRAM) TOPICAL DAILY
Status: DISCONTINUED | OUTPATIENT
Start: 2024-05-28 | End: 2024-05-31 | Stop reason: HOSPADM

## 2024-05-27 RX ORDER — DIPHENHYDRAMINE HCL 25 MG
50 CAPSULE ORAL NIGHTLY PRN
Status: DISCONTINUED | OUTPATIENT
Start: 2024-05-27 | End: 2024-05-31 | Stop reason: HOSPADM

## 2024-05-27 RX ADMIN — SODIUM CHLORIDE 75 ML/HR: 9 INJECTION, SOLUTION INTRAVENOUS at 23:12

## 2024-05-27 RX ADMIN — Medication 10 ML: at 23:12

## 2024-05-27 RX ADMIN — APIXABAN 5 MG: 5 TABLET, FILM COATED ORAL at 23:37

## 2024-05-27 RX ADMIN — SODIUM CHLORIDE, POTASSIUM CHLORIDE, SODIUM LACTATE AND CALCIUM CHLORIDE 500 ML: 600; 310; 30; 20 INJECTION, SOLUTION INTRAVENOUS at 14:24

## 2024-05-27 RX ADMIN — SODIUM CHLORIDE 1000 MG: 900 INJECTION INTRAVENOUS at 15:38

## 2024-05-27 RX ADMIN — AZITHROMYCIN DIHYDRATE 500 MG: 500 INJECTION, POWDER, LYOPHILIZED, FOR SOLUTION INTRAVENOUS at 17:00

## 2024-05-27 RX ADMIN — SACUBITRIL AND VALSARTAN 1 TABLET: 49; 51 TABLET, FILM COATED ORAL at 23:38

## 2024-05-27 NOTE — ED PROVIDER NOTES
Subjective   History of Present Illness  5d ago with vomiting x1, and x1 yesterday. Got nauseous and  vomited. Poor po intake since 5d ago. Drinking fluids; not a lot. No abdominal pain. Urinating normally. Diarrhea is present. Decreased urination. No sick contacts. Has chills every night and occasional sweats. Has not checked his temperature. Not nauseous. No blood in the stool. H/o colon cancer in 04 no recurrence. No colostomy. CHF. H/o cardiac stents; no chest pain. Recently told kidney function abnormal needs recheck.    Review of Systems   Constitutional:  Negative for chills and fever.   Respiratory:  Negative for cough and shortness of breath.    Cardiovascular:  Negative for chest pain and palpitations.   Gastrointestinal:  Positive for nausea and vomiting. Negative for abdominal pain.   Genitourinary:  Positive for decreased urine volume. Negative for dysuria.   Neurological:  Negative for syncope and light-headedness.       Past Medical History:   Diagnosis Date    Arthritis     Colon cancer     colon    Colon polyp     Coronary artery disease     stents x 2    Diabetes mellitus     Hyperlipidemia     Hypertension     Myocardial infarction        No Known Allergies    Past Surgical History:   Procedure Laterality Date    CARDIAC CATHETERIZATION      stents x 2    CARDIAC ELECTROPHYSIOLOGY PROCEDURE N/A 11/14/2022    Procedure: EP/Ablation, atrial flutter;  Surgeon: Francisca Cote MD;  Location:  [facility] CATH INVASIVE LOCATION;  Service: Cardiology;  Laterality: N/A;    CARDIAC ELECTROPHYSIOLOGY PROCEDURE N/A 12/29/2022    Procedure: EP/Ablation, atypical atrial flutter;  Surgeon: Francisca Cote MD;  Location:  [facility] CATH INVASIVE LOCATION;  Service: Cardiology;  Laterality: N/A;    CAROTID ENDARTERECTOMY Right     CATARACT EXTRACTION      CHOLECYSTECTOMY      COLON RESECTION      for colon cancer    COLONOSCOPY  02/2004    COLONOSCOPY  09/2005    normal    COLONOSCOPY  10/2007    recall 3 yr    COLONOSCOPY   11/01/2010    5mm polypectomy distal transverse colon, patent end to end ileo colonic anastomosis    COLONOSCOPY N/A 11/16/2016    Procedure: COLONOSCOPY WITH ANESTHESIA;  Surgeon: Cirilo Bhandari MD;  Location:  [facility] END[facility]COPY;  Service:     COLONOSCOPY N/A 1/11/2019    Procedure: COLONOSCOPY WITH ANESTHESIA;  Surgeon: Cirilo Bhandari MD;  Location:  [facility] END[facility];  Service: Gastroenterology    COLONOSCOPY N/A 2/15/2022    Procedure: COLONOSCOPY WITH ANESTHESIA;  Surgeon: Cirilo Bhandari MD;  Location:  [facility]OS[facility];  Service: Gastroenterology;  Laterality: N/A;  pre colon cancer  post polyp;hemorrhoids      HYDROCELE EXCISION / REPAIR      LUMBAR FUSION Left 3/13/2017    Procedure: LEFT LUMBAR LATERAL INTERBODY FUSION WITH INSTRUMENTATION L4-5 , LANX,  NUVASIVE MONITORING ;  Surgeon: DALY Morgan MD;  Location:  [facility] OR;  Service:     LUMBAR FUSION N/A 3/15/2017    Procedure: POSTERIOR SPINAL FUSION WITH INSTRUMENTATION L4-5;  Surgeon: DALY Morgan MD;  Location:  [facility] OR;  Service:     PERIPHERAL ARTERIAL STENT GRAFT         Family History   Problem Relation Age of Onset    Heart disease Father     Esophageal cancer Father     Colon cancer Neg Hx     Colon polyps Neg Hx        Social History     Socioeconomic History    Marital status:    Tobacco Use    Smoking status: Every Day     Types: Pipe    Smokeless tobacco: Never   Vaping Use    Vaping status: Never Used   Substance and Sexual Activity    Alcohol use: Yes     Comment: occ    Drug use: No    Sexual activity: Defer           Objective   Physical Exam  Vitals reviewed.   Constitutional:       General: He is not in acute distress.  HENT:      Head: Normocephalic and atraumatic.   Eyes:      Extraocular Movements: Extraocular movements intact.      Conjunctiva/sclera: Conjunctivae normal.   Cardiovascular:      Pulses: Normal pulses.      Heart sounds: Normal heart sounds.   Pulmonary:      Effort: Pulmonary effort  is normal. No respiratory distress.      Breath sounds: Normal breath sounds. No wheezing.   Abdominal:      General: Abdomen is flat. There is no distension.      Tenderness: There is no abdominal tenderness (including with deep palpation).   Musculoskeletal:         General: No swelling or tenderness.      Cervical back: Normal range of motion and neck supple.      Right lower leg: No edema.      Left lower leg: No edema.   Skin:     General: Skin is warm and dry.   Neurological:      General: No focal deficit present.      Mental Status: He is alert. Mental status is at baseline.   Psychiatric:         Behavior: Behavior normal.         Thought Content: Thought content normal.         Procedures           ED Course  ED Course as of 05/27/24 1803   Mon May 27, 2024   1513 CXR with RLL opacity concerning for pna. Rocephin is infusing. Can add blood cx and azithro [AS]      ED Course User Index  [AS] Phi Zamora MD                                             Medical Decision Making  Problems Addressed:  SIMRAN (acute kidney injury): complicated acute illness or injury  Urinary tract infection without hematuria, site unspecified: complicated acute illness or injury    Amount and/or Complexity of Data Reviewed  Labs: ordered.  Radiology: ordered.  ECG/medicine tests: ordered.    Risk  Decision regarding hospitalization.      Mani Arreola is a 79 y.o. male with PMH above who presents to the Emergency Department with dehydration decreased urination dark urine And blood in the urine.  No pain at all to suggest ureterolithiasis or intra-abdominal emergency.  Consider UTI with the chills, consider rhabdo given the dark urine.  Workup is ordered.  Will give a small bolus of fluids, being conservative due to his history of HFrEF.    ED Course:   -Labs show SIMRAN with evidence of UTI.  This fits with the patient symptoms chills.  On repeat history he endorses a history of renal stones, and his wife states he has  been complaining of back pain off and on for the past couple of weeks.  Given the infection, will obtain CT on pelvis without to evaluate for ureterolithiasis.  Rocephin is ordered.  Plan is for admission given the UTI with SIMRAN.  Not clinically septic, vital signs are normal, no fever, SIRS 0 out of 4    CT with ureterolithiasis.  Notify Dr. Cardenas given that there is an 8 mm stone with infection.  Patient not clinically septic and the stone is nonobstructive.  Therefore he was admitted to Dr. Loya with plan for antibiotics.  Initially azithromycin was ordered for the possible pneumonia on chest x-ray; the patient states that he has scarring in his lung base and has had no pneumonia symptoms.  The azithromycin was bothering him on infusion.  Therefore it was discontinued.      Final diagnosis: SIMRAN, UTI    All questions answered. Patient/family was understanding and in agreement with today's assessment and plan. The patient was monitored during their stay in the ED and dispositioned without acute event.    Electronically signed by:  Phi Zamora MD 5/27/2024 18:03 CDT      Note: Dragon medical dictation software was used in the creation of this note.      Final diagnoses:   SIMRAN (acute kidney injury)   Urinary tract infection without hematuria, site unspecified       ED Disposition  ED Disposition       ED Disposition   Decision to Admit    Condition   --    Comment   Level of Care: Med/Surg [1]   Diagnosis: Chills [014012]   Admitting Physician: JEOVANNY XIE [[id number]]   Attending Physician: JEOVANNY MURRY [[id number]]   Certification: I Certify That Inpatient Hospital Services Are Medically Necessary For Greater Than 2 Midnights                 No follow-up provider specified.       Medication List      No changes were made to your prescriptions during this visit.            Phi Zamora MD  05/27/24 4251

## 2024-05-27 NOTE — ED NOTES
Nursing report ED to floor  Mani Arreola  79 y.o.  male    HPI:   Chief Complaint   Patient presents with    Chills    Blood in Urine       Admitting doctor:   Tyree Antonio MD    Consulting provider(s):  Consults       No orders found from 4/28/2024 to 5/28/2024.             Admitting diagnosis:   The primary encounter diagnosis was SIMRAN (acute kidney injury). A diagnosis of Urinary tract infection without hematuria, site unspecified was also pertinent to this visit.    Code status:   Current Code Status       Date Active Code Status Order ID Comments User Context       Prior            Allergies:   Patient has no known allergies.    Intake and Output    Intake/Output Summary (Last 24 hours) at 5/27/2024 1848  Last data filed at 5/27/2024 1814  Gross per 24 hour   Intake 250 ml   Output --   Net 250 ml       Weight:       05/27/24  1240   Weight: 88.9 kg (196 lb)       Most recent vitals:   Vitals:    05/27/24 1801 05/27/24 1802 05/27/24 1814 05/27/24 1831   BP: 132/72   132/80   Pulse: 70 69 65 62   Resp:       Temp:       SpO2:  92% 97% 93%   Weight:       Height:         Oxygen Therapy: .    Active LDAs/IV Access:   Lines, Drains & Airways       Active LDAs       Name Placement date Placement time Site Days    Peripheral IV 05/27/24 1424 Anterior;Distal;Left;Upper Arm 05/27/24  1424  Arm  less than 1                    Labs (abnormal labs have a star):   Labs Reviewed   COMPREHENSIVE METABOLIC PANEL - Abnormal; Notable for the following components:       Result Value    Glucose 170 (*)     BUN 28 (*)     Creatinine 1.90 (*)     Potassium 5.3 (*)     Albumin 3.4 (*)     Anion Gap 17.0 (*)     eGFR 35.9 (*)     All other components within normal limits    Narrative:     GFR Normal >60  Chronic Kidney Disease <60  Kidney Failure <15    The GFR formula is only valid for adults with stable renal function between ages 18 and 70.   URINALYSIS W/ CULTURE IF INDICATED - Abnormal; Notable for the following  components:    Color, UA Dark Yellow (*)     Appearance, UA Cloudy (*)     Glucose, UA >=1000 mg/dL (3+) (*)     Ketones, UA 15 mg/dL (1+) (*)     Blood, UA Moderate (2+) (*)     Protein, UA >=300 mg/dL (3+) (*)     Leuk Esterase, UA Trace (*)     All other components within normal limits    Narrative:     In absence of clinical symptoms, the presence of pyuria, bacteria, and/or nitrites on the urinalysis result does not correlate with infection.   CBC WITH AUTO DIFFERENTIAL - Abnormal; Notable for the following components:    WBC 10.95 (*)     Hemoglobin 12.2 (*)     RDW 15.7 (*)     Neutrophil % 88.5 (*)     Lymphocyte % 3.7 (*)     Neutrophils, Absolute 9.69 (*)     Lymphocytes, Absolute 0.41 (*)     Immature Grans, Absolute 0.06 (*)     All other components within normal limits   URINALYSIS, MICROSCOPIC ONLY - Abnormal; Notable for the following components:    RBC, UA 21-50 (*)     WBC, UA 11-20 (*)     Bacteria, UA 1+ (*)     All other components within normal limits   RESPIRATORY PANEL PCR W/ COVID-19 (SARS-COV-2), NP SWAB IN UTM/VTP, 2 HR TAT - Normal    Narrative:     In the setting of a positive respiratory panel with a viral infection PLUS a negative procalcitonin without other underlying concern for bacterial infection, consider observing off antibiotics or discontinuation of antibiotics and continue supportive care. If the respiratory panel is positive for atypical bacterial infection (Bordetella pertussis, Chlamydophila pneumoniae, or Mycoplasma pneumoniae), consider antibiotic de-escalation to target atypical bacterial infection.   CK - Normal   MAGNESIUM - Normal   LIPASE - Normal   URINE CULTURE   BLOOD CULTURE   BLOOD CULTURE   CBC AND DIFFERENTIAL    Narrative:     The following orders were created for panel order CBC & Differential.  Procedure                               Abnormality         Status                     ---------                               -----------         ------                      CBC Auto Differential[[id number]]        Abnormal            Final result                 Please view results for these tests on the individual orders.       Meds given in ED:   Medications   lactated ringers bolus 500 mL (0 mL Intravenous Stopped 5/27/24 1541)   cefTRIAXone (ROCEPHIN) 1,000 mg in sodium chloride 0.9 % 100 mL MBP (0 mg Intravenous Stopped 5/27/24 1624)     No current facility-administered medications for this encounter.       NIH Stroke Scale:       Isolation/Infection(s):  No active isolations   No active infections     COVID Testing  Collected .  Resulted .    Nursing report ED to floor:  Mental status: .  Ambulatory status: .  Precautions: .    ED nurse phone extentsion- ..

## 2024-05-28 LAB
ANION GAP SERPL CALCULATED.3IONS-SCNC: 17 MMOL/L (ref 5–15)
BASOPHILS # BLD AUTO: 0.02 10*3/MM3 (ref 0–0.2)
BASOPHILS NFR BLD AUTO: 0.2 % (ref 0–1.5)
BUN SERPL-MCNC: 29 MG/DL (ref 8–23)
BUN/CREAT SERPL: 15.4 (ref 7–25)
CALCIUM SPEC-SCNC: 8.2 MG/DL (ref 8.6–10.5)
CHLORIDE SERPL-SCNC: 98 MMOL/L (ref 98–107)
CO2 SERPL-SCNC: 19 MMOL/L (ref 22–29)
CREAT SERPL-MCNC: 1.88 MG/DL (ref 0.76–1.27)
DEPRECATED RDW RBC AUTO: 48.2 FL (ref 37–54)
EGFRCR SERPLBLD CKD-EPI 2021: 36.3 ML/MIN/1.73
EOSINOPHIL # BLD AUTO: 0.03 10*3/MM3 (ref 0–0.4)
EOSINOPHIL NFR BLD AUTO: 0.3 % (ref 0.3–6.2)
ERYTHROCYTE [DISTWIDTH] IN BLOOD BY AUTOMATED COUNT: 15.8 % (ref 12.3–15.4)
GLUCOSE SERPL-MCNC: 138 MG/DL (ref 65–99)
HCT VFR BLD AUTO: 34.5 % (ref 37.5–51)
HGB BLD-MCNC: 10.9 G/DL (ref 13–17.7)
IMM GRANULOCYTES # BLD AUTO: 0.04 10*3/MM3 (ref 0–0.05)
IMM GRANULOCYTES NFR BLD AUTO: 0.4 % (ref 0–0.5)
LYMPHOCYTES # BLD AUTO: 0.39 10*3/MM3 (ref 0.7–3.1)
LYMPHOCYTES NFR BLD AUTO: 4.2 % (ref 19.6–45.3)
MCH RBC QN AUTO: 26.5 PG (ref 26.6–33)
MCHC RBC AUTO-ENTMCNC: 31.6 G/DL (ref 31.5–35.7)
MCV RBC AUTO: 83.7 FL (ref 79–97)
MONOCYTES # BLD AUTO: 0.68 10*3/MM3 (ref 0.1–0.9)
MONOCYTES NFR BLD AUTO: 7.3 % (ref 5–12)
NEUTROPHILS NFR BLD AUTO: 8.17 10*3/MM3 (ref 1.7–7)
NEUTROPHILS NFR BLD AUTO: 87.6 % (ref 42.7–76)
NRBC BLD AUTO-RTO: 0 /100 WBC (ref 0–0.2)
PLATELET # BLD AUTO: 174 10*3/MM3 (ref 140–450)
PMV BLD AUTO: 11.8 FL (ref 6–12)
POTASSIUM SERPL-SCNC: 4.9 MMOL/L (ref 3.5–5.2)
RBC # BLD AUTO: 4.12 10*6/MM3 (ref 4.14–5.8)
SODIUM SERPL-SCNC: 134 MMOL/L (ref 136–145)
WBC NRBC COR # BLD AUTO: 9.33 10*3/MM3 (ref 3.4–10.8)

## 2024-05-28 PROCEDURE — 85025 COMPLETE CBC W/AUTO DIFF WBC: CPT | Performed by: FAMILY MEDICINE

## 2024-05-28 PROCEDURE — 25010000002 CEFTRIAXONE PER 250 MG: Performed by: FAMILY MEDICINE

## 2024-05-28 PROCEDURE — 80048 BASIC METABOLIC PNL TOTAL CA: CPT | Performed by: FAMILY MEDICINE

## 2024-05-28 PROCEDURE — 25810000003 SODIUM CHLORIDE 0.9 % SOLUTION: Performed by: FAMILY MEDICINE

## 2024-05-28 RX ORDER — TAMSULOSIN HYDROCHLORIDE 0.4 MG/1
CAPSULE ORAL DAILY
Status: CANCELLED | OUTPATIENT
Start: 2024-05-28 | End: 2024-06-27

## 2024-05-28 RX ORDER — BUMETANIDE 1 MG/1
1 TABLET ORAL DAILY PRN
COMMUNITY

## 2024-05-28 RX ADMIN — SACUBITRIL AND VALSARTAN 1 TABLET: 49; 51 TABLET, FILM COATED ORAL at 09:07

## 2024-05-28 RX ADMIN — SODIUM CHLORIDE 75 ML/HR: 9 INJECTION, SOLUTION INTRAVENOUS at 13:43

## 2024-05-28 RX ADMIN — Medication 10 ML: at 20:44

## 2024-05-28 RX ADMIN — ATORVASTATIN CALCIUM 80 MG: 40 TABLET ORAL at 09:08

## 2024-05-28 RX ADMIN — AMLODIPINE BESYLATE 10 MG: 10 TABLET ORAL at 09:08

## 2024-05-28 RX ADMIN — APIXABAN 5 MG: 5 TABLET, FILM COATED ORAL at 09:08

## 2024-05-28 RX ADMIN — METOPROLOL SUCCINATE 100 MG: 100 TABLET, FILM COATED, EXTENDED RELEASE ORAL at 09:07

## 2024-05-28 RX ADMIN — LINAGLIPTIN 5 MG: 5 TABLET, FILM COATED ORAL at 09:07

## 2024-05-28 RX ADMIN — PANTOPRAZOLE SODIUM 40 MG: 40 TABLET, DELAYED RELEASE ORAL at 06:04

## 2024-05-28 RX ADMIN — METFORMIN HYDROCHLORIDE 1000 MG: 500 TABLET, EXTENDED RELEASE ORAL at 09:07

## 2024-05-28 RX ADMIN — EMPAGLIFLOZIN 10 MG: 10 TABLET, FILM COATED ORAL at 09:08

## 2024-05-28 RX ADMIN — SODIUM CHLORIDE 1000 MG: 900 INJECTION INTRAVENOUS at 16:00

## 2024-05-28 RX ADMIN — APIXABAN 5 MG: 5 TABLET, FILM COATED ORAL at 20:43

## 2024-05-28 RX ADMIN — Medication 10 ML: at 09:09

## 2024-05-28 RX ADMIN — SACUBITRIL AND VALSARTAN 1 TABLET: 49; 51 TABLET, FILM COATED ORAL at 20:43

## 2024-05-28 RX ADMIN — Medication 400 MCG: at 09:08

## 2024-05-28 RX ADMIN — OXYCODONE HYDROCHLORIDE AND ACETAMINOPHEN 500 MG: 500 TABLET ORAL at 09:07

## 2024-05-28 NOTE — PLAN OF CARE
Goal Outcome Evaluation:      Pt A&Ox4. VSS on RA. IVF infusing at 75 mL/hr, abx infused per orders. Denies pain. Pt up standby. Voiding via urinal. Minimal output this shift. Spouse at bedside much of shift. Urology consulted. Safety maintained. Call light in reach.

## 2024-05-28 NOTE — H&P
History and Physical    Patient:  Mani Arreola  MRN: [id number]    CHIEF COMPLAINT: Chills    History Obtained From: the patient   PCP: Tyree Fleming MD    HISTORY OF PRESENT ILLNESS:   The patient is a 79 y.o. male who presents with chills, nausea, generalized weakness for approximately 1 week prior to presentation to the emergency department.  He was found to have nonobstructing kidney stone as well as remarkable urinary tract infection and acute kidney injury in the emergency department.  He declines dysuria, but does admit to chills and occasional lower back pain.  He does have history of kidney stone.    REVIEW OF SYSTEMS:    Review of Systems   Constitutional:  Positive for activity change, chills and fatigue. Negative for fever.   Respiratory:  Negative for cough and shortness of breath.    Cardiovascular:  Negative for chest pain.   Gastrointestinal:  Positive for nausea and vomiting. Negative for abdominal pain.   Neurological:  Positive for weakness.          Past Medical History:  Past Medical History:   Diagnosis Date    Arthritis     Colon cancer     colon    Colon polyp     Coronary artery disease     stents x 2    Diabetes mellitus     Hyperlipidemia     Hypertension     Myocardial infarction        Past Surgical History:  Past Surgical History:   Procedure Laterality Date    CARDIAC CATHETERIZATION      stents x 2    CARDIAC ELECTROPHYSIOLOGY PROCEDURE N/A 11/14/2022    Procedure: EP/Ablation, atrial flutter;  Surgeon: Francisca Cote MD;  Location:  [facility];  Service: Cardiology;  Laterality: N/A;    CARDIAC ELECTROPHYSIOLOGY PROCEDURE N/A 12/29/2022    Procedure: EP/Ablation, atypical atrial flutter;  Surgeon: Francisca Cote MD;  Location:  [facility];  Service: Cardiology;  Laterality: N/A;    CAROTID ENDARTERECTOMY Right     CATARACT EXTRACTION      CHOLECYSTECTOMY      COLON RESECTION      for colon cancer    COLONOSCOPY  02/2004    COLONOSCOPY   09/2005    normal    COLONOSCOPY  10/2007    recall 3 yr    COLONOSCOPY  11/01/2010    5mm polypectomy distal transverse colon, patent end to end ileo colonic anastomosis    COLONOSCOPY N/A 11/16/2016    Procedure: COLONOSCOPY WITH ANESTHESIA;  Surgeon: Cirilo Bhandari MD;  Location:  [facility];  Service:     COLONOSCOPY N/A 1/11/2019    Procedure: COLONOSCOPY WITH ANESTHESIA;  Surgeon: Cirilo Bhandari MD;  Location:  [facility];  Service: Gastroenterology    COLONOSCOPY N/A 2/15/2022    Procedure: COLONOSCOPY WITH ANESTHESIA;  Surgeon: Cirilo Bhandari MD;  Location:  [facility];  Service: Gastroenterology;  Laterality: N/A;  pre colon cancer  post polyp;hemorrhoids      HYDROCELE EXCISION / REPAIR      LUMBAR FUSION Left 3/13/2017    Procedure: LEFT LUMBAR LATERAL INTERBODY FUSION WITH INSTRUMENTATION L4-5 , LANX,  NUVASIVE MONITORING ;  Surgeon: DALY Morgan MD;  Location:  [facility] OR;  Service:     LUMBAR FUSION N/A 3/15/2017    Procedure: POSTERIOR SPINAL FUSION WITH INSTRUMENTATION L4-5;  Surgeon: DALY Morgan MD;  Location:  [facility] OR;  Service:     PERIPHERAL ARTERIAL STENT GRAFT         Medications Prior to Admission:    Medications Prior to Admission   Medication Sig Dispense Refill Last Dose    acetaminophen (TYLENOL) 650 MG 8 hr tablet Take 2 tablets by mouth 2 (Two) Times a Day.   5/27/2024    amLODIPine (NORVASC) 10 MG tablet Take 1 tablet by mouth Daily. 90 tablet 3 5/27/2024    apixaban (Eliquis) 5 MG tablet tablet Take 1 tablet by mouth 2 (Two) Times a Day. 90 tablet 3 5/27/2024    atorvastatin (LIPITOR) 80 MG tablet Take 1 tablet by mouth Daily. (Patient taking differently: Take 1 tablet by mouth Every Night.) 90 tablet 3 5/26/2024    bumetanide (BUMEX) 1 MG tablet Take 1 tablet by mouth Daily As Needed. For weight gain. 30 tablet 11 5/27/2024    dapagliflozin Propanediol 10 MG tablet Take 10 mg by mouth Daily. 90 tablet 3 5/27/2024    diphenhydrAMINE  (BENADRYL) 25 mg capsule Take 2 capsules by mouth At Night As Needed for Allergies or Sleep. Patient states he takes them regularly.   5/26/2024    folic acid (FOLVITE) 400 MCG tablet Take 1 tablet by mouth Daily.   5/27/2024    metoprolol succinate XL (TOPROL-XL) 100 MG 24 hr tablet Take 1 tablet by mouth Daily. 30 tablet 11 5/27/2024    Multiple Vitamins-Minerals (MULTIVITAMIN ADULT PO) Take 1 tablet by mouth Daily.   5/27/2024    Omega-3 Fatty Acids (FISH OIL PO) Take 1,000 mg by mouth Every Night.   5/26/2024    omeprazole (priLOSEC) 20 MG capsule Take 2 capsules by mouth Daily. 180 capsule 4 5/27/2024    sacubitril-valsartan (ENTRESTO)  MG tablet Take 1 tablet by mouth 2 (Two) Times a Day. (Patient taking differently: Take 0.5 tablets by mouth 2 (Two) Times a Day.) 180 tablet 3 5/27/2024    SITagliptin-metFORMIN HCl ER (JANUMET XR) 100-1000 MG tablet Take 1 tablet by mouth Daily.   5/27/2024    vitamin C (ASCORBIC ACID) 500 MG tablet Take 1 tablet by mouth Daily.   5/27/2024    ondansetron ODT (ZOFRAN-ODT) 8 MG disintegrating tablet Place 1 tablet on the tongue Every 8 (Eight) Hours As Needed for Nausea or Vomiting.       traMADol (ULTRAM) 50 MG tablet Take 1 tablet by mouth Every 8 (Eight) Hours As Needed.          Allergies:  Patient has no known allergies.    Social History:   Social History     Socioeconomic History    Marital status:    Tobacco Use    Smoking status: Every Day     Types: Pipe    Smokeless tobacco: Never   Vaping Use    Vaping status: Never Used   Substance and Sexual Activity    Alcohol use: Yes     Comment: occ    Drug use: No    Sexual activity: Defer       Family History:   Family History   Problem Relation Age of Onset    Heart disease Father     Esophageal cancer Father     Colon cancer Neg Hx     Colon polyps Neg Hx            Physical Exam:    Vitals: /60 (BP Location: Right arm, Patient Position: Lying)   Pulse 75   Temp 98.8 °F (37.1 °C) (Oral)   Resp 18    "Ht 182.9 cm (72\")   Wt 92.1 kg (203 lb)   SpO2 93%   BMI 27.53 kg/m²   Physical Exam  Vitals reviewed.   Constitutional:       Appearance: Normal appearance.   HENT:      Head: Normocephalic and atraumatic.   Eyes:      General:         Right eye: No discharge.         Left eye: No discharge.      Extraocular Movements: Extraocular movements intact.      Conjunctiva/sclera: Conjunctivae normal.   Cardiovascular:      Rate and Rhythm: Normal rate and regular rhythm.      Pulses: Normal pulses.      Heart sounds: No murmur heard.  Pulmonary:      Effort: Pulmonary effort is normal. No respiratory distress.      Breath sounds: No wheezing.   Abdominal:      General: Abdomen is flat. Bowel sounds are normal. There is no distension.   Musculoskeletal:      Cervical back: Normal range of motion.   Skin:     Capillary Refill: Capillary refill takes less than 2 seconds.   Neurological:      General: No focal deficit present.      Mental Status: He is alert.   Psychiatric:         Mood and Affect: Mood normal.           Lab Results (last 24 hours)       Procedure Component Value Units Date/Time    Basic Metabolic Panel [[id number]]  (Abnormal) Collected: 05/28/24 0458    Specimen: Blood Updated: 05/28/24 0600     Glucose 138 mg/dL      BUN 29 mg/dL      Creatinine 1.88 mg/dL      Sodium 134 mmol/L      Potassium 4.9 mmol/L      Chloride 98 mmol/L      CO2 19.0 mmol/L      Calcium 8.2 mg/dL      BUN/Creatinine Ratio 15.4     Anion Gap 17.0 mmol/L      eGFR 36.3 mL/min/1.73     Narrative:      GFR Normal >60  Chronic Kidney Disease <60  Kidney Failure <15    The GFR formula is only valid for adults with stable renal function between ages 18 and 70.    CBC Auto Differential [[id number]]  (Abnormal) Collected: 05/28/24 0458    Specimen: Blood Updated: 05/28/24 0543     WBC 9.33 10*3/mm3      RBC 4.12 10*6/mm3      Hemoglobin 10.9 g/dL      Hematocrit 34.5 %      MCV 83.7 fL      MCH 26.5 pg      MCHC 31.6 g/dL      RDW 15.8 %  "     RDW-SD 48.2 fl      MPV 11.8 fL      Platelets 174 10*3/mm3      Neutrophil % 87.6 %      Lymphocyte % 4.2 %      Monocyte % 7.3 %      Eosinophil % 0.3 %      Basophil % 0.2 %      Immature Grans % 0.4 %      Neutrophils, Absolute 8.17 10*3/mm3      Lymphocytes, Absolute 0.39 10*3/mm3      Monocytes, Absolute 0.68 10*3/mm3      Eosinophils, Absolute 0.03 10*3/mm3      Basophils, Absolute 0.02 10*3/mm3      Immature Grans, Absolute 0.04 10*3/mm3      nRBC 0.0 /100 WBC     Blood Culture - Blood, Arm, Left [[id number]] Collected: 05/27/24 1536    Specimen: Blood from Arm, Left Updated: 05/27/24 1543    Blood Culture - Blood, Arm, Right [[id number]] Collected: 05/27/24 1536    Specimen: Blood from Arm, Right Updated: 05/27/24 1542    Respiratory Panel PCR w/COVID-19(SARS-CoV-2) [facility] In-House, NP Swab in UTM/VTM, 2 HR TAT - Swab, Nasopharynx [[id number]]  (Normal) Collected: 05/27/24 1352    Specimen: Swab from Nasopharynx Updated: 05/27/24 1505     ADENOVIRUS, PCR Not Detected     Coronavirus 229E Not Detected     Coronavirus HKU1 Not Detected     Coronavirus NL63 Not Detected     Coronavirus OC43 Not Detected     COVID19 Not Detected     Human Metapneumovirus Not Detected     Human Rhinovirus/Enterovirus Not Detected     Influenza A PCR Not Detected     Influenza B PCR Not Detected     Parainfluenza Virus 1 Not Detected     Parainfluenza Virus 2 Not Detected     Parainfluenza Virus 3 Not Detected     Parainfluenza Virus 4 Not Detected     RSV, PCR Not Detected     Bordetella pertussis pcr Not Detected     Bordetella parapertussis PCR Not Detected     Chlamydophila pneumoniae PCR Not Detected     Mycoplasma pneumo by PCR Not Detected    Narrative:      In the setting of a positive respiratory panel with a viral infection PLUS a negative procalcitonin without other underlying concern for bacterial infection, consider observing off antibiotics or discontinuation of antibiotics and continue supportive  care. If the respiratory panel is positive for atypical bacterial infection (Bordetella pertussis, Chlamydophila pneumoniae, or Mycoplasma pneumoniae), consider antibiotic de-escalation to target atypical bacterial infection.    Urinalysis With Culture If Indicated - Urine, Clean Catch [[id number]]  (Abnormal) Collected: 05/27/24 1341    Specimen: Urine, Clean Catch Updated: 05/27/24 1427     Color, UA Dark Yellow     Appearance, UA Cloudy     pH, UA 5.5     Specific Gravity, UA 1.025     Glucose, UA >=1000 mg/dL (3+)     Ketones, UA 15 mg/dL (1+)     Bilirubin, UA Negative     Blood, UA Moderate (2+)     Protein, UA >=300 mg/dL (3+)     Leuk Esterase, UA Trace     Nitrite, UA Negative     Urobilinogen, UA 1.0 E.U./dL    Narrative:      In absence of clinical symptoms, the presence of pyuria, bacteria, and/or nitrites on the urinalysis result does not correlate with infection.    Urinalysis, Microscopic Only - Urine, Clean Catch [[id number]]  (Abnormal) Collected: 05/27/24 1341    Specimen: Urine, Clean Catch Updated: 05/27/24 1427     RBC, UA 21-50 /HPF      WBC, UA 11-20 /HPF      Bacteria, UA 1+ /HPF      Squamous Epithelial Cells, UA 0-2 /HPF      Hyaline Casts, UA None Seen /LPF      Granular Casts, UA 0-2 /LPF      Methodology Manual Light Microscopy    Urine Culture - Urine, Urine, Clean Catch [[id number]] Collected: 05/27/24 1341    Specimen: Urine, Clean Catch Updated: 05/27/24 1427    Comprehensive Metabolic Panel [[id number]]  (Abnormal) Collected: 05/27/24 1351    Specimen: Blood Updated: 05/27/24 1424     Glucose 170 mg/dL      BUN 28 mg/dL      Creatinine 1.90 mg/dL      Sodium 137 mmol/L      Potassium 5.3 mmol/L      Chloride 98 mmol/L      CO2 22.0 mmol/L      Calcium 9.0 mg/dL      Total Protein 6.8 g/dL      Albumin 3.4 g/dL      ALT (SGPT) 25 U/L      AST (SGOT) 34 U/L      Alkaline Phosphatase 107 U/L      Total Bilirubin 1.0 mg/dL      Globulin 3.4 gm/dL      A/G Ratio 1.0 g/dL      BUN/Creatinine  Ratio 14.7     Anion Gap 17.0 mmol/L      eGFR 35.9 mL/min/1.73     Narrative:      GFR Normal >60  Chronic Kidney Disease <60  Kidney Failure <15    The GFR formula is only valid for adults with stable renal function between ages 18 and 70.    Magnesium [[id number]]  (Normal) Collected: 05/27/24 1351    Specimen: Blood Updated: 05/27/24 1424     Magnesium 2.1 mg/dL     CK [[id number]]  (Normal) Collected: 05/27/24 1351    Specimen: Blood Updated: 05/27/24 1424     Creatine Kinase 42 U/L     Lipase [[id number]]  (Normal) Collected: 05/27/24 1351    Specimen: Blood Updated: 05/27/24 1420     Lipase 19 U/L     CBC & Differential [[id number]]  (Abnormal) Collected: 05/27/24 1351    Specimen: Blood Updated: 05/27/24 1408    Narrative:      The following orders were created for panel order CBC & Differential.  Procedure                               Abnormality         Status                     ---------                               -----------         ------                     CBC Auto Differential[[id number]]        Abnormal            Final result                 Please view results for these tests on the individual orders.    CBC Auto Differential [[id number]]  (Abnormal) Collected: 05/27/24 1351    Specimen: Blood Updated: 05/27/24 1408     WBC 10.95 10*3/mm3      RBC 4.52 10*6/mm3      Hemoglobin 12.2 g/dL      Hematocrit 37.6 %      MCV 83.2 fL      MCH 27.0 pg      MCHC 32.4 g/dL      RDW 15.7 %      RDW-SD 47.6 fl      MPV 10.9 fL      Platelets 173 10*3/mm3      Neutrophil % 88.5 %      Lymphocyte % 3.7 %      Monocyte % 6.7 %      Eosinophil % 0.4 %      Basophil % 0.2 %      Immature Grans % 0.5 %      Neutrophils, Absolute 9.69 10*3/mm3      Lymphocytes, Absolute 0.41 10*3/mm3      Monocytes, Absolute 0.73 10*3/mm3      Eosinophils, Absolute 0.04 10*3/mm3      Basophils, Absolute 0.02 10*3/mm3      Immature Grans, Absolute 0.06 10*3/mm3      nRBC 0.0 /100 WBC               -----------------------------------------------------------------  EKG:   Radiology:     CT Abdomen Pelvis Without Contrast    Result Date: 5/27/2024  CT ABDOMEN PELVIS WO CONTRAST- 5/27/2024 3:16 PM  HISTORY: eval for ureteral stone: back pain h/o stones and UTI with renal dysfx; N17.9-Acute kidney failure, unspecified; N39.0-Urinary tract infection, site not specified  COMPARISON: CT abdomen pelvis 2/2/2014  TOTAL DOSE LENGTH PRODUCT: 479 mGy.cm. Automated exposure control was also utilized to decrease patient radiation dose.  TECHNIQUE: Axial images of the abdomen and pelvis are performed without IV or oral contrast.  FINDINGS: There are patchy right lower lobe opacities consistent with pneumonia. Trace right pleural effusion. Cardiomegaly.  The nonenhanced liver, spleen, pancreas are unremarkable. Cholecystectomy clips. Common bile duct measuring up to 1.8 cm, slightly increased from the 2014 measurement of 1.3 cm-long-term chronicity favoring benign reservoir effect. No adrenal nodule.  There are bilateral intrarenal nonobstructing stones, the largest in the inferior pole of the left kidney measuring 9 mm. There are 2 adjacent stones at/just beyond the left ureterovesicular junction, the largest measuring 8 mm with a smaller adjacent 4 mm stone without hydronephrosis. There are no right ureteral stones. Bladder mild to moderately distended. Prostate measuring 4.7 cm in width.  Postoperative changes/anastomosis within the rectosigmoid region. The right colon is absent. No bowel dilatation. Decompressed stomach which likely accounts for the apparent diffuse gastric wall thickening. Fatty containing umbilical hernia defect. Moderate vascular calcification. No pathologic lymphadenopathy.  Postoperative changes lumbar spine. Chronic compression of the left superior endplate of T9. Chronic mottled appearance to the regional bony marrow.       IMPRESSION: 1. 2 stones at/just beyond the left ureterovesicular  junction, largest measuring 8 mm with a smaller adjacent 4 mm stone, with Lesvia significant left-sided hydronephrosis. Bilateral nonobstructing intrarenal stones, largest in the inferior pole of the left kidney measuring 9 mm. No right ureteral stones. 2. Patchy right lower lobe opacities consistent with pneumonia. Trace right pleural effusion.  This report was signed and finalized on 5/27/2024 4:31 PM by Dr. Jocelin Saravia MD.      XR Chest 2 View    Result Date: 5/27/2024  EXAM: XR CHEST 2 VW-  DATE: 5/27/2024 12:26 PM  HISTORY: ifnectious eval   COMPARISON: 3/6/2017.  TECHNIQUE:  Frontal and lateral views of the chest submitted.  FINDINGS:  There is opacity in the right lower lobe worrisome for pneumonia. No effusion or pneumothorax is seen. Heart and mediastinum are unremarkable.       1. Right lower lobe opacity worrisome for pneumonia.  This report was signed and finalized on 5/27/2024 2:16 PM by Fidel Hernandez.        Assessment and Plan     Primary Problem:  [facility] Problem list:    [facility]    Type 2 diabetes mellitus without complication, without long-term current use of insulin    Essential hypertension    Pure hypercholesterolemia    Coronary artery disease involving native coronary artery without angina pectoris    Atypical atrial flutter    HFrEF (heart failure with reduced ejection fraction)    Chronic systolic heart failure      PMH:  Past Medical History:   Diagnosis Date    Arthritis     Colon cancer     colon    Colon polyp     Coronary artery disease     stents x 2    Diabetes mellitus     Hyperlipidemia     Hypertension     Myocardial infarction        Treatment Plan:  SIMRAN: Monitor kidney function closely, gentle IVF.    UTI: Follow urine culture.  Rocephin every 24 hours.  Due to nonobstructing kidney stone, urology consulted from the emergency department.    Nonobstructing nephrolithiasis: Urology consulted.    Disposition: Inpatient    Phi Macario MD 5/28/2024 08:20 CDT

## 2024-05-28 NOTE — CONSULTS
79-year-old male with a history of stone disease requiring surgical intervention in the past presents to the emergency room department yesterday with a 1 week history of generalized weakness which he attributed to dehydration.  CT scan performed through the ER revealed bilateral intrarenal calculi as well as 2 stones that measured 4 and 8 mm distal to the left ureter without any evidence of hydronephrosis, thus quite possibly representing bladder calculi.  He has been started on fluids and IV antibiotics and has been doing very well.  He has not had any significant voiding issues.  He denies any dysuria although may have seen some light pink urine at times.  Denies any hesitancy, claims to have good stream and senses good emptying.  He has not been treated for urinary tract infection sometime, and has not had any other prior urologic surgery other than his previous ESWL            Signed            Subjective   .     Review of Systems   Otherwise complete ROS reviewed and negative except as mentioned in the HPI.     Past Medical History:   Medical History        Allergies:  Allergies   No Known Allergies        Medications:          Prior to Admission medications    Medication Sig Start Date End Date Taking? Authorizing Provider   aspirin 81 MG EC tablet Take 1 tablet by mouth Daily.       Michael Snowden MD   atorvastatin (LIPITOR) 40 MG tablet TAKE ONE TABLET BY MOUTH DAILY FOR CHOLESTEROL 1/30/18     Malcolm Dunbar MD   Boswellia-Glucosamine-Vit D (OSTEO BI-FLEX ONE PER DAY PO) Take  by mouth.       Michael Snowden MD   diclofenac (VOLTAREN) 75 MG EC tablet Take 1 tablet by mouth 2 (Two) Times a Day.       Michael Snowden MD   famotidine (PEPCID) 40 MG tablet   8/5/22     Michael Snowden MD   finasteride (PROSCAR) 5 MG tablet Take 1 tablet by mouth Daily. 8/3/22     Michael Snowden MD   folic acid (FOLVITE) 400 MCG tablet Take 1 tablet by mouth Daily.       Michael Snowden MD    HYDROcodone-acetaminophen (NORCO) 5-325 MG per tablet Take 1 tablet by mouth Every 6 (Six) Hours As Needed for Severe Pain.       Michael Snowden MD   ipratropium-albuterol (DUO-NEB) 0.5-2.5 mg/3 ml nebulizer Take 3 mL by nebulization 4 (Four) Times a Day As Needed for Wheezing or Shortness of Air. 2/22/24     Manan Parker MD   levothyroxine (SYNTHROID, LEVOTHROID) 50 MCG tablet Take 1 tablet by mouth Every Morning. 8/3/22     Michael Snowden MD   lisinopril (PRINIVIL,ZESTRIL) 10 MG tablet Take 1 tablet by mouth Daily.       Michael Snowden MD   montelukast (SINGULAIR) 10 MG tablet Take 1 tablet by mouth Every Night.       Michael Snowden MD   Multiple Vitamins-Minerals (CENTRUM SILVER PO) Take  by mouth.       Michael Snowden MD   Omega-3 Fatty Acids (OMEGA 3 500 PO) Take  by mouth Daily.       Michael Snowden MD   omeprazole (priLOSEC) 20 MG capsule Take 1 capsule by mouth Daily.       Michael Snowden MD   ondansetron ODT (ZOFRAN-ODT) 4 MG disintegrating tablet Take 1 tablet by mouth Every 8 (Eight) Hours As Needed for Nausea or Vomiting. 5/20/24     Michael Snowedn MD   sertraline (ZOLOFT) 50 MG tablet Take 50 mg by mouth Daily.  Patient not taking: Reported on 9/25/2023       Michael Snowden MD   sucralfate (CARAFATE) 1 g tablet Take 1 tablet by mouth 4 (Four) Times a Day. 5/20/24     Michael Snowden MD   tamsulosin (FLOMAX) 0.4 MG capsule 24 hr capsule Take 2 capsules by mouth Daily. 5/11/22     Michael Snowden MD   vitamin B-12 (CYANOCOBALAMIN) 1000 MCG tablet Take 1 tablet by mouth Daily.       Michael Snowden MD   VITAMIN D PO Take 50 mcg by mouth Daily.       Michael Snowden MD      I have utilized all available immediate resources to obtain, update, or review the patient's current medications (including all prescriptions, over-the-counter products, herbals, cannabis/cannabidiol products, and vitamin/mineral/dietary  "(nutritional) supplements).     Objective      Vital Signs: /51   Pulse 88   Temp 97.3 °F (36.3 °C) (Axillary)   Resp 14   Ht 170.2 cm (67\")   Wt 66.7 kg (147 lb)   SpO2 99%   BMI 23.02 kg/m²   Physical Exam  Constitutional:       Appearance: He is well-developed. He is ill-appearing.   HENT:      Head: Normocephalic and atraumatic.      Right Ear: External ear normal.      Left Ear: External ear normal.      Nose: Nose normal.      Mouth/Throat:      Mouth: Mucous membranes are dry.   Eyes:      General:         Right eye: No discharge.         Left eye: No discharge.      Extraocular Movements: Extraocular movements intact.      Conjunctiva/sclera: Conjunctivae normal.      Pupils: Pupils are equal, round, and reactive to light.   Neck:      Vascular: No JVD.   Cardiovascular:      Rate and Rhythm: Regular rhythm. Tachycardia present.      Heart sounds: Normal heart sounds. No murmur heard.  Pulmonary:      Effort: Pulmonary effort is normal. No respiratory distress.      Breath sounds: Normal breath sounds. No wheezing or rales.   Chest:      Chest wall: No tenderness.   Abdomen is soft and nontender.  There is no left lower quadrant tenderness.   Bladder is not palpably distended.    :     Lab Results (last 72 hours)         Procedure Component Value Units Date/Time     Comprehensive Metabolic Panel [[id number]]  (Abnormal) Collected: 05/29/24 0359     Specimen: Blood Updated: 05/29/24 0726       Glucose 167 mg/dL         BUN 27 mg/dL         Creatinine 1.54 mg/dL         Sodium 137 mmol/L         Potassium 4.8 mmol/L         Comment: Specimen hemolyzed.  Result may be falsely elevated.          Chloride 103 mmol/L         CO2 18.0 mmol/L         Calcium 8.2 mg/dL         Total Protein 6.4 g/dL         Albumin 2.9 g/dL         ALT (SGPT) 68 U/L         Comment: Specimen hemolyzed.  Result may  be falsely elevated.          AST (SGOT) 126 U/L         Comment: Specimen hemolyzed.  Result may be falsely " elevated.          Alkaline Phosphatase 117 U/L         Total Bilirubin 0.7 mg/dL         Globulin 3.5 gm/dL         A/G Ratio 0.8 g/dL         BUN/Creatinine Ratio 17.5       Anion Gap 16.0 mmol/L         eGFR 46.2 mL/min/1.73       Narrative:       GFR Normal >60  Chronic Kidney Disease <60  Kidney Failure <15     The GFR formula is only valid for adults with stable renal function between ages 18 and 70.     CBC & Differential [[id number]]  (Abnormal) Collected: 05/29/24 0621     Specimen: Blood Updated: 05/29/24 0639     Narrative:       The following orders were created for panel order CBC & Differential.  Procedure                               Abnormality         Status                     ---------                               -----------         ------                     CBC Auto Differential[[id number]]        Abnormal            Final result                  Please view results for these tests on the individual orders.     CBC Auto Differential [[id number]]  (Abnormal) Collected: 05/29/24 0621     Specimen: Blood Updated: 05/29/24 0639       WBC 8.11 10*3/mm3         RBC 4.14 10*6/mm3         Hemoglobin 11.0 g/dL         Hematocrit 33.8 %         MCV 81.6 fL         MCH 26.6 pg         MCHC 32.5 g/dL         RDW 15.8 %         RDW-SD 47.7 fl         MPV 10.7 fL         Platelets 188 10*3/mm3         Neutrophil % 86.0 %         Lymphocyte % 4.1 %         Monocyte % 6.9 %         Eosinophil % 2.2 %         Basophil % 0.1 %         Immature Grans % 0.7 %         Neutrophils, Absolute 6.97 10*3/mm3         Lymphocytes, Absolute 0.33 10*3/mm3         Monocytes, Absolute 0.56 10*3/mm3         Eosinophils, Absolute 0.18 10*3/mm3         Basophils, Absolute 0.01 10*3/mm3         Immature Grans, Absolute 0.06 10*3/mm3         nRBC 0.0 /100 WBC       BNP [[id number]]  (Abnormal) Collected: 05/29/24 0359     Specimen:          ABDOMEN PELVIS WO CONTRAST- 5/27/2024 3:16 PM     HISTORY: eval for ureteral stone: back pain  h/o stones and UTI with  renal dysfx; N17.9-Acute kidney failure, unspecified; N39.0-Urinary  tract infection, site not specified      COMPARISON: CT abdomen pelvis 2/2/2014     TOTAL DOSE LENGTH PRODUCT: 479 mGy.cm. Automated exposure control was  also utilized to decrease patient radiation dose.     TECHNIQUE: Axial images of the abdomen and pelvis are performed without  IV or oral contrast.     FINDINGS: There are patchy right lower lobe opacities consistent with  pneumonia. Trace right pleural effusion. Cardiomegaly.     The nonenhanced liver, spleen, pancreas are unremarkable.  Cholecystectomy clips. Common bile duct measuring up to 1.8 cm, slightly  increased from the 2014 measurement of 1.3 cm-long-term chronicity  favoring benign reservoir effect. No adrenal nodule.     There are bilateral intrarenal nonobstructing stones, the largest in the  inferior pole of the left kidney measuring 9 mm. There are 2 adjacent  stones at/just beyond the left ureterovesicular junction, the largest  measuring 8 mm with a smaller adjacent 4 mm stone without  hydronephrosis. There are no right ureteral stones. Bladder mild to  moderately distended. Prostate measuring 4.7 cm in width.     Postoperative changes/anastomosis within the rectosigmoid region. The  right colon is absent. No bowel dilatation. Decompressed stomach which  likely accounts for the apparent diffuse gastric wall thickening. Fatty  containing umbilical hernia defect. Moderate vascular calcification. No  pathologic lymphadenopathy.     Postoperative changes lumbar spine. Chronic compression of the left  superior endplate of T9. Chronic mottled appearance to the regional bony  marrow.     IMPRESSION:   IMPRESSION:  1. 2 stones at/just beyond the left ureterovesicular junction, largest  measuring 8 mm with a smaller adjacent 4 mm stone, with Lesvia significant  left-sided hydronephrosis. Bilateral nonobstructing intrarenal stones,  largest in the inferior pole of  the left kidney measuring 9 mm. No right  ureteral stones.  2. Patchy right lower lobe opacities consistent with pneumonia. Trace  right pleural effusion.     This report was signed and finalized on 5/27/2024 4:31 PM by Dr. Jocelin Saravia MD.                                                                                                                                                                                                                                                                                                                                                                                                                                                                                                                                                                         Collected: 05/27/24 1325      Updated: 05/27/24 1320

## 2024-05-28 NOTE — PLAN OF CARE
Goal Outcome Evaluation:  Plan of Care Reviewed With: patient        Progress: no change  Outcome Evaluation: Pt A&Ox4, pleasant and cooperative. VSS. RA. IVF infusing at 75 mL/hr. Safety maintained. Call light in reach.

## 2024-05-28 NOTE — CASE MANAGEMENT/SOCIAL WORK
Discharge Planning Assessment  [facility]     Patient Name: Mani rAreola  MRN: [id number]  Today's Date: 5/28/2024    Admit Date: 5/27/2024        Discharge Needs Assessment       Row Name 05/28/24 1002       Living Environment    People in Home spouse    Name(s) of People in Home Andra-spouse    Current Living Arrangements home    Potentially Unsafe Housing Conditions none    Primary Care Provided by self    Provides Primary Care For no one    Family Caregiver if Needed spouse    Quality of Family Relationships helpful;involved;supportive       Resource/Environmental Concerns    Transportation Concerns none       Transition Planning    Patient/Family Anticipates Transition to home with family    Patient/Family Anticipated Services at Transition none    Transportation Anticipated family or friend will provide       Discharge Needs Assessment    Readmission Within the Last 30 Days no previous admission in last 30 days    Equipment Currently Used at Home cane, straight;bp cuff;scales;glucometer    Concerns to be Addressed no discharge needs identified;denies needs/concerns at this time    Anticipated Changes Related to Illness none    Equipment Needed After Discharge none    Discharge Coordination/Progress Spoke with patient to complete DC planning. Pt plans to return home w/ spouse at DC. Has a PCP and Rx coverage. Pt denies the need for HH or any DME at this time. Will follow for DC needs.                   Discharge Plan    No documentation.                 Continued Care and Services - Admitted Since 5/27/2024    No active coordination exists for this encounter.          Demographic Summary    No documentation.                  Functional Status    No documentation.                  Psychosocial    No documentation.                  Abuse/Neglect    No documentation.                  Legal    No documentation.                  Substance Abuse    No documentation.                  Patient Forms    No  documentation.                     Sarah Rios RN

## 2024-05-29 LAB
ALBUMIN SERPL-MCNC: 2.9 G/DL (ref 3.5–5.2)
ALBUMIN/GLOB SERPL: 0.8 G/DL
ALP SERPL-CCNC: 117 U/L (ref 39–117)
ALT SERPL W P-5'-P-CCNC: 68 U/L (ref 1–41)
ANION GAP SERPL CALCULATED.3IONS-SCNC: 16 MMOL/L (ref 5–15)
AST SERPL-CCNC: 126 U/L (ref 1–40)
BACTERIA SPEC AEROBE CULT: ABNORMAL
BASOPHILS # BLD AUTO: 0.01 10*3/MM3 (ref 0–0.2)
BASOPHILS NFR BLD AUTO: 0.1 % (ref 0–1.5)
BILIRUB SERPL-MCNC: 0.7 MG/DL (ref 0–1.2)
BUN SERPL-MCNC: 27 MG/DL (ref 8–23)
BUN/CREAT SERPL: 17.5 (ref 7–25)
CALCIUM SPEC-SCNC: 8.2 MG/DL (ref 8.6–10.5)
CHLORIDE SERPL-SCNC: 103 MMOL/L (ref 98–107)
CO2 SERPL-SCNC: 18 MMOL/L (ref 22–29)
CREAT SERPL-MCNC: 1.54 MG/DL (ref 0.76–1.27)
DEPRECATED RDW RBC AUTO: 47.7 FL (ref 37–54)
DEPRECATED RDW RBC AUTO: 48.2 FL (ref 37–54)
EGFRCR SERPLBLD CKD-EPI 2021: 46.2 ML/MIN/1.73
EOSINOPHIL # BLD AUTO: 0.18 10*3/MM3 (ref 0–0.4)
EOSINOPHIL NFR BLD AUTO: 2.2 % (ref 0.3–6.2)
ERYTHROCYTE [DISTWIDTH] IN BLOOD BY AUTOMATED COUNT: 15.8 % (ref 12.3–15.4)
ERYTHROCYTE [DISTWIDTH] IN BLOOD BY AUTOMATED COUNT: 15.9 % (ref 12.3–15.4)
GLOBULIN UR ELPH-MCNC: 3.5 GM/DL
GLUCOSE SERPL-MCNC: 167 MG/DL (ref 65–99)
HCT VFR BLD AUTO: 33.8 % (ref 37.5–51)
HCT VFR BLD AUTO: 36.5 % (ref 37.5–51)
HGB BLD-MCNC: 11 G/DL (ref 13–17.7)
HGB BLD-MCNC: 11.7 G/DL (ref 13–17.7)
IMM GRANULOCYTES # BLD AUTO: 0.06 10*3/MM3 (ref 0–0.05)
IMM GRANULOCYTES NFR BLD AUTO: 0.7 % (ref 0–0.5)
LYMPHOCYTES # BLD AUTO: 0.33 10*3/MM3 (ref 0.7–3.1)
LYMPHOCYTES NFR BLD AUTO: 4.1 % (ref 19.6–45.3)
MCH RBC QN AUTO: 26.6 PG (ref 26.6–33)
MCH RBC QN AUTO: 26.7 PG (ref 26.6–33)
MCHC RBC AUTO-ENTMCNC: 32.1 G/DL (ref 31.5–35.7)
MCHC RBC AUTO-ENTMCNC: 32.5 G/DL (ref 31.5–35.7)
MCV RBC AUTO: 81.6 FL (ref 79–97)
MCV RBC AUTO: 83.1 FL (ref 79–97)
MONOCYTES # BLD AUTO: 0.56 10*3/MM3 (ref 0.1–0.9)
MONOCYTES NFR BLD AUTO: 6.9 % (ref 5–12)
NEUTROPHILS NFR BLD AUTO: 6.97 10*3/MM3 (ref 1.7–7)
NEUTROPHILS NFR BLD AUTO: 86 % (ref 42.7–76)
NRBC BLD AUTO-RTO: 0 /100 WBC (ref 0–0.2)
NT-PROBNP SERPL-MCNC: 9999 PG/ML (ref 0–1800)
PLATELET # BLD AUTO: 188 10*3/MM3 (ref 140–450)
PLATELET # BLD AUTO: 211 10*3/MM3 (ref 140–450)
PMV BLD AUTO: 10.7 FL (ref 6–12)
PMV BLD AUTO: 11.5 FL (ref 6–12)
POTASSIUM SERPL-SCNC: 4.8 MMOL/L (ref 3.5–5.2)
PROT SERPL-MCNC: 6.4 G/DL (ref 6–8.5)
RBC # BLD AUTO: 4.14 10*6/MM3 (ref 4.14–5.8)
RBC # BLD AUTO: 4.39 10*6/MM3 (ref 4.14–5.8)
SODIUM SERPL-SCNC: 137 MMOL/L (ref 136–145)
WBC NRBC COR # BLD AUTO: 8.11 10*3/MM3 (ref 3.4–10.8)
WBC NRBC COR # BLD AUTO: 8.39 10*3/MM3 (ref 3.4–10.8)

## 2024-05-29 PROCEDURE — 25810000003 SODIUM CHLORIDE 0.9 % SOLUTION: Performed by: FAMILY MEDICINE

## 2024-05-29 PROCEDURE — 83880 ASSAY OF NATRIURETIC PEPTIDE: CPT | Performed by: PHYSICIAN ASSISTANT

## 2024-05-29 PROCEDURE — 25010000002 CEFTRIAXONE PER 250 MG: Performed by: FAMILY MEDICINE

## 2024-05-29 PROCEDURE — 97161 PT EVAL LOW COMPLEX 20 MIN: CPT | Performed by: PHYSICAL THERAPIST

## 2024-05-29 PROCEDURE — 80053 COMPREHEN METABOLIC PANEL: CPT | Performed by: FAMILY MEDICINE

## 2024-05-29 PROCEDURE — 85025 COMPLETE CBC W/AUTO DIFF WBC: CPT | Performed by: FAMILY MEDICINE

## 2024-05-29 PROCEDURE — 85027 COMPLETE CBC AUTOMATED: CPT | Performed by: PHYSICIAN ASSISTANT

## 2024-05-29 PROCEDURE — 25010000002 METHYLPREDNISOLONE PER 125 MG: Performed by: FAMILY MEDICINE

## 2024-05-29 RX ORDER — METHYLPREDNISOLONE SODIUM SUCCINATE 125 MG/2ML
125 INJECTION, POWDER, LYOPHILIZED, FOR SOLUTION INTRAMUSCULAR; INTRAVENOUS EVERY 12 HOURS
Status: DISCONTINUED | OUTPATIENT
Start: 2024-05-29 | End: 2024-05-31 | Stop reason: HOSPADM

## 2024-05-29 RX ORDER — ALBUTEROL SULFATE 2.5 MG/3ML
2.5 SOLUTION RESPIRATORY (INHALATION) EVERY 4 HOURS PRN
COMMUNITY

## 2024-05-29 RX ORDER — AMOXICILLIN 500 MG/1
500 CAPSULE ORAL EVERY 8 HOURS SCHEDULED
Qty: 21 CAPSULE | Refills: 0 | Status: DISCONTINUED | OUTPATIENT
Start: 2024-05-29 | End: 2024-05-31 | Stop reason: HOSPADM

## 2024-05-29 RX ADMIN — LINAGLIPTIN 5 MG: 5 TABLET, FILM COATED ORAL at 09:03

## 2024-05-29 RX ADMIN — METOPROLOL SUCCINATE 100 MG: 100 TABLET, FILM COATED, EXTENDED RELEASE ORAL at 09:03

## 2024-05-29 RX ADMIN — SODIUM CHLORIDE 75 ML/HR: 9 INJECTION, SOLUTION INTRAVENOUS at 03:48

## 2024-05-29 RX ADMIN — METFORMIN HYDROCHLORIDE 1000 MG: 500 TABLET, EXTENDED RELEASE ORAL at 09:03

## 2024-05-29 RX ADMIN — ATORVASTATIN CALCIUM 80 MG: 40 TABLET ORAL at 09:03

## 2024-05-29 RX ADMIN — METHYLPREDNISOLONE SODIUM SUCCINATE 125 MG: 125 INJECTION, POWDER, FOR SOLUTION INTRAMUSCULAR; INTRAVENOUS at 12:17

## 2024-05-29 RX ADMIN — OXYCODONE HYDROCHLORIDE AND ACETAMINOPHEN 500 MG: 500 TABLET ORAL at 09:03

## 2024-05-29 RX ADMIN — AMLODIPINE BESYLATE 10 MG: 10 TABLET ORAL at 09:03

## 2024-05-29 RX ADMIN — APIXABAN 5 MG: 5 TABLET, FILM COATED ORAL at 09:03

## 2024-05-29 RX ADMIN — Medication 10 ML: at 21:17

## 2024-05-29 RX ADMIN — SACUBITRIL AND VALSARTAN 1 TABLET: 49; 51 TABLET, FILM COATED ORAL at 09:03

## 2024-05-29 RX ADMIN — AMOXICILLIN 500 MG: 500 CAPSULE ORAL at 18:47

## 2024-05-29 RX ADMIN — SACUBITRIL AND VALSARTAN 1 TABLET: 49; 51 TABLET, FILM COATED ORAL at 21:16

## 2024-05-29 RX ADMIN — APIXABAN 5 MG: 5 TABLET, FILM COATED ORAL at 21:16

## 2024-05-29 RX ADMIN — Medication 10 ML: at 09:05

## 2024-05-29 RX ADMIN — Medication 400 MCG: at 09:03

## 2024-05-29 RX ADMIN — METHYLPREDNISOLONE SODIUM SUCCINATE 125 MG: 125 INJECTION, POWDER, FOR SOLUTION INTRAMUSCULAR; INTRAVENOUS at 22:36

## 2024-05-29 RX ADMIN — EMPAGLIFLOZIN 10 MG: 10 TABLET, FILM COATED ORAL at 09:03

## 2024-05-29 RX ADMIN — PANTOPRAZOLE SODIUM 40 MG: 40 TABLET, DELAYED RELEASE ORAL at 06:00

## 2024-05-29 RX ADMIN — SODIUM CHLORIDE 1000 MG: 900 INJECTION INTRAVENOUS at 15:33

## 2024-05-29 NOTE — PROGRESS NOTES
Daily Progress Note  Mani Arreola  MRN: [id number] LOS: 2    Admit Date: 5/27/2024 5/29/2024 08:12 CDT    Subjective:      Chief Complaint:  Chief Complaint   Patient presents with    Chills    Blood in Urine       Interval History:    Reviewed overnight events and nursing notes.   No acute events overnight.  Denies any pain.    Review of Systems   Constitutional:  Positive for activity change, chills and fatigue. Negative for fever.   Respiratory:  Negative for cough, shortness of breath and wheezing.    Cardiovascular:  Negative for chest pain.   Gastrointestinal:  Positive for nausea. Negative for abdominal pain.   Genitourinary:  Positive for hematuria. Negative for difficulty urinating.   Skin:  Negative for color change.       DIET:  Diet: Regular/House; Fluid Consistency: Thin (IDDSI 0)    Medications:   sodium chloride, 75 mL/hr, Last Rate: 75 mL/hr (05/29/24 9566)      amLODIPine, 10 mg, Oral, Daily  apixaban, 5 mg, Oral, BID  vitamin C, 500 mg, Oral, Daily  atorvastatin, 80 mg, Oral, Daily  cefTRIAXone, 1,000 mg, Intravenous, Q24H  empagliflozin, 10 mg, Oral, Daily  folic acid, 400 mcg, Oral, Daily  linagliptin, 5 mg, Oral, Daily   And  metFORMIN ER, 1,000 mg, Oral, Daily  metoprolol succinate XL, 100 mg, Oral, Daily  pantoprazole, 40 mg, Oral, Q AM  sacubitril-valsartan, 1 tablet, Oral, Q12H  sodium chloride, 10 mL, Intravenous, Q12H        Data:     Code Status:   Code Status and Medical Interventions:   Ordered at: 05/27/24 2300     Code Status (Patient has no pulse and is not breathing):    CPR (Attempt to Resuscitate)     Medical Interventions (Patient has pulse or is breathing):    Full Support       Family History   Problem Relation Age of Onset    Heart disease Father     Esophageal cancer Father     Colon cancer Neg Hx     Colon polyps Neg Hx      Social History     Socioeconomic History    Marital status:    Tobacco Use    Smoking status: Every Day     Types: Pipe    Smokeless  tobacco: Never   Vaping Use    Vaping status: Never Used   Substance and Sexual Activity    Alcohol use: Yes     Comment: occ    Drug use: No    Sexual activity: Defer       Labs:    Lab Results (last 72 hours)       Procedure Component Value Units Date/Time    Comprehensive Metabolic Panel [[id number]]  (Abnormal) Collected: 05/29/24 0359    Specimen: Blood Updated: 05/29/24 0726     Glucose 167 mg/dL      BUN 27 mg/dL      Creatinine 1.54 mg/dL      Sodium 137 mmol/L      Potassium 4.8 mmol/L      Comment: Specimen hemolyzed.  Result may be falsely elevated.        Chloride 103 mmol/L      CO2 18.0 mmol/L      Calcium 8.2 mg/dL      Total Protein 6.4 g/dL      Albumin 2.9 g/dL      ALT (SGPT) 68 U/L      Comment: Specimen hemolyzed.  Result may  be falsely elevated.        AST (SGOT) 126 U/L      Comment: Specimen hemolyzed.  Result may be falsely elevated.        Alkaline Phosphatase 117 U/L      Total Bilirubin 0.7 mg/dL      Globulin 3.5 gm/dL      A/G Ratio 0.8 g/dL      BUN/Creatinine Ratio 17.5     Anion Gap 16.0 mmol/L      eGFR 46.2 mL/min/1.73     Narrative:      GFR Normal >60  Chronic Kidney Disease <60  Kidney Failure <15    The GFR formula is only valid for adults with stable renal function between ages 18 and 70.    CBC & Differential [[id number]]  (Abnormal) Collected: 05/29/24 0621    Specimen: Blood Updated: 05/29/24 0639    Narrative:      The following orders were created for panel order CBC & Differential.  Procedure                               Abnormality         Status                     ---------                               -----------         ------                     CBC Auto Differential[[id number]]        Abnormal            Final result                 Please view results for these tests on the individual orders.    CBC Auto Differential [[id number]]  (Abnormal) Collected: 05/29/24 0621    Specimen: Blood Updated: 05/29/24 0639     WBC 8.11 10*3/mm3      RBC 4.14 10*6/mm3       Hemoglobin 11.0 g/dL      Hematocrit 33.8 %      MCV 81.6 fL      MCH 26.6 pg      MCHC 32.5 g/dL      RDW 15.8 %      RDW-SD 47.7 fl      MPV 10.7 fL      Platelets 188 10*3/mm3      Neutrophil % 86.0 %      Lymphocyte % 4.1 %      Monocyte % 6.9 %      Eosinophil % 2.2 %      Basophil % 0.1 %      Immature Grans % 0.7 %      Neutrophils, Absolute 6.97 10*3/mm3      Lymphocytes, Absolute 0.33 10*3/mm3      Monocytes, Absolute 0.56 10*3/mm3      Eosinophils, Absolute 0.18 10*3/mm3      Basophils, Absolute 0.01 10*3/mm3      Immature Grans, Absolute 0.06 10*3/mm3      nRBC 0.0 /100 WBC     BNP [[id number]]  (Abnormal) Collected: 05/29/24 0359    Specimen: Blood Updated: 05/29/24 0526     proBNP 9,999.0 pg/mL     Narrative:      This assay is used as an aid in the diagnosis of individuals suspected of having heart failure. It can be used as an aid in the diagnosis of acute decompensated heart failure (ADHF) in patients presenting with signs and symptoms of ADHF to the emergency department (ED). In addition, NT-proBNP of <300 pg/mL indicates ADHF is not likely.    Age Range Result Interpretation  NT-proBNP Concentration (pg/mL:      <50             Positive            >450                   Gray                 300-450                    Negative             <300    50-75           Positive            >900                  Gray                300-900                  Negative            <300      >75             Positive            >1800                  Gray                300-1800                  Negative            <300    CBC (No Diff) [[id number]]  (Abnormal) Collected: 05/29/24 0359    Specimen: Blood Updated: 05/29/24 0453     WBC 8.39 10*3/mm3      RBC 4.39 10*6/mm3      Hemoglobin 11.7 g/dL      Hematocrit 36.5 %      MCV 83.1 fL      MCH 26.7 pg      MCHC 32.1 g/dL      RDW 15.9 %      RDW-SD 48.2 fl      MPV 11.5 fL      Platelets 211 10*3/mm3     Blood Culture - Blood, Arm, Right [[id number]]  (Normal)  Collected: 05/27/24 1536    Specimen: Blood from Arm, Right Updated: 05/28/24 1545     Blood Culture No growth at 24 hours    Blood Culture - Blood, Arm, Left [[id number]]  (Normal) Collected: 05/27/24 1536    Specimen: Blood from Arm, Left Updated: 05/28/24 1545     Blood Culture No growth at 24 hours    Urine Culture - Urine, Urine, Clean Catch [[id number]]  (Abnormal) Collected: 05/27/24 1341    Specimen: Urine, Clean Catch Updated: 05/28/24 0931     Urine Culture >100,000 CFU/mL Gram Positive Cocci    Narrative:      Colonization of the urinary tract without infection is common. Treatment is discouraged unless the patient is symptomatic, pregnant, or undergoing an invasive urologic procedure.    Basic Metabolic Panel [[id number]]  (Abnormal) Collected: 05/28/24 0458    Specimen: Blood Updated: 05/28/24 0600     Glucose 138 mg/dL      BUN 29 mg/dL      Creatinine 1.88 mg/dL      Sodium 134 mmol/L      Potassium 4.9 mmol/L      Chloride 98 mmol/L      CO2 19.0 mmol/L      Calcium 8.2 mg/dL      BUN/Creatinine Ratio 15.4     Anion Gap 17.0 mmol/L      eGFR 36.3 mL/min/1.73     Narrative:      GFR Normal >60  Chronic Kidney Disease <60  Kidney Failure <15    The GFR formula is only valid for adults with stable renal function between ages 18 and 70.    CBC Auto Differential [[id number]]  (Abnormal) Collected: 05/28/24 0458    Specimen: Blood Updated: 05/28/24 0543     WBC 9.33 10*3/mm3      RBC 4.12 10*6/mm3      Hemoglobin 10.9 g/dL      Hematocrit 34.5 %      MCV 83.7 fL      MCH 26.5 pg      MCHC 31.6 g/dL      RDW 15.8 %      RDW-SD 48.2 fl      MPV 11.8 fL      Platelets 174 10*3/mm3      Neutrophil % 87.6 %      Lymphocyte % 4.2 %      Monocyte % 7.3 %      Eosinophil % 0.3 %      Basophil % 0.2 %      Immature Grans % 0.4 %      Neutrophils, Absolute 8.17 10*3/mm3      Lymphocytes, Absolute 0.39 10*3/mm3      Monocytes, Absolute 0.68 10*3/mm3      Eosinophils, Absolute 0.03 10*3/mm3      Basophils, Absolute 0.02  10*3/mm3      Immature Grans, Absolute 0.04 10*3/mm3      nRBC 0.0 /100 WBC     Respiratory Panel PCR w/COVID-19(SARS-CoV-2) ABEL/JAMIL/BENJY/PAD/COR/RUTH In-House, NP Swab in UTM/VTM, 2 HR TAT - Swab, Nasopharynx [[id number]]  (Normal) Collected: 05/27/24 1352    Specimen: Swab from Nasopharynx Updated: 05/27/24 1505     ADENOVIRUS, PCR Not Detected     Coronavirus 229E Not Detected     Coronavirus HKU1 Not Detected     Coronavirus NL63 Not Detected     Coronavirus OC43 Not Detected     COVID19 Not Detected     Human Metapneumovirus Not Detected     Human Rhinovirus/Enterovirus Not Detected     Influenza A PCR Not Detected     Influenza B PCR Not Detected     Parainfluenza Virus 1 Not Detected     Parainfluenza Virus 2 Not Detected     Parainfluenza Virus 3 Not Detected     Parainfluenza Virus 4 Not Detected     RSV, PCR Not Detected     Bordetella pertussis pcr Not Detected     Bordetella parapertussis PCR Not Detected     Chlamydophila pneumoniae PCR Not Detected     Mycoplasma pneumo by PCR Not Detected    Narrative:      In the setting of a positive respiratory panel with a viral infection PLUS a negative procalcitonin without other underlying concern for bacterial infection, consider observing off antibiotics or discontinuation of antibiotics and continue supportive care. If the respiratory panel is positive for atypical bacterial infection (Bordetella pertussis, Chlamydophila pneumoniae, or Mycoplasma pneumoniae), consider antibiotic de-escalation to target atypical bacterial infection.    Urinalysis With Culture If Indicated - Urine, Clean Catch [[id number]]  (Abnormal) Collected: 05/27/24 1341    Specimen: Urine, Clean Catch Updated: 05/27/24 1427     Color, UA Dark Yellow     Appearance, UA Cloudy     pH, UA 5.5     Specific Gravity, UA 1.025     Glucose, UA >=1000 mg/dL (3+)     Ketones, UA 15 mg/dL (1+)     Bilirubin, UA Negative     Blood, UA Moderate (2+)     Protein, UA >=300 mg/dL (3+)     Leuk Esterase, UA  Trace     Nitrite, UA Negative     Urobilinogen, UA 1.0 E.U./dL    Narrative:      In absence of clinical symptoms, the presence of pyuria, bacteria, and/or nitrites on the urinalysis result does not correlate with infection.    Urinalysis, Microscopic Only - Urine, Clean Catch [[id number]]  (Abnormal) Collected: 05/27/24 1341    Specimen: Urine, Clean Catch Updated: 05/27/24 1427     RBC, UA 21-50 /HPF      WBC, UA 11-20 /HPF      Bacteria, UA 1+ /HPF      Squamous Epithelial Cells, UA 0-2 /HPF      Hyaline Casts, UA None Seen /LPF      Granular Casts, UA 0-2 /LPF      Methodology Manual Light Microscopy    Comprehensive Metabolic Panel [[id number]]  (Abnormal) Collected: 05/27/24 1351    Specimen: Blood Updated: 05/27/24 1424     Glucose 170 mg/dL      BUN 28 mg/dL      Creatinine 1.90 mg/dL      Sodium 137 mmol/L      Potassium 5.3 mmol/L      Chloride 98 mmol/L      CO2 22.0 mmol/L      Calcium 9.0 mg/dL      Total Protein 6.8 g/dL      Albumin 3.4 g/dL      ALT (SGPT) 25 U/L      AST (SGOT) 34 U/L      Alkaline Phosphatase 107 U/L      Total Bilirubin 1.0 mg/dL      Globulin 3.4 gm/dL      A/G Ratio 1.0 g/dL      BUN/Creatinine Ratio 14.7     Anion Gap 17.0 mmol/L      eGFR 35.9 mL/min/1.73     Narrative:      GFR Normal >60  Chronic Kidney Disease <60  Kidney Failure <15    The GFR formula is only valid for adults with stable renal function between ages 18 and 70.    Magnesium [[id number]]  (Normal) Collected: 05/27/24 1351    Specimen: Blood Updated: 05/27/24 1424     Magnesium 2.1 mg/dL     CK [[id number]]  (Normal) Collected: 05/27/24 1351    Specimen: Blood Updated: 05/27/24 1424     Creatine Kinase 42 U/L     Lipase [[id number]]  (Normal) Collected: 05/27/24 1351    Specimen: Blood Updated: 05/27/24 1420     Lipase 19 U/L     CBC & Differential [[id number]]  (Abnormal) Collected: 05/27/24 1351    Specimen: Blood Updated: 05/27/24 1408    Narrative:      The following orders were created for panel order CBC &  "Differential.  Procedure                               Abnormality         Status                     ---------                               -----------         ------                     CBC Auto Differential[[id number]]        Abnormal            Final result                 Please view results for these tests on the individual orders.    CBC Auto Differential [[id number]]  (Abnormal) Collected: 24 1351    Specimen: Blood Updated: 24 1408     WBC 10.95 10*3/mm3      RBC 4.52 10*6/mm3      Hemoglobin 12.2 g/dL      Hematocrit 37.6 %      MCV 83.2 fL      MCH 27.0 pg      MCHC 32.4 g/dL      RDW 15.7 %      RDW-SD 47.6 fl      MPV 10.9 fL      Platelets 173 10*3/mm3      Neutrophil % 88.5 %      Lymphocyte % 3.7 %      Monocyte % 6.7 %      Eosinophil % 0.4 %      Basophil % 0.2 %      Immature Grans % 0.5 %      Neutrophils, Absolute 9.69 10*3/mm3      Lymphocytes, Absolute 0.41 10*3/mm3      Monocytes, Absolute 0.73 10*3/mm3      Eosinophils, Absolute 0.04 10*3/mm3      Basophils, Absolute 0.02 10*3/mm3      Immature Grans, Absolute 0.06 10*3/mm3      nRBC 0.0 /100 WBC               Objective:     Vitals: /69 (BP Location: Right arm, Patient Position: Lying)   Pulse 78   Temp 98.2 °F (36.8 °C) (Oral)   Resp 18   Ht 182.9 cm (72\")   Wt 92.1 kg (203 lb)   SpO2 93%   BMI 27.53 kg/m²    Intake/Output Summary (Last 24 hours) at 2024 0812  Last data filed at 2024 0741  Gross per 24 hour   Intake 2410.04 ml   Output 1650 ml   Net 760.04 ml    Temp (24hrs), Av.3 °F (36.8 °C), Min:98.1 °F (36.7 °C), Max:98.8 °F (37.1 °C)      Physical Exam  Vitals reviewed.   Constitutional:       Appearance: Normal appearance.   HENT:      Head: Normocephalic and atraumatic.   Eyes:      General:         Right eye: No discharge.         Left eye: No discharge.      Extraocular Movements: Extraocular movements intact.      Conjunctiva/sclera: Conjunctivae normal.   Cardiovascular:      Rate and Rhythm: " Normal rate and regular rhythm.      Pulses: Normal pulses.      Heart sounds: No murmur heard.  Pulmonary:      Effort: Pulmonary effort is normal. No respiratory distress.      Breath sounds: No wheezing.   Abdominal:      General: Abdomen is flat. Bowel sounds are normal. There is no distension.   Musculoskeletal:      Right lower leg: No edema.      Left lower leg: No edema.   Skin:     Capillary Refill: Capillary refill takes less than 2 seconds.   Neurological:      General: No focal deficit present.      Mental Status: He is alert and oriented to person, place, and time.   Psychiatric:         Mood and Affect: Mood normal.             Assessment and Plan:     Primary Problem:  [facility] Problem list:    Chills    Type 2 diabetes mellitus without complication, without long-term current use of insulin    Essential hypertension    Pure hypercholesterolemia    Coronary artery disease involving native coronary artery without angina pectoris    Atypical atrial flutter    HFrEF (heart failure with reduced ejection fraction)    Chronic systolic heart failure      PMH:  Past Medical History:   Diagnosis Date    Arthritis     Colon cancer     colon    Colon polyp     Coronary artery disease     stents x 2    Diabetes mellitus     Hyperlipidemia     Hypertension     Myocardial infarction        Treatment Plan:  SIMRAN: Monitor kidney function closely, gentle IVF.     UTI: Rocephin every 24 hours.  Due to nonobstructing kidney stone, urology consulted from the emergency department.  - Urine culture growing gram-positive cocci, follow for sensitivities.     Nonobstructing nephrolithiasis: Urology consulted, no plans for surgical intervention at this time.     Disposition: Inpatient, anticipate discharge home tomorrow with oral antibiotics    Reviewed treatment plans with the patient and/or family.   30 minutes spent in face to face interaction and coordination of care.     Electronically signed by Phi Macario  MD on 5/29/2024 at 08:12 CDT

## 2024-05-29 NOTE — PLAN OF CARE
Goal Outcome Evaluation:  Plan of Care Reviewed With: patient        Progress: improving  Outcome Evaluation: A/O x3. RA. No complaints of pain. Voids in urinal, adequate. NS 75ml/hr. Safety maintained.      Problem: Adult Inpatient Plan of Care  Goal: Plan of Care Review  Outcome: Ongoing, Progressing  Flowsheets (Taken 5/29/2024 0543)  Progress: improving  Plan of Care Reviewed With: patient  Outcome Evaluation: A/O x3. RA. No complaints of pain. Voids in urinal, adequate. NS 75ml/hr. Safety maintained.  Goal: Patient-Specific Goal (Individualized)  Outcome: Ongoing, Progressing  Goal: Absence of Hospital-Acquired Illness or Injury  Outcome: Ongoing, Progressing  Intervention: Identify and Manage Fall Risk  Recent Flowsheet Documentation  Taken 5/29/2024 0500 by Sherry Velasquez RN  Safety Promotion/Fall Prevention: safety round/check completed  Taken 5/29/2024 0400 by Sherry Velasquez RN  Safety Promotion/Fall Prevention: safety round/check completed  Taken 5/29/2024 0300 by Sherry Velasquez RN  Safety Promotion/Fall Prevention: safety round/check completed  Taken 5/29/2024 0200 by Sherry Velasquez RN  Safety Promotion/Fall Prevention: safety round/check completed  Taken 5/29/2024 0100 by Sherry Velasquez RN  Safety Promotion/Fall Prevention: safety round/check completed  Taken 5/29/2024 0000 by Sherry Velasquez RN  Safety Promotion/Fall Prevention: safety round/check completed  Taken 5/28/2024 2300 by Sherry Velasquez RN  Safety Promotion/Fall Prevention: safety round/check completed  Taken 5/28/2024 2200 by Sherry Velasquez RN  Safety Promotion/Fall Prevention: safety round/check completed  Taken 5/28/2024 2100 by Sherry Velasquez RN  Safety Promotion/Fall Prevention: safety round/check completed  Taken 5/28/2024 2000 by Sherry Velasquez RN  Safety Promotion/Fall Prevention: safety round/check completed  Taken 5/28/2024 1900 by Sherry Velasquez RN  Safety Promotion/Fall Prevention: safety round/check  completed  Intervention: Prevent Skin Injury  Recent Flowsheet Documentation  Taken 5/29/2024 0400 by Sherry Velasquez RN  Body Position: position changed independently  Taken 5/29/2024 0200 by Sherry Velasquez RN  Body Position: position changed independently  Taken 5/29/2024 0000 by Sherry Velasquez RN  Body Position:   position changed independently   supine  Taken 5/28/2024 2200 by Sherry Velasquez RN  Body Position:   position changed independently   side-lying   left  Taken 5/28/2024 2000 by Sherry Velasquez RN  Body Position:   supine   position changed independently  Intervention: Prevent and Manage VTE (Venous Thromboembolism) Risk  Recent Flowsheet Documentation  Taken 5/29/2024 0400 by Sherry Vealsquez RN  Activity Management: bedrest  Taken 5/29/2024 0200 by Sherry Velasquez RN  Activity Management: bedrest  Taken 5/29/2024 0000 by Sherry Velasquez RN  Activity Management: bedrest  Taken 5/28/2024 2200 by Sherry Velasquez RN  Activity Management: bedrest  Taken 5/28/2024 2000 by Sherry Velasquez RN  Activity Management: bedrest  VTE Prevention/Management: (see mar) other (see comments)  Range of Motion: active ROM (range of motion) encouraged  Goal: Optimal Comfort and Wellbeing  Outcome: Ongoing, Progressing  Intervention: Provide Person-Centered Care  Recent Flowsheet Documentation  Taken 5/28/2024 2000 by Sherry Velasquez RN  Trust Relationship/Rapport:   care explained   choices provided   questions encouraged   questions answered  Goal: Readiness for Transition of Care  Outcome: Ongoing, Progressing

## 2024-05-29 NOTE — PLAN OF CARE
Goal Outcome Evaluation:  Plan of Care Reviewed With: patient, spouse        Progress: improving  Outcome Evaluation: The patient presents alert and oriented x4 lying in bed with spouse at bedside. The patient demonstrates no focal deficits in strength, sensation, or coordination. He is independent in all functional mobility. He does typically walk longer distances with a cane. He demonstrates no needs for PT at this time. PT to sign off and recommend discharge home with wife.      Anticipated Discharge Disposition (PT): home with assist

## 2024-05-29 NOTE — THERAPY DISCHARGE NOTE
Patient Name: Mani Arreola  : 1945    MRN: [id number]                              Today's Date: 2024       Admit Date: 2024    Visit Dx:     ICD-10-CM ICD-9-CM   1. SIMRAN (acute kidney injury)  N17.9 584.9   2. Urinary tract infection without hematuria, site unspecified  N39.0 599.0     Patient Active Problem List   Diagnosis    Spinal stenosis, lumbar    Type 2 diabetes mellitus without complication, without long-term current use of insulin    Essential hypertension    Pure hypercholesterolemia    Coronary artery disease involving native coronary artery without angina pectoris    Personal history of colon cancer    Atypical atrial flutter    HFrEF (heart failure with reduced ejection fraction)    Chronic systolic heart failure    Chronic HFrEF (heart failure with reduced ejection fraction)    Paroxysmal atrial fibrillation    Chills     Past Medical History:   Diagnosis Date    Arthritis     Colon cancer     colon    Colon polyp     Coronary artery disease     stents x 2    Diabetes mellitus     Hyperlipidemia     Hypertension     Myocardial infarction      Past Surgical History:   Procedure Laterality Date    CARDIAC CATHETERIZATION      stents x 2    CARDIAC ELECTROPHYSIOLOGY PROCEDURE N/A 2022    Procedure: EP/Ablation, atrial flutter;  Surgeon: Francisca Cote MD;  Location:  [facility];  Service: Cardiology;  Laterality: N/A;    CARDIAC ELECTROPHYSIOLOGY PROCEDURE N/A 2022    Procedure: EP/Ablation, atypical atrial flutter;  Surgeon: Francisca Cote MD;  Location:  [facility];  Service: Cardiology;  Laterality: N/A;    CAROTID ENDARTERECTOMY Right     CATARACT EXTRACTION      CHOLECYSTECTOMY      COLON RESECTION      for colon cancer    COLONOSCOPY  2004    COLONOSCOPY  2005    normal    COLONOSCOPY  10/2007    recall 3 yr    COLONOSCOPY  2010    5mm polypectomy distal transverse colon, patent end to end ileo colonic anastomosis     COLONOSCOPY N/A 11/16/2016    Procedure: COLONOSCOPY WITH ANESTHESIA;  Surgeon: Cirilo Bhandari MD;  Location:  [facility];  Service:     COLONOSCOPY N/A 1/11/2019    Procedure: COLONOSCOPY WITH ANESTHESIA;  Surgeon: Cirilo Bhandari MD;  Location:  [facility];  Service: Gastroenterology    COLONOSCOPY N/A 2/15/2022    Procedure: COLONOSCOPY WITH ANESTHESIA;  Surgeon: Cirilo Bhandari MD;  Location:  [facility];  Service: Gastroenterology;  Laterality: N/A;  pre colon cancer  post polyp;hemorrhoids      HYDROCELE EXCISION / REPAIR      LUMBAR FUSION Left 3/13/2017    Procedure: LEFT LUMBAR LATERAL INTERBODY FUSION WITH INSTRUMENTATION L4-5 , LANX,  NUVASIVE MONITORING ;  Surgeon: DALY Morgan MD;  Location:  [facility];  Service:     LUMBAR FUSION N/A 3/15/2017    Procedure: POSTERIOR SPINAL FUSION WITH INSTRUMENTATION L4-5;  Surgeon: DALY Morgan MD;  Location:  [facility];  Service:     PERIPHERAL ARTERIAL STENT GRAFT        General Information       Row Name 05/29/24 1130          Physical Therapy Time and Intention    Document Type evaluation  chills, SIMRAN, vomitting, blood in urine  -MS     Mode of Treatment physical therapy;individual therapy  -MS       Row Name 05/29/24 1130          General Information    Patient Profile Reviewed yes  -MS     Prior Level of Function independent:;all household mobility;community mobility;ADL's  uses cane outside the home  -MS     Existing Precautions/Restrictions no known precautions/restrictions  -MS     Barriers to Rehab none identified  -MS       Row Name 05/29/24 1130          Living Environment    People in Home spouse  -MS       Row Name 05/29/24 1130          Cognition    Orientation Status (Cognition) oriented x 4  -MS               User Key  (r) = Recorded By, (t) = Taken By, (c) = Cosigned By      Initials Name Provider Type    MS Danika Pickett, PT, DPT, NCS Physical Therapist                   Mobility       Row Name 05/29/24  1130          Bed Mobility    Bed Mobility bed mobility (all) activities  -MS     All Activities, [place] (Bed Mobility) independent  -MS       Row Name 05/29/24 1130          Sit-Stand Transfer    Sit-Stand [place] (Transfers) independent  -MS       Row Name 05/29/24 1130          Gait/Stairs (Locomotion)    [place] Level (Gait) independent  -MS     Distance in Feet (Gait) 150  -MS     Comment, (Gait/Stairs) 1 LOB that he self corrected  -MS               User Key  (r) = Recorded By, (t) = Taken By, (c) = Cosigned By      Initials Name Provider Type    MS Danika Pickett, PT, DPT, NCS Physical Therapist                   Obj/Interventions       Row Name 05/29/24 1130          Range of Motion Comprehensive    General Range of Motion bilateral upper extremity ROM WFL;bilateral lower extremity ROM WFL  -MS       Row Name 05/29/24 1130          Strength Comprehensive (MMT)    General Manual Muscle Testing (MMT) Assessment no strength deficits identified  -MS       Row Name 05/29/24 1130          Balance    Balance Assessment sitting static balance;sitting dynamic balance;standing static balance;standing dynamic balance  -MS     Static Sitting Balance independent  -MS     Dynamic Sitting Balance independent  -MS     Position, Sitting Balance unsupported;sitting edge of bed  -MS     Static Standing Balance independent  -MS     Dynamic Standing Balance independent  -MS     Position/Device Used, Standing Balance unsupported  -MS       Row Name 05/29/24 1130          Sensory Assessment (Somatosensory)    Sensory Assessment (Somatosensory) sensation intact  -MS               User Key  (r) = Recorded By, (t) = Taken By, (c) = Cosigned By      Initials Name Provider Type    MS Danika Pickett, PT, DPT, NCS Physical Therapist                   Goals/Plan    No documentation.                  Clinical Impression       Row Name 05/29/24 1130          Pain    Pretreatment Pain Rating 0/10 - no pain  -MS      Posttreatment Pain Rating 0/10 - no pain  -MS       Row Name 05/29/24 1130          Plan of Care Review    Plan of Care Reviewed With patient;spouse  -MS     Progress improving  -MS     Outcome Evaluation The patient presents alert and oriented x4 lying in bed with spouse at bedside. The patient demonstrates no focal deficits in strength, sensation, or coordination. He is independent in all functional mobility. He does typically walk longer distances with a cane. He demonstrates no needs for PT at this time. PT to sign off and recommend discharge home with wife.  -MS       Row Name 05/29/24 1130          Therapy Assessment/Plan (PT)    Criteria for Skilled Interventions Met (PT) no problems identified which require skilled intervention;does not meet criteria for skilled intervention  -MS     Therapy Frequency (PT) evaluation only  -MS       Row Name 05/29/24 1130          Positioning and Restraints    Post Treatment Position chair  -MS     In Chair sitting;call light within reach;encouraged to call for assist;with family/caregiver  -MS               User Key  (r) = Recorded By, (t) = Taken By, (c) = Cosigned By      Initials Name Provider Type    MS Danika Pickett, PT, DPT, NCS Physical Therapist                   Outcome Measures       Row Name 05/29/24 1130 05/29/24 0826       How much help from another person do you currently need...    Turning from your back to your side while in flat bed without using bedrails? 4  -MS 4  -KH    Moving from lying on back to sitting on the side of a flat bed without bedrails? 4  -MS 4  -KH    Moving to and from a bed to a chair (including a wheelchair)? 4  -MS 4  -KH    Standing up from a chair using your arms (e.g., wheelchair, bedside chair)? 4  -MS 4  -KH    Climbing 3-5 steps with a railing? 4  -MS 3  -KH    To walk in hospital room? 4  -MS 4  -KH    AM-PAC 6 Clicks Score (PT) 24  -MS 23  -KH    Highest Level of Mobility Goal 8 --> Walked 250 feet or more  -MS 7 --> Walk 25  feet or more  -      Row Name 05/29/24 1130          Functional Assessment    Outcome Measure Options AM-PAC 6 Clicks Basic Mobility (PT)  -MS               User Key  (r) = Recorded By, (t) = Taken By, (c) = Cosigned By      Initials Name Provider Type    MS Danika Pickett, PT, DPT, NCS Physical Therapist    Key Chamberlain, RN Registered Nurse                    PT Recommendation and Plan     Plan of Care Reviewed With: patient, spouse  Progress: improving  Outcome Evaluation: The patient presents alert and oriented x4 lying in bed with spouse at bedside. The patient demonstrates no focal deficits in strength, sensation, or coordination. He is independent in all functional mobility. He does typically walk longer distances with a cane. He demonstrates no needs for PT at this time. PT to sign off and recommend discharge home with wife.     Time Calculation:         PT Charges       Row Name 05/29/24 1130             Time Calculation    Start Time 1130  -MS      Stop Time 1208  -MS      Time Calculation (min) 38 min  -MS      PT Received On 05/29/24  -MS         Untimed Charges    PT Eval/Re-eval Minutes 38  -MS         Total Minutes    Untimed Charges Total Minutes 38  -MS       Total Minutes 38  -MS                User Key  (r) = Recorded By, (t) = Taken By, (c) = Cosigned By      Initials Name Provider Type    MS Danika Pickett, PT, DPT, NCS Physical Therapist                      PT G-Codes  Outcome Measure Options: AM-PAC 6 Clicks Basic Mobility (PT)  AM-PAC 6 Clicks Score (PT): 24    PT Discharge Summary  Anticipated Discharge Disposition (PT): home with assist    Danika Pickett, PT, DPT, NCS  5/29/2024

## 2024-05-29 NOTE — PROGRESS NOTES
Patient appears to be doing better today, More energetic though still fatigued.  Appetite still not great, but he is sitting up eating some of his tray and trying to drink liquids.    With hydration his creatinine has improved, his white count is down slightly though it was never abnormal and he is having no voiding difficulty.    Still with no indication the calcifications on the left-hand side of his CT or intraureteral as there has been no visible blood, no flank pain or any other pain radiating to where one might presume a stone existed         Lab Results (last 72 hours)         Procedure Component Value Units Date/Time     Comprehensive Metabolic Panel [[id number]]  (Abnormal) Collected: 05/29/24 0359     Specimen: Blood Updated: 05/29/24 0726       Glucose 167 mg/dL         BUN 27 mg/dL         Creatinine 1.54 mg/dL         Sodium 137 mmol/L         Potassium 4.8 mmol/L         Comment: Specimen hemolyzed.  Result may be falsely elevated.          Chloride 103 mmol/L         CO2 18.0 mmol/L         Calcium 8.2 mg/dL         Total Protein 6.4 g/dL         Albumin 2.9 g/dL         ALT (SGPT) 68 U/L         Comment: Specimen hemolyzed.  Result may  be falsely elevated.          AST (SGOT) 126 U/L         Comment: Specimen hemolyzed.  Result may be falsely elevated.          Alkaline Phosphatase 117 U/L         Total Bilirubin 0.7 mg/dL         Globulin 3.5 gm/dL         A/G Ratio 0.8 g/dL         BUN/Creatinine Ratio 17.5       Anion Gap 16.0 mmol/L         eGFR 46.2 mL/min/1.73       Narrative:       GFR Normal >60  Chronic Kidney Disease <60  Kidney Failure <15     The GFR formula is only valid for adults with stable renal function between ages 18 and 70.     CBC & Differential [[id number]]  (Abnormal) Collected: 05/29/24 0621     Specimen: Blood Updated: 05/29/24 0639     Narrative:       The following orders were created for panel order CBC & Differential.  Procedure                               Abnormality          Status                     ---------                               -----------         ------                     CBC Auto Differential[[id number]]        Abnormal            Final result                  Please view results for these tests on the individual orders.     CBC Auto Differential [[id number]]  (Abnormal) Collected: 05/29/24 0621     Specimen: Blood Updated: 05/29/24 0639       WBC 8.11 10*3/mm3         RBC 4.14 10*6/mm3         Hemoglobin 11.0 g/dL         Hematocrit 33.8 %         MCV 81.6 fL         MCH 26.6 pg         MCHC 32.5 g/dL         RDW 15.8 %         RDW-SD 47.7 fl         MPV 10.7 fL         Platelets 188 10*3/mm3         Neutrophil % 86.0 %         Lymphocyte % 4.1 %         Monocyte % 6.9 %         Eosinophil % 2.2 %         Basophil % 0.1 %         Immature Grans % 0.7 %         Neutrophils, Absolute 6.97 10*3/mm3         Lymphocytes, Absolute 0.33 10*3/mm3         Monocytes, Absolute 0.56 10*3/mm3         Eosinophils, Absolute 0.18 10*3/mm3         Basophils, Absolute 0.01 10*3/mm3         Immature Grans, Absolute 0.06 10*3/mm3         nRBC 0.0 /100 WBC        Collected: 05/29/24 0359      Updated: 05/29/24 0526                Narrative:          Assessment-acute renal failure in a 79-year-old gentleman with a history of stone disease, as well as a report of gross hematuria    Plan-will continue hydration.  Will monitor voiding.  Okay to discharge home with appropriate urology follow-up on antibiotics of your choice

## 2024-05-29 NOTE — PLAN OF CARE
Pt A&Ox4. VSS on RA. IVF infusing at 75 mL/hr, abx infused per orders. Some c/o back pain (2/3 out of 10). Prn meds offered, pt refused. New cough and sob breath noted, lung sounds clear, MD notified. Pt up standby. Voiding via urinal. Safety maintained.

## 2024-05-30 LAB
ALBUMIN SERPL-MCNC: 3 G/DL (ref 3.5–5.2)
ALBUMIN/GLOB SERPL: 1 G/DL
ALP SERPL-CCNC: 125 U/L (ref 39–117)
ALT SERPL W P-5'-P-CCNC: 101 U/L (ref 1–41)
ANION GAP SERPL CALCULATED.3IONS-SCNC: 14 MMOL/L (ref 5–15)
AST SERPL-CCNC: 128 U/L (ref 1–40)
BACTERIA BLD CULT: NORMAL
BASOPHILS # BLD AUTO: 0.01 10*3/MM3 (ref 0–0.2)
BASOPHILS NFR BLD AUTO: 0.2 % (ref 0–1.5)
BILIRUB SERPL-MCNC: 0.6 MG/DL (ref 0–1.2)
BOTTLE TYPE: NORMAL
BUN SERPL-MCNC: 26 MG/DL (ref 8–23)
BUN/CREAT SERPL: 17 (ref 7–25)
CALCIUM SPEC-SCNC: 7.9 MG/DL (ref 8.6–10.5)
CHLORIDE SERPL-SCNC: 103 MMOL/L (ref 98–107)
CO2 SERPL-SCNC: 19 MMOL/L (ref 22–29)
CREAT SERPL-MCNC: 1.53 MG/DL (ref 0.76–1.27)
DEPRECATED RDW RBC AUTO: 47.8 FL (ref 37–54)
EGFRCR SERPLBLD CKD-EPI 2021: 46.5 ML/MIN/1.73
EOSINOPHIL # BLD AUTO: 0 10*3/MM3 (ref 0–0.4)
EOSINOPHIL NFR BLD AUTO: 0 % (ref 0.3–6.2)
ERYTHROCYTE [DISTWIDTH] IN BLOOD BY AUTOMATED COUNT: 15.7 % (ref 12.3–15.4)
GLOBULIN UR ELPH-MCNC: 3 GM/DL
GLUCOSE SERPL-MCNC: 374 MG/DL (ref 65–99)
HCT VFR BLD AUTO: 35.2 % (ref 37.5–51)
HGB BLD-MCNC: 11.1 G/DL (ref 13–17.7)
IMM GRANULOCYTES # BLD AUTO: 0.04 10*3/MM3 (ref 0–0.05)
IMM GRANULOCYTES NFR BLD AUTO: 0.6 % (ref 0–0.5)
LYMPHOCYTES # BLD AUTO: 0.21 10*3/MM3 (ref 0.7–3.1)
LYMPHOCYTES NFR BLD AUTO: 3.2 % (ref 19.6–45.3)
MCH RBC QN AUTO: 26.1 PG (ref 26.6–33)
MCHC RBC AUTO-ENTMCNC: 31.5 G/DL (ref 31.5–35.7)
MCV RBC AUTO: 82.6 FL (ref 79–97)
MONOCYTES # BLD AUTO: 0.12 10*3/MM3 (ref 0.1–0.9)
MONOCYTES NFR BLD AUTO: 1.9 % (ref 5–12)
NEUTROPHILS NFR BLD AUTO: 6.09 10*3/MM3 (ref 1.7–7)
NEUTROPHILS NFR BLD AUTO: 94.1 % (ref 42.7–76)
NRBC BLD AUTO-RTO: 0 /100 WBC (ref 0–0.2)
PLATELET # BLD AUTO: 220 10*3/MM3 (ref 140–450)
PMV BLD AUTO: 11.4 FL (ref 6–12)
POTASSIUM SERPL-SCNC: 4.3 MMOL/L (ref 3.5–5.2)
PROT SERPL-MCNC: 6 G/DL (ref 6–8.5)
QT INTERVAL: 436 MS
QTC INTERVAL: 453 MS
RBC # BLD AUTO: 4.26 10*6/MM3 (ref 4.14–5.8)
SODIUM SERPL-SCNC: 136 MMOL/L (ref 136–145)
WBC NRBC COR # BLD AUTO: 6.47 10*3/MM3 (ref 3.4–10.8)

## 2024-05-30 PROCEDURE — 80053 COMPREHEN METABOLIC PANEL: CPT | Performed by: FAMILY MEDICINE

## 2024-05-30 PROCEDURE — 25010000002 METHYLPREDNISOLONE PER 125 MG: Performed by: FAMILY MEDICINE

## 2024-05-30 PROCEDURE — 85025 COMPLETE CBC W/AUTO DIFF WBC: CPT | Performed by: FAMILY MEDICINE

## 2024-05-30 PROCEDURE — 25810000003 SODIUM CHLORIDE 0.9 % SOLUTION: Performed by: FAMILY MEDICINE

## 2024-05-30 RX ADMIN — ATORVASTATIN CALCIUM 80 MG: 40 TABLET ORAL at 08:41

## 2024-05-30 RX ADMIN — Medication 400 MCG: at 08:41

## 2024-05-30 RX ADMIN — SACUBITRIL AND VALSARTAN 1 TABLET: 49; 51 TABLET, FILM COATED ORAL at 08:41

## 2024-05-30 RX ADMIN — SODIUM CHLORIDE 75 ML/HR: 9 INJECTION, SOLUTION INTRAVENOUS at 11:55

## 2024-05-30 RX ADMIN — EMPAGLIFLOZIN 10 MG: 10 TABLET, FILM COATED ORAL at 08:41

## 2024-05-30 RX ADMIN — SACUBITRIL AND VALSARTAN 1 TABLET: 49; 51 TABLET, FILM COATED ORAL at 20:10

## 2024-05-30 RX ADMIN — OXYCODONE HYDROCHLORIDE AND ACETAMINOPHEN 500 MG: 500 TABLET ORAL at 08:41

## 2024-05-30 RX ADMIN — APIXABAN 5 MG: 5 TABLET, FILM COATED ORAL at 08:41

## 2024-05-30 RX ADMIN — METOPROLOL SUCCINATE 100 MG: 100 TABLET, FILM COATED, EXTENDED RELEASE ORAL at 08:41

## 2024-05-30 RX ADMIN — METHYLPREDNISOLONE SODIUM SUCCINATE 125 MG: 125 INJECTION, POWDER, FOR SOLUTION INTRAMUSCULAR; INTRAVENOUS at 11:40

## 2024-05-30 RX ADMIN — AMOXICILLIN 500 MG: 500 CAPSULE ORAL at 22:22

## 2024-05-30 RX ADMIN — AMLODIPINE BESYLATE 10 MG: 10 TABLET ORAL at 08:41

## 2024-05-30 RX ADMIN — LINAGLIPTIN 5 MG: 5 TABLET, FILM COATED ORAL at 08:41

## 2024-05-30 RX ADMIN — PANTOPRAZOLE SODIUM 40 MG: 40 TABLET, DELAYED RELEASE ORAL at 05:38

## 2024-05-30 RX ADMIN — AMOXICILLIN 500 MG: 500 CAPSULE ORAL at 16:35

## 2024-05-30 RX ADMIN — AMOXICILLIN 500 MG: 500 CAPSULE ORAL at 05:39

## 2024-05-30 RX ADMIN — METFORMIN HYDROCHLORIDE 1000 MG: 500 TABLET, EXTENDED RELEASE ORAL at 08:41

## 2024-05-30 RX ADMIN — APIXABAN 5 MG: 5 TABLET, FILM COATED ORAL at 20:10

## 2024-05-30 RX ADMIN — Medication 10 ML: at 08:42

## 2024-05-30 RX ADMIN — METHYLPREDNISOLONE SODIUM SUCCINATE 125 MG: 125 INJECTION, POWDER, FOR SOLUTION INTRAMUSCULAR; INTRAVENOUS at 22:22

## 2024-05-30 NOTE — PLAN OF CARE
Goal Outcome Evaluation: Patient complaints of pain this afternoon. Wife at bedside at this time . Patient anticipating discharge in am. Patient safety to be maintained this shift, continue to monitor and report abnormal to provider.

## 2024-05-30 NOTE — PROGRESS NOTES
Daily Progress Note  Mani Arreola  MRN: [id number] LOS: 3    Admit Date: 5/27/2024 5/30/2024 08:21 CDT    Subjective:      Chief Complaint:  Chief Complaint   Patient presents with    Chills    Blood in Urine       Interval History:    Reviewed overnight events and nursing notes.   No acute events overnight.  Denies any pain.    Urine culture is positive for Enterococcus faecalis, antibiotics adjusted.  Blood culture this morning 1 of 2 positive for gram-negative bacilli, further identity pending.    Review of Systems   Constitutional:  Positive for activity change, chills and fatigue. Negative for fever.   Respiratory:  Negative for cough, shortness of breath and wheezing.    Cardiovascular:  Negative for chest pain.   Gastrointestinal:  Positive for nausea. Negative for abdominal pain.   Genitourinary:  Positive for hematuria. Negative for difficulty urinating.   Skin:  Negative for color change.       DIET:  Diet: Regular/House; Fluid Consistency: Thin (IDDSI 0)    Medications:   sodium chloride, 75 mL/hr, Last Rate: 75 mL/hr (05/29/24 2164)      amLODIPine, 10 mg, Oral, Daily  amoxicillin, 500 mg, Oral, Q8H  apixaban, 5 mg, Oral, BID  vitamin C, 500 mg, Oral, Daily  atorvastatin, 80 mg, Oral, Daily  empagliflozin, 10 mg, Oral, Daily  folic acid, 400 mcg, Oral, Daily  linagliptin, 5 mg, Oral, Daily   And  metFORMIN ER, 1,000 mg, Oral, Daily  methylPREDNISolone sodium succinate, 125 mg, Intravenous, Q12H  metoprolol succinate XL, 100 mg, Oral, Daily  pantoprazole, 40 mg, Oral, Q AM  sacubitril-valsartan, 1 tablet, Oral, Q12H  sodium chloride, 10 mL, Intravenous, Q12H        Data:     Code Status:   Code Status and Medical Interventions:   Ordered at: 05/27/24 2300     Code Status (Patient has no pulse and is not breathing):    CPR (Attempt to Resuscitate)     Medical Interventions (Patient has pulse or is breathing):    Full Support       Family History   Problem Relation Age of Onset    Heart disease Father      Esophageal cancer Father     Colon cancer Neg Hx     Colon polyps Neg Hx      Social History     Socioeconomic History    Marital status:    Tobacco Use    Smoking status: Every Day     Types: Pipe    Smokeless tobacco: Never   Vaping Use    Vaping status: Never Used   Substance and Sexual Activity    Alcohol use: Yes     Comment: occ    Drug use: No    Sexual activity: Defer       Labs:  Lab Results (last 72 hours)       Procedure Component Value Units Date/Time    Comprehensive Metabolic Panel [[id number]]  (Abnormal) Collected: 05/30/24 0421    Specimen: Blood Updated: 05/30/24 0542     Glucose 374 mg/dL      BUN 26 mg/dL      Creatinine 1.53 mg/dL      Sodium 136 mmol/L      Potassium 4.3 mmol/L      Chloride 103 mmol/L      CO2 19.0 mmol/L      Calcium 7.9 mg/dL      Total Protein 6.0 g/dL      Albumin 3.0 g/dL      ALT (SGPT) 101 U/L      AST (SGOT) 128 U/L      Alkaline Phosphatase 125 U/L      Total Bilirubin 0.6 mg/dL      Globulin 3.0 gm/dL      A/G Ratio 1.0 g/dL      BUN/Creatinine Ratio 17.0     Anion Gap 14.0 mmol/L      eGFR 46.5 mL/min/1.73     Narrative:      GFR Normal >60  Chronic Kidney Disease <60  Kidney Failure <15    The GFR formula is only valid for adults with stable renal function between ages 18 and 70.    CBC & Differential [[id number]]  (Abnormal) Collected: 05/30/24 0421    Specimen: Blood Updated: 05/30/24 0518    Narrative:      The following orders were created for panel order CBC & Differential.  Procedure                               Abnormality         Status                     ---------                               -----------         ------                     CBC Auto Differential[[id number]]        Abnormal            Final result                 Please view results for these tests on the individual orders.    CBC Auto Differential [[id number]]  (Abnormal) Collected: 05/30/24 0421    Specimen: Blood Updated: 05/30/24 0518     WBC 6.47 10*3/mm3      RBC 4.26 10*6/mm3       Hemoglobin 11.1 g/dL      Hematocrit 35.2 %      MCV 82.6 fL      MCH 26.1 pg      MCHC 31.5 g/dL      RDW 15.7 %      RDW-SD 47.8 fl      MPV 11.4 fL      Platelets 220 10*3/mm3      Neutrophil % 94.1 %      Lymphocyte % 3.2 %      Monocyte % 1.9 %      Eosinophil % 0.0 %      Basophil % 0.2 %      Immature Grans % 0.6 %      Neutrophils, Absolute 6.09 10*3/mm3      Lymphocytes, Absolute 0.21 10*3/mm3      Monocytes, Absolute 0.12 10*3/mm3      Eosinophils, Absolute 0.00 10*3/mm3      Basophils, Absolute 0.01 10*3/mm3      Immature Grans, Absolute 0.04 10*3/mm3      nRBC 0.0 /100 WBC     Blood Culture ID, PCR - Blood, Arm, Left [[id number]] Collected: 05/27/24 1536    Specimen: Blood from Arm, Left Updated: 05/30/24 0505     BCID, PCR Negative by BCID PCR. Culture to Follow.     BOTTLE TYPE Aerobic Bottle    Blood Culture - Blood, Arm, Left [[id number]]  (Abnormal) Collected: 05/27/24 1536    Specimen: Blood from Arm, Left Updated: 05/30/24 0505     Blood Culture Abnormal Stain     Gram Stain Aerobic Bottle Gram negative bacilli    Blood Culture - Blood, Arm, Right [[id number]]  (Normal) Collected: 05/27/24 1536    Specimen: Blood from Arm, Right Updated: 05/29/24 1545     Blood Culture No growth at 2 days    Urine Culture - Urine, Urine, Clean Catch [[id number]]  (Abnormal)  (Susceptibility) Collected: 05/27/24 1341    Specimen: Urine, Clean Catch Updated: 05/29/24 0924     Urine Culture >100,000 CFU/mL Enterococcus faecalis    Narrative:      Colonization of the urinary tract without infection is common. Treatment is discouraged unless the patient is symptomatic, pregnant, or undergoing an invasive urologic procedure.    Susceptibility        Enterococcus faecalis      STEPHAN      Ampicillin Susceptible      Levofloxacin Susceptible      Nitrofurantoin Susceptible      Vancomycin Susceptible                           Comprehensive Metabolic Panel [[id number]]  (Abnormal) Collected: 05/29/24 0359    Specimen: Blood  Updated: 05/29/24 0726     Glucose 167 mg/dL      BUN 27 mg/dL      Creatinine 1.54 mg/dL      Sodium 137 mmol/L      Potassium 4.8 mmol/L      Comment: Specimen hemolyzed.  Result may be falsely elevated.        Chloride 103 mmol/L      CO2 18.0 mmol/L      Calcium 8.2 mg/dL      Total Protein 6.4 g/dL      Albumin 2.9 g/dL      ALT (SGPT) 68 U/L      Comment: Specimen hemolyzed.  Result may  be falsely elevated.        AST (SGOT) 126 U/L      Comment: Specimen hemolyzed.  Result may be falsely elevated.        Alkaline Phosphatase 117 U/L      Total Bilirubin 0.7 mg/dL      Globulin 3.5 gm/dL      A/G Ratio 0.8 g/dL      BUN/Creatinine Ratio 17.5     Anion Gap 16.0 mmol/L      eGFR 46.2 mL/min/1.73     Narrative:      GFR Normal >60  Chronic Kidney Disease <60  Kidney Failure <15    The GFR formula is only valid for adults with stable renal function between ages 18 and 70.    CBC & Differential [[id number]]  (Abnormal) Collected: 05/29/24 0621    Specimen: Blood Updated: 05/29/24 0639    Narrative:      The following orders were created for panel order CBC & Differential.  Procedure                               Abnormality         Status                     ---------                               -----------         ------                     CBC Auto Differential[[id number]]        Abnormal            Final result                 Please view results for these tests on the individual orders.    CBC Auto Differential [[id number]]  (Abnormal) Collected: 05/29/24 0621    Specimen: Blood Updated: 05/29/24 0639     WBC 8.11 10*3/mm3      RBC 4.14 10*6/mm3      Hemoglobin 11.0 g/dL      Hematocrit 33.8 %      MCV 81.6 fL      MCH 26.6 pg      MCHC 32.5 g/dL      RDW 15.8 %      RDW-SD 47.7 fl      MPV 10.7 fL      Platelets 188 10*3/mm3      Neutrophil % 86.0 %      Lymphocyte % 4.1 %      Monocyte % 6.9 %      Eosinophil % 2.2 %      Basophil % 0.1 %      Immature Grans % 0.7 %      Neutrophils, Absolute 6.97 10*3/mm3       Lymphocytes, Absolute 0.33 10*3/mm3      Monocytes, Absolute 0.56 10*3/mm3      Eosinophils, Absolute 0.18 10*3/mm3      Basophils, Absolute 0.01 10*3/mm3      Immature Grans, Absolute 0.06 10*3/mm3      nRBC 0.0 /100 WBC     BNP [[id number]]  (Abnormal) Collected: 05/29/24 0359    Specimen: Blood Updated: 05/29/24 0526     proBNP 9,999.0 pg/mL     Narrative:      This assay is used as an aid in the diagnosis of individuals suspected of having heart failure. It can be used as an aid in the diagnosis of acute decompensated heart failure (ADHF) in patients presenting with signs and symptoms of ADHF to the emergency department (ED). In addition, NT-proBNP of <300 pg/mL indicates ADHF is not likely.    Age Range Result Interpretation  NT-proBNP Concentration (pg/mL:      <50             Positive            >450                   Gray                 300-450                    Negative             <300    50-75           Positive            >900                  Gray                300-900                  Negative            <300      >75             Positive            >1800                  Gray                300-1800                  Negative            <300    CBC (No Diff) [[id number]]  (Abnormal) Collected: 05/29/24 0359    Specimen: Blood Updated: 05/29/24 0453     WBC 8.39 10*3/mm3      RBC 4.39 10*6/mm3      Hemoglobin 11.7 g/dL      Hematocrit 36.5 %      MCV 83.1 fL      MCH 26.7 pg      MCHC 32.1 g/dL      RDW 15.9 %      RDW-SD 48.2 fl      MPV 11.5 fL      Platelets 211 10*3/mm3     Basic Metabolic Panel [[id number]]  (Abnormal) Collected: 05/28/24 0458    Specimen: Blood Updated: 05/28/24 0600     Glucose 138 mg/dL      BUN 29 mg/dL      Creatinine 1.88 mg/dL      Sodium 134 mmol/L      Potassium 4.9 mmol/L      Chloride 98 mmol/L      CO2 19.0 mmol/L      Calcium 8.2 mg/dL      BUN/Creatinine Ratio 15.4     Anion Gap 17.0 mmol/L      eGFR 36.3 mL/min/1.73     Narrative:      GFR Normal >60  Chronic  Kidney Disease <60  Kidney Failure <15    The GFR formula is only valid for adults with stable renal function between ages 18 and 70.    CBC Auto Differential [[id number]]  (Abnormal) Collected: 05/28/24 0458    Specimen: Blood Updated: 05/28/24 0543     WBC 9.33 10*3/mm3      RBC 4.12 10*6/mm3      Hemoglobin 10.9 g/dL      Hematocrit 34.5 %      MCV 83.7 fL      MCH 26.5 pg      MCHC 31.6 g/dL      RDW 15.8 %      RDW-SD 48.2 fl      MPV 11.8 fL      Platelets 174 10*3/mm3      Neutrophil % 87.6 %      Lymphocyte % 4.2 %      Monocyte % 7.3 %      Eosinophil % 0.3 %      Basophil % 0.2 %      Immature Grans % 0.4 %      Neutrophils, Absolute 8.17 10*3/mm3      Lymphocytes, Absolute 0.39 10*3/mm3      Monocytes, Absolute 0.68 10*3/mm3      Eosinophils, Absolute 0.03 10*3/mm3      Basophils, Absolute 0.02 10*3/mm3      Immature Grans, Absolute 0.04 10*3/mm3      nRBC 0.0 /100 WBC     Respiratory Panel PCR w/COVID-19(SARS-CoV-2) ABEL/JAMIL/BENJY/PAD/COR/RUTH In-House, NP Swab in UTM/VTM, 2 HR TAT - Swab, Nasopharynx [[id number]]  (Normal) Collected: 05/27/24 1352    Specimen: Swab from Nasopharynx Updated: 05/27/24 1505     ADENOVIRUS, PCR Not Detected     Coronavirus 229E Not Detected     Coronavirus HKU1 Not Detected     Coronavirus NL63 Not Detected     Coronavirus OC43 Not Detected     COVID19 Not Detected     Human Metapneumovirus Not Detected     Human Rhinovirus/Enterovirus Not Detected     Influenza A PCR Not Detected     Influenza B PCR Not Detected     Parainfluenza Virus 1 Not Detected     Parainfluenza Virus 2 Not Detected     Parainfluenza Virus 3 Not Detected     Parainfluenza Virus 4 Not Detected     RSV, PCR Not Detected     Bordetella pertussis pcr Not Detected     Bordetella parapertussis PCR Not Detected     Chlamydophila pneumoniae PCR Not Detected     Mycoplasma pneumo by PCR Not Detected    Narrative:      In the setting of a positive respiratory panel with a viral infection PLUS a negative  procalcitonin without other underlying concern for bacterial infection, consider observing off antibiotics or discontinuation of antibiotics and continue supportive care. If the respiratory panel is positive for atypical bacterial infection (Bordetella pertussis, Chlamydophila pneumoniae, or Mycoplasma pneumoniae), consider antibiotic de-escalation to target atypical bacterial infection.    Urinalysis With Culture If Indicated - Urine, Clean Catch [[id number]]  (Abnormal) Collected: 05/27/24 1341    Specimen: Urine, Clean Catch Updated: 05/27/24 1427     Color, UA Dark Yellow     Appearance, UA Cloudy     pH, UA 5.5     Specific Gravity, UA 1.025     Glucose, UA >=1000 mg/dL (3+)     Ketones, UA 15 mg/dL (1+)     Bilirubin, UA Negative     Blood, UA Moderate (2+)     Protein, UA >=300 mg/dL (3+)     Leuk Esterase, UA Trace     Nitrite, UA Negative     Urobilinogen, UA 1.0 E.U./dL    Narrative:      In absence of clinical symptoms, the presence of pyuria, bacteria, and/or nitrites on the urinalysis result does not correlate with infection.    Urinalysis, Microscopic Only - Urine, Clean Catch [[id number]]  (Abnormal) Collected: 05/27/24 1341    Specimen: Urine, Clean Catch Updated: 05/27/24 1427     RBC, UA 21-50 /HPF      WBC, UA 11-20 /HPF      Bacteria, UA 1+ /HPF      Squamous Epithelial Cells, UA 0-2 /HPF      Hyaline Casts, UA None Seen /LPF      Granular Casts, UA 0-2 /LPF      Methodology Manual Light Microscopy    Comprehensive Metabolic Panel [[id number]]  (Abnormal) Collected: 05/27/24 1351    Specimen: Blood Updated: 05/27/24 1424     Glucose 170 mg/dL      BUN 28 mg/dL      Creatinine 1.90 mg/dL      Sodium 137 mmol/L      Potassium 5.3 mmol/L      Chloride 98 mmol/L      CO2 22.0 mmol/L      Calcium 9.0 mg/dL      Total Protein 6.8 g/dL      Albumin 3.4 g/dL      ALT (SGPT) 25 U/L      AST (SGOT) 34 U/L      Alkaline Phosphatase 107 U/L      Total Bilirubin 1.0 mg/dL      Globulin 3.4 gm/dL      A/G Ratio  1.0 g/dL      BUN/Creatinine Ratio 14.7     Anion Gap 17.0 mmol/L      eGFR 35.9 mL/min/1.73     Narrative:      GFR Normal >60  Chronic Kidney Disease <60  Kidney Failure <15    The GFR formula is only valid for adults with stable renal function between ages 18 and 70.    Magnesium [[id number]]  (Normal) Collected: 05/27/24 1351    Specimen: Blood Updated: 05/27/24 1424     Magnesium 2.1 mg/dL     CK [[id number]]  (Normal) Collected: 05/27/24 1351    Specimen: Blood Updated: 05/27/24 1424     Creatine Kinase 42 U/L     Lipase [[id number]]  (Normal) Collected: 05/27/24 1351    Specimen: Blood Updated: 05/27/24 1420     Lipase 19 U/L     CBC & Differential [[id number]]  (Abnormal) Collected: 05/27/24 1351    Specimen: Blood Updated: 05/27/24 1408    Narrative:      The following orders were created for panel order CBC & Differential.  Procedure                               Abnormality         Status                     ---------                               -----------         ------                     CBC Auto Differential[[id number]]        Abnormal            Final result                 Please view results for these tests on the individual orders.    CBC Auto Differential [[id number]]  (Abnormal) Collected: 05/27/24 1351    Specimen: Blood Updated: 05/27/24 1408     WBC 10.95 10*3/mm3      RBC 4.52 10*6/mm3      Hemoglobin 12.2 g/dL      Hematocrit 37.6 %      MCV 83.2 fL      MCH 27.0 pg      MCHC 32.4 g/dL      RDW 15.7 %      RDW-SD 47.6 fl      MPV 10.9 fL      Platelets 173 10*3/mm3      Neutrophil % 88.5 %      Lymphocyte % 3.7 %      Monocyte % 6.7 %      Eosinophil % 0.4 %      Basophil % 0.2 %      Immature Grans % 0.5 %      Neutrophils, Absolute 9.69 10*3/mm3      Lymphocytes, Absolute 0.41 10*3/mm3      Monocytes, Absolute 0.73 10*3/mm3      Eosinophils, Absolute 0.04 10*3/mm3      Basophils, Absolute 0.02 10*3/mm3      Immature Grans, Absolute 0.06 10*3/mm3      nRBC 0.0 /100 WBC        "          Objective:     Vitals: /73 (BP Location: Right arm, Patient Position: Lying)   Pulse 75   Temp 97.8 °F (36.6 °C) (Oral)   Resp 18   Ht 182.9 cm (72\")   Wt 92.1 kg (203 lb)   SpO2 98%   BMI 27.53 kg/m²    Intake/Output Summary (Last 24 hours) at 2024 0821  Last data filed at 2024 0427  Gross per 24 hour   Intake 440 ml   Output 1720 ml   Net -1280 ml    Temp (24hrs), Av.9 °F (36.6 °C), Min:97.7 °F (36.5 °C), Max:98.3 °F (36.8 °C)      Physical Exam  Vitals reviewed.   Constitutional:       Appearance: Normal appearance.   HENT:      Head: Normocephalic and atraumatic.   Eyes:      General:         Right eye: No discharge.         Left eye: No discharge.      Extraocular Movements: Extraocular movements intact.      Conjunctiva/sclera: Conjunctivae normal.   Cardiovascular:      Rate and Rhythm: Normal rate and regular rhythm.      Pulses: Normal pulses.      Heart sounds: No murmur heard.  Pulmonary:      Effort: Pulmonary effort is normal. No respiratory distress.      Breath sounds: No wheezing.   Abdominal:      General: Abdomen is flat. Bowel sounds are normal. There is no distension.   Musculoskeletal:      Right lower leg: No edema.      Left lower leg: No edema.   Skin:     Capillary Refill: Capillary refill takes less than 2 seconds.   Neurological:      General: No focal deficit present.      Mental Status: He is alert and oriented to person, place, and time.   Psychiatric:         Mood and Affect: Mood normal.             Assessment and Plan:     Primary Problem:  [facility] Problem list:    [facility]    Type 2 diabetes mellitus without complication, without long-term current use of insulin    Essential hypertension    Pure hypercholesterolemia    Coronary artery disease involving native coronary artery without angina pectoris    Atypical atrial flutter    HFrEF (heart failure with reduced ejection fraction)    Chronic systolic heart failure      PMH:  Past Medical " History:   Diagnosis Date    Arthritis     Colon cancer     colon    Colon polyp     Coronary artery disease     stents x 2    Diabetes mellitus     Hyperlipidemia     Hypertension     Myocardial infarction        Treatment Plan:  SIMRAN: Monitor kidney function closely, gentle IVF.     UTI: Due to nonobstructing kidney stone, urology consulted from the emergency department.  - Urine culture is positive for Enterococcus faecalis, Rocephin switched to amoxicillin with start date 5/29/2024.    Positive blood culture: Blood culture this morning 1 of 2 positive for gram-negative bacilli, further identity pending.     Nonobstructing nephrolithiasis: Urology consulted, no plans for surgical intervention at this time.     Disposition: Inpatient, await sensitivities and further identification of blood culture, hopeful for discharge in the morning on oral antibiotics.    Reviewed treatment plans with the patient and/or family.   30 minutes spent in face to face interaction and coordination of care.     Electronically signed by Phi Macario MD on 5/30/2024 at 08:21 CDT

## 2024-05-30 NOTE — PROGRESS NOTES
Urology  Length of Stay: 3  Patient Care Team:  Tyree Fleming MD as PCP - General  Tyree Fleming MD as PCP - Family Medicine  Cirilo Bhandari MD as Consulting Physician (Gastroenterology)  Tyree Fleming MD as Referring Physician (Family Medicine)  Fidel Harris MD (Inactive) as Cardiologist (Cardiology)    Chief Complaint: Chills with UTI symptoms    Subjective patient appears to be doing better today.  More energetic though still fatigued.  Appetite still not good but he is sitting up eating some of his tray and trying to drink liquids.  No voiding problems at this time    Interval History:   Creatinine has improved with hydration, and the white count is down slightly though it was never out of the normal range.    Review of Systems:   Review of Systems   Constitutional: Negative.    HENT: Negative.     Eyes: Negative.    Respiratory: Negative.     Cardiovascular: Negative.    Gastrointestinal: Negative.    Endocrine: Negative.    Genitourinary: Negative.    Musculoskeletal: Negative.    Skin: Negative.    Allergic/Immunologic: Negative.    Neurological: Negative.    Hematological: Negative.    Psychiatric/Behavioral: Negative.         Objective       Intake/Output Summary (Last 24 hours) at 5/30/2024 1500  Last data filed at 5/30/2024 1318  Gross per 24 hour   Intake 2104.86 ml   Output 1910 ml   Net 194.86 ml       Vital Signs  Temp:  [97.7 °F (36.5 °C)-98.7 °F (37.1 °C)] 98.7 °F (37.1 °C)  Heart Rate:  [64-80] 64  Resp:  [16-18] 16  BP: (124-152)/(55-76) 134/76    Physical Exam:  Physical Exam  Vitals and nursing note reviewed.   Constitutional:       Appearance: Normal appearance.   HENT:      Head: Normocephalic and atraumatic.      Nose: Nose normal.      Mouth/Throat:      Mouth: Mucous membranes are moist.   Eyes:      Pupils: Pupils are equal, round, and reactive to light.   Cardiovascular:      Rate and Rhythm: Normal rate.      Pulses: Normal pulses.      Heart sounds:  Normal heart sounds.   Pulmonary:      Effort: Pulmonary effort is normal.   Abdominal:      General: Bowel sounds are normal.   Musculoskeletal:         General: Normal range of motion.      Cervical back: Normal range of motion and neck supple.   Skin:     General: Skin is warm and dry.      Capillary Refill: Capillary refill takes less than 2 seconds.   Neurological:      General: No focal deficit present.      Mental Status: He is alert and oriented to person, place, and time.   Psychiatric:         Mood and Affect: Mood normal.         Behavior: Behavior normal.          Results Review:       I reviewed the patient's new clinical results.  Lab Results (last 24 hours)       Procedure Component Value Units Date/Time    Comprehensive Metabolic Panel [[id number]]  (Abnormal) Collected: 05/30/24 0421    Specimen: Blood Updated: 05/30/24 0542     Glucose 374 mg/dL      BUN 26 mg/dL      Creatinine 1.53 mg/dL      Sodium 136 mmol/L      Potassium 4.3 mmol/L      Chloride 103 mmol/L      CO2 19.0 mmol/L      Calcium 7.9 mg/dL      Total Protein 6.0 g/dL      Albumin 3.0 g/dL      ALT (SGPT) 101 U/L      AST (SGOT) 128 U/L      Alkaline Phosphatase 125 U/L      Total Bilirubin 0.6 mg/dL      Globulin 3.0 gm/dL      A/G Ratio 1.0 g/dL      BUN/Creatinine Ratio 17.0     Anion Gap 14.0 mmol/L      eGFR 46.5 mL/min/1.73     Narrative:      GFR Normal >60  Chronic Kidney Disease <60  Kidney Failure <15    The GFR formula is only valid for adults with stable renal function between ages 18 and 70.    CBC & Differential [[id number]]  (Abnormal) Collected: 05/30/24 0421    Specimen: Blood Updated: 05/30/24 0518    Narrative:      The following orders were created for panel order CBC & Differential.  Procedure                               Abnormality         Status                     ---------                               -----------         ------                     CBC Auto Differential[[id number]]        Abnormal             Final result                 Please view results for these tests on the individual orders.    CBC Auto Differential [[id number]]  (Abnormal) Collected: 05/30/24 0421    Specimen: Blood Updated: 05/30/24 0518     WBC 6.47 10*3/mm3      RBC 4.26 10*6/mm3      Hemoglobin 11.1 g/dL      Hematocrit 35.2 %      MCV 82.6 fL      MCH 26.1 pg      MCHC 31.5 g/dL      RDW 15.7 %      RDW-SD 47.8 fl      MPV 11.4 fL      Platelets 220 10*3/mm3      Neutrophil % 94.1 %      Lymphocyte % 3.2 %      Monocyte % 1.9 %      Eosinophil % 0.0 %      Basophil % 0.2 %      Immature Grans % 0.6 %      Neutrophils, Absolute 6.09 10*3/mm3      Lymphocytes, Absolute 0.21 10*3/mm3      Monocytes, Absolute 0.12 10*3/mm3      Eosinophils, Absolute 0.00 10*3/mm3      Basophils, Absolute 0.01 10*3/mm3      Immature Grans, Absolute 0.04 10*3/mm3      nRBC 0.0 /100 WBC     Blood Culture ID, PCR - Blood, Arm, Left [[id number]] Collected: 05/27/24 1536    Specimen: Blood from Arm, Left Updated: 05/30/24 0505     BCID, PCR Negative by BCID PCR. Culture to Follow.     BOTTLE TYPE Aerobic Bottle    Blood Culture - Blood, Arm, Left [[id number]]  (Abnormal) Collected: 05/27/24 1536    Specimen: Blood from Arm, Left Updated: 05/30/24 0505     Blood Culture Abnormal Stain     Gram Stain Aerobic Bottle Gram negative bacilli    Blood Culture - Blood, Arm, Right [[id number]]  (Normal) Collected: 05/27/24 1536    Specimen: Blood from Arm, Right Updated: 05/29/24 1545     Blood Culture No growth at 2 days          Imaging Results (Last 24 Hours)       ** No results found for the last 24 hours. **            Medication Review:     Current Facility-Administered Medications:     acetaminophen (TYLENOL) tablet 500 mg, 500 mg, Oral, Q6H PRN, Tyree Antonio MD    amLODIPine (NORVASC) tablet 10 mg, 10 mg, Oral, Daily, Tyree Antonio MD, 10 mg at 05/30/24 0841    amoxicillin (AMOXIL) capsule 500 mg, 500 mg, Oral, Q8H, Phi aMcario MD, 500 mg at 05/30/24  0539    apixaban (ELIQUIS) tablet 5 mg, 5 mg, Oral, BID, Tyree Antonio MD, 5 mg at 05/30/24 0841    ascorbic acid (VITAMIN C) tablet 500 mg, 500 mg, Oral, Daily, Tyree Antonio MD, 500 mg at 05/30/24 0841    atorvastatin (LIPITOR) tablet 80 mg, 80 mg, Oral, Daily, Tyree Antonio MD, 80 mg at 05/30/24 0841    sennosides-docusate (PERICOLACE) 8.6-50 MG per tablet 2 tablet, 2 tablet, Oral, BID PRN **AND** polyethylene glycol (MIRALAX) packet 17 g, 17 g, Oral, Daily PRN **AND** bisacodyl (DULCOLAX) EC tablet 5 mg, 5 mg, Oral, Daily PRN **AND** bisacodyl (DULCOLAX) suppository 10 mg, 10 mg, Rectal, Daily PRN, Tyree Antonio MD    bumetanide (BUMEX) tablet 1 mg, 1 mg, Oral, Daily PRN, Tyree Antonio MD    diphenhydrAMINE (BENADRYL) capsule 50 mg, 50 mg, Oral, Nightly PRN, Tyree Antonio MD    empagliflozin (JARDIANCE) tablet 10 mg, 10 mg, Oral, Daily, Tyree Antonio MD, 10 mg at 05/30/24 0841    folic acid (FOLVITE) tablet 400 mcg, 400 mcg, Oral, Daily, Tyree Antonio MD, 400 mcg at 05/30/24 0841    linagliptin (TRADJENTA) tablet 5 mg, 5 mg, Oral, Daily, 5 mg at 05/30/24 0841 **AND** metFORMIN ER (GLUCOPHAGE-XR) 24 hr tablet 1,000 mg, 1,000 mg, Oral, Daily, Tyree Antonio MD, 1,000 mg at 05/30/24 0841    methylPREDNISolone sodium succinate (SOLU-Medrol) injection 125 mg, 125 mg, Intravenous, Q12H, Phi Macario MD, 125 mg at 05/30/24 1140    metoprolol succinate XL (TOPROL-XL) 24 hr tablet 100 mg, 100 mg, Oral, Daily, Tyree Antonio MD, 100 mg at 05/30/24 0841    ondansetron ODT (ZOFRAN-ODT) disintegrating tablet 8 mg, 8 mg, Translingual, Q8H PRN, Tyree Antonio MD    pantoprazole (PROTONIX) EC tablet 40 mg, 40 mg, Oral, Q AM, Tyree Antonio MD, 40 mg at 05/30/24 0538    sacubitril-valsartan (ENTRESTO) 49-51 MG tablet 1 tablet, 1 tablet, Oral, Q12H, Tyree Antonio MD, 1 tablet at 05/30/24 0841    sodium chloride 0.9 % flush 10  mL, 10 mL, Intravenous, Q12H, Tyree Antonio MD, 10 mL at 05/30/24 0842    sodium chloride 0.9 % flush 10 mL, 10 mL, Intravenous, PRN, Tyree Antonio MD    sodium chloride 0.9 % infusion 40 mL, 40 mL, Intravenous, PRN, Tyree Antonio MD    sodium chloride 0.9 % infusion, 75 mL/hr, Intravenous, Continuous, Tyree Antonio MD, Last Rate: 75 mL/hr at 05/30/24 1155, 75 mL/hr at 05/30/24 1155    traMADol (ULTRAM) tablet 50 mg, 50 mg, Oral, Q8H PRN, Tyree Antonio MD    Assessment/Plan:   Patient Active Problem List   Diagnosis    Spinal stenosis, lumbar    Type 2 diabetes mellitus without complication, without long-term current use of insulin    Essential hypertension    Pure hypercholesterolemia    Coronary artery disease involving native coronary artery without angina pectoris    Personal history of colon cancer    Atypical atrial flutter    HFrEF (heart failure with reduced ejection fraction)    Chronic systolic heart failure    Chronic HFrEF (heart failure with reduced ejection fraction)    Paroxysmal atrial fibrillation    Chills     Assessment-acute renal failure in a 79-year-old gentleman with a history of stone disease as well as a report of gross hematuria    Plan-will continue hydration.  Will monitor voiding.  Okay to discharge home with appropriate urology follow-up on antibiotics of your choice.          (Please note that portions of this note were completed with a voice recognition program.)  Dominic Acosta MD  05/30/24  15:00 CDT

## 2024-05-30 NOTE — PLAN OF CARE
Goal Outcome Evaluation:  Plan of Care Reviewed With: patient        Progress: improving     Alert and oriented x4. IVF. Oral antibiotics continue. VSS. Safety maintained.

## 2024-05-31 ENCOUNTER — READMISSION MANAGEMENT (OUTPATIENT)
Dept: CALL CENTER | Facility: HOSPITAL | Age: 79
End: 2024-05-31
Payer: MEDICARE

## 2024-05-31 VITALS
HEART RATE: 81 BPM | SYSTOLIC BLOOD PRESSURE: 138 MMHG | RESPIRATION RATE: 16 BRPM | DIASTOLIC BLOOD PRESSURE: 74 MMHG | HEIGHT: 72 IN | BODY MASS INDEX: 27.5 KG/M2 | OXYGEN SATURATION: 97 % | TEMPERATURE: 97.6 F | WEIGHT: 203 LBS

## 2024-05-31 LAB
ALBUMIN SERPL-MCNC: 2.8 G/DL (ref 3.5–5.2)
ALBUMIN/GLOB SERPL: 1 G/DL
ALP SERPL-CCNC: 116 U/L (ref 39–117)
ALT SERPL W P-5'-P-CCNC: 86 U/L (ref 1–41)
ANION GAP SERPL CALCULATED.3IONS-SCNC: 14 MMOL/L (ref 5–15)
AST SERPL-CCNC: 76 U/L (ref 1–40)
BASOPHILS # BLD AUTO: 0.01 10*3/MM3 (ref 0–0.2)
BASOPHILS NFR BLD AUTO: 0.1 % (ref 0–1.5)
BILIRUB SERPL-MCNC: 0.5 MG/DL (ref 0–1.2)
BUN SERPL-MCNC: 28 MG/DL (ref 8–23)
BUN/CREAT SERPL: 18.8 (ref 7–25)
CALCIUM SPEC-SCNC: 7.6 MG/DL (ref 8.6–10.5)
CHLORIDE SERPL-SCNC: 105 MMOL/L (ref 98–107)
CO2 SERPL-SCNC: 19 MMOL/L (ref 22–29)
CREAT SERPL-MCNC: 1.49 MG/DL (ref 0.76–1.27)
DEPRECATED RDW RBC AUTO: 48.3 FL (ref 37–54)
EGFRCR SERPLBLD CKD-EPI 2021: 48 ML/MIN/1.73
EOSINOPHIL # BLD AUTO: 0 10*3/MM3 (ref 0–0.4)
EOSINOPHIL NFR BLD AUTO: 0 % (ref 0.3–6.2)
ERYTHROCYTE [DISTWIDTH] IN BLOOD BY AUTOMATED COUNT: 15.9 % (ref 12.3–15.4)
GLOBULIN UR ELPH-MCNC: 2.8 GM/DL
GLUCOSE BLDC GLUCOMTR-MCNC: 250 MG/DL (ref 70–130)
GLUCOSE BLDC GLUCOMTR-MCNC: 290 MG/DL (ref 70–130)
GLUCOSE BLDC GLUCOMTR-MCNC: 381 MG/DL (ref 70–130)
GLUCOSE SERPL-MCNC: 364 MG/DL (ref 65–99)
HCT VFR BLD AUTO: 31.9 % (ref 37.5–51)
HGB BLD-MCNC: 10.2 G/DL (ref 13–17.7)
IMM GRANULOCYTES # BLD AUTO: 0.07 10*3/MM3 (ref 0–0.05)
IMM GRANULOCYTES NFR BLD AUTO: 0.6 % (ref 0–0.5)
LYMPHOCYTES # BLD AUTO: 0.26 10*3/MM3 (ref 0.7–3.1)
LYMPHOCYTES NFR BLD AUTO: 2.4 % (ref 19.6–45.3)
MCH RBC QN AUTO: 26.3 PG (ref 26.6–33)
MCHC RBC AUTO-ENTMCNC: 32 G/DL (ref 31.5–35.7)
MCV RBC AUTO: 82.2 FL (ref 79–97)
MONOCYTES # BLD AUTO: 0.29 10*3/MM3 (ref 0.1–0.9)
MONOCYTES NFR BLD AUTO: 2.7 % (ref 5–12)
NEUTROPHILS NFR BLD AUTO: 10.25 10*3/MM3 (ref 1.7–7)
NEUTROPHILS NFR BLD AUTO: 94.2 % (ref 42.7–76)
NRBC BLD AUTO-RTO: 0 /100 WBC (ref 0–0.2)
PLATELET # BLD AUTO: 223 10*3/MM3 (ref 140–450)
PMV BLD AUTO: 11.2 FL (ref 6–12)
POTASSIUM SERPL-SCNC: 4.2 MMOL/L (ref 3.5–5.2)
PROT SERPL-MCNC: 5.6 G/DL (ref 6–8.5)
RBC # BLD AUTO: 3.88 10*6/MM3 (ref 4.14–5.8)
SODIUM SERPL-SCNC: 138 MMOL/L (ref 136–145)
WBC NRBC COR # BLD AUTO: 10.88 10*3/MM3 (ref 3.4–10.8)

## 2024-05-31 PROCEDURE — 63710000001 INSULIN LISPRO (HUMAN) PER 5 UNITS: Performed by: FAMILY MEDICINE

## 2024-05-31 PROCEDURE — 25010000002 METHYLPREDNISOLONE PER 125 MG: Performed by: FAMILY MEDICINE

## 2024-05-31 PROCEDURE — 82948 REAGENT STRIP/BLOOD GLUCOSE: CPT

## 2024-05-31 PROCEDURE — 80053 COMPREHEN METABOLIC PANEL: CPT | Performed by: FAMILY MEDICINE

## 2024-05-31 PROCEDURE — 85025 COMPLETE CBC W/AUTO DIFF WBC: CPT | Performed by: FAMILY MEDICINE

## 2024-05-31 RX ORDER — DEXTROSE MONOHYDRATE 25 G/50ML
25 INJECTION, SOLUTION INTRAVENOUS
Status: DISCONTINUED | OUTPATIENT
Start: 2024-05-31 | End: 2024-05-31 | Stop reason: HOSPADM

## 2024-05-31 RX ORDER — NICOTINE POLACRILEX 4 MG
15 LOZENGE BUCCAL
Status: DISCONTINUED | OUTPATIENT
Start: 2024-05-31 | End: 2024-05-31 | Stop reason: HOSPADM

## 2024-05-31 RX ORDER — INSULIN LISPRO 100 [IU]/ML
2-9 INJECTION, SOLUTION INTRAVENOUS; SUBCUTANEOUS
Status: DISCONTINUED | OUTPATIENT
Start: 2024-05-31 | End: 2024-05-31 | Stop reason: HOSPADM

## 2024-05-31 RX ORDER — IBUPROFEN 600 MG/1
1 TABLET ORAL
Status: DISCONTINUED | OUTPATIENT
Start: 2024-05-31 | End: 2024-05-31 | Stop reason: HOSPADM

## 2024-05-31 RX ORDER — AMOXICILLIN 500 MG/1
500 CAPSULE ORAL EVERY 8 HOURS SCHEDULED
Qty: 16 CAPSULE | Refills: 0 | Status: SHIPPED | OUTPATIENT
Start: 2024-05-31 | End: 2024-06-06

## 2024-05-31 RX ADMIN — PANTOPRAZOLE SODIUM 40 MG: 40 TABLET, DELAYED RELEASE ORAL at 06:15

## 2024-05-31 RX ADMIN — APIXABAN 5 MG: 5 TABLET, FILM COATED ORAL at 08:11

## 2024-05-31 RX ADMIN — INSULIN LISPRO 8 UNITS: 100 INJECTION, SOLUTION INTRAVENOUS; SUBCUTANEOUS at 09:22

## 2024-05-31 RX ADMIN — METFORMIN HYDROCHLORIDE 1000 MG: 500 TABLET, EXTENDED RELEASE ORAL at 08:11

## 2024-05-31 RX ADMIN — Medication 400 MCG: at 08:11

## 2024-05-31 RX ADMIN — AMOXICILLIN 500 MG: 500 CAPSULE ORAL at 06:15

## 2024-05-31 RX ADMIN — Medication 10 ML: at 08:12

## 2024-05-31 RX ADMIN — ATORVASTATIN CALCIUM 80 MG: 40 TABLET ORAL at 08:11

## 2024-05-31 RX ADMIN — SACUBITRIL AND VALSARTAN 1 TABLET: 49; 51 TABLET, FILM COATED ORAL at 08:11

## 2024-05-31 RX ADMIN — METHYLPREDNISOLONE SODIUM SUCCINATE 125 MG: 125 INJECTION, POWDER, FOR SOLUTION INTRAMUSCULAR; INTRAVENOUS at 11:43

## 2024-05-31 RX ADMIN — AMOXICILLIN 500 MG: 500 CAPSULE ORAL at 15:27

## 2024-05-31 RX ADMIN — EMPAGLIFLOZIN 10 MG: 10 TABLET, FILM COATED ORAL at 08:11

## 2024-05-31 RX ADMIN — INSULIN LISPRO 6 UNITS: 100 INJECTION, SOLUTION INTRAVENOUS; SUBCUTANEOUS at 11:43

## 2024-05-31 RX ADMIN — OXYCODONE HYDROCHLORIDE AND ACETAMINOPHEN 500 MG: 500 TABLET ORAL at 08:11

## 2024-05-31 RX ADMIN — LINAGLIPTIN 5 MG: 5 TABLET, FILM COATED ORAL at 08:11

## 2024-05-31 NOTE — PLAN OF CARE
Goal Outcome Evaluation:           Progress: no change  Outcome Evaluation: Pt A&OX4. VSS on RA. PPP.SORIA. NO C/O pain. UP adlib. IV to L AC infusing NS @ 75 ml/hr. Call light within reach safety maintained.

## 2024-05-31 NOTE — PROGRESS NOTES
Daily Progress Note  Mani Arreola  MRN: [id number] LOS: 4    Admit Date: 5/27/2024 5/31/2024 08:20 CDT    Subjective:      Chief Complaint:  Chief Complaint   Patient presents with    Chills    Blood in Urine       Interval History:    Reviewed overnight events and nursing notes.   No acute events overnight.  Denies any pain.    Urine culture is positive for Enterococcus faecalis, antibiotics adjusted.  Blood culture 1 of 2 positive for gram-negative bacilli, further identity pending.  Still waiting on speciation of blood culture and sensitivities.  Hopeful discharge later today.    Review of Systems   Constitutional:  Positive for activity change, chills and fatigue. Negative for fever.   Respiratory:  Negative for cough, shortness of breath and wheezing.    Cardiovascular:  Negative for chest pain.   Gastrointestinal:  Positive for nausea. Negative for abdominal pain.   Genitourinary:  Positive for hematuria. Negative for difficulty urinating.   Skin:  Negative for color change.       DIET:  Diet: Regular/House; Fluid Consistency: Thin (IDDSI 0)    Medications:   sodium chloride, 75 mL/hr, Last Rate: 75 mL/hr (05/30/24 1155)      amLODIPine, 10 mg, Oral, Daily  amoxicillin, 500 mg, Oral, Q8H  apixaban, 5 mg, Oral, BID  vitamin C, 500 mg, Oral, Daily  atorvastatin, 80 mg, Oral, Daily  empagliflozin, 10 mg, Oral, Daily  folic acid, 400 mcg, Oral, Daily  linagliptin, 5 mg, Oral, Daily   And  metFORMIN ER, 1,000 mg, Oral, Daily  methylPREDNISolone sodium succinate, 125 mg, Intravenous, Q12H  metoprolol succinate XL, 100 mg, Oral, Daily  pantoprazole, 40 mg, Oral, Q AM  sacubitril-valsartan, 1 tablet, Oral, Q12H  sodium chloride, 10 mL, Intravenous, Q12H        Data:     Code Status:   Code Status and Medical Interventions:   Ordered at: 05/27/24 2300     Code Status (Patient has no pulse and is not breathing):    CPR (Attempt to Resuscitate)     Medical Interventions (Patient has pulse or is breathing):    Full  Support       Family History   Problem Relation Age of Onset    Heart disease Father     Esophageal cancer Father     Colon cancer Neg Hx     Colon polyps Neg Hx      Social History     Socioeconomic History    Marital status:    Tobacco Use    Smoking status: Every Day     Types: Pipe    Smokeless tobacco: Never   Vaping Use    Vaping status: Never Used   Substance and Sexual Activity    Alcohol use: Yes     Comment: occ    Drug use: No    Sexual activity: Defer       Labs:  Lab Results (last 72 hours)       Procedure Component Value Units Date/Time    Comprehensive Metabolic Panel [[id number]]  (Abnormal) Collected: 05/31/24 0559    Specimen: Blood Updated: 05/31/24 0715     Glucose 364 mg/dL      BUN 28 mg/dL      Creatinine 1.49 mg/dL      Sodium 138 mmol/L      Potassium 4.2 mmol/L      Chloride 105 mmol/L      CO2 19.0 mmol/L      Calcium 7.6 mg/dL      Total Protein 5.6 g/dL      Albumin 2.8 g/dL      ALT (SGPT) 86 U/L      AST (SGOT) 76 U/L      Alkaline Phosphatase 116 U/L      Total Bilirubin 0.5 mg/dL      Globulin 2.8 gm/dL      A/G Ratio 1.0 g/dL      BUN/Creatinine Ratio 18.8     Anion Gap 14.0 mmol/L      eGFR 48.0 mL/min/1.73     Narrative:      GFR Normal >60  Chronic Kidney Disease <60  Kidney Failure <15    The GFR formula is only valid for adults with stable renal function between ages 18 and 70.    CBC & Differential [[id number]]  (Abnormal) Collected: 05/31/24 0559    Specimen: Blood Updated: 05/31/24 0658    Narrative:      The following orders were created for panel order CBC & Differential.  Procedure                               Abnormality         Status                     ---------                               -----------         ------                     CBC Auto Differential[[id number]]        Abnormal            Final result                 Please view results for these tests on the individual orders.    CBC Auto Differential [[id number]]  (Abnormal) Collected: 05/31/24 0559     Specimen: Blood Updated: 05/31/24 0658     WBC 10.88 10*3/mm3      RBC 3.88 10*6/mm3      Hemoglobin 10.2 g/dL      Hematocrit 31.9 %      MCV 82.2 fL      MCH 26.3 pg      MCHC 32.0 g/dL      RDW 15.9 %      RDW-SD 48.3 fl      MPV 11.2 fL      Platelets 223 10*3/mm3      Neutrophil % 94.2 %      Lymphocyte % 2.4 %      Monocyte % 2.7 %      Eosinophil % 0.0 %      Basophil % 0.1 %      Immature Grans % 0.6 %      Neutrophils, Absolute 10.25 10*3/mm3      Lymphocytes, Absolute 0.26 10*3/mm3      Monocytes, Absolute 0.29 10*3/mm3      Eosinophils, Absolute 0.00 10*3/mm3      Basophils, Absolute 0.01 10*3/mm3      Immature Grans, Absolute 0.07 10*3/mm3      nRBC 0.0 /100 WBC     Blood Culture - Blood, Arm, Left [[id number]]  (Abnormal) Collected: 05/27/24 1536    Specimen: Blood from Arm, Left Updated: 05/31/24 0608     Blood Culture Culture in progress     Gram Stain Aerobic Bottle Gram negative bacilli    Blood Culture - Blood, Arm, Right [[id number]]  (Normal) Collected: 05/27/24 1536    Specimen: Blood from Arm, Right Updated: 05/30/24 1545     Blood Culture No growth at 3 days    Comprehensive Metabolic Panel [[id number]]  (Abnormal) Collected: 05/30/24 0421    Specimen: Blood Updated: 05/30/24 0542     Glucose 374 mg/dL      BUN 26 mg/dL      Creatinine 1.53 mg/dL      Sodium 136 mmol/L      Potassium 4.3 mmol/L      Chloride 103 mmol/L      CO2 19.0 mmol/L      Calcium 7.9 mg/dL      Total Protein 6.0 g/dL      Albumin 3.0 g/dL      ALT (SGPT) 101 U/L      AST (SGOT) 128 U/L      Alkaline Phosphatase 125 U/L      Total Bilirubin 0.6 mg/dL      Globulin 3.0 gm/dL      A/G Ratio 1.0 g/dL      BUN/Creatinine Ratio 17.0     Anion Gap 14.0 mmol/L      eGFR 46.5 mL/min/1.73     Narrative:      GFR Normal >60  Chronic Kidney Disease <60  Kidney Failure <15    The GFR formula is only valid for adults with stable renal function between ages 18 and 70.    CBC & Differential [[id number]]  (Abnormal) Collected: 05/30/24 0428     Specimen: Blood Updated: 05/30/24 0518    Narrative:      The following orders were created for panel order CBC & Differential.  Procedure                               Abnormality         Status                     ---------                               -----------         ------                     CBC Auto Differential[[id number]]        Abnormal            Final result                 Please view results for these tests on the individual orders.    CBC Auto Differential [[id number]]  (Abnormal) Collected: 05/30/24 0421    Specimen: Blood Updated: 05/30/24 0518     WBC 6.47 10*3/mm3      RBC 4.26 10*6/mm3      Hemoglobin 11.1 g/dL      Hematocrit 35.2 %      MCV 82.6 fL      MCH 26.1 pg      MCHC 31.5 g/dL      RDW 15.7 %      RDW-SD 47.8 fl      MPV 11.4 fL      Platelets 220 10*3/mm3      Neutrophil % 94.1 %      Lymphocyte % 3.2 %      Monocyte % 1.9 %      Eosinophil % 0.0 %      Basophil % 0.2 %      Immature Grans % 0.6 %      Neutrophils, Absolute 6.09 10*3/mm3      Lymphocytes, Absolute 0.21 10*3/mm3      Monocytes, Absolute 0.12 10*3/mm3      Eosinophils, Absolute 0.00 10*3/mm3      Basophils, Absolute 0.01 10*3/mm3      Immature Grans, Absolute 0.04 10*3/mm3      nRBC 0.0 /100 WBC     Blood Culture ID, PCR - Blood, Arm, Left [[id number]] Collected: 05/27/24 1536    Specimen: Blood from Arm, Left Updated: 05/30/24 0505     BCID, PCR Negative by BCID PCR. Culture to Follow.     BOTTLE TYPE Aerobic Bottle    Urine Culture - Urine, Urine, Clean Catch [[id number]]  (Abnormal)  (Susceptibility) Collected: 05/27/24 1341    Specimen: Urine, Clean Catch Updated: 05/29/24 0924     Urine Culture >100,000 CFU/mL Enterococcus faecalis    Narrative:      Colonization of the urinary tract without infection is common. Treatment is discouraged unless the patient is symptomatic, pregnant, or undergoing an invasive urologic procedure.    Susceptibility        Enterococcus faecalis      STEPHAN      Ampicillin Susceptible       Levofloxacin Susceptible      Nitrofurantoin Susceptible      Vancomycin Susceptible                           Comprehensive Metabolic Panel [[id number]]  (Abnormal) Collected: 05/29/24 0359    Specimen: Blood Updated: 05/29/24 0726     Glucose 167 mg/dL      BUN 27 mg/dL      Creatinine 1.54 mg/dL      Sodium 137 mmol/L      Potassium 4.8 mmol/L      Comment: Specimen hemolyzed.  Result may be falsely elevated.        Chloride 103 mmol/L      CO2 18.0 mmol/L      Calcium 8.2 mg/dL      Total Protein 6.4 g/dL      Albumin 2.9 g/dL      ALT (SGPT) 68 U/L      Comment: Specimen hemolyzed.  Result may  be falsely elevated.        AST (SGOT) 126 U/L      Comment: Specimen hemolyzed.  Result may be falsely elevated.        Alkaline Phosphatase 117 U/L      Total Bilirubin 0.7 mg/dL      Globulin 3.5 gm/dL      A/G Ratio 0.8 g/dL      BUN/Creatinine Ratio 17.5     Anion Gap 16.0 mmol/L      eGFR 46.2 mL/min/1.73     Narrative:      GFR Normal >60  Chronic Kidney Disease <60  Kidney Failure <15    The GFR formula is only valid for adults with stable renal function between ages 18 and 70.    CBC & Differential [[id number]]  (Abnormal) Collected: 05/29/24 0621    Specimen: Blood Updated: 05/29/24 0639    Narrative:      The following orders were created for panel order CBC & Differential.  Procedure                               Abnormality         Status                     ---------                               -----------         ------                     CBC Auto Differential[[id number]]        Abnormal            Final result                 Please view results for these tests on the individual orders.    CBC Auto Differential [[id number]]  (Abnormal) Collected: 05/29/24 0621    Specimen: Blood Updated: 05/29/24 0639     WBC 8.11 10*3/mm3      RBC 4.14 10*6/mm3      Hemoglobin 11.0 g/dL      Hematocrit 33.8 %      MCV 81.6 fL      MCH 26.6 pg      MCHC 32.5 g/dL      RDW 15.8 %      RDW-SD 47.7 fl      MPV 10.7 fL       "Platelets 188 10*3/mm3      Neutrophil % 86.0 %      Lymphocyte % 4.1 %      Monocyte % 6.9 %      Eosinophil % 2.2 %      Basophil % 0.1 %      Immature Grans % 0.7 %      Neutrophils, Absolute 6.97 10*3/mm3      Lymphocytes, Absolute 0.33 10*3/mm3      Monocytes, Absolute 0.56 10*3/mm3      Eosinophils, Absolute 0.18 10*3/mm3      Basophils, Absolute 0.01 10*3/mm3      Immature Grans, Absolute 0.06 10*3/mm3      nRBC 0.0 /100 WBC     BNP [[id number]]  (Abnormal) Collected: 05/29/24 0359    Specimen: Blood Updated: 05/29/24 0526     proBNP 9,999.0 pg/mL     Narrative:      This assay is used as an aid in the diagnosis of individuals suspected of having heart failure. It can be used as an aid in the diagnosis of acute decompensated heart failure (ADHF) in patients presenting with signs and symptoms of ADHF to the emergency department (ED). In addition, NT-proBNP of <300 pg/mL indicates ADHF is not likely.    Age Range Result Interpretation  NT-proBNP Concentration (pg/mL:      <50             Positive            >450                   Gray                 300-450                    Negative             <300    50-75           Positive            >900                  Gray                300-900                  Negative            <300      >75             Positive            >1800                  Gray                300-1800                  Negative            <300    CBC (No Diff) [[id number]]  (Abnormal) Collected: 05/29/24 0359    Specimen: Blood Updated: 05/29/24 0453     WBC 8.39 10*3/mm3      RBC 4.39 10*6/mm3      Hemoglobin 11.7 g/dL      Hematocrit 36.5 %      MCV 83.1 fL      MCH 26.7 pg      MCHC 32.1 g/dL      RDW 15.9 %      RDW-SD 48.2 fl      MPV 11.5 fL      Platelets 211 10*3/mm3                     Objective:     Vitals: /57 (BP Location: Right arm, Patient Position: Lying)   Pulse 80   Temp 98 °F (36.7 °C) (Oral)   Resp 18   Ht 182.9 cm (72\")   Wt 92.1 kg (203 lb)   SpO2 95%   BMI " 27.53 kg/m²    Intake/Output Summary (Last 24 hours) at 2024 0820  Last data filed at 2024 0741  Gross per 24 hour   Intake 2104.86 ml   Output 2060 ml   Net 44.86 ml    Temp (24hrs), Av °F (36.7 °C), Min:97.6 °F (36.4 °C), Max:98.7 °F (37.1 °C)      Physical Exam  Vitals reviewed.   Constitutional:       Appearance: Normal appearance.   HENT:      Head: Normocephalic and atraumatic.   Eyes:      General:         Right eye: No discharge.         Left eye: No discharge.      Extraocular Movements: Extraocular movements intact.      Conjunctiva/sclera: Conjunctivae normal.   Cardiovascular:      Rate and Rhythm: Normal rate and regular rhythm.      Pulses: Normal pulses.      Heart sounds: No murmur heard.  Pulmonary:      Effort: Pulmonary effort is normal. No respiratory distress.      Breath sounds: No wheezing.   Abdominal:      General: Abdomen is flat. Bowel sounds are normal. There is no distension.   Musculoskeletal:      Right lower leg: No edema.      Left lower leg: No edema.   Skin:     Capillary Refill: Capillary refill takes less than 2 seconds.   Neurological:      General: No focal deficit present.      Mental Status: He is alert and oriented to person, place, and time.   Psychiatric:         Mood and Affect: Mood normal.             Assessment and Plan:     Primary Problem:  Chills    Hospital Problem list:    Chills    Type 2 diabetes mellitus without complication, without long-term current use of insulin    Essential hypertension    Pure hypercholesterolemia    Coronary artery disease involving native coronary artery without angina pectoris    Atypical atrial flutter    HFrEF (heart failure with reduced ejection fraction)    Chronic systolic heart failure      PMH:  Past Medical History:   Diagnosis Date    Arthritis     Colon cancer     colon    Colon polyp     Coronary artery disease     stents x 2    Diabetes mellitus     Hyperlipidemia     Hypertension     Myocardial infarction         Treatment Plan:  SIMRAN: Monitor kidney function closely, gentle IVF.     UTI: Due to nonobstructing kidney stone, urology consulted from the emergency department.  - Urine culture is positive for Enterococcus faecalis, Rocephin switched to amoxicillin with start date 5/29/2024.    Positive blood culture: Blood culture this morning 1 of 2 positive for gram-negative bacilli, further identity pending.     Nonobstructing nephrolithiasis: Urology consulted, no plans for surgical intervention at this time.    Type 2 diabetes: Blood sugar very elevated yesterday and today due to IV steroids.  SSI while inpatient.     Disposition: Inpatient, await sensitivities and further identification of blood culture, hopeful for discharge later today on oral antibiotics.    Reviewed treatment plans with the patient and/or family.   30 minutes spent in face to face interaction and coordination of care.     Electronically signed by Phi Macario MD on 5/31/2024 at 08:20 CDT

## 2024-05-31 NOTE — CASE MANAGEMENT/SOCIAL WORK
Continued Stay Note   [facility]     Patient Name: Mani Arreola  MRN: [id number]  Today's Date: 5/31/2024    Admit Date: 5/27/2024        Discharge Plan       Row Name 05/31/24 1621       Plan    Plan Comments Plan is still for return to home when pt is medically stable for dc. No dc needs identified.                   Discharge Codes    No documentation.                 Expected Discharge Date and Time       Expected Discharge Date Expected Discharge Time    May 31, 2024               DONNIE Maradiaga

## 2024-05-31 NOTE — DISCHARGE SUMMARY
Hospital Discharge Summary    Mani Arreola  :  1945  MRN:  [id number]    Admit date:  2024  Discharge date:  2024    Admitting Physician:    Tyree Fleming MD    Discharge Diagnoses:      Chills    Type 2 diabetes mellitus without complication, without long-term current use of insulin    Essential hypertension    Pure hypercholesterolemia    Coronary artery disease involving native coronary artery without angina pectoris    Atypical atrial flutter    HFrEF (heart failure with reduced ejection fraction)    Chronic systolic heart failure      Hospital Course:   The patient is a 79 y.o. male who presents with chills, nausea, generalized weakness for approximately 1 week prior to presentation to the emergency department.  He was found to have nonobstructing kidney stone as well as remarkable urinary tract infection and acute kidney injury in the emergency department, started on Rocephin.  He declines dysuria, but does admit to chills and occasional lower back pain.  He does have history of kidney stone.  Urology consulted, no plans for inpatient therapy for the kidney stones, plans for outpatient monitoring.    Urine culture positive for Enterococcus faecalis, antibiotics adjusted to amoxicillin.  Subsequently, 1 of 2 blood cultures positive for gram-negative bacilli.  Ultimately, discharged home on oral antibiotics in stable condition.    The patient was admitted for the above noted medical/surgical issues. Please see daily progress note for further details concerning their stay. The patient improved throughout their stay and reached maximum medical improvement on the day of discharge. The patient/family agree with the treatment plan as outlined above. All questions concerning their stay were answered prior to discharge. They understand the importance of follow up concerning any abnormal test results.     Physical Exam  See progress note with same date    Discharge Medications:          Discharge Medications        New Medications        Instructions Start Date   amoxicillin 500 MG capsule  Commonly known as: AMOXIL   500 mg, Oral, Every 8 Hours Scheduled             Changes to Medications        Instructions Start Date   atorvastatin 80 MG tablet  Commonly known as: LIPITOR  What changed: when to take this   80 mg, Oral, Daily             Continue These Medications        Instructions Start Date   acetaminophen 650 MG 8 hr tablet  Commonly known as: TYLENOL   1,300 mg, Oral, 2 Times Daily      albuterol (2.5 MG/3ML) 0.083% nebulizer solution  Commonly known as: PROVENTIL   2.5 mg, Nebulization, Every 4 Hours PRN      amLODIPine 10 MG tablet  Commonly known as: NORVASC   10 mg, Oral, Daily      apixaban 5 MG tablet tablet  Commonly known as: Eliquis   5 mg, Oral, 2 Times Daily      bumetanide 1 MG tablet  Commonly known as: BUMEX   1 mg, Oral, Daily PRN      dapagliflozin Propanediol 10 MG tablet   10 mg, Oral, Daily      diphenhydrAMINE 25 mg capsule  Commonly known as: BENADRYL   50 mg, Oral, Nightly PRN, Patient states he takes them regularly.      FISH OIL PO   1,000 mg, Oral, Nightly      folic acid 400 MCG tablet  Commonly known as: FOLVITE   400 mcg, Oral, Daily      metoprolol succinate  MG 24 hr tablet  Commonly known as: TOPROL-XL   100 mg, Oral, Daily      multivitamin with minerals tablet tablet   1 tablet, Oral, Daily      omeprazole 20 MG capsule  Commonly known as: priLOSEC   40 mg, Oral, Daily      ondansetron ODT 8 MG disintegrating tablet  Commonly known as: ZOFRAN-ODT   8 mg, Translingual, Every 8 Hours PRN      sacubitril-valsartan  MG tablet  Commonly known as: ENTRESTO   1 tablet, Oral, 2 Times Daily      SITagliptin-metFORMIN HCl -1000 MG tablet  Commonly known as: JANUMET XR   1 tablet, Oral, Daily      traMADol 50 MG tablet  Commonly known as: ULTRAM   50 mg, Oral, Every 8 Hours PRN      vitamin C 500 MG tablet  Commonly known as: ASCORBIC ACID   500 mg,  Oral, Every Morning               Activity: As tolerated    Diet: Regular    Consults: Urology    Significant Diagnostic Studies:    CT Abdomen Pelvis Without Contrast    Result Date: 5/27/2024   IMPRESSION: 1. 2 stones at/just beyond the left ureterovesicular junction, largest measuring 8 mm with a smaller adjacent 4 mm stone, with [place] significant left-sided hydronephrosis. Bilateral nonobstructing intrarenal stones, largest in the inferior pole of the left kidney measuring 9 mm. No right ureteral stones. 2. Patchy right lower lobe opacities consistent with pneumonia. Trace right pleural effusion.  This report was signed and finalized on 5/27/2024 4:31 PM by Dr. Jocelin Saravia MD.      XR Chest 2 View    Result Date: 5/27/2024  1. Right lower lobe opacity worrisome for pneumonia.  This report was signed and finalized on 5/27/2024 2:16 PM by Fidel Hernandez.            Treatments:   Above    Disposition:   Discharge home    Time spent on discharge including discussion with patient/family, SW, and coordination of care.     Follow up with Tyree Fleming MD in 1 weeks.    Signed:  Phi Macario MD   5/31/2024, 16:57 CDT

## 2024-06-01 LAB
BACTERIA SPEC AEROBE CULT: ABNORMAL
BACTERIA SPEC AEROBE CULT: NORMAL
GRAM STN SPEC: ABNORMAL
ISOLATED FROM: ABNORMAL

## 2024-06-01 NOTE — OUTREACH NOTE
Prep Survey      Flowsheet Row Responses   [facility] patient discharged from? [place]   Is LACE score < 7 ? No   Eligibility Readm Mgmt   Discharge diagnosis Chills   Does the patient have one of the following disease processes/diagnoses(primary or secondary)? Other   Does the patient have Home health ordered? No   Is there a DME ordered? No   Prep survey completed? Yes            LUCY SUBRAMANIAN - Registered Nurse

## 2024-06-11 ENCOUNTER — READMISSION MANAGEMENT (OUTPATIENT)
Dept: CALL CENTER | Facility: HOSPITAL | Age: 79
End: 2024-06-11
Payer: MEDICARE

## 2024-06-11 NOTE — OUTREACH NOTE
Contact Misha for Colostomy rings. Advise patient when this has been taken care of. Medical Week 2 Survey      Flowsheet Row Responses   [facility] patient discharged from? [place]   Does the patient have one of the following disease processes/diagnoses(primary or secondary)? Other   Week 2 attempt successful? No   Unsuccessful attempts Attempt 1            Amy CARDOZO - Licensed Nurse

## 2024-06-19 ENCOUNTER — READMISSION MANAGEMENT (OUTPATIENT)
Dept: CALL CENTER | Facility: HOSPITAL | Age: 79
End: 2024-06-19
Payer: MEDICARE

## 2024-06-19 NOTE — OUTREACH NOTE
Medical Week 3 Survey      Flowsheet Row Responses   [facility] patient discharged from? [place]   Does the patient have one of the following disease processes/diagnoses(primary or secondary)? Other   Week 3 attempt successful? Yes   Call start time 1653   Call end time 1704   Discharge diagnosis Chills    Type 2 diabetes mellitus without complication, without long-term current use of insulin    Essential hypertension    Pure hypercholesterolemia    Coronary artery disease involving native coronary artery without angina pectoris    Atypical atrial flutter    HFrEF (heart failure with reduced ejection fraction)    Chronic systolic heart failure   Person spoke with today (if not patient) and relationship Andra, spouse   Meds reviewed with patient/caregiver? Yes   Does the patient have all medications ordered at discharge? Yes   Is the patient taking all medications as directed (includes completed medication regime)? Yes   Does the patient have a primary care provider?  Yes   Comments regarding PCP Patient has seen Dr Fleming since discharge.   Has the patient kept scheduled appointments due by today? Yes   Has home health visited the patient within 72 hours of discharge? N/A   Psychosocial issues? No   Did the patient receive a copy of their discharge instructions? Yes   Nursing interventions Reviewed instructions with patient   What is the patient's perception of their health status since discharge? Worsening  [Reports increased swelling to left leg. Patient has seen PCP and had a study today to check for blood clot. Has not heard yet on report]   Is the patient/caregiver able to teach back signs and symptoms related to disease process for when to call PCP? Yes   Is the patient/caregiver able to teach back signs and symptoms related to disease process for when to call 911? Yes   Is the patient/caregiver able to teach back the hierarchy of who to call/visit for symptoms/problems? PCP, Specialist, Home health  nurse, Urgent Care, ED, 911 Yes   Week 3 Call Completed? Yes   Graduated Yes  [No calls after week 3]   Is the patient interested in additional calls from an ambulatory ? No   Would this patient benefit from a Referral to [facility] Social Work? No   Call end time 1320            TURNER BERRY - Registered Nurse

## 2024-07-01 RX ORDER — OMEPRAZOLE 20 MG/1
40 CAPSULE, DELAYED RELEASE ORAL DAILY
Qty: 180 CAPSULE | Refills: 4 | Status: SHIPPED | OUTPATIENT
Start: 2024-07-01

## 2024-08-22 ENCOUNTER — HOSPITAL ENCOUNTER (OUTPATIENT)
Dept: CARDIOLOGY | Facility: HOSPITAL | Age: 79
Discharge: HOME | End: 2024-08-22
Admitting: HOSPITALIST
Payer: MEDICARE

## 2024-08-22 VITALS
HEIGHT: 72 IN | WEIGHT: 205 LBS | HEART RATE: 68 BPM | SYSTOLIC BLOOD PRESSURE: 128 MMHG | OXYGEN SATURATION: 98 % | DIASTOLIC BLOOD PRESSURE: 67 MMHG | BODY MASS INDEX: 27.77 KG/M2 | RESPIRATION RATE: 14 BRPM

## 2024-08-22 DIAGNOSIS — E78.5 HYPERLIPIDEMIA LDL GOAL <70: ICD-10-CM

## 2024-08-22 DIAGNOSIS — I10 PRIMARY HYPERTENSION: ICD-10-CM

## 2024-08-22 DIAGNOSIS — I50.22 CHRONIC HFREF (HEART FAILURE WITH REDUCED EJECTION FRACTION): Primary | ICD-10-CM

## 2024-08-22 DIAGNOSIS — I48.0 PAROXYSMAL ATRIAL FIBRILLATION: ICD-10-CM

## 2024-08-22 DIAGNOSIS — I25.10 CORONARY ARTERY DISEASE INVOLVING NATIVE CORONARY ARTERY OF NATIVE HEART WITHOUT ANGINA PECTORIS: ICD-10-CM

## 2024-08-22 LAB
ABSOLUTE LUNG FLUID CONTENT: 28 % (ref 20–35)
ANION GAP SERPL CALCULATED.3IONS-SCNC: 9 MMOL/L (ref 5–15)
BUN SERPL-MCNC: 20 MG/DL (ref 8–23)
BUN/CREAT SERPL: 13.1 (ref 7–25)
CALCIUM SPEC-SCNC: 9.3 MG/DL (ref 8.6–10.5)
CHLORIDE SERPL-SCNC: 105 MMOL/L (ref 98–107)
CHOLEST SERPL-MCNC: 111 MG/DL (ref 0–200)
CO2 SERPL-SCNC: 29 MMOL/L (ref 22–29)
CREAT SERPL-MCNC: 1.53 MG/DL (ref 0.76–1.27)
EGFRCR SERPLBLD CKD-EPI 2021: 46.5 ML/MIN/1.73
GLUCOSE SERPL-MCNC: 151 MG/DL (ref 65–99)
HDLC SERPL-MCNC: 57 MG/DL (ref 40–60)
LDLC SERPL CALC-MCNC: 38 MG/DL (ref 0–100)
LDLC/HDLC SERPL: 0.66 {RATIO}
NT-PROBNP SERPL-MCNC: 548.3 PG/ML (ref 0–1800)
POTASSIUM SERPL-SCNC: 4.1 MMOL/L (ref 3.5–5.2)
SODIUM SERPL-SCNC: 143 MMOL/L (ref 136–145)
TRIGL SERPL-MCNC: 81 MG/DL (ref 0–150)
VLDLC SERPL-MCNC: 16 MG/DL (ref 5–40)

## 2024-08-22 PROCEDURE — 80048 BASIC METABOLIC PNL TOTAL CA: CPT | Performed by: HOSPITALIST

## 2024-08-22 PROCEDURE — 94726 PLETHYSMOGRAPHY LUNG VOLUMES: CPT | Performed by: HOSPITALIST

## 2024-08-22 PROCEDURE — 80061 LIPID PANEL: CPT | Performed by: HOSPITALIST

## 2024-08-22 PROCEDURE — 83880 ASSAY OF NATRIURETIC PEPTIDE: CPT | Performed by: HOSPITALIST

## 2024-08-22 RX ORDER — DAPAGLIFLOZIN 10 MG/1
10 TABLET, FILM COATED ORAL DAILY
Qty: 90 TABLET | Refills: 3 | Status: SHIPPED | OUTPATIENT
Start: 2024-08-22

## 2024-08-22 RX ORDER — AMLODIPINE BESYLATE 10 MG/1
5 TABLET ORAL DAILY
Start: 2024-08-22

## 2024-08-22 NOTE — PROGRESS NOTES
Heart Failure Clinic    Date:  08/22/24     Vitals:    08/22/24 1038   BP: 128/67   Pulse: 68   Resp: 14   SpO2: 98%        Indication:  Heart Failure     Procedure:  ReDS device sensor unit applied to right side of chest and right side of back.  Appropriate positioning confirmed based off of the unit's calculation.  Chest measurement obtained with the chest size ruler.  Measurement session performed over 45 seconds.      Method of arrival:  Ambulatory with cane    Weighing self daily:  Yes    Taking medications as prescribed:  Yes    Edema:  No    Shortness of Air:  No    Number of pillows used at night:  <2    Results:  ReDS Value=   28                       Interpretation:  25-35% - normal/ideal lung fluid content    Bertha Young RN 08/22/24 10:46 CDT

## 2024-08-22 NOTE — PROGRESS NOTES
Reason For Visit:  CHF     Subjective        Mani Arreola is a 79 y.o. male with the below pertinent PMH who presents for follow-up of CHF.    Mani Arreola was most recently seen in the CHF clinic 2/23/2024 at which time he was doing reasonably well without significant CHF concerns.  He was monitoring his weights and taking Bumex on average about every 2 weeks.  He was noted to have some worsening renal function over the prior 6 months, and he was advised to increase fluid intake some.    Today, the patient reports that he has done well from a cardiac standpoint recently.  At some point since his last visit his Entresto was cut in half and his Bumex 2 mg daily was restarted later when he had some leg swelling.  He reports doing well with this.  He did have an upper respiratory infection associated with diarrhea a few weeks ago; he reports that this has mostly resolved and his diarrhea is now mild and back to baseline.  He denies any chest pain or increased shortness of breath.  No recent significant leg swelling.  He denies palpitations and lightheadedness.  He has no acute concerns today.  He brings in an extensive BP and weight log that shows good BP control at home and recently stable weight.    ROS: Pertinent findings are included above.    Cardiac Studies  Echo 9/15/2022: LV moderately dilated with EF 31-35%, diastolic dysfunction noted, normal RV size/function, LA and RA borderline dilated, hemodynamically significant valvular stenosis or regurgitation  Stress SPECT 11/8/2022: Large anterior infarct involving proximal to mid LAD distribution with hypokinesis to akinesis in this distribution.  No significant inducible ischemia.  LVEF 33%.  Cardiac MRI 1/5/2023: Ischemic cardiomyopathy with LVEF 50% and moderate scar burden.  Normal RV systolic function.  Subendocardial infarction involving the anteroseptal, mid anterior, anteroapical, and apical walls with low likelihood of viability (50-75% of transmural  thickness scar).  Small pericardial effusion, large pericardial fat pad, trivial bilateral pleural effusions.  Dilated main PA suggest pulmonary hypertension.    Pertinent PMH  HFrEF due to ICM with improved LVEF (50% on cardiac MRI 1/5/2023)  CAD s/p LAD PCI  Hypertension  Hyperlipidemia  Type 2 diabetes mellitus  Atrial flutter s/p ablation  Atrial fibrillation s/p ablation  Carotid artery stenosis s/p CEA    Pertinent past medical, surgical, family, and social history were reviewed.      Current Outpatient Medications:     acetaminophen (TYLENOL) 650 MG 8 hr tablet, Take 2 tablets by mouth 2 (Two) Times a Day., Disp: , Rfl:     albuterol (PROVENTIL) (2.5 MG/3ML) 0.083% nebulizer solution, Take 2.5 mg by nebulization Every 4 (Four) Hours As Needed for Wheezing., Disp: , Rfl:     amLODIPine (NORVASC) 10 MG tablet, Take 1 tablet by mouth Daily., Disp: 90 tablet, Rfl: 3    apixaban (Eliquis) 5 MG tablet tablet, Take 1 tablet by mouth 2 (Two) Times a Day., Disp: 90 tablet, Rfl: 3    atorvastatin (LIPITOR) 80 MG tablet, Take 1 tablet by mouth Daily. (Patient taking differently: Take 1 tablet by mouth Every Night.), Disp: 90 tablet, Rfl: 3    bumetanide (BUMEX) 1 MG tablet, Take 1 tablet by mouth Daily As Needed (weight gain)., Disp: , Rfl:     dapagliflozin Propanediol 10 MG tablet, Take 10 mg by mouth Daily., Disp: 90 tablet, Rfl: 3    diphenhydrAMINE (BENADRYL) 25 mg capsule, Take 2 capsules by mouth At Night As Needed for Allergies or Sleep. Patient states he takes them regularly., Disp: , Rfl:     folic acid (FOLVITE) 400 MCG tablet, Take 1 tablet by mouth Daily., Disp: , Rfl:     metoprolol succinate XL (TOPROL-XL) 100 MG 24 hr tablet, Take 1 tablet by mouth Daily., Disp: 30 tablet, Rfl: 11    Multiple Vitamins-Minerals (MULTIVITAMIN ADULT PO), Take 1 tablet by mouth Daily., Disp: , Rfl:     Omega-3 Fatty Acids (FISH OIL PO), Take 1,000 mg by mouth Every Night., Disp: , Rfl:     omeprazole (priLOSEC) 20 MG  "capsule, Take 2 capsules by mouth Daily., Disp: 180 capsule, Rfl: 4    ondansetron ODT (ZOFRAN-ODT) 8 MG disintegrating tablet, Place 1 tablet on the tongue Every 8 (Eight) Hours As Needed for Nausea or Vomiting., Disp: , Rfl:     sacubitril-valsartan (ENTRESTO)  MG tablet, Take 1 tablet by mouth 2 (Two) Times a Day. (Patient taking differently: Take 0.5 tablets by mouth 2 (Two) Times a Day. Gets from Dr. Aguirre's office.), Disp: 180 tablet, Rfl: 3    SITagliptin-metFORMIN HCl ER (JANUMET XR) 100-1000 MG tablet, Take 1 tablet by mouth Daily., Disp: , Rfl:     traMADol (ULTRAM) 50 MG tablet, Take 1 tablet by mouth Every 8 (Eight) Hours As Needed for Moderate Pain., Disp: , Rfl:     vitamin C (ASCORBIC ACID) 500 MG tablet, Take 1 tablet by mouth Every Morning., Disp: , Rfl:      Objective   Vital Signs:  /67 (BP Location: Left arm)   Pulse 68   Resp 14   Ht 182.9 cm (72\")   Wt 93 kg (205 lb)   SpO2 98%   BMI 27.80 kg/m²   Estimated body mass index is 27.8 kg/m² as calculated from the following:    Height as of this encounter: 182.9 cm (72\").    Weight as of this encounter: 93 kg (205 lb).      Constitutional:       Appearance: Healthy appearance. Not in distress.   Neck:      Vascular: JVD normal.   Pulmonary:      Effort: Pulmonary effort is normal.      Breath sounds: Normal breath sounds.   Cardiovascular:      Normal rate. Regular rhythm.      Murmurs: There is no murmur.      No gallop.  No click. No rub.   Edema:     Comments: Bilateral pretibial edema  Abdominal:      General: There is no distension.      Palpations: Abdomen is soft.      Tenderness: There is no abdominal tenderness.   Skin:     General: Skin is warm and dry.   Neurological:      Mental Status: Alert and oriented to person, place and time.        Result Review :  The following data was reviewed by: Dominic Aguirre MD on 08/22/2024:  BMP   BMP          5/30/2024    04:21 5/31/2024    05:59 8/22/2024    10:30   BMP   BUN 26  28  " 20    Creatinine 1.53  1.49  1.53    Sodium 136  138  143    Potassium 4.3  4.2  4.1    Chloride 103  105  105    CO2 19.0  19.0  29.0    Calcium 7.9  7.6  9.3      Lipid Panel   Lipid Panel          8/22/2024    10:30   Lipid Panel   Total Cholesterol 111    Triglycerides 81    HDL Cholesterol 57    VLDL Cholesterol 16    LDL Cholesterol  38    LDL/HDL Ratio 0.66      Pro-BNP       Lab 08/22/24  1030   PROBNP 548.3     ReDS   Lab Results   Component Value Date    ABSOLUTELUNG 28 08/22/2024          Assessment and Plan   Diagnoses and all orders for this visit:    1. CHF with improved LVEF (Primary)  2. Primary hypertension  3. Coronary artery disease involving native coronary artery of native heart without angina pectoris  4. Hyperlipidemia LDL goal <70  5. Paroxysmal atrial fibrillation  - BP well-controlled.  Minimal peripheral edema but overall appears pretty euvolemic.  No significant CHF symptoms or angina at present.  Lipids at goal.  - Increase Entresto back to 97/103 mg twice daily  - Reduce amlodipine to 5 mg daily  - Continue Farxiga 10 mg daily, Toprol- mg daily, atorvastatin 80 mg daily, Eliquis 5 mg twice daily    Follow Up   Return in about 5 weeks (around 9/26/2024).  Patient was given instructions and counseling regarding his condition or for health maintenance advice. Please see specific information pulled into the AVS if appropriate.       EMR Dragon/Transcription disclaimer: Much of this encounter note is an electronic transcription/translation of spoken language to printed text. The electronic translation of spoken language may permit erroneous, or at times, nonsensical words or phrases to be inadvertently transcribed; although I have reviewed the note for such errors, some may still exist.

## 2024-08-22 NOTE — PATIENT INSTRUCTIONS
Increase Entresto back to taking a whole tablet twice daily  Reduce Amlodipine to taking a half tablet once daily

## 2024-10-02 ENCOUNTER — HOSPITAL ENCOUNTER (OUTPATIENT)
Dept: CARDIOLOGY | Facility: HOSPITAL | Age: 79
Discharge: HOME | End: 2024-10-02
Admitting: NURSE PRACTITIONER
Payer: MEDICARE

## 2024-10-02 VITALS
BODY MASS INDEX: 27.45 KG/M2 | WEIGHT: 202.4 LBS | HEART RATE: 71 BPM | DIASTOLIC BLOOD PRESSURE: 76 MMHG | SYSTOLIC BLOOD PRESSURE: 139 MMHG

## 2024-10-02 DIAGNOSIS — I50.22 CHRONIC HFREF (HEART FAILURE WITH REDUCED EJECTION FRACTION): Primary | ICD-10-CM

## 2024-10-02 DIAGNOSIS — I25.10 CORONARY ARTERY DISEASE INVOLVING NATIVE CORONARY ARTERY OF NATIVE HEART WITHOUT ANGINA PECTORIS: ICD-10-CM

## 2024-10-02 DIAGNOSIS — I48.0 PAROXYSMAL ATRIAL FIBRILLATION: ICD-10-CM

## 2024-10-02 DIAGNOSIS — I10 PRIMARY HYPERTENSION: ICD-10-CM

## 2024-10-02 DIAGNOSIS — E78.5 HYPERLIPIDEMIA LDL GOAL <70: ICD-10-CM

## 2024-10-02 LAB
ABSOLUTE LUNG FLUID CONTENT: 25 % (ref 20–35)
ANION GAP SERPL CALCULATED.3IONS-SCNC: 14 MMOL/L (ref 5–15)
BUN SERPL-MCNC: 26 MG/DL (ref 8–23)
BUN/CREAT SERPL: 16 (ref 7–25)
CALCIUM SPEC-SCNC: 8.9 MG/DL (ref 8.6–10.5)
CHLORIDE SERPL-SCNC: 102 MMOL/L (ref 98–107)
CHOLEST SERPL-MCNC: 117 MG/DL (ref 0–200)
CO2 SERPL-SCNC: 24 MMOL/L (ref 22–29)
CREAT SERPL-MCNC: 1.62 MG/DL (ref 0.76–1.27)
EGFRCR SERPLBLD CKD-EPI 2021: 43.4 ML/MIN/1.73
GLUCOSE SERPL-MCNC: 210 MG/DL (ref 65–99)
HDLC SERPL-MCNC: 52 MG/DL (ref 40–60)
LDLC SERPL CALC-MCNC: 41 MG/DL (ref 0–100)
LDLC/HDLC SERPL: 0.72 {RATIO}
NT-PROBNP SERPL-MCNC: 478.1 PG/ML (ref 0–1800)
POTASSIUM SERPL-SCNC: 3.8 MMOL/L (ref 3.5–5.2)
SODIUM SERPL-SCNC: 140 MMOL/L (ref 136–145)
TRIGL SERPL-MCNC: 138 MG/DL (ref 0–150)
VLDLC SERPL-MCNC: 24 MG/DL (ref 5–40)

## 2024-10-02 PROCEDURE — 80048 BASIC METABOLIC PNL TOTAL CA: CPT | Performed by: NURSE PRACTITIONER

## 2024-10-02 PROCEDURE — 94726 PLETHYSMOGRAPHY LUNG VOLUMES: CPT | Performed by: NURSE PRACTITIONER

## 2024-10-02 PROCEDURE — 80061 LIPID PANEL: CPT | Performed by: NURSE PRACTITIONER

## 2024-10-02 PROCEDURE — 83880 ASSAY OF NATRIURETIC PEPTIDE: CPT | Performed by: NURSE PRACTITIONER

## 2024-10-02 RX ORDER — SACCHAROMYCES BOULARDII 250 MG
250 CAPSULE ORAL DAILY
COMMUNITY

## 2024-10-02 NOTE — PROGRESS NOTES
Heart Failure Clinic    Date:  10/02/24     Vitals:    10/02/24 1509   BP: 139/76   Pulse: 71        Indication:  Heart Failure     Procedure:  ReDS device sensor unit applied to right side of chest and right side of back.  Appropriate positioning confirmed based off of the unit's calculation.  Chest measurement obtained with the chest size ruler.  Measurement session performed over 45 seconds.      Method of arrival:  Ambulatory with cane    Weighing self daily:  Yes    Taking medications as prescribed:  Yes    Edema:  No    Shortness of Air:  No    Number of pillows used at night:  Patient sleep on one pillow    Results:  ReDS Value=   25                       Interpretation:  25-35% - normal/ideal lung fluid content    Mallory L Haase, RN 10/02/24 15:10 CDT

## 2024-10-02 NOTE — PROGRESS NOTES
Heart Failure Clinic Progress Note  Reason For Visit:  CHF    Subjective        Mani Arreola is a 79 y.o. male with the below pertinent PMH who presents for follow-up of CHF.    Mani Arreola was most recently seen in the heart failure clinic on 8/22/2024 by Dr. Timothy MD.  At that time he had done well from a cardiac standpoint.  His Entresto was cut in half at some point his Bumex was restarted after he had had some leg swelling.  He reported doing well with that.  He had a right upper respiratory infection with diarrhea that had resolved at that point.  He had no acute concerns today and he had had a blood pressure log that showed good blood pressure control.    His Entresto was increased back to  mg twice daily and his amlodipine was decreased to 5 mg daily. He had no changes to his GDMT otherwise.    He notes that he has been doing fair.  He has been sick on and off recenrly.  During that time he would be dizzy and short of braeth.  However, this has improved.      Review of Systems   Constitutional:  Negative for fatigue.   Respiratory:  Negative for shortness of breath.    Cardiovascular:  Negative for chest pain and leg swelling.   Neurological:  Positive for dizziness and light-headedness.        Cardiac Studies  Echo 9/15/2022: LV moderately dilated with EF 31-35%, diastolic dysfunction noted, normal RV size/function, LA and RA borderline dilated, hemodynamically significant valvular stenosis or regurgitation  Stress SPECT 11/8/2022: Large anterior infarct involving proximal to mid LAD distribution with hypokinesis to akinesis in this distribution.  No significant inducible ischemia.  LVEF 33%.  Cardiac MRI 1/5/2023: Ischemic cardiomyopathy with LVEF 50% and moderate scar burden.  Normal RV systolic function.  Subendocardial infarction involving the anteroseptal, mid anterior, anteroapical, and apical walls with low likelihood of viability (50-75% of transmural thickness scar).  Small  pericardial effusion, large pericardial fat pad, trivial bilateral pleural effusions.  Dilated main PA suggest pulmonary hypertension.     Pertinent PMH  HFrEF due to ICM with improved LVEF (50% on cardiac MRI 1/5/2023)  CAD s/p LAD PCI  Hypertension  Hyperlipidemia  Type 2 diabetes mellitus  Atrial flutter s/p ablation  Atrial fibrillation s/p ablation  Carotid artery stenosis s/p CEA    Pertinent past medical, surgical, family, and social history were reviewed.      Current Outpatient Medications:     acetaminophen (TYLENOL) 650 MG 8 hr tablet, Take 2 tablets by mouth 2 (Two) Times a Day., Disp: , Rfl:     albuterol (PROVENTIL) (2.5 MG/3ML) 0.083% nebulizer solution, Take 2.5 mg by nebulization Every 4 (Four) Hours As Needed for Wheezing., Disp: , Rfl:     amLODIPine (NORVASC) 10 MG tablet, Take 0.5 tablets by mouth Daily., Disp: , Rfl:     apixaban (Eliquis) 5 MG tablet tablet, Take 1 tablet by mouth 2 (Two) Times a Day., Disp: 90 tablet, Rfl: 3    atorvastatin (LIPITOR) 80 MG tablet, Take 1 tablet by mouth Daily. (Patient taking differently: Take 1 tablet by mouth Every Night.), Disp: 90 tablet, Rfl: 3    bumetanide (BUMEX) 1 MG tablet, Take 1 tablet by mouth Daily As Needed (weight gain)., Disp: , Rfl:     dapagliflozin Propanediol 10 MG tablet, Take 10 mg by mouth Daily., Disp: 90 tablet, Rfl: 3    diphenhydrAMINE (BENADRYL) 25 mg capsule, Take 2 capsules by mouth At Night As Needed for Allergies or Sleep. Patient states he takes them regularly., Disp: , Rfl:     folic acid (FOLVITE) 400 MCG tablet, Take 1 tablet by mouth Daily., Disp: , Rfl:     metoprolol succinate XL (TOPROL-XL) 100 MG 24 hr tablet, Take 1 tablet by mouth Daily., Disp: 30 tablet, Rfl: 11    Multiple Vitamins-Minerals (MULTIVITAMIN ADULT PO), Take 1 tablet by mouth Daily., Disp: , Rfl:     Omega-3 Fatty Acids (FISH OIL PO), Take 1,000 mg by mouth Every Night., Disp: , Rfl:     omeprazole (priLOSEC) 20 MG capsule, Take 2 capsules by mouth  "Daily., Disp: 180 capsule, Rfl: 4    ondansetron ODT (ZOFRAN-ODT) 8 MG disintegrating tablet, Place 1 tablet on the tongue Every 8 (Eight) Hours As Needed for Nausea or Vomiting., Disp: , Rfl:     saccharomyces boulardii (FLORASTOR) 250 MG capsule, Take 1 capsule by mouth Daily., Disp: , Rfl:     sacubitril-valsartan (ENTRESTO)  MG tablet, Take 1 tablet by mouth 2 (Two) Times a Day., Disp: 180 tablet, Rfl: 3    SITagliptin-metFORMIN HCl ER (JANUMET XR) 100-1000 MG tablet, Take 1 tablet by mouth Daily., Disp: , Rfl:     traMADol (ULTRAM) 50 MG tablet, Take 1 tablet by mouth Every 8 (Eight) Hours As Needed for Moderate Pain., Disp: , Rfl:     vitamin C (ASCORBIC ACID) 500 MG tablet, Take 1 tablet by mouth Every Morning., Disp: , Rfl:      Objective   Vital Signs:  /76 (BP Location: Left arm, Patient Position: Sitting)   Pulse 71   Wt 91.8 kg (202 lb 6.4 oz)   BMI 27.45 kg/m²   Estimated body mass index is 27.45 kg/m² as calculated from the following:    Height as of 8/22/24: 182.9 cm (72\").    Weight as of this encounter: 91.8 kg (202 lb 6.4 oz).    Neck:      Vascular: No JVD.   Pulmonary:      Effort: Pulmonary effort is normal.      Breath sounds: Normal breath sounds.   Cardiovascular:      Normal rate. Regular rhythm. Normal S1. Normal S2.       Murmurs: There is no murmur.      No gallop.  No click. No rub.   Pulses:     Intact distal pulses.   Edema:     Peripheral edema absent.   Abdominal:      Palpations: Abdomen is soft.      Tenderness: There is no abdominal tenderness.   Skin:     General: Skin is warm and dry.   Neurological:      General: No focal deficit present.      Mental Status: Alert and oriented to person, place and time.   Psychiatric:         Behavior: Behavior is cooperative.        Result Review :  The following data was reviewed by: GARY Madrid on 10/02/2024:  BMP   BMP          5/31/2024    05:59 8/22/2024    10:30 10/2/2024    14:59   BMP   BUN 28  20  26  "   Creatinine 1.49  1.53  1.62    Sodium 138  143  140    Potassium 4.2  4.1  3.8    Chloride 105  105  102    CO2 19.0  29.0  24.0    Calcium 7.6  9.3  8.9      ReDS   Lab Results   Component Value Date    ABSOLUTELUNG 25 10/02/2024    ABSOLUTELUNG 28 08/22/2024         Assessment and Plan   Diagnoses and all orders for this visit:    1. Chronic HFrEF (heart failure with reduced ejection fraction) (Primary)  Overall he appears to be doing well.  He has no new concerning symptoms and his exam shows euvolemia.  He has been noting some dizziness on and off but this is primarily when he was ill.  This is improved since he has recovered.  His blood pressures have been adequate on his current medications and I feel that this is less likely associated to his blood pressures at this time.  - Etiology: Hemic cardiomyopathy  - LVEF: 50%  - NYHA Class: II  - BB: Toprol- mg daily  - ACEi/ARB/ARNi: Entresto 97-23 mg twice daily  - SGLT2i: Farxiga 10 mg daily  - MRA: Spironolactone previously held secondary to hyperkalemia.  Will continue to hold today as he has been recently ill and is not having any concerning symptoms.  - Volume: Euvolemic on exam  - Diuretics: Bumex 1 mg as needed  - Electrolytes: Stable on exam  - Na Restriction: 2 g daily  - Fluid Restriction: 2 L daily  - Daily Weight: Trending down on our scales and stable on his home weight log.    2. Coronary artery disease involving native coronary artery of native heart without angina pectoris  3. Primary hypertension  4. Hyperlipidemia LDL goal <70  No anginal symptoms as of late.  He has been doing well with regard to this.  - Continue Lipitor 80 mg daily  - Not on any antiplatelet therapy secondary to anticoagulation.  - Continue Norvasc 5 mg daily    5. Paroxysmal atrial fibrillation  No concerning symptoms.  Appears to be in regular rhythm today in the office.  - Beta-blocker as above  - Continue Eliquis 5 mg twice daily       Follow Up   Return in about 6  weeks (around 11/13/2024) for recheck.    Patient was given instructions and counseling regarding his condition or for health maintenance advice. Please see specific information pulled into the AVS if appropriate.       Hugh Aguillon, GARY  10/02/24  15:47 CDT

## 2024-10-02 NOTE — PATIENT INSTRUCTIONS
Medication Management  Continue  Entresto 97/103mg twice daily  Continue  Toprol-XL  100mg daily  Continue  Farxiga 10mg daily  Continue  Bumex 1mg as needed  Continue Norvasc 5mg daily  Continue Eliquis 5mg twice daily  Continue Lipitor 80mg daily    Weight Monitoring  - Please weigh yourself every morning after you use the restroom while wearing the same amount of clothing.  - Please write down your weight readings in a log and bring that log with you to your next heart failure clinic appointment.  - If your weight increases by 2 lbs in 1 day or 5 lbs in 1 week, you should take your Bumex. If your weight does not come down, please call the heart failure clinic.    Blood Pressure Monitoring  - Please check your blood pressure at least once daily ~2-4 hours after you have taken your morning blood pressure medications.  - Please write down your blood pressure readings in a log and bring that log with you to your next heart failure clinic appointment.    Diet  - It is very important that you monitor your fluid and sodium (salt) intake.  - Please try to limit sodium intake to less than 2,000 mg each day.  - Please try to limit fluid intake to ~2 liters (64 ounces) each day.

## 2024-11-01 RX ORDER — APIXABAN 5 MG/1
5 TABLET, FILM COATED ORAL 2 TIMES DAILY
Qty: 180 TABLET | Refills: 3 | Status: SHIPPED | OUTPATIENT
Start: 2024-11-01

## 2024-11-14 ENCOUNTER — HOSPITAL ENCOUNTER (OUTPATIENT)
Dept: CARDIOLOGY | Facility: HOSPITAL | Age: 79
Discharge: HOME | End: 2024-11-14
Admitting: HOSPITALIST
Payer: MEDICARE

## 2024-11-14 VITALS
HEART RATE: 68 BPM | SYSTOLIC BLOOD PRESSURE: 114 MMHG | DIASTOLIC BLOOD PRESSURE: 66 MMHG | BODY MASS INDEX: 28.13 KG/M2 | WEIGHT: 207.4 LBS | RESPIRATION RATE: 16 BRPM | OXYGEN SATURATION: 98 %

## 2024-11-14 DIAGNOSIS — E78.5 HYPERLIPIDEMIA LDL GOAL <70: ICD-10-CM

## 2024-11-14 DIAGNOSIS — I48.0 PAROXYSMAL ATRIAL FIBRILLATION: ICD-10-CM

## 2024-11-14 DIAGNOSIS — I50.22 CHRONIC HFREF (HEART FAILURE WITH REDUCED EJECTION FRACTION): Primary | ICD-10-CM

## 2024-11-14 DIAGNOSIS — I25.10 CORONARY ARTERY DISEASE INVOLVING NATIVE CORONARY ARTERY OF NATIVE HEART WITHOUT ANGINA PECTORIS: ICD-10-CM

## 2024-11-14 DIAGNOSIS — I10 PRIMARY HYPERTENSION: ICD-10-CM

## 2024-11-14 LAB
ABSOLUTE LUNG FLUID CONTENT: 31 % (ref 20–35)
ANION GAP SERPL CALCULATED.3IONS-SCNC: 13 MMOL/L (ref 5–15)
BUN SERPL-MCNC: 22 MG/DL (ref 8–23)
BUN/CREAT SERPL: 12.6 (ref 7–25)
CALCIUM SPEC-SCNC: 9.4 MG/DL (ref 8.6–10.5)
CHLORIDE SERPL-SCNC: 101 MMOL/L (ref 98–107)
CO2 SERPL-SCNC: 27 MMOL/L (ref 22–29)
CREAT SERPL-MCNC: 1.75 MG/DL (ref 0.76–1.27)
EGFRCR SERPLBLD CKD-EPI 2021: 39.6 ML/MIN/1.73
GLUCOSE SERPL-MCNC: 156 MG/DL (ref 65–99)
POTASSIUM SERPL-SCNC: 3.8 MMOL/L (ref 3.5–5.2)
QT INTERVAL: 424 MS
QTC INTERVAL: 444 MS
SODIUM SERPL-SCNC: 141 MMOL/L (ref 136–145)

## 2024-11-14 PROCEDURE — 94726 PLETHYSMOGRAPHY LUNG VOLUMES: CPT | Performed by: HOSPITALIST

## 2024-11-14 PROCEDURE — 80048 BASIC METABOLIC PNL TOTAL CA: CPT | Performed by: HOSPITALIST

## 2024-11-14 PROCEDURE — 93005 ELECTROCARDIOGRAM TRACING: CPT | Performed by: HOSPITALIST

## 2024-11-14 RX ORDER — BUMETANIDE 2 MG/1
1 TABLET ORAL DAILY
Start: 2024-11-14

## 2024-11-14 NOTE — PROGRESS NOTES
Reason For Visit:  CHF    Subjective        Mani Arreola is a 79 y.o. male with the below pertinent PMH who presents for follow-up of CHF.    Mani Arreola was most recently seen in the CHF clinic 10/2/2024 after previously increasing Entresto back to 97/103 mg twice daily at his visit in August.  At his most recent visit he reported doing overall fair.  The majority of his issues were due to being sick on and off without clear cardiovascular issues.  He did report having some dizziness and shortness of breath with his intermittent illness, but those symptoms had improved.  No significant changes were made to his medications.    Today, patient reports that he has been doing much better recently.  He has gotten back to his baseline without any exertional chest discomfort or abnormal shortness of breath.  He also denies any palpitations, lightheadedness, orthopnea, or peripheral edema.  BP log shows overall good BP control with readings primarily in the 110s/60s.  He has no acute concerns today.    ROS: Pertinent findings are included above.    Cardiac Studies  Echo 9/15/2022: LV moderately dilated with EF 31-35%, diastolic dysfunction noted, normal RV size/function, LA and RA borderline dilated, hemodynamically significant valvular stenosis or regurgitation  Stress SPECT 11/8/2022: Large anterior infarct involving proximal to mid LAD distribution with hypokinesis to akinesis in this distribution.  No significant inducible ischemia.  LVEF 33%.  Cardiac MRI 1/5/2023: Ischemic cardiomyopathy with LVEF 50% and moderate scar burden.  Normal RV systolic function.  Subendocardial infarction involving the anteroseptal, mid anterior, anteroapical, and apical walls with low likelihood of viability (50-75% of transmural thickness scar).  Small pericardial effusion, large pericardial fat pad, trivial bilateral pleural effusions.  Dilated main PA suggest pulmonary hypertension.     Pertinent PMH  HFrEF due to ICM with improved  LVEF (50% on cardiac MRI 1/5/2023)  CAD s/p LAD PCI  Hypertension  Hyperlipidemia  Type 2 diabetes mellitus  Atrial flutter s/p ablation  Atrial fibrillation s/p ablation  Carotid artery stenosis s/p CEA    Pertinent past medical, surgical, family, and social history were reviewed.      Current Outpatient Medications:     acetaminophen (TYLENOL) 650 MG 8 hr tablet, Take 2 tablets by mouth 2 (Two) Times a Day., Disp: , Rfl:     albuterol (PROVENTIL) (2.5 MG/3ML) 0.083% nebulizer solution, Take 2.5 mg by nebulization Every 4 (Four) Hours As Needed for Wheezing., Disp: , Rfl:     amLODIPine (NORVASC) 10 MG tablet, Take 0.5 tablets by mouth Daily., Disp: , Rfl:     apixaban (Eliquis) 5 MG tablet tablet, TAKE 1 TABLET BY MOUTH TWICE DAILY, Disp: 180 tablet, Rfl: 3    atorvastatin (LIPITOR) 80 MG tablet, Take 1 tablet by mouth Daily. (Patient taking differently: Take 1 tablet by mouth Every Night.), Disp: 90 tablet, Rfl: 3    bumetanide (BUMEX) 1 MG tablet, Take 1 tablet by mouth Daily As Needed (weight gain)., Disp: , Rfl:     dapagliflozin Propanediol 10 MG tablet, Take 10 mg by mouth Daily., Disp: 90 tablet, Rfl: 3    diphenhydrAMINE (BENADRYL) 25 mg capsule, Take 2 capsules by mouth At Night As Needed for Allergies or Sleep. Patient states he takes them regularly., Disp: , Rfl:     folic acid (FOLVITE) 400 MCG tablet, Take 1 tablet by mouth Daily., Disp: , Rfl:     metoprolol succinate XL (TOPROL-XL) 100 MG 24 hr tablet, Take 1 tablet by mouth Daily., Disp: 30 tablet, Rfl: 11    Multiple Vitamins-Minerals (MULTIVITAMIN ADULT PO), Take 1 tablet by mouth Daily., Disp: , Rfl:     Omega-3 Fatty Acids (FISH OIL PO), Take 1,000 mg by mouth Every Night., Disp: , Rfl:     omeprazole (priLOSEC) 20 MG capsule, Take 2 capsules by mouth Daily., Disp: 180 capsule, Rfl: 4    ondansetron ODT (ZOFRAN-ODT) 8 MG disintegrating tablet, Place 1 tablet on the tongue Every 8 (Eight) Hours As Needed for Nausea or Vomiting., Disp: , Rfl:      "saccharomyces boulardii (FLORASTOR) 250 MG capsule, Take 1 capsule by mouth Daily., Disp: , Rfl:     sacubitril-valsartan (ENTRESTO)  MG tablet, Take 1 tablet by mouth 2 (Two) Times a Day., Disp: 180 tablet, Rfl: 3    SITagliptin-metFORMIN HCl ER (JANUMET XR) 100-1000 MG tablet, Take 1 tablet by mouth Daily., Disp: , Rfl:     traMADol (ULTRAM) 50 MG tablet, Take 1 tablet by mouth Every 8 (Eight) Hours As Needed for Moderate Pain., Disp: , Rfl:     vitamin C (ASCORBIC ACID) 500 MG tablet, Take 1 tablet by mouth Every Morning., Disp: , Rfl:      Objective   Vital Signs:  /66 (BP Location: Left arm)   Pulse 68   Resp 16   Wt 94.1 kg (207 lb 6.4 oz)   SpO2 98%   BMI 28.13 kg/m²   Estimated body mass index is 28.13 kg/m² as calculated from the following:    Height as of 8/22/24: 182.9 cm (72\").    Weight as of this encounter: 94.1 kg (207 lb 6.4 oz).      Constitutional:       Appearance: Healthy appearance. Not in distress.   Neck:      Vascular: JVD normal.   Pulmonary:      Effort: Pulmonary effort is normal.      Breath sounds: Normal breath sounds.   Cardiovascular:      Normal rate. Regular rhythm.      Murmurs: There is no murmur.      No gallop.  No click. No rub.   Edema:     Peripheral edema absent.   Abdominal:      General: There is no distension.      Palpations: Abdomen is soft.      Tenderness: There is no abdominal tenderness.   Skin:     General: Skin is warm and dry.   Neurological:      Mental Status: Alert and oriented to person, place and time.        Result Review :  The following data was reviewed by: Dominic Aguirre MD on 11/14/2024:  BMP   BMP          8/22/2024    10:30 10/2/2024    14:59 11/14/2024    10:09   BMP   BUN 20  26  22    Creatinine 1.53  1.62  1.75    Sodium 143  140  141    Potassium 4.1  3.8  3.8    Chloride 105  102  101    CO2 29.0  24.0  27.0    Calcium 9.3  8.9  9.4        Assessment and Plan   Diagnoses and all orders for this visit:    1. Chronic HFrEF (heart " failure with reduced ejection fraction) (Primary)  -     Basic Metabolic Panel; Future  -     ReDs Vest  -     Basic Metabolic Panel; Standing  -     Basic Metabolic Panel    2. Primary hypertension    3. Coronary artery disease involving native coronary artery of native heart without angina pectoris    4. Hyperlipidemia LDL goal <70    5. Paroxysmal atrial fibrillation    Other orders  -     ECG 12 Lead Other; AFIB; Standing  -     ECG 12 Lead Other; AFIB  -     bumetanide (BUMEX) 2 MG tablet; Take 0.5 tablets by mouth Daily.      -Overall appears to be doing well.  He appears relatively euvolemic on exam although does have very slight increase in creatinine.  Weight has been stable recently.  Given rising creatinine, I do think it may be reasonable to reduce his Bumex (he actually has been taking Bumex 2 mg once daily).  - Reduce Bumex to 1 mg daily and have him increase to 2 mg as needed for weight gain or swelling  - Continue Entresto 97/103 mg twice daily  - Continue Toprol- mg daily  - Continue Farxiga 10 mg daily  - Not currently on spironolactone; could consider adding this back in the future if renal function stabilizes  - Continue amlodipine 5 mg daily although may reduce or discontinue this in the future if BP remains consistently less than 120  - Continue atorvastatin 80 mg daily  - Continue Eliquis 5 mg twice daily    Follow Up   Return in about 10 weeks (around 1/23/2025).  Patient was given instructions and counseling regarding his condition or for health maintenance advice. Please see specific information pulled into the AVS if appropriate.       EMR Dragon/Transcription disclaimer: Much of this encounter note is an electronic transcription/translation of spoken language to printed text. The electronic translation of spoken language may permit erroneous, or at times, nonsensical words or phrases to be inadvertently transcribed; although I have reviewed the note for such errors, some may still  exist.

## 2024-11-14 NOTE — PROGRESS NOTES
Heart Failure Clinic    Date:  11/14/24     Vitals:    11/14/24 1016   BP: 114/66   Pulse: 68   Resp: 16   SpO2: 98%        Indication:  Heart Failure     Procedure:  ReDS device sensor unit applied to right side of chest and right side of back.  Appropriate positioning confirmed based off of the unit's calculation.  Chest measurement obtained with the chest size ruler.  Measurement session performed over 45 seconds.      Method of arrival:  Ambulatory with cane    Weighing self daily:  Yes    Taking medications as prescribed:  Yes    Edema:  No    Shortness of Air:  No    Number of pillows used at night:  <2    Results:  ReDS Value=             31             Interpretation:  25-35% - normal/ideal lung fluid content    Bertha Young RN 11/14/24 10:17 CST

## 2024-12-05 ENCOUNTER — HOSPITAL ENCOUNTER (OUTPATIENT)
Dept: CARDIOLOGY | Facility: HOSPITAL | Age: 79
Discharge: HOME | End: 2024-12-05
Admitting: HOSPITALIST
Payer: MEDICARE

## 2024-12-05 VITALS
OXYGEN SATURATION: 99 % | RESPIRATION RATE: 16 BRPM | WEIGHT: 208 LBS | DIASTOLIC BLOOD PRESSURE: 73 MMHG | HEART RATE: 68 BPM | BODY MASS INDEX: 28.21 KG/M2 | SYSTOLIC BLOOD PRESSURE: 145 MMHG

## 2024-12-05 DIAGNOSIS — I50.22 CHRONIC HFREF (HEART FAILURE WITH REDUCED EJECTION FRACTION): Primary | ICD-10-CM

## 2024-12-05 LAB
ABSOLUTE LUNG FLUID CONTENT: 30 % (ref 20–35)
ANION GAP SERPL CALCULATED.3IONS-SCNC: 12 MMOL/L (ref 5–15)
BUN SERPL-MCNC: 24 MG/DL (ref 8–23)
BUN/CREAT SERPL: 13.6 (ref 7–25)
CALCIUM SPEC-SCNC: 9.2 MG/DL (ref 8.6–10.5)
CHLORIDE SERPL-SCNC: 101 MMOL/L (ref 98–107)
CO2 SERPL-SCNC: 27 MMOL/L (ref 22–29)
CREAT SERPL-MCNC: 1.76 MG/DL (ref 0.76–1.27)
EGFRCR SERPLBLD CKD-EPI 2021: 39.3 ML/MIN/1.73
GLUCOSE SERPL-MCNC: 203 MG/DL (ref 65–99)
POTASSIUM SERPL-SCNC: 4.1 MMOL/L (ref 3.5–5.2)
SODIUM SERPL-SCNC: 140 MMOL/L (ref 136–145)

## 2024-12-05 PROCEDURE — 80048 BASIC METABOLIC PNL TOTAL CA: CPT | Performed by: HOSPITALIST

## 2024-12-05 PROCEDURE — 94726 PLETHYSMOGRAPHY LUNG VOLUMES: CPT | Performed by: HOSPITALIST

## 2024-12-05 NOTE — PROGRESS NOTES
Heart Failure Clinic    Date:  12/05/24     Vitals:    12/05/24 1017   BP: 145/73   Pulse: 68   Resp: 16   SpO2: 99%        Indication:  Heart Failure     Procedure:  ReDS device sensor unit applied to right side of chest and right side of back.  Appropriate positioning confirmed based off of the unit's calculation.  Chest measurement obtained with the chest size ruler.  Measurement session performed over 45 seconds.      Method of arrival:  Ambulatory with cane    Weighing self daily:  Yes    Taking medications as prescribed:  Yes    Edema:  No    Shortness of Air:  No    Number of pillows used at night:  <2    Results:  ReDS Value=            30              Interpretation:  25-35% - normal/ideal lung fluid content    Bertha Young RN 12/05/24 11:01 CST

## 2024-12-09 ENCOUNTER — OFFICE VISIT (OUTPATIENT)
Dept: CARDIOLOGY | Facility: CLINIC | Age: 79
End: 2024-12-09
Payer: MEDICARE

## 2024-12-09 VITALS
BODY MASS INDEX: 26.41 KG/M2 | WEIGHT: 195 LBS | HEART RATE: 51 BPM | HEIGHT: 72 IN | SYSTOLIC BLOOD PRESSURE: 120 MMHG | DIASTOLIC BLOOD PRESSURE: 64 MMHG

## 2024-12-09 DIAGNOSIS — I50.22 CHRONIC HFREF (HEART FAILURE WITH REDUCED EJECTION FRACTION): ICD-10-CM

## 2024-12-09 DIAGNOSIS — I48.4 ATYPICAL ATRIAL FLUTTER: ICD-10-CM

## 2024-12-09 DIAGNOSIS — I10 PRIMARY HYPERTENSION: ICD-10-CM

## 2024-12-09 DIAGNOSIS — I48.0 PAROXYSMAL ATRIAL FIBRILLATION: Primary | ICD-10-CM

## 2024-12-09 NOTE — PROGRESS NOTES
[facility] Electrophysiology   Reason For Visit:  Congestive Heart Failure     Subjective        EP History:  1.  Typical atrial flutter  -11/14/22:  CTI ablation  2.  Atypical atrial flutter  -EPS 12-29-22: mitral annual flutter s/p anteroseptal mitral line, repeat PVI, posterior wall isolation  3. Persistent afib  -PVI      Cardiology history:  1.  Hypertension  2.  Hyperlipidemia  3.  Coronary artery disease w/ Prior MI and PCI  4.  Chronic systolic heart failure  -9/7/2022: EF 31 to 35%  -1/2023 Cardiac MRI 50%, nonviable LAD territory  5.  COTY with carotid endarterectomy (2004)     Medical History:   1.  Type 2 diabetes  2.  Obesity      Mani Arreola is a 79 y.o. male with above pertinent PMH who presents for follow-up of atrial flutter and atrial fibrillation.  Still being followed closely by the heart failure clinic and having adjustments to his medication regimen given worsening renal function.  Patient is been doing well from electrophysiology standpoint.  Denies any palpitations or fluttering.  Tolerating his blood thinner well with no bleeding concerns.  Denies any significant dizziness or lightheadedness.    ROS: Pertinent findings as noted above        Pertinent past medical, surgical, family, and social history were reviewed.      Current Outpatient Medications:     acetaminophen (TYLENOL) 650 MG 8 hr tablet, Take 2 tablets by mouth 2 (Two) Times a Day., Disp: , Rfl:     albuterol (PROVENTIL) (2.5 MG/3ML) 0.083% nebulizer solution, Take 2.5 mg by nebulization Every 4 (Four) Hours As Needed for Wheezing., Disp: , Rfl:     amLODIPine (NORVASC) 10 MG tablet, Take 0.5 tablets by mouth Daily., Disp: , Rfl:     apixaban (Eliquis) 5 MG tablet tablet, TAKE 1 TABLET BY MOUTH TWICE DAILY, Disp: 180 tablet, Rfl: 3    atorvastatin (LIPITOR) 80 MG tablet, Take 1 tablet by mouth Daily. (Patient taking differently: Take 1 tablet by mouth Every Night.), Disp: 90 tablet, Rfl: 3    bumetanide (BUMEX) 2 MG tablet,  "Take 0.5 tablets by mouth Daily., Disp: , Rfl:     dapagliflozin Propanediol 10 MG tablet, Take 10 mg by mouth Daily., Disp: 90 tablet, Rfl: 3    diphenhydrAMINE (BENADRYL) 25 mg capsule, Take 2 capsules by mouth At Night As Needed for Allergies or Sleep. Patient states he takes them regularly., Disp: , Rfl:     folic acid (FOLVITE) 400 MCG tablet, Take 1 tablet by mouth Daily., Disp: , Rfl:     metoprolol succinate XL (TOPROL-XL) 100 MG 24 hr tablet, Take 1 tablet by mouth Daily., Disp: 30 tablet, Rfl: 11    Multiple Vitamins-Minerals (MULTIVITAMIN ADULT PO), Take 1 tablet by mouth Daily., Disp: , Rfl:     Omega-3 Fatty Acids (FISH OIL PO), Take 1,000 mg by mouth Every Night., Disp: , Rfl:     omeprazole (priLOSEC) 20 MG capsule, Take 2 capsules by mouth Daily., Disp: 180 capsule, Rfl: 4    ondansetron ODT (ZOFRAN-ODT) 8 MG disintegrating tablet, Place 1 tablet on the tongue Every 8 (Eight) Hours As Needed for Nausea or Vomiting., Disp: , Rfl:     saccharomyces boulardii (FLORASTOR) 250 MG capsule, Take 1 capsule by mouth Daily., Disp: , Rfl:     sacubitril-valsartan (ENTRESTO)  MG tablet, Take 1 tablet by mouth 2 (Two) Times a Day., Disp: 180 tablet, Rfl: 3    SITagliptin-metFORMIN HCl ER (JANUMET XR) 100-1000 MG tablet, Take 1 tablet by mouth Daily., Disp: , Rfl:     traMADol (ULTRAM) 50 MG tablet, Take 1 tablet by mouth Every 8 (Eight) Hours As Needed for Moderate Pain., Disp: , Rfl:     vitamin C (ASCORBIC ACID) 500 MG tablet, Take 1 tablet by mouth Every Morning., Disp: , Rfl:      Objective   Vital Signs:  /64   Pulse 51   Ht 182.9 cm (72\")   Wt 88.5 kg (195 lb)   BMI 26.45 kg/m²   Estimated body mass index is 26.45 kg/m² as calculated from the following:    Height as of this encounter: 182.9 cm (72\").    Weight as of this encounter: 88.5 kg (195 lb).      Constitutional:       Appearance: Healthy appearance. Not in distress.   Neck:      Vascular: JVD normal.   Pulmonary:      Effort: " Pulmonary effort is normal.      Breath sounds: Normal breath sounds. No wheezing. No rhonchi. No rales.   Cardiovascular:      PMI at left midclavicular line. Normal rate. Regular rhythm. Normal S1. Normal S2.       Murmurs: There is no murmur.      No gallop.  No click. No rub.   Edema:     Peripheral edema absent.   Musculoskeletal: Normal range of motion.      Cervical back: Normal range of motion. Skin:     General: Skin is warm and dry.   Neurological:      Mental Status: Alert and oriented to person, place and time.        Result Review :  The following data was reviewed by: GARY Crawford on 12/09/2024:  CMP   CMP          10/2/2024    14:59 11/14/2024    10:09 12/5/2024    10:08   CMP   Glucose 210  156  203    BUN 26  22  24    Creatinine 1.62  1.75  1.76    EGFR 43.4  39.6  39.3    Sodium 140  141  140    Potassium 3.8  3.8  4.1    Chloride 102  101  101    Calcium 8.9  9.4  9.2    BUN/Creatinine Ratio 16.0  12.6  13.6    Anion Gap 14.0  13.0  12.0      CBC   CBC          5/29/2024    03:59 5/29/2024    06:21 5/30/2024    04:21 5/31/2024    05:59   CBC   WBC 8.39  8.11  6.47  10.88    RBC 4.39  4.14  4.26  3.88    Hemoglobin 11.7  11.0  11.1  10.2    Hematocrit 36.5  33.8  35.2  31.9    MCV 83.1  81.6  82.6  82.2    MCH 26.7  26.6  26.1  26.3    MCHC 32.1  32.5  31.5  32.0    RDW 15.9  15.8  15.7  15.9    Platelets 211  188  220  223      Lipid Panel   Lipid Panel          8/22/2024    10:30 10/2/2024    14:59   Lipid Panel   Total Cholesterol 111  117    Triglycerides 81  138    HDL Cholesterol 57  52    VLDL Cholesterol 16  24    LDL Cholesterol  38  41    LDL/HDL Ratio 0.66  0.72      BMP   BMP          10/2/2024    14:59 11/14/2024    10:09 12/5/2024    10:08   BMP   BUN 26  22  24    Creatinine 1.62  1.75  1.76    Sodium 140  141  140    Potassium 3.8  3.8  4.1    Chloride 102  101  101    CO2 24.0  27.0  27.0    Calcium 8.9  9.4  9.2      Data reviewed : Cardiology studies echo      Results  for orders placed during the hospital encounter of 09/15/22    Adult Transthoracic Echo Complete W/ Cont if Necessary Per Protocol    Interpretation Summary  · The left ventricular cavity is moderately dilated.  · The right atrial cavity is borderline dilated.  · Left ventricular diastolic dysfunction is noted.  · The following left ventricular wall segments are hypokinetic: mid anterior, apical anterior, mid anterolateral, apical lateral, apical inferior, mid inferior, mid anteroseptal and basal anterior. The following left ventricular wall segments are akinetic: apical septal and apex.  · Left ventricular ejection fraction appears to be 31 - 35%. Left ventricular systolic function is severely decreased.    SEVERE ISCHEMIC CARDIOMYOPATHY         Assessment and Plan   Diagnoses and all orders for this visit:    1. Paroxysmal atrial fibrillation (Primary)  2. Atypical atrial flutter  -Maintaining normal sinus rhythm  - Continue Eliquis 5 mg twice daily as [id number] score is elevated  - Continue Toprol- mg daily (mainly for GDMT for heart failure)  - Follow-up in EP clinic in 6 months      3. Chronic HFrEF (heart failure with reduced ejection fraction)  4. Primary hypertension  -Patient appears relatively euvolemic on exam today  - He did not tolerate reducing Bumex per patient report  - Continue optimization of GDMT per heart failure clinic               Follow Up   Return in about 6 months (around 6/9/2025).  Patient was given instructions and counseling regarding his condition or for health maintenance advice. Please see specific information pulled into the AVS if appropriate.       Part of this note may be an electronic transcription/translation of spoken language to printed text using the Dragon Dictation System.

## 2025-01-23 ENCOUNTER — HOSPITAL ENCOUNTER (OUTPATIENT)
Dept: CARDIOLOGY | Facility: HOSPITAL | Age: 80
Discharge: HOME | End: 2025-01-23
Admitting: NURSE PRACTITIONER
Payer: MEDICARE

## 2025-01-23 VITALS
HEART RATE: 68 BPM | BODY MASS INDEX: 29.57 KG/M2 | DIASTOLIC BLOOD PRESSURE: 67 MMHG | WEIGHT: 218 LBS | SYSTOLIC BLOOD PRESSURE: 137 MMHG | OXYGEN SATURATION: 99 %

## 2025-01-23 DIAGNOSIS — I50.20 HFREF (HEART FAILURE WITH REDUCED EJECTION FRACTION): Primary | ICD-10-CM

## 2025-01-23 DIAGNOSIS — E78.5 HYPERLIPIDEMIA LDL GOAL <70: ICD-10-CM

## 2025-01-23 DIAGNOSIS — I48.0 PAROXYSMAL ATRIAL FIBRILLATION: ICD-10-CM

## 2025-01-23 DIAGNOSIS — I25.10 CORONARY ARTERY DISEASE INVOLVING NATIVE CORONARY ARTERY OF NATIVE HEART WITHOUT ANGINA PECTORIS: ICD-10-CM

## 2025-01-23 DIAGNOSIS — I10 PRIMARY HYPERTENSION: ICD-10-CM

## 2025-01-23 LAB
ABSOLUTE LUNG FLUID CONTENT: 34 % (ref 20–35)
ANION GAP SERPL CALCULATED.3IONS-SCNC: 11 MMOL/L (ref 5–15)
BUN SERPL-MCNC: 25 MG/DL (ref 8–23)
BUN/CREAT SERPL: 14.5 (ref 7–25)
CALCIUM SPEC-SCNC: 9.5 MG/DL (ref 8.6–10.5)
CHLORIDE SERPL-SCNC: 104 MMOL/L (ref 98–107)
CO2 SERPL-SCNC: 26 MMOL/L (ref 22–29)
CREAT SERPL-MCNC: 1.72 MG/DL (ref 0.76–1.27)
EGFRCR SERPLBLD CKD-EPI 2021: 40.4 ML/MIN/1.73
GLUCOSE SERPL-MCNC: 194 MG/DL (ref 65–99)
POTASSIUM SERPL-SCNC: 4.6 MMOL/L (ref 3.5–5.2)
SODIUM SERPL-SCNC: 141 MMOL/L (ref 136–145)

## 2025-01-23 PROCEDURE — 94726 PLETHYSMOGRAPHY LUNG VOLUMES: CPT | Performed by: NURSE PRACTITIONER

## 2025-01-23 PROCEDURE — 80048 BASIC METABOLIC PNL TOTAL CA: CPT | Performed by: NURSE PRACTITIONER

## 2025-01-23 PROCEDURE — 93005 ELECTROCARDIOGRAM TRACING: CPT | Performed by: NURSE PRACTITIONER

## 2025-01-23 NOTE — PROGRESS NOTES
Heart Failure Clinic Progress Note  Reason For Visit:  CHF    Subjective    Mani Arreola is a 79 y.o. male with the below pertinent PMH who presents for follow-up of CHF.    Mani Arreola was most recently seen in the heart failure clinic on 12/5/2024 for a nurse visit.  However, he was seen on 11/14/2024 last for provider visit.  At that time he had noted that he was doing much better.  He felt that he is back in his baseline without any chest discomfort shortness of breath.  His blood pressure log was performed.  He had no acute issues at that time.  He does continue to have elevated creatinine at that time.  In the nurse visit on 12/5/2024 his renal function had not returned to baseline.    He continues to note that he is feeling great.  He decreased his Bumex and feels that everything is stable.  He denies any shortness of breath, lightheadedness, dizziness, chest pain, or peripheral edema.  He is up 10lbs since last visit and his ReDs is 34%.  He attributes his weight gain to more clothing and less activity during the winter.  He is normal sinus rhythm EKG today.    Review of Systems   Constitutional: Negative for malaise/fatigue.   Cardiovascular:  Negative for chest pain, dyspnea on exertion, leg swelling, orthopnea, palpitations and syncope.   Respiratory:  Negative for sleep disturbances due to breathing.    Neurological:  Negative for dizziness and light-headedness.     Pertinent PMH  HFrEF due to ICM with improved LVEF (50% on cardiac MRI 1/5/2023)  CAD s/p LAD PCI  Hypertension  Hyperlipidemia  Type 2 diabetes mellitus  Atrial flutter s/p ablation  Atrial fibrillation s/p ablation  Carotid artery stenosis s/p CEA    Pertinent past medical, surgical, family, and social history were reviewed.    Current Outpatient Medications   Medication Instructions    acetaminophen (TYLENOL) 1,300 mg, 2 Times Daily    albuterol (PROVENTIL) 2.5 mg, Every 4 Hours PRN    amLODIPine (NORVASC) 5 mg, Oral, Daily     "atorvastatin (LIPITOR) 80 mg, Oral, Daily    bumetanide (BUMEX) 1 mg, Oral, Daily    dapagliflozin Propanediol 10 mg, Oral, Daily    diphenhydrAMINE (BENADRYL) 50 mg, Nightly PRN    Eliquis 5 mg, Oral, 2 Times Daily    folic acid (FOLVITE) 400 mcg, Daily    metoprolol succinate XL (TOPROL-XL) 100 mg, Oral, Daily    Multiple Vitamins-Minerals (MULTIVITAMIN ADULT PO) 1 tablet, Daily    Omega-3 Fatty Acids (FISH OIL PO) 1,000 mg, Nightly    omeprazole (PRILOSEC) 40 mg, Oral, Daily    ondansetron ODT (ZOFRAN-ODT) 8 mg, Every 8 Hours PRN    saccharomyces boulardii (FLORASTOR) 250 mg, Daily    sacubitril-valsartan (ENTRESTO)  MG tablet 1 tablet, Oral, 2 Times Daily    SITagliptin-metFORMIN HCl ER (JANUMET XR) 100-1000 MG tablet 1 tablet, Daily    traMADol (ULTRAM) 50 mg, Every 8 Hours PRN    vitamin C (ASCORBIC ACID) 500 mg, Every Morning        Objective   Vital Signs:  /67 (BP Location: Left arm, Patient Position: Sitting)   Pulse 68   Wt 98.9 kg (218 lb)   SpO2 99%   BMI 29.57 kg/m²   Estimated body mass index is 29.57 kg/m² as calculated from the following:    Height as of 12/9/24: 182.9 cm (72\").    Weight as of this encounter: 98.9 kg (218 lb).    Neck:      Vascular: No JVD.   Pulmonary:      Effort: Pulmonary effort is normal.      Breath sounds: Normal breath sounds.   Cardiovascular:      Normal rate. Regular rhythm. Normal S1. Normal S2.       Murmurs: There is no murmur.      No gallop.  No click. No rub.   Pulses:     Intact distal pulses.   Edema:     Peripheral edema absent.   Abdominal:      Palpations: Abdomen is soft.      Tenderness: There is no abdominal tenderness.   Skin:     General: Skin is warm and dry.   Neurological:      General: No focal deficit present.      Mental Status: Alert and oriented to person, place and time.   Psychiatric:         Behavior: Behavior is cooperative.        The following data was reviewed by myself, GARY Russell  [facility]          11/14/2024    " 10:09 12/5/2024    10:08 1/23/2025    10:47   BMP   BUN 22  24  25    Creatinine 1.75  1.76  1.72    Sodium 141  140  141    Potassium 3.8  4.1  4.6    Chloride 101  101  104    CO2 27.0  27.0  26.0    Calcium 9.4  9.2  9.5      ReDS   Lab Results   Component Value Date    ABSOLUTELUNG 34 01/23/2025    ABSOLUTELUNG 30 12/05/2024    ABSOLUTELUNG 31 11/14/2024       Results for orders placed during the hospital encounter of 09/15/22    Adult Transthoracic Echo Complete W/ Cont if Necessary Per Protocol    Interpretation Summary  · The left ventricular cavity is moderately dilated.  · The right atrial cavity is borderline dilated.  · Left ventricular diastolic dysfunction is noted.  · The following left ventricular wall segments are hypokinetic: mid anterior, apical anterior, mid anterolateral, apical lateral, apical inferior, mid inferior, mid anteroseptal and basal anterior. The following left ventricular wall segments are akinetic: apical septal and apex.  · Left ventricular ejection fraction appears to be 31 - 35%. Left ventricular systolic function is severely decreased.    SEVERE ISCHEMIC CARDIOMYOPATHY       Assessment   Assessment and Plan  Diagnoses and all orders for this visit:    1. HFrEF (heart failure with reduced ejection fraction) (Primary)  2. Primary hypertension  3. Coronary artery disease involving native coronary artery of native heart without angina pectoris  4. Hyperlipidemia LDL goal <70  5. Paroxysmal atrial fibrillation  Overall he appears to be doing well.  He has no new complaints and denies any associated heart failure symptoms.  He does have a 10 pound weight gain although I am not overly sure that this is associated primarily daily fluid gain.  He attributes some of this to heavier clothing, including his boots, in addition to less physical activity.  He has no JVD, no peripheral edema, and no abnormal lung sounds.  I do believe he is euvolemic despite his weight gain today.  Normal sinus  rhythm today.  - Kidney function overall is remaining stable.  And is actually slightly improved since last being seen.  There was some consideration to potentially de-escalate some of his heart failure therapy.  However, due to the stability of his renal function and overall course of his heart failure I feel that we should avoid this if at all possible.  I discussed this with the patient he is understanding.  - Bumex 1 mg daily  - Continue Entresto 97/103 mg twice daily  - Toprol- mg daily  - Farxiga 10 mg daily  - Amlodipine 5mg daily  - Atorvastatin 80 mg daily  - Eliquis 5 mg twice daily  - Recommend and encourage adequate fluid intake.        Follow Up:  Return in about 3 months (around 4/23/2025) for recheck.    Patient was given instructions and counseling regarding his condition or for health maintenance advice.      Hugh Aguillon, GARY  01/23/25  11:27 CST

## 2025-01-23 NOTE — PROGRESS NOTES
Heart Failure Clinic    Date:  01/23/25     Vitals:    01/23/25 1056   BP: 137/67   Pulse: 68   SpO2: 99%        Indication:  Heart Failure     Procedure:  ReDS device sensor unit applied to right side of chest and right side of back.  Appropriate positioning confirmed based off of the unit's calculation.  Chest measurement obtained with the chest size ruler.  Measurement session performed over 45 seconds.      Method of arrival:  Ambulatory with cane    Weighing self daily:  Yes    Taking medications as prescribed:  Yes    Edema:  Yes and 2+ pitting edema to left lower leg    Shortness of Air:  No    Number of pillows used at night:  1    Results:  ReDS Value=   34                       Interpretation:  25-35% - normal/ideal lung fluid content    Mallory L Haase, RN 01/23/25 10:57 CST

## 2025-01-24 LAB
QT INTERVAL: 400 MS
QTC INTERVAL: 425 MS

## 2025-02-20 ENCOUNTER — CLINICAL SUPPORT (OUTPATIENT)
Dept: GASTROENTEROLOGY | Facility: CLINIC | Age: 80
End: 2025-02-20
Payer: MEDICARE

## 2025-02-20 DIAGNOSIS — Z85.038 PERSONAL HISTORY OF COLON CANCER: Primary | ICD-10-CM

## 2025-02-20 DIAGNOSIS — D68.9 COAGULOPATHY: ICD-10-CM

## 2025-02-20 RX ORDER — METFORMIN HYDROCHLORIDE 500 MG/1
500 TABLET, EXTENDED RELEASE ORAL
COMMUNITY
Start: 2025-01-28

## 2025-02-20 RX ORDER — COLESTIPOL HYDROCHLORIDE 1 G/1
1 TABLET ORAL DAILY
COMMUNITY
Start: 2025-01-28

## 2025-02-20 RX ORDER — ORAL SEMAGLUTIDE 7 MG/1
7 TABLET ORAL
COMMUNITY
Start: 2025-01-30

## 2025-02-20 NOTE — PROGRESS NOTES
[facility] Gastroenterology    Primary Physician Tyree Fleming MD    2/20/2025    Mani Arreola   1945      Chief Complaint   Patient presents with    Colonoscopy     Colon recall       Subjective     HPI    Mani Arreola is a 79 y.o. male who presents as a referral for preventative maintenance. He has no complaints of nausea or vomiting. No change in bowels. No wt loss. No BRBPR. No melena. No abdominal pain.       He is on eliquis. Dr. Aguirre.    He is on rybelsus.         COLONOSCOPY (02/15/2022 10:31) colon polyps not retrieved. Recall 3 years. He has personal history of colon cancer 2004.         Past Medical History:   Diagnosis Date    Arthritis     Colon cancer     colon    Colon polyp     Coronary artery disease     stents x 2    Diabetes mellitus     Hyperlipidemia     Hypertension     Myocardial infarction        Past Surgical History:   Procedure Laterality Date    CARDIAC CATHETERIZATION      stents x 2    CARDIAC ELECTROPHYSIOLOGY PROCEDURE N/A 11/14/2022    Procedure: EP/Ablation, atrial flutter;  Surgeon: Francisca Cote MD;  Location:  [facility] CATH INVASIVE LOCATION;  Service: Cardiology;  Laterality: N/A;    CARDIAC ELECTROPHYSIOLOGY PROCEDURE N/A 12/29/2022    Procedure: EP/Ablation, atypical atrial flutter;  Surgeon: Francisca Cote MD;  Location:  [facility] CATH INVASIVE LOCATION;  Service: Cardiology;  Laterality: N/A;    CAROTID ENDARTERECTOMY Right     CATARACT EXTRACTION      CHOLECYSTECTOMY      COLON RESECTION      for colon cancer    COLONOSCOPY  02/2004    COLONOSCOPY  09/2005    normal    COLONOSCOPY  10/2007    recall 3 yr    COLONOSCOPY  11/01/2010    5mm polypectomy distal transverse colon, patent end to end ileo colonic anastomosis    COLONOSCOPY N/A 11/16/2016    Procedure: COLONOSCOPY WITH ANESTHESIA;  Surgeon: Cirilo Bhandari MD;  Location: [facility] ENDOSCOPY;  Service:     COLONOSCOPY N/A 1/11/2019    Procedure: COLONOSCOPY WITH ANESTHESIA;  Surgeon: Cirilo Bhandari,  MD;  Location:  [facility] ENDOSCOPY;  Service: Gastroenterology    COLONOSCOPY N/A 2/15/2022    Procedure: COLONOSCOPY WITH ANESTHESIA;  Surgeon: Cirilo Bhandari MD;  Location:  [facility] ENDOSCOPY;  Service: Gastroenterology;  Laterality: N/A;  pre colon cancer  post polyp;hemorrhoids      HYDROCELE EXCISION / REPAIR      LUMBAR FUSION Left 3/13/2017    Procedure: LEFT LUMBAR LATERAL INTERBODY FUSION WITH INSTRUMENTATION L4-5 , LANX,  NUVASIVE MONITORING ;  Surgeon: DALY Morgan MD;  Location:  [facility] OR;  Service:     LUMBAR FUSION N/A 3/15/2017    Procedure: POSTERIOR SPINAL FUSION WITH INSTRUMENTATION L4-5;  Surgeon: DALY Morgan MD;  Location:  [facility] OR;  Service:     PERIPHERAL ARTERIAL STENT GRAFT         Outpatient Medications Marked as Taking for the 2/20/25 encounter (Clinical Support) with Nitza Carvalho APRN   Medication Sig Dispense Refill    acetaminophen (TYLENOL) 650 MG 8 hr tablet Take 2 tablets by mouth 2 (Two) Times a Day.      albuterol (PROVENTIL) (2.5 MG/3ML) 0.083% nebulizer solution Take 2.5 mg by nebulization Every 4 (Four) Hours As Needed for Wheezing.      amLODIPine (NORVASC) 10 MG tablet Take 0.5 tablets by mouth Daily.      apixaban (Eliquis) 5 MG tablet tablet TAKE 1 TABLET BY MOUTH TWICE DAILY 180 tablet 3    atorvastatin (LIPITOR) 80 MG tablet Take 1 tablet by mouth Daily. (Patient taking differently: Take 1 tablet by mouth Every Night.) 90 tablet 3    bumetanide (BUMEX) 2 MG tablet Take 0.5 tablets by mouth Daily.      colestipol (COLESTID) 1 g tablet Take 1 tablet by mouth Daily.      dapagliflozin Propanediol 10 MG tablet Take 10 mg by mouth Daily. 90 tablet 3    diphenhydrAMINE (BENADRYL) 25 mg capsule Take 2 capsules by mouth At Night As Needed for Allergies or Sleep. Patient states he takes them regularly.      folic acid (FOLVITE) 400 MCG tablet Take 1 tablet by mouth Daily.      metFORMIN ER (GLUCOPHAGE-XR) 500 MG 24 hr tablet Take 1 tablet by mouth Daily With  Breakfast.      metoprolol succinate XL (TOPROL-XL) 100 MG 24 hr tablet Take 1 tablet by mouth Daily. 30 tablet 11    Multiple Vitamins-Minerals (MULTIVITAMIN ADULT PO) Take 1 tablet by mouth Daily.      Omega-3 Fatty Acids (FISH OIL PO) Take 1,000 mg by mouth Every Night.      omeprazole (priLOSEC) 20 MG capsule Take 2 capsules by mouth Daily. 180 capsule 4    ondansetron ODT (ZOFRAN-ODT) 8 MG disintegrating tablet Place 1 tablet on the tongue Every 8 (Eight) Hours As Needed for Nausea or Vomiting.      Rybelsus 7 MG tablet Take 7 mg by mouth.      sacubitril-valsartan (ENTRESTO)  MG tablet Take 1 tablet by mouth 2 (Two) Times a Day. 180 tablet 3    traMADol (ULTRAM) 50 MG tablet Take 1 tablet by mouth Every 8 (Eight) Hours As Needed for Moderate Pain.      vitamin C (ASCORBIC ACID) 500 MG tablet Take 1 tablet by mouth Every Morning.         No Known Allergies    Social History     Socioeconomic History    Marital status:    Tobacco Use    Smoking status: Every Day     Types: Pipe    Smokeless tobacco: Never   Vaping Use    Vaping status: Never Used   Substance and Sexual Activity    Alcohol use: Yes     Comment: occ    Drug use: No    Sexual activity: Defer       Family History   Problem Relation Age of Onset    Heart disease Father     Esophageal cancer Father     Colon cancer Neg Hx     Colon polyps Neg Hx        Review of Systems   Constitutional:  Negative for chills, fever and unexpected weight change.   Respiratory:  Negative for shortness of breath.    Cardiovascular:  Negative for chest pain.   Gastrointestinal:  Negative for abdominal distention, abdominal pain, anal bleeding, blood in stool, constipation, diarrhea, nausea and vomiting.       Objective     There were no vitals filed for this visit.  There were no vitals filed for this visit.  There is no height or weight on file to calculate BMI.    Physical Exam    Imaging Results (Most Recent)       None            Assessment & Plan      Diagnoses and all orders for this visit:    1. Personal history of colon cancer (Primary)    2. Coagulopathy  Comments:  eliquis             Plan for colonoscopy. However prior to scheduling I will send a letter to Dr. Aguirre in regards to if the patient can safely hold eliquis 2 days prior to procedure. I will contact patient once he has responded.  I have instructed the patient to contact our office if he has not heard from us within 2 weeks.      The patient was advised on the risks of stopping blood thinners for a procedure.  The risks discussed included the risk of developing myocardial infarction, CVA, embolus, clogging of the arteries or stents, etc.  We discussed the potential consequences of the risks discussed.  The benefits of stopping as well as the alternatives were discussed as well.   Patient is to hold their anticoagulation medication per the direction of their prescribing physician, Dr Aguirre.    A letter will be sent to prescribing This is to prevent any risk or complication from bleeding intra and post procedure. If they develop bleeding post procedure they are to go the emergency department for further evaluation and treatment immediately.       Labs 1-23-25 creatinine 1.72.        * Surgery not found *  All risks, benefits, alternatives, and indications of colonoscopy procedure have been discussed with the patient. Risks to include perforation of the colon requiring possible surgery or colostomy, risk of bleeding from biopsies or removal of colon tissue, possibility of missing a colon polyp or cancer, or adverse drug reaction.  Benefits to include the diagnosis and management of disease of the colon and rectum. Alternatives to include barium enema, radiographic evaluation, lab testing or no intervention. Pt verbalizes understanding and agrees.     There are no Patient Instructions on file for this visit.    GARY Coppola

## 2025-02-21 ENCOUNTER — TELEPHONE (OUTPATIENT)
Dept: GASTROENTEROLOGY | Facility: CLINIC | Age: 80
End: 2025-02-21
Payer: MEDICARE

## 2025-02-21 ENCOUNTER — PREP FOR SURGERY (OUTPATIENT)
Dept: OTHER | Facility: HOSPITAL | Age: 80
End: 2025-02-21
Payer: MEDICARE

## 2025-02-21 DIAGNOSIS — Z85.038 PERSONAL HISTORY OF COLON CANCER: Primary | ICD-10-CM

## 2025-02-21 NOTE — TELEPHONE ENCOUNTER
Please let patient know that Dr. Aguirre approves him to hold eliquis 48 hours prior to procedure. He needs to take aspirin 81 mg daily while off eliquis. Also he needs to hold ryblesus 1 week prior to procedure.  Schedule colonoscopy. Golytely prep  ( creat 1.7).         ----- Message from Domniic Aguirre sent at 2/20/2025 10:09 AM CST -----  He may hold Eliquis for 2 days as requested, but given his CAD history I'd recommend that he take aspirin 81 mg daily while holding Eliquis. He does not require bridging with lovenox.    Mónica, can you please make the patient aware of the recommendations and to take aspirin while holding Eliquis?    Thank you!  ----- Message -----  From: Nitza Carvalho APRN  Sent: 2/20/2025   9:13 AM CST  To: Dominic Aguirre MD

## 2025-04-08 RX ORDER — POLYETHYLENE GLYCOL 3350, SODIUM SULFATE ANHYDROUS, SODIUM BICARBONATE, SODIUM CHLORIDE, POTASSIUM CHLORIDE 236; 22.74; 6.74; 5.86; 2.97 G/4L; G/4L; G/4L; G/4L; G/4L
4 POWDER, FOR SOLUTION ORAL ONCE
Qty: 4000 ML | Refills: 0 | Status: SHIPPED | OUTPATIENT
Start: 2025-04-08 | End: 2025-04-08

## 2025-04-17 ENCOUNTER — HOSPITAL ENCOUNTER (OUTPATIENT)
Facility: HOSPITAL | Age: 80
Setting detail: HOSPITAL OUTPATIENT SURGERY
Discharge: HOME | End: 2025-04-17
Attending: INTERNAL MEDICINE | Admitting: INTERNAL MEDICINE
Payer: MEDICARE

## 2025-04-17 ENCOUNTER — ANESTHESIA (OUTPATIENT)
Dept: GASTROENTEROLOGY | Facility: HOSPITAL | Age: 80
End: 2025-04-17
Payer: MEDICARE

## 2025-04-17 ENCOUNTER — ANESTHESIA EVENT (OUTPATIENT)
Dept: GASTROENTEROLOGY | Facility: HOSPITAL | Age: 80
End: 2025-04-17
Payer: MEDICARE

## 2025-04-17 VITALS
SYSTOLIC BLOOD PRESSURE: 123 MMHG | HEART RATE: 66 BPM | OXYGEN SATURATION: 98 % | TEMPERATURE: 96.4 F | DIASTOLIC BLOOD PRESSURE: 71 MMHG | WEIGHT: 206 LBS | HEIGHT: 72 IN | BODY MASS INDEX: 27.9 KG/M2 | RESPIRATION RATE: 20 BRPM

## 2025-04-17 DIAGNOSIS — Z85.038 PERSONAL HISTORY OF COLON CANCER: ICD-10-CM

## 2025-04-17 LAB — GLUCOSE BLDC GLUCOMTR-MCNC: 174 MG/DL (ref 70–130)

## 2025-04-17 PROCEDURE — 45385 COLONOSCOPY W/LESION REMOVAL: CPT | Performed by: INTERNAL MEDICINE

## 2025-04-17 PROCEDURE — 25810000003 SODIUM CHLORIDE 0.9 % SOLUTION: Performed by: ANESTHESIOLOGY

## 2025-04-17 PROCEDURE — 25010000002 LIDOCAINE PF 2% 2 % SOLUTION

## 2025-04-17 PROCEDURE — 82948 REAGENT STRIP/BLOOD GLUCOSE: CPT

## 2025-04-17 PROCEDURE — 88305 TISSUE EXAM BY PATHOLOGIST: CPT | Performed by: INTERNAL MEDICINE

## 2025-04-17 PROCEDURE — 25010000002 PROPOFOL 10 MG/ML EMULSION

## 2025-04-17 RX ORDER — ONDANSETRON 2 MG/ML
4 INJECTION INTRAMUSCULAR; INTRAVENOUS ONCE AS NEEDED
Status: DISCONTINUED | OUTPATIENT
Start: 2025-04-17 | End: 2025-04-17 | Stop reason: HOSPADM

## 2025-04-17 RX ORDER — SODIUM CHLORIDE 0.9 % (FLUSH) 0.9 %
10 SYRINGE (ML) INJECTION AS NEEDED
Status: DISCONTINUED | OUTPATIENT
Start: 2025-04-17 | End: 2025-04-17 | Stop reason: HOSPADM

## 2025-04-17 RX ORDER — LIDOCAINE HYDROCHLORIDE 20 MG/ML
INJECTION, SOLUTION EPIDURAL; INFILTRATION; INTRACAUDAL; PERINEURAL AS NEEDED
Status: DISCONTINUED | OUTPATIENT
Start: 2025-04-17 | End: 2025-04-17 | Stop reason: SURG

## 2025-04-17 RX ORDER — PROPOFOL 10 MG/ML
VIAL (ML) INTRAVENOUS AS NEEDED
Status: DISCONTINUED | OUTPATIENT
Start: 2025-04-17 | End: 2025-04-17 | Stop reason: SURG

## 2025-04-17 RX ORDER — SODIUM CHLORIDE 9 MG/ML
500 INJECTION, SOLUTION INTRAVENOUS ONCE
Status: COMPLETED | OUTPATIENT
Start: 2025-04-17 | End: 2025-04-17

## 2025-04-17 RX ORDER — ASPIRIN 81 MG/1
81 TABLET ORAL DAILY
COMMUNITY

## 2025-04-17 RX ADMIN — SODIUM CHLORIDE 500 ML: 9 INJECTION, SOLUTION INTRAVENOUS at 10:10

## 2025-04-17 RX ADMIN — PROPOFOL 300 MG: 10 INJECTION, EMULSION INTRAVENOUS at 12:26

## 2025-04-17 RX ADMIN — LIDOCAINE HYDROCHLORIDE 50 MG: 20 INJECTION, SOLUTION EPIDURAL; INFILTRATION; INTRACAUDAL; PERINEURAL at 12:26

## 2025-04-17 NOTE — ANESTHESIA POSTPROCEDURE EVALUATION
Patient: Mani Arreola    Procedure Summary       Date: 04/17/25 Room / Location: [facility] ENDOSCOPY 4 / [facility] ENDOSCOPY    Anesthesia Start: 1223 Anesthesia Stop: 1247    Procedure: COLONOSCOPY WITH ANESTHESIA Diagnosis:       Personal history of colon cancer      (Personal history of colon cancer [Z85.038])    Surgeons: Cirilo Bhandari MD Provider: Durga Ramirez CRNA    Anesthesia Type: MAC ASA Status: 3            Anesthesia Type: MAC    Vitals  No vitals data found for the desired time range.          Post Anesthesia Care and Evaluation    Patient location during evaluation: PHASE II  Patient participation: complete - patient participated  Level of consciousness: awake  Pain score: 0    Airway patency: patent  Anesthetic complications: No anesthetic complications  PONV Status: none  Cardiovascular status: acceptable  Respiratory status: acceptable  Hydration status: acceptable  No anesthesia care post op

## 2025-04-17 NOTE — H&P
[facility] Gastroenterology  Pre Procedure History & Physical    Chief Complaint:   Past history of colon cancer    Subjective     HPI:   He has a past history of colon cancer presents for colonoscopy surveillance exam.  Denies any colon symptoms.    Past Medical History:   Past Medical History:   Diagnosis Date    Arthritis     Colon cancer     colon    Colon polyp     Coronary artery disease     stents x 2    Diabetes mellitus     Hyperlipidemia     Hypertension     Myocardial infarction        Past Surgical History:  Past Surgical History:   Procedure Laterality Date    CARDIAC CATHETERIZATION      stents x 2    CARDIAC ELECTROPHYSIOLOGY PROCEDURE N/A 11/14/2022    Procedure: EP/Ablation, atrial flutter;  Surgeon: Francisca Cote MD;  Location: [facility] CATH INVASIVE LOCATION;  Service: Cardiology;  Laterality: N/A;    CARDIAC ELECTROPHYSIOLOGY PROCEDURE N/A 12/29/2022    Procedure: EP/Ablation, atypical atrial flutter;  Surgeon: Francisca Cote MD;  Location: [facility] CATH INVASIVE LOCATION;  Service: Cardiology;  Laterality: N/A;    CAROTID ENDARTERECTOMY Right     CATARACT EXTRACTION      CHOLECYSTECTOMY      COLON RESECTION      for colon cancer    COLONOSCOPY  02/2004    COLONOSCOPY  09/2005    normal    COLONOSCOPY  10/2007    recall 3 yr    COLONOSCOPY  11/01/2010    5mm polypectomy distal transverse colon, patent end to end ileo colonic anastomosis    COLONOSCOPY N/A 11/16/2016    Procedure: COLONOSCOPY WITH ANESTHESIA;  Surgeon: Cirilo Bhandari MD;  Location: [facility] ENDOSCOPY;  Service:     COLONOSCOPY N/A 1/11/2019    Procedure: COLONOSCOPY WITH ANESTHESIA;  Surgeon: Cirilo Bhandari MD;  Location: [facility] ENDOSCOPY;  Service: Gastroenterology    COLONOSCOPY N/A 2/15/2022    Procedure: COLONOSCOPY WITH ANESTHESIA;  Surgeon: Cirilo Bhandari MD;  Location: [facility] ENDOSCOPY;  Service: Gastroenterology;  Laterality: N/A;  pre colon cancer  post polyp;hemorrhoids      HYDROCELE EXCISION / REPAIR       LUMBAR FUSION Left 3/13/2017    Procedure: LEFT LUMBAR LATERAL INTERBODY FUSION WITH INSTRUMENTATION L4-5 , LANX,  NUVASIVE MONITORING ;  Surgeon: DALY Morgan MD;  Location:  [facility] OR;  Service:     LUMBAR FUSION N/A 3/15/2017    Procedure: POSTERIOR SPINAL FUSION WITH INSTRUMENTATION L4-5;  Surgeon: DALY Morgan MD;  Location:  [facility] OR;  Service:     PERIPHERAL ARTERIAL STENT GRAFT          Family History:  Family History   Problem Relation Age of Onset    Heart disease Father     Esophageal cancer Father     Colon cancer Neg Hx     Colon polyps Neg Hx        Social History:   reports that he has been smoking pipe. He has never used smokeless tobacco. He reports current alcohol use. He reports that he does not use drugs.    Medications:   Prior to Admission medications    Medication Sig Start Date End Date Taking? Authorizing Provider   acetaminophen (TYLENOL) 650 MG 8 hr tablet Take 2 tablets by mouth 2 (Two) Times a Day.   Yes Michael Snowden MD   amLODIPine (NORVASC) 10 MG tablet Take 0.5 tablets by mouth Daily. 8/22/24  Yes Dominic Aguirre MD   aspirin 81 MG EC tablet Take 1 tablet by mouth Daily.   Yes Michael Snowden MD   atorvastatin (LIPITOR) 80 MG tablet Take 1 tablet by mouth Daily.  Patient taking differently: Take 1 tablet by mouth Every Night. 9/22/22  Yes Dominic Aguirre MD   bumetanide (BUMEX) 2 MG tablet Take 0.5 tablets by mouth Daily. 11/14/24  Yes Dominic Aguirre MD   colestipol (COLESTID) 1 g tablet Take 1 tablet by mouth Daily. 1/28/25  Yes Michael Snowden MD   dapagliflozin Propanediol 10 MG tablet Take 10 mg by mouth Daily. 8/22/24  Yes Dominic Aguirre MD   folic acid (FOLVITE) 400 MCG tablet Take 1 tablet by mouth Daily.   Yes Michael Snowden MD   metFORMIN ER (GLUCOPHAGE-XR) 500 MG 24 hr tablet Take 1 tablet by mouth Daily With Breakfast. 1/28/25  Yes Michael Snowden MD   metoprolol succinate XL (TOPROL-XL) 100 MG 24 hr tablet Take 1 tablet by  mouth Daily. 11/28/23  Yes Dominic Aguirre MD   Multiple Vitamins-Minerals (MULTIVITAMIN ADULT PO) Take 1 tablet by mouth Daily.   Yes Michael Snowden MD   Omega-3 Fatty Acids (FISH OIL PO) Take 1,000 mg by mouth Every Night.   Yes Michael Snowden MD   omeprazole (priLOSEC) 20 MG capsule Take 2 capsules by mouth Daily. 7/1/24  Yes Dominic Aguirre MD   sacubitril-valsartan (ENTRESTO)  MG tablet Take 1 tablet by mouth 2 (Two) Times a Day. 11/14/24  Yes Dominic Aguirre MD   traMADol (ULTRAM) 50 MG tablet Take 1 tablet by mouth Every 8 (Eight) Hours As Needed for Moderate Pain.   Yes Michael Snowden MD   vitamin C (ASCORBIC ACID) 500 MG tablet Take 1 tablet by mouth Every Morning.   Yes Michael Snowden MD   albuterol (PROVENTIL) (2.5 MG/3ML) 0.083% nebulizer solution Take 2.5 mg by nebulization Every 4 (Four) Hours As Needed for Wheezing.    Michael Snowden MD   apixaban (Eliquis) 5 MG tablet tablet TAKE 1 TABLET BY MOUTH TWICE DAILY 11/1/24   Hugh Aguillon APRN   diphenhydrAMINE (BENADRYL) 25 mg capsule Take 2 capsules by mouth At Night As Needed for Allergies or Sleep. Patient states he takes them regularly.    Michael Snowden MD   ondansetron ODT (ZOFRAN-ODT) 8 MG disintegrating tablet Place 1 tablet on the tongue Every 8 (Eight) Hours As Needed for Nausea or Vomiting.    Michael Snowden MD Rybelsus 7 MG tablet Take 7 mg by mouth. 1/30/25   Provider, Historical, MD   saccharomyces boulardii (FLORASTOR) 250 MG capsule Take 1 capsule by mouth Daily.  Patient not taking: Reported on 2/20/2025    Michael Snowden MD   SITagliptin-metFORMIN HCl ER (JANUMET XR) 100-1000 MG tablet Take 1 tablet by mouth Daily.  Patient not taking: Reported on 2/20/2025    Michael Snowden MD       Allergies:  Patient has no known allergies.    ROS:    General: Weight stable  Respiratory: No SOA  Cardiovascular: No CP    Objective     Blood pressure 123/71, pulse 66, temperature  "96.4 °F (35.8 °C), temperature source Tympanic, resp. rate 20, height 182.9 cm (72\"), weight 93.4 kg (206 lb), SpO2 98%.    Physical Exam   Constitutional: Pt is oriented to person, place, and in no distress.   Cardiovascular: Normal rate, regular rhythm.    Pulmonary/Chest: Effort normal. No respiratory distress.    Abdominal: Nondistended.  Psychiatric: Mood, memory, affect and judgment appear normal.     Assessment & Plan     Diagnosis:  Past personal history of colon cancer    Anticipated Surgical Procedure:  Colonoscopy    The risks, benefits, and alternatives of this procedure have been discussed with the patient or the responsible party- the patient understands and agrees to proceed.    EMR Dragon/transcription disclaimer:  Much of this encounter note is electronic transcription/translation of spoken language to printed text.  The electronic translation of spoken language may be erroneous, or at times, nonsensical words or phrases may be inadvertently transcribed.  Although I have reviewed the note for such errors, some may still exist.  "

## 2025-04-17 NOTE — ANESTHESIA PREPROCEDURE EVALUATION
Anesthesia Evaluation     Patient summary reviewed   no history of anesthetic complications:   NPO Solid Status: > 8 hours  NPO Liquid Status: > 4 hours           Airway   Mallampati: II  TM distance: >3 FB  Neck ROM: full  Dental          Pulmonary    (+) a smoker (pipe) Current,  (-) asthma, sleep apnea  Cardiovascular   Exercise tolerance: good (4-7 METS)    PT is on anticoagulation therapy  Patient on routine beta blocker    (+) hypertension, past MI (1998) , CAD, cardiac stents (1998) , dysrhythmias Atrial Flutter, hyperlipidemia      Neuro/Psych  (-) seizures, TIA, CVA  GI/Hepatic/Renal/Endo    (+) obesity, diabetes mellitus  (-) GERD, liver disease, no renal disease    Musculoskeletal     Abdominal    Substance History      OB/GYN          Other      history of cancer                      Anesthesia Plan    ASA 3     MAC     (Aspirin appropriately taken )  intravenous induction     Anesthetic plan, risks, benefits, and alternatives have been provided, discussed and informed consent has been obtained with: patient.

## 2025-04-18 LAB
CYTO UR: NORMAL
LAB AP CASE REPORT: NORMAL
Lab: NORMAL
PATH REPORT.FINAL DX SPEC: NORMAL
PATH REPORT.GROSS SPEC: NORMAL

## 2025-04-23 ENCOUNTER — HOSPITAL ENCOUNTER (OUTPATIENT)
Dept: CARDIOLOGY | Facility: HOSPITAL | Age: 80
Discharge: HOME | End: 2025-04-23
Admitting: NURSE PRACTITIONER
Payer: MEDICARE

## 2025-04-23 VITALS
HEART RATE: 68 BPM | BODY MASS INDEX: 28.92 KG/M2 | WEIGHT: 213.2 LBS | SYSTOLIC BLOOD PRESSURE: 140 MMHG | OXYGEN SATURATION: 99 % | DIASTOLIC BLOOD PRESSURE: 72 MMHG

## 2025-04-23 DIAGNOSIS — E78.5 HYPERLIPIDEMIA LDL GOAL <70: ICD-10-CM

## 2025-04-23 DIAGNOSIS — I10 PRIMARY HYPERTENSION: ICD-10-CM

## 2025-04-23 DIAGNOSIS — I50.22 CHRONIC HFREF (HEART FAILURE WITH REDUCED EJECTION FRACTION): Primary | ICD-10-CM

## 2025-04-23 DIAGNOSIS — I48.0 PAROXYSMAL ATRIAL FIBRILLATION: ICD-10-CM

## 2025-04-23 DIAGNOSIS — I25.10 CORONARY ARTERY DISEASE INVOLVING NATIVE CORONARY ARTERY OF NATIVE HEART WITHOUT ANGINA PECTORIS: ICD-10-CM

## 2025-04-23 LAB
ABSOLUTE LUNG FLUID CONTENT: 44 % (ref 20–35)
ANION GAP SERPL CALCULATED.3IONS-SCNC: 8 MMOL/L (ref 5–15)
BUN SERPL-MCNC: 18 MG/DL (ref 8–23)
BUN/CREAT SERPL: 12.9 (ref 7–25)
CALCIUM SPEC-SCNC: 8.9 MG/DL (ref 8.6–10.5)
CHLORIDE SERPL-SCNC: 104 MMOL/L (ref 98–107)
CO2 SERPL-SCNC: 27 MMOL/L (ref 22–29)
CREAT SERPL-MCNC: 1.4 MG/DL (ref 0.76–1.27)
EGFRCR SERPLBLD CKD-EPI 2021: 51.4 ML/MIN/1.73
GLUCOSE SERPL-MCNC: 215 MG/DL (ref 65–99)
POTASSIUM SERPL-SCNC: 5 MMOL/L (ref 3.5–5.2)
SODIUM SERPL-SCNC: 139 MMOL/L (ref 136–145)

## 2025-04-23 PROCEDURE — 94726 PLETHYSMOGRAPHY LUNG VOLUMES: CPT | Performed by: NURSE PRACTITIONER

## 2025-04-23 PROCEDURE — 80048 BASIC METABOLIC PNL TOTAL CA: CPT | Performed by: NURSE PRACTITIONER

## 2025-04-23 NOTE — PROGRESS NOTES
Heart Failure Clinic    Date:  04/23/25     Vitals:    04/23/25 1005   BP: 140/72   Pulse: 68   SpO2: 99%        Indication:  Heart Failure    Procedure:  ReDS device sensor unit applied to right side of chest and right side of back.  Appropriate positioning confirmed based off of the unit's calculation.  Chest measurement obtained with the chest size ruler.  Measurement session performed over 45 seconds.      Results:  ReDS Value=  44    Interpretation:  39-46% - elevated lung fluid content    Mallory L Haase, RN 04/23/25 10:08 CDT

## 2025-04-23 NOTE — PROGRESS NOTES
Heart Failure Clinic Progress Note  Reason For Visit:  CHF    Subjective    Mani Arreola is a 80 y.o. male with the below pertinent PMH who presents for follow-up of CHF.    Mani Arreola was most recently seen in the heart failure clinic on 1/23/2025.  At that time he noted he was feeling very well.  He did decrease his Bumex and felt that everything was going well.  He denied any shortness of breath, lightheadedness, dizziness, chest pain, or peripheral edema.  His weight was up by 10 pounds but his Rytary was normal.  He attributed his weight gain to more clothing and less activity during the winter.  At that time no new changes were made in his renal function remained stable.    Indicates he is doing well today.  He denies any lightheadedness, dizziness, chest pain, shortness of breath, or peripheral edema.  He is taking all of his medications and feels that he is tolerating this well.  His weight today is down by 5 pounds but his ReDs reading is elevated at 44%.    Review of Systems   Constitutional: Negative for malaise/fatigue.   Cardiovascular:  Negative for chest pain, dyspnea on exertion, leg swelling, orthopnea, palpitations and syncope.   Respiratory:  Negative for sleep disturbances due to breathing.    Neurological:  Negative for dizziness and light-headedness.     Pertinent PMH  HFrEF due to ICM with improved LVEF (50% on cardiac MRI 1/5/2023)  CAD s/p LAD PCI  Hypertension  Hyperlipidemia  Type 2 diabetes mellitus  Atrial flutter s/p ablation  Atrial fibrillation s/p ablation  Carotid artery stenosis s/p CEA    Pertinent past medical, surgical, family, and social history were reviewed.    Current Outpatient Medications   Medication Instructions    acetaminophen (TYLENOL) 1,300 mg, 2 Times Daily    albuterol (PROVENTIL) 2.5 mg, Every 4 Hours PRN    amLODIPine (NORVASC) 5 mg, Oral, Daily    aspirin 81 mg, Daily    atorvastatin (LIPITOR) 80 mg, Oral, Daily    bumetanide (BUMEX) 1 mg, Oral, Daily     "colestipol (COLESTID) 1 g tablet 1 tablet, Daily    dapagliflozin Propanediol 10 mg, Oral, Daily    diphenhydrAMINE (BENADRYL) 50 mg, Nightly PRN    Eliquis 5 mg, Oral, 2 Times Daily    folic acid (FOLVITE) 400 mcg, Daily    metFORMIN ER (GLUCOPHAGE-XR) 500 mg, Daily With Breakfast    metoprolol succinate XL (TOPROL-XL) 100 mg, Oral, Daily    Multiple Vitamins-Minerals (MULTIVITAMIN ADULT PO) 1 tablet, Daily    Omega-3 Fatty Acids (FISH OIL PO) 1,000 mg, Nightly    omeprazole (PRILOSEC) 40 mg, Oral, Daily    ondansetron ODT (ZOFRAN-ODT) 8 mg, Every 8 Hours PRN    Rybelsus 7 mg    saccharomyces boulardii (FLORASTOR) 250 mg, Daily    sacubitril-valsartan (ENTRESTO)  MG tablet 1 tablet, Oral, 2 Times Daily    SITagliptin-metFORMIN HCl ER (JANUMET XR) 100-1000 MG tablet 1 tablet, Daily    traMADol (ULTRAM) 50 mg, Every 8 Hours PRN    vitamin C (ASCORBIC ACID) 500 mg, Every Morning        Objective   Vital Signs:  /72 (BP Location: Left arm, Patient Position: Sitting)   Pulse 68   Wt 96.7 kg (213 lb 3.2 oz)   SpO2 99%   BMI 28.92 kg/m²   Estimated body mass index is 28.92 kg/m² as calculated from the following:    Height as of 4/17/25: 182.9 cm (72\").    Weight as of this encounter: 96.7 kg (213 lb 3.2 oz).    Neck:      Vascular: No JVD.   Pulmonary:      Effort: Pulmonary effort is normal.      Breath sounds: Normal breath sounds.   Cardiovascular:      Normal rate. Regular rhythm. Normal S1. Normal S2.       Murmurs: There is no murmur.      No gallop.  No click. No rub.   Pulses:     Intact distal pulses.   Edema:     Peripheral edema absent.   Abdominal:      Palpations: Abdomen is soft.      Tenderness: There is no abdominal tenderness.   Skin:     General: Skin is warm and dry.   Neurological:      General: No focal deficit present.      Mental Status: Alert and oriented to person, place and time.   Psychiatric:         Behavior: Behavior is cooperative.        The following data was reviewed by " myself, Hugh AguillonGARY  BMP   BMP          12/5/2024    10:08 1/23/2025    10:47 4/23/2025    10:11   BMP   BUN 24  25  18    Creatinine 1.76  1.72  1.40    Sodium 140  141  139    Potassium 4.1  4.6  5.0    Chloride 101  104  104    CO2 27.0  26.0  27.0    Calcium 9.2  9.5  8.9      ReDS   Lab Results   Component Value Date    ABSOLUTELUNG 44 (A) 04/23/2025    ABSOLUTELUNG 34 01/23/2025    ABSOLUTELUNG 30 12/05/2024       Results for orders placed during the hospital encounter of 09/15/22    Adult Transthoracic Echo Complete W/ Cont if Necessary Per Protocol    Interpretation Summary  · The left ventricular cavity is moderately dilated.  · The right atrial cavity is borderline dilated.  · Left ventricular diastolic dysfunction is noted.  · The following left ventricular wall segments are hypokinetic: mid anterior, apical anterior, mid anterolateral, apical lateral, apical inferior, mid inferior, mid anteroseptal and basal anterior. The following left ventricular wall segments are akinetic: apical septal and apex.  · Left ventricular ejection fraction appears to be 31 - 35%. Left ventricular systolic function is severely decreased.    SEVERE ISCHEMIC CARDIOMYOPATHY       Assessment   Assessment and Plan  Diagnoses and all orders for this visit:    1. HFrEF (heart failure with reduced ejection fraction) (Primary)  2. Primary hypertension  3. Coronary artery disease involving native coronary artery of native heart without angina pectoris  4. Hyperlipidemia LDL goal <70  5. Paroxysmal atrial fibrillation  Patient remains stable from a cardiac standpoint.  His ReDs reading is elevated but otherwise he has no signs or symptoms of hypervolemia today.  I would favor not changing his GDMT today.  - Bumex 1 mg daily  - Continue Entresto 97/103 mg twice daily  - Toprol- mg daily  - Farxiga 10 mg daily  - Amlodipine 5mg daily  - Atorvastatin 80 mg daily  - Eliquis 5 mg twice daily  - Recommend and encourage adequate  fluid intake.        Follow Up:  Return in about 5 months (around 9/23/2025) for recheck.    Patient was given instructions and counseling regarding his condition or for health maintenance advice.      GARY Madrid  04/23/25  12:37 CDT

## 2025-06-10 ENCOUNTER — OFFICE VISIT (OUTPATIENT)
Dept: CARDIOLOGY | Facility: CLINIC | Age: 80
End: 2025-06-10
Payer: MEDICARE

## 2025-06-10 VITALS
OXYGEN SATURATION: 98 % | HEART RATE: 74 BPM | DIASTOLIC BLOOD PRESSURE: 82 MMHG | WEIGHT: 208.4 LBS | BODY MASS INDEX: 28.23 KG/M2 | HEIGHT: 72 IN | SYSTOLIC BLOOD PRESSURE: 150 MMHG

## 2025-06-10 DIAGNOSIS — I48.4 ATYPICAL ATRIAL FLUTTER: ICD-10-CM

## 2025-06-10 DIAGNOSIS — I48.0 PAROXYSMAL ATRIAL FIBRILLATION: Primary | ICD-10-CM

## 2025-06-10 NOTE — PROGRESS NOTES
EP FOLLOW UP PATIENT VISIT    Chief Complaint  Paroxysmal atrial fibrillation (Atrial flutter with rapid ventricular response/Atrial flutter with controlled response/Paroxysmal Atrial Fibrillation /6 Month Follow Up )    Subjective        History of Present Illness    EP History:  1.  Typical atrial flutter  -11/14/22:  CTI ablation  2.  Atypical atrial flutter  - 12/29/2022: mitral annual flutter s/p anteroseptal mitral line  3. Persistent afib  - 12/29/2022: PVI, posterior wall isolation     Cardiology history:  1.  Hypertension  2.  Hyperlipidemia  3.  Coronary artery disease w/ Prior MI and PCI  4.  Chronic systolic heart failure  -9/7/2022: EF 31 to 35%  -1/2023 Cardiac MRI 50%, nonviable LAD territory  5.  COTY with carotid endarterectomy (2004)     Medical History:   1.  Type 2 diabetes  3.  Obesity, BMI 31  -Resolved BMI 27           Patient is a 80-year-old male who presents to the clinic today following up for persistent atrial fibrillation and atypical atrial flutter. His history of atrial flutter and atrial fibrillation started in 2022. He had a CTI ablation in 2022 for typical atrial flutter. At his follow-up visit he was found to be in atrial flutter again and had a repeat EP study with a PVI ablation, posterior wall isolation, and anteroseptal mitral line. He has had a low EF in the past but a cardiac MRI in 2023 showed an EF of 50%.    He states that he is doing well since his last visit. He denies any recurrence of atrial fibrillation or atrial flutter. He says that he checks his heart rate every day and it usually in the 60s to the 70s. He continues to take metoprolol succinate for rate control and says that he tolerates this medication well. He is on Eliquis for anticoagulation and denies any bleeding issues. Blood pressure is higher than usual today 150/82. He says that his blood pressures are typically 120s over 60s and he attributes his elevated blood pressure today to back pain.    Objective  "  Vital Signs:  /82 (BP Location: Left arm, Patient Position: Sitting, Cuff Size: Adult)   Pulse 74   Ht 182.9 cm (72.01\")   Wt 94.5 kg (208 lb 6.4 oz)   SpO2 98%   BMI 28.26 kg/m²   Estimated body mass index is 28.26 kg/m² as calculated from the following:    Height as of this encounter: 182.9 cm (72.01\").    Weight as of this encounter: 94.5 kg (208 lb 6.4 oz).      Physical Exam  Vitals reviewed.   Constitutional:       Appearance: Normal appearance. He is normal weight.   Cardiovascular:      Rate and Rhythm: Normal rate and regular rhythm.      Pulses: Normal pulses.           Radial pulses are 2+ on the right side and 2+ on the left side.        Posterior tibial pulses are 2+ on the right side and 2+ on the left side.      Heart sounds: Normal heart sounds.   Pulmonary:      Effort: Pulmonary effort is normal.      Breath sounds: Normal breath sounds.   Musculoskeletal:      Right lower leg: No edema.      Left lower leg: No edema.   Skin:     General: Skin is warm and dry.   Neurological:      Mental Status: He is alert.                  Result Review :  The following data was reviewed by: GARY Harris on 06/10/2025:  CMP          12/5/2024    10:08 1/23/2025    10:47 4/23/2025    10:11   CMP   Glucose 203  194  215    BUN 24  25  18    Creatinine 1.76  1.72  1.40    EGFR 39.3  40.4  51.4    Sodium 140  141  139    Potassium 4.1  4.6  5.0    Chloride 101  104  104    Calcium 9.2  9.5  8.9    BUN/Creatinine Ratio 13.6  14.5  12.9    Anion Gap 12.0  11.0  8.0        [id number] SCORE   [id number] Score: 6 (6/10/2025 12:24 PM)        ECG 12 Lead    Date/Time: 6/10/2025 12:28 PM  Performed by: Yon Rader APRN    Authorized by: Yon Rader APRN  Comparison: compared with previous ECG from 1/23/2025  Similar to previous ECG  Comparison to previous ECG: Left axis deviation not present  Rhythm: sinus rhythm  Rate: normal  BPM: 74  Conduction: incomplete right bundle branch block and 1st " degree AV block  QRS axis: normal    Clinical impression: abnormal EKG              Assessment and Plan   Diagnoses and all orders for this visit:    1. Paroxysmal atrial fibrillation (Primary)  -     ECG 12 Lead    2. Atypical atrial flutter  -     ECG 12 Lead            Plan:  - Continue Eliquis for anticoagulation given his elevated [id number] score of 6.  - Continue metoprolol succinate 100 mg daily for rate control.  - Continue GDMT as recommended by the heart failure clinic.  - Consider repeat echo in the future to reassess EF, especially if his symptoms were to worsen.                   Follow Up   Return in about 6 months (around 12/10/2025).  Patient was given instructions and counseling regarding his condition or for health maintenance advice. Please see specific information pulled into the AVS if appropriate.     Part of this note may be an electronic transcription/translation of spoken language to printed text using the Dragon Dictation System.

## 2025-06-18 RX ORDER — AMLODIPINE BESYLATE 10 MG/1
5 TABLET ORAL DAILY
Qty: 45 TABLET | Refills: 11 | Status: SHIPPED | OUTPATIENT
Start: 2025-06-18

## (undated) DEVICE — SENSR O2 OXIMAX FNGR A/ 18IN NONSTR

## (undated) DEVICE — GLV SURG SENSICARE MICRO PF LF 9 STRL

## (undated) DEVICE — SNAR POLYP SENSATION MICRO OVL 13 240X40

## (undated) DEVICE — SYS CLS VASC/VENI VASCADE MVP 6TO12F

## (undated) DEVICE — OCTA,PERSEID,2-2-2-2-2,F-CURVE: Brand: OCTARAY MAPPING CATHETER

## (undated) DEVICE — Device: Brand: REFERENCE PATCH CARTO 3

## (undated) DEVICE — PAD E/S GRND SGL/FOIL 9FT/CORD DISP

## (undated) DEVICE — KWIRE TIMBERLINE BLNT
Type: IMPLANTABLE DEVICE | Status: NON-FUNCTIONAL
Removed: 2017-03-13

## (undated) DEVICE — MASK,OXYGEN,MED CONC,ADLT,7' TUB, UC: Brand: PENDING

## (undated) DEVICE — PK SPINE POST 30

## (undated) DEVICE — SI AVANTI+ 8F STD W/GW  NO OBT: Brand: AVANTI

## (undated) DEVICE — KT NDL GUIDE STRL 18GA

## (undated) DEVICE — YANKAUER SUCTION INSTRUMENT WITHOUT CONTROL VENT, OPEN TIP, CLEAR: Brand: YANKAUER

## (undated) DEVICE — PK TURNOVER RM ADV

## (undated) DEVICE — GLV SURG TRIUMPH ORTHO W/ALOE PF LTX 8.5 STRL

## (undated) DEVICE — ANTIBACTERIAL UNDYED BRAIDED (POLYGLACTIN 910), SYNTHETIC ABSORBABLE SUTURE: Brand: COATED VICRYL

## (undated) DEVICE — TP SILK DURAPORE 3IN

## (undated) DEVICE — Device: Brand: CARTO 3

## (undated) DEVICE — CATH IV ANGIO FEP 12G 3IN LTBLU 10PK

## (undated) DEVICE — GLV SURG NEOLON 2G PF LF 8 STRL

## (undated) DEVICE — SOLIDIFIER LIQUI LOC PLUS 2000CC

## (undated) DEVICE — PK SPINE LAT 30

## (undated) DEVICE — TBG PRESS/MONITR FIX M/F LL A/ 48IN STRL

## (undated) DEVICE — GRFT PLS W/FLTR SM

## (undated) DEVICE — THE SINGLE USE ETRAP – POLYP TRAP IS USED FOR SUCTION RETRIEVAL OF ENDOSCOPICALLY REMOVED POLYPS.: Brand: ETRAP

## (undated) DEVICE — Device: Brand: WEBSTER CS

## (undated) DEVICE — Device: Brand: THERMOCOOL SMARTTOUCH SF

## (undated) DEVICE — ENDOGATOR AUXILIARY WATER JET CONNECTOR: Brand: ENDOGATOR

## (undated) DEVICE — THE CHANNEL CLEANING BRUSH IS A NYLON FLEXI BRUSH ATTACHED TO A FLEXIBLE PLASTIC SHEATH DESIGNED TO SAFELY REMOVE DEBRIS FROM FLEXIBLE ENDOSCOPES.

## (undated) DEVICE — PK CATH CARD 30 CA/4

## (undated) DEVICE — ARGYLE YANKAUER BULB TIP WITH VENT: Brand: ARGYLE

## (undated) DEVICE — SOL IRR NACL 0.9PCT BT 1000ML

## (undated) DEVICE — PROB MONOPLR

## (undated) DEVICE — Device: Brand: SOUNDSTAR

## (undated) DEVICE — CUFF,BP,DISP,1 TUBE,ADULT,HP: Brand: MEDLINE

## (undated) DEVICE — DRAPE,UTILITY,TAPE,15X26,STERILE: Brand: MEDLINE

## (undated) DEVICE — DEFENDO AIR WATER SUCTION AND BIOPSY VALVE KIT FOR  OLYMPUS: Brand: DEFENDO AIR/WATER/SUCTION AND BIOPSY VALVE

## (undated) DEVICE — Device: Brand: SMARTABLATE

## (undated) DEVICE — STPCK 3/WY HP M/RA W/OFF/HNDL 1050PSI STRL

## (undated) DEVICE — Device: Brand: DEFENDO AIR/WATER/SUCTION AND BIOPSY VALVE

## (undated) DEVICE — KT ACC LAT EMG/SSEP TIMBERLINE GEN2

## (undated) DEVICE — CODMAN® SURGICAL PATTIES 1/2" X1 1/2" (1.27CM X 3.81CM): Brand: CODMAN®

## (undated) DEVICE — SCRW IPF TIMBERLINE VARI S/TAP 5.5X60MM
Type: IMPLANTABLE DEVICE | Status: NON-FUNCTIONAL
Removed: 2017-03-13

## (undated) DEVICE — CANN NASL ETCO2 LO/FLO A/

## (undated) DEVICE — PK GRFT DEL

## (undated) DEVICE — SI AVANTI+ 6F STD W/GW  NO OBT: Brand: AVANTI

## (undated) DEVICE — SPNG GZ WOVN 4X4IN 12PLY 10/BX STRL

## (undated) DEVICE — DRSNG BRDR MEPILEX P/OP SIL 4X8IN

## (undated) DEVICE — STERILE (15.2 TAPERED TO 7.6 X 183CM) POLYETHYLENE ACCORDION-FOLDED COVER FOR USE WITH SIEMENS ACUNAV ULTRASOUND CATHETER FAMILY CONNECTOR: Brand: SWIFTLINK TRANSDUCER COVER

## (undated) DEVICE — GOWN,NON-REINFORCED,SIRUS,SET IN SLV,XXL: Brand: MEDLINE

## (undated) DEVICE — PAD, DEFIB, ADULT, RADIOTRANS, PHYSIO: Brand: MEDLINE

## (undated) DEVICE — Device: Brand: VIZIGO

## (undated) DEVICE — TP PROB BALL 160MM

## (undated) DEVICE — INTRO SHEATH MOBICATH BI DIR 8.5F 1792CM

## (undated) DEVICE — TBG SMPL FLTR LINE NASL 02/C02 A/ BX/100

## (undated) DEVICE — GLV SURG NEOLON 2G PF LF 7.5 STRL

## (undated) DEVICE — GLV SURG TRIUMPH MICRO PF LTX 8.5 STRL

## (undated) DEVICE — KT PKIT PROAXIS W/PRONEVIEW ACP TBG

## (undated) DEVICE — PINNACLE INTRODUCER SHEATH: Brand: PINNACLE

## (undated) DEVICE — CONN FLX BREATHE CIRCT

## (undated) DEVICE — OCTA,PERSEID,2-2-2-2-2,D-CURVE: Brand: OCTARAY MAPPING CATHETER

## (undated) DEVICE — DRP C/ARMOR

## (undated) DEVICE — Device: Brand: SINGLE USE ELECTROSURGICAL SNARE SD-400

## (undated) DEVICE — ELECTRD BLD EDGE/INSUL1P 2.4X5.1MM STRL

## (undated) DEVICE — YANKAUER,BULB TIP WITH VENT: Brand: ARGYLE

## (undated) DEVICE — NDL TRNSEP BRK XS LNG 18G 98CM A/

## (undated) DEVICE — SI AVANTI+ 7F STD W/GW  NO OBT: Brand: AVANTI

## (undated) DEVICE — KWIRE TELLURIDE BLNT 1.4MM NS
Type: IMPLANTABLE DEVICE | Status: NON-FUNCTIONAL
Removed: 2017-03-15

## (undated) DEVICE — APPL CHLORAPREP W/TINT 26ML ORNG

## (undated) DEVICE — GLV SURG TRIUMPH NATURAL W/ALOE PF LTX 8 STRL